# Patient Record
Sex: FEMALE | Race: WHITE | Employment: OTHER | URBAN - METROPOLITAN AREA
[De-identification: names, ages, dates, MRNs, and addresses within clinical notes are randomized per-mention and may not be internally consistent; named-entity substitution may affect disease eponyms.]

---

## 2017-05-15 ENCOUNTER — TELEPHONE (OUTPATIENT)
Dept: FAMILY MEDICINE CLINIC | Age: 57
End: 2017-05-15

## 2017-06-12 ENCOUNTER — OFFICE VISIT (OUTPATIENT)
Dept: FAMILY MEDICINE CLINIC | Age: 57
End: 2017-06-12

## 2017-06-12 VITALS
BODY MASS INDEX: 41.03 KG/M2 | HEIGHT: 69 IN | TEMPERATURE: 98 F | HEART RATE: 63 BPM | WEIGHT: 277 LBS | DIASTOLIC BLOOD PRESSURE: 78 MMHG | SYSTOLIC BLOOD PRESSURE: 126 MMHG | RESPIRATION RATE: 20 BRPM

## 2017-06-12 DIAGNOSIS — R13.12 OROPHARYNGEAL DYSPHAGIA: ICD-10-CM

## 2017-06-12 DIAGNOSIS — F17.200 SMOKER: ICD-10-CM

## 2017-06-12 DIAGNOSIS — R79.89 LOW VITAMIN D LEVEL: ICD-10-CM

## 2017-06-12 DIAGNOSIS — E78.2 MIXED HYPERLIPIDEMIA: ICD-10-CM

## 2017-06-12 DIAGNOSIS — E07.89 PALPABLE THYROID: ICD-10-CM

## 2017-06-12 DIAGNOSIS — E11.8 TYPE 2 DIABETES MELLITUS WITH COMPLICATION, UNSPECIFIED LONG TERM INSULIN USE STATUS: Primary | ICD-10-CM

## 2017-06-12 DIAGNOSIS — G62.9 NEUROPATHY: ICD-10-CM

## 2017-06-12 DIAGNOSIS — R06.02 SHORTNESS OF BREATH: ICD-10-CM

## 2017-06-12 LAB
ALBUMIN SERPL-MCNC: 4.2 G/DL (ref 3.5–5.1)
ALP BLD-CCNC: 122 U/L (ref 38–126)
ALT SERPL-CCNC: 15 U/L (ref 11–66)
ANION GAP SERPL CALCULATED.3IONS-SCNC: 15 MEQ/L (ref 8–16)
AST SERPL-CCNC: 13 U/L (ref 5–40)
BASOPHILS # BLD: 0.6 %
BASOPHILS ABSOLUTE: 0.1 THOU/MM3 (ref 0–0.1)
BILIRUB SERPL-MCNC: 0.6 MG/DL (ref 0.3–1.2)
BILIRUBIN DIRECT: < 0.2 MG/DL (ref 0–0.3)
BUN BLDV-MCNC: 18 MG/DL (ref 7–22)
CALCIUM SERPL-MCNC: 10.3 MG/DL (ref 8.5–10.5)
CHLORIDE BLD-SCNC: 101 MEQ/L (ref 98–111)
CHOLESTEROL, TOTAL: 260 MG/DL (ref 100–199)
CO2: 23 MEQ/L (ref 23–33)
CREAT SERPL-MCNC: 0.7 MG/DL (ref 0.4–1.2)
EOSINOPHIL # BLD: 2 %
EOSINOPHILS ABSOLUTE: 0.3 THOU/MM3 (ref 0–0.4)
GFR SERPL CREATININE-BSD FRML MDRD: 86 ML/MIN/1.73M2
GLUCOSE BLD-MCNC: 116 MG/DL (ref 70–108)
HCT VFR BLD CALC: 45.9 % (ref 37–47)
HDLC SERPL-MCNC: 33 MG/DL
HEMOGLOBIN: 15.3 GM/DL (ref 12–16)
LDL CHOLESTEROL CALCULATED: 166 MG/DL
LYMPHOCYTES # BLD: 33.7 %
LYMPHOCYTES ABSOLUTE: 4.3 THOU/MM3 (ref 1–4.8)
MAGNESIUM: 2 MG/DL (ref 1.6–2.4)
MCH RBC QN AUTO: 30.1 PG (ref 27–31)
MCHC RBC AUTO-ENTMCNC: 33.2 GM/DL (ref 33–37)
MCV RBC AUTO: 90.6 FL (ref 81–99)
MONOCYTES # BLD: 8.4 %
MONOCYTES ABSOLUTE: 1.1 THOU/MM3 (ref 0.4–1.3)
NUCLEATED RED BLOOD CELLS: 0 /100 WBC
PDW BLD-RTO: 14.5 % (ref 11.5–14.5)
PLATELET # BLD: 213 THOU/MM3 (ref 130–400)
PMV BLD AUTO: 10.8 MCM (ref 7.4–10.4)
POTASSIUM SERPL-SCNC: 4.3 MEQ/L (ref 3.5–5.2)
RBC # BLD: 5.07 MILL/MM3 (ref 4.2–5.4)
RBC # BLD: NORMAL 10*6/UL
SEDIMENTATION RATE, ERYTHROCYTE: 28 MM/HR (ref 0–20)
SEG NEUTROPHILS: 55.3 %
SEGMENTED NEUTROPHILS ABSOLUTE COUNT: 7 THOU/MM3 (ref 1.8–7.7)
SODIUM BLD-SCNC: 139 MEQ/L (ref 135–145)
TOTAL PROTEIN: 8 G/DL (ref 6.1–8)
TRIGL SERPL-MCNC: 305 MG/DL (ref 0–199)
TSH SERPL DL<=0.05 MIU/L-ACNC: 1.68 UIU/ML (ref 0.4–4.2)
VITAMIN D 25-HYDROXY: 12 NG/ML (ref 30–100)
WBC # BLD: 12.7 THOU/MM3 (ref 4.8–10.8)

## 2017-06-12 PROCEDURE — 99204 OFFICE O/P NEW MOD 45 MIN: CPT | Performed by: FAMILY MEDICINE

## 2017-06-12 PROCEDURE — 36415 COLL VENOUS BLD VENIPUNCTURE: CPT | Performed by: FAMILY MEDICINE

## 2017-06-12 RX ORDER — PREGABALIN 300 MG/1
300 CAPSULE ORAL 2 TIMES DAILY
COMMUNITY
End: 2017-08-14 | Stop reason: SDUPTHER

## 2017-06-12 RX ORDER — BUPROPION HYDROCHLORIDE 150 MG/1
150 TABLET, EXTENDED RELEASE ORAL 2 TIMES DAILY
Qty: 60 TABLET | Refills: 3 | Status: SHIPPED | OUTPATIENT
Start: 2017-06-12 | End: 2017-06-12 | Stop reason: SDUPTHER

## 2017-06-12 ASSESSMENT — ENCOUNTER SYMPTOMS
TROUBLE SWALLOWING: 0
NAUSEA: 0
EYE PAIN: 0
COUGH: 0
RHINORRHEA: 1
BLOOD IN STOOL: 0
CONSTIPATION: 0
ABDOMINAL PAIN: 0
SHORTNESS OF BREATH: 1
VOMITING: 0
DIARRHEA: 1

## 2017-06-13 ENCOUNTER — TELEPHONE (OUTPATIENT)
Dept: FAMILY MEDICINE CLINIC | Age: 57
End: 2017-06-13

## 2017-06-13 LAB — THYROXINE (T4): 6.9 UG/DL (ref 4.5–12)

## 2017-06-13 RX ORDER — BUPROPION HYDROCHLORIDE 150 MG/1
TABLET, EXTENDED RELEASE ORAL
Qty: 180 TABLET | Refills: 3 | Status: SHIPPED | OUTPATIENT
Start: 2017-06-13 | End: 2017-07-06 | Stop reason: SDUPTHER

## 2017-06-14 LAB
C-REACTIVE PROTEIN, HIGH SENSITIVITY: 14.7 MG/L
HOMOCYSTEINE, TOTAL: 16 UMOL/L
THYROGLOBULIN: NORMAL
THYROID PEROXIDASE ANTIBODY: 1.5 IU/ML (ref 0–9)

## 2017-06-15 ENCOUNTER — TELEPHONE (OUTPATIENT)
Dept: FAMILY MEDICINE CLINIC | Age: 57
End: 2017-06-15

## 2017-06-15 LAB — ANA SCREEN: NORMAL

## 2017-07-03 ENCOUNTER — TELEPHONE (OUTPATIENT)
Dept: FAMILY MEDICINE CLINIC | Age: 57
End: 2017-07-03

## 2017-07-06 DIAGNOSIS — F17.200 SMOKER: ICD-10-CM

## 2017-07-06 RX ORDER — BUPROPION HYDROCHLORIDE 150 MG/1
TABLET, EXTENDED RELEASE ORAL
Qty: 90 TABLET | Refills: 1 | Status: SHIPPED | OUTPATIENT
Start: 2017-07-06 | End: 2017-07-12 | Stop reason: SDUPTHER

## 2017-07-06 RX ORDER — PANTOPRAZOLE SODIUM 40 MG/1
40 TABLET, DELAYED RELEASE ORAL DAILY
Qty: 90 TABLET | Refills: 1 | Status: SHIPPED | OUTPATIENT
Start: 2017-07-06 | End: 2017-07-12 | Stop reason: SDUPTHER

## 2017-07-06 RX ORDER — CITALOPRAM 40 MG/1
40 TABLET ORAL DAILY
Qty: 90 TABLET | Refills: 1 | Status: SHIPPED | OUTPATIENT
Start: 2017-07-06 | End: 2017-07-12 | Stop reason: SDUPTHER

## 2017-07-06 RX ORDER — TRAZODONE HYDROCHLORIDE 100 MG/1
100 TABLET ORAL NIGHTLY
Qty: 90 TABLET | Refills: 1 | Status: SHIPPED | OUTPATIENT
Start: 2017-07-06 | End: 2017-07-12 | Stop reason: SDUPTHER

## 2017-07-06 RX ORDER — INSULIN ASPART 100 [IU]/ML
8 INJECTION, SUSPENSION SUBCUTANEOUS 3 TIMES DAILY
Qty: 1 VIAL | Refills: 3 | Status: SHIPPED | OUTPATIENT
Start: 2017-07-06 | End: 2017-07-11 | Stop reason: CLARIF

## 2017-07-11 ENCOUNTER — TELEPHONE (OUTPATIENT)
Dept: FAMILY MEDICINE CLINIC | Age: 57
End: 2017-07-11

## 2017-07-12 ENCOUNTER — TELEPHONE (OUTPATIENT)
Dept: FAMILY MEDICINE CLINIC | Age: 57
End: 2017-07-12

## 2017-07-12 ENCOUNTER — OFFICE VISIT (OUTPATIENT)
Dept: FAMILY MEDICINE CLINIC | Age: 57
End: 2017-07-12

## 2017-07-12 VITALS
WEIGHT: 273.4 LBS | SYSTOLIC BLOOD PRESSURE: 123 MMHG | DIASTOLIC BLOOD PRESSURE: 57 MMHG | RESPIRATION RATE: 12 BRPM | TEMPERATURE: 98.1 F | HEART RATE: 75 BPM | BODY MASS INDEX: 42.91 KG/M2 | HEIGHT: 67 IN

## 2017-07-12 DIAGNOSIS — E66.01 MORBID OBESITY WITH BMI OF 40.0-44.9, ADULT (HCC): ICD-10-CM

## 2017-07-12 DIAGNOSIS — J43.9 PULMONARY EMPHYSEMA, UNSPECIFIED EMPHYSEMA TYPE (HCC): Primary | ICD-10-CM

## 2017-07-12 DIAGNOSIS — Z72.0 TOBACCO ABUSE: ICD-10-CM

## 2017-07-12 DIAGNOSIS — F33.42 RECURRENT MAJOR DEPRESSIVE DISORDER, IN FULL REMISSION (HCC): ICD-10-CM

## 2017-07-12 DIAGNOSIS — Z11.4 SCREENING FOR HIV WITHOUT PRESENCE OF RISK FACTORS: ICD-10-CM

## 2017-07-12 DIAGNOSIS — R94.2 ABNORMAL PFT: Primary | ICD-10-CM

## 2017-07-12 DIAGNOSIS — E55.9 VITAMIN D DEFICIENCY: ICD-10-CM

## 2017-07-12 DIAGNOSIS — K21.9 GASTROESOPHAGEAL REFLUX DISEASE WITHOUT ESOPHAGITIS: ICD-10-CM

## 2017-07-12 DIAGNOSIS — E11.8 TYPE 2 DIABETES MELLITUS WITH COMPLICATION, UNSPECIFIED LONG TERM INSULIN USE STATUS: ICD-10-CM

## 2017-07-12 DIAGNOSIS — Z11.59 NEED FOR HEPATITIS C SCREENING TEST: ICD-10-CM

## 2017-07-12 DIAGNOSIS — E78.2 HYPERLIPIDEMIA, MIXED: ICD-10-CM

## 2017-07-12 DIAGNOSIS — F17.200 SMOKER: ICD-10-CM

## 2017-07-12 PROCEDURE — 99214 OFFICE O/P EST MOD 30 MIN: CPT | Performed by: NURSE PRACTITIONER

## 2017-07-12 RX ORDER — PANTOPRAZOLE SODIUM 40 MG/1
40 TABLET, DELAYED RELEASE ORAL DAILY
Qty: 90 TABLET | Refills: 1 | Status: SHIPPED | OUTPATIENT
Start: 2017-07-12 | End: 2017-11-09 | Stop reason: SDUPTHER

## 2017-07-12 RX ORDER — CITALOPRAM 40 MG/1
40 TABLET ORAL DAILY
Qty: 90 TABLET | Refills: 1 | Status: SHIPPED | OUTPATIENT
Start: 2017-07-12 | End: 2017-11-09 | Stop reason: SDUPTHER

## 2017-07-12 RX ORDER — BUPROPION HYDROCHLORIDE 150 MG/1
TABLET, EXTENDED RELEASE ORAL
Qty: 90 TABLET | Refills: 1 | Status: SHIPPED | OUTPATIENT
Start: 2017-07-12 | End: 2017-11-09 | Stop reason: SDUPTHER

## 2017-07-12 RX ORDER — AMPICILLIN TRIHYDRATE 250 MG
1 CAPSULE ORAL DAILY
COMMUNITY
End: 2019-01-14

## 2017-07-12 RX ORDER — ATORVASTATIN CALCIUM 20 MG/1
20 TABLET, FILM COATED ORAL DAILY
Qty: 90 TABLET | Refills: 0 | Status: SHIPPED | OUTPATIENT
Start: 2017-07-12 | End: 2017-09-20 | Stop reason: SDUPTHER

## 2017-07-12 RX ORDER — TRAZODONE HYDROCHLORIDE 100 MG/1
100 TABLET ORAL NIGHTLY
Qty: 90 TABLET | Refills: 1 | Status: SHIPPED | OUTPATIENT
Start: 2017-07-12 | End: 2017-11-09 | Stop reason: SDUPTHER

## 2017-07-12 ASSESSMENT — ENCOUNTER SYMPTOMS
SORE THROAT: 0
NAUSEA: 0
RHINORRHEA: 0
EYE REDNESS: 0
BLOOD IN STOOL: 0
COLOR CHANGE: 0
ABDOMINAL PAIN: 0
ABDOMINAL DISTENTION: 0
EYE DISCHARGE: 0
CONSTIPATION: 0
ANAL BLEEDING: 0
DIARRHEA: 0
SHORTNESS OF BREATH: 0
COUGH: 0

## 2017-07-12 ASSESSMENT — PATIENT HEALTH QUESTIONNAIRE - PHQ9
SUM OF ALL RESPONSES TO PHQ9 QUESTIONS 1 & 2: 3
SUM OF ALL RESPONSES TO PHQ QUESTIONS 1-9: 19
8. MOVING OR SPEAKING SO SLOWLY THAT OTHER PEOPLE COULD HAVE NOTICED. OR THE OPPOSITE, BEING SO FIGETY OR RESTLESS THAT YOU HAVE BEEN MOVING AROUND A LOT MORE THAN USUAL: 3
2. FEELING DOWN, DEPRESSED OR HOPELESS: 3
1. LITTLE INTEREST OR PLEASURE IN DOING THINGS: 0
4. FEELING TIRED OR HAVING LITTLE ENERGY: 2
3. TROUBLE FALLING OR STAYING ASLEEP: 2
10. IF YOU CHECKED OFF ANY PROBLEMS, HOW DIFFICULT HAVE THESE PROBLEMS MADE IT FOR YOU TO DO YOUR WORK, TAKE CARE OF THINGS AT HOME, OR GET ALONG WITH OTHER PEOPLE: 1
5. POOR APPETITE OR OVEREATING: 3
9. THOUGHTS THAT YOU WOULD BE BETTER OFF DEAD, OR OF HURTING YOURSELF: 0
7. TROUBLE CONCENTRATING ON THINGS, SUCH AS READING THE NEWSPAPER OR WATCHING TELEVISION: 3
6. FEELING BAD ABOUT YOURSELF - OR THAT YOU ARE A FAILURE OR HAVE LET YOURSELF OR YOUR FAMILY DOWN: 3

## 2017-07-13 ENCOUNTER — TELEPHONE (OUTPATIENT)
Dept: FAMILY MEDICINE CLINIC | Age: 57
End: 2017-07-13

## 2017-07-17 ENCOUNTER — TELEPHONE (OUTPATIENT)
Dept: FAMILY MEDICINE CLINIC | Age: 57
End: 2017-07-17

## 2017-07-17 DIAGNOSIS — E11.8 TYPE 2 DIABETES MELLITUS WITH COMPLICATION, UNSPECIFIED LONG TERM INSULIN USE STATUS: Primary | ICD-10-CM

## 2017-07-31 ENCOUNTER — HOSPITAL ENCOUNTER (OUTPATIENT)
Age: 57
Discharge: HOME | End: 2017-07-31
Payer: MEDICARE

## 2017-07-31 ENCOUNTER — HOSPITAL ENCOUNTER (OUTPATIENT)
Dept: GENERAL RADIOLOGY | Age: 57
Discharge: HOME | End: 2017-07-31
Payer: MEDICARE

## 2017-07-31 DIAGNOSIS — R94.2 ABNORMAL PFT: ICD-10-CM

## 2017-07-31 DIAGNOSIS — E11.8 TYPE 2 DIABETES MELLITUS WITH COMPLICATION, UNSPECIFIED LONG TERM INSULIN USE STATUS: ICD-10-CM

## 2017-07-31 DIAGNOSIS — Z11.59 NEED FOR HEPATITIS C SCREENING TEST: ICD-10-CM

## 2017-07-31 DIAGNOSIS — Z72.0 TOBACCO ABUSE: ICD-10-CM

## 2017-07-31 DIAGNOSIS — Z11.4 SCREENING FOR HIV WITHOUT PRESENCE OF RISK FACTORS: ICD-10-CM

## 2017-07-31 LAB
AVERAGE GLUCOSE: 222 MG/DL (ref 70–126)
CREATININE, URINE: 139.6 MG/DL
HBA1C MFR BLD: 9.4 % (ref 4.4–6.4)
HEPATITIS C ANTIBODY: NEGATIVE
MICROALBUMIN UR-MCNC: < 1.2 MG/DL
MICROALBUMIN/CREAT UR-RTO: 9 MG/G (ref 0–30)

## 2017-07-31 PROCEDURE — 82043 UR ALBUMIN QUANTITATIVE: CPT

## 2017-07-31 PROCEDURE — 36415 COLL VENOUS BLD VENIPUNCTURE: CPT

## 2017-07-31 PROCEDURE — 83036 HEMOGLOBIN GLYCOSYLATED A1C: CPT

## 2017-07-31 PROCEDURE — 86803 HEPATITIS C AB TEST: CPT

## 2017-07-31 PROCEDURE — 71020 XR CHEST STANDARD TWO VW: CPT

## 2017-07-31 PROCEDURE — 87389 HIV-1 AG W/HIV-1&-2 AB AG IA: CPT

## 2017-08-01 ENCOUNTER — TELEPHONE (OUTPATIENT)
Dept: FAMILY MEDICINE CLINIC | Age: 57
End: 2017-08-01

## 2017-08-01 LAB — HIV-2 AB: NEGATIVE

## 2017-08-02 ENCOUNTER — TELEPHONE (OUTPATIENT)
Dept: FAMILY MEDICINE CLINIC | Age: 57
End: 2017-08-02

## 2017-08-09 RX ORDER — PREGABALIN 300 MG/1
300 CAPSULE ORAL 2 TIMES DAILY
Qty: 60 CAPSULE | Refills: 1 | OUTPATIENT
Start: 2017-08-09

## 2017-08-14 DIAGNOSIS — G62.9 NEUROPATHY: Primary | ICD-10-CM

## 2017-08-14 RX ORDER — PREGABALIN 300 MG/1
300 CAPSULE ORAL 2 TIMES DAILY
Qty: 60 CAPSULE | Refills: 1 | Status: SHIPPED | OUTPATIENT
Start: 2017-08-14 | End: 2017-08-25 | Stop reason: SDUPTHER

## 2017-08-14 RX ORDER — PREGABALIN 300 MG/1
300 CAPSULE ORAL 2 TIMES DAILY
Qty: 60 CAPSULE | Refills: 1 | Status: SHIPPED | OUTPATIENT
Start: 2017-08-14 | End: 2017-08-14 | Stop reason: SDUPTHER

## 2017-08-23 ENCOUNTER — OFFICE VISIT (OUTPATIENT)
Dept: FAMILY MEDICINE CLINIC | Age: 57
End: 2017-08-23
Payer: MEDICARE

## 2017-08-23 VITALS
TEMPERATURE: 98 F | HEART RATE: 67 BPM | DIASTOLIC BLOOD PRESSURE: 72 MMHG | SYSTOLIC BLOOD PRESSURE: 110 MMHG | HEIGHT: 67 IN | WEIGHT: 273.8 LBS | BODY MASS INDEX: 42.97 KG/M2

## 2017-08-23 DIAGNOSIS — E11.8 TYPE 2 DIABETES MELLITUS WITH COMPLICATION, UNSPECIFIED LONG TERM INSULIN USE STATUS: Primary | ICD-10-CM

## 2017-08-23 DIAGNOSIS — G62.9 NEUROPATHY: ICD-10-CM

## 2017-08-23 LAB — HBA1C MFR BLD: 9.8 %

## 2017-08-23 PROCEDURE — 99214 OFFICE O/P EST MOD 30 MIN: CPT | Performed by: NURSE PRACTITIONER

## 2017-08-23 PROCEDURE — 83036 HEMOGLOBIN GLYCOSYLATED A1C: CPT | Performed by: NURSE PRACTITIONER

## 2017-08-23 RX ORDER — CHLORAL HYDRATE 500 MG
1000 CAPSULE ORAL DAILY
COMMUNITY
End: 2021-06-08 | Stop reason: ALTCHOICE

## 2017-08-23 RX ORDER — PREGABALIN 300 MG/1
300 CAPSULE ORAL 2 TIMES DAILY
Qty: 180 CAPSULE | Refills: 1 | Status: CANCELLED | OUTPATIENT
Start: 2017-08-23

## 2017-08-23 RX ORDER — GLIPIZIDE 5 MG/1
5 TABLET, FILM COATED, EXTENDED RELEASE ORAL DAILY
Qty: 90 TABLET | Refills: 3 | Status: SHIPPED | OUTPATIENT
Start: 2017-08-23 | End: 2017-11-09

## 2017-08-23 ASSESSMENT — ENCOUNTER SYMPTOMS
ANAL BLEEDING: 0
BLOOD IN STOOL: 0
SORE THROAT: 0
DIARRHEA: 0
COLOR CHANGE: 0
EYE REDNESS: 0
COUGH: 0
ABDOMINAL DISTENTION: 0
RHINORRHEA: 0
NAUSEA: 0
CONSTIPATION: 0
SHORTNESS OF BREATH: 0
ABDOMINAL PAIN: 0
EYE DISCHARGE: 0

## 2017-08-25 DIAGNOSIS — G62.9 NEUROPATHY: ICD-10-CM

## 2017-08-28 RX ORDER — PREGABALIN 300 MG/1
300 CAPSULE ORAL 2 TIMES DAILY
Qty: 180 CAPSULE | Refills: 1 | Status: SHIPPED | OUTPATIENT
Start: 2017-08-28 | End: 2018-03-01 | Stop reason: SDUPTHER

## 2017-09-11 ENCOUNTER — TELEPHONE (OUTPATIENT)
Dept: FAMILY MEDICINE CLINIC | Age: 57
End: 2017-09-11

## 2017-09-11 DIAGNOSIS — E11.8 TYPE 2 DIABETES MELLITUS WITH COMPLICATION, UNSPECIFIED LONG TERM INSULIN USE STATUS: Primary | ICD-10-CM

## 2017-09-20 DIAGNOSIS — E78.2 HYPERLIPIDEMIA, MIXED: ICD-10-CM

## 2017-09-20 RX ORDER — ATORVASTATIN CALCIUM 20 MG/1
20 TABLET, FILM COATED ORAL DAILY
Qty: 90 TABLET | Refills: 0 | Status: SHIPPED | OUTPATIENT
Start: 2017-09-20 | End: 2018-01-17 | Stop reason: SDUPTHER

## 2017-09-27 ENCOUNTER — CARE COORDINATION (OUTPATIENT)
Dept: CARE COORDINATION | Age: 57
End: 2017-09-27

## 2017-09-27 ENCOUNTER — TELEPHONE (OUTPATIENT)
Dept: FAMILY MEDICINE CLINIC | Age: 57
End: 2017-09-27

## 2017-10-09 ENCOUNTER — OFFICE VISIT (OUTPATIENT)
Dept: FAMILY MEDICINE CLINIC | Age: 57
End: 2017-10-09
Payer: MEDICARE

## 2017-10-09 VITALS
DIASTOLIC BLOOD PRESSURE: 62 MMHG | HEIGHT: 67 IN | TEMPERATURE: 97.5 F | RESPIRATION RATE: 12 BRPM | BODY MASS INDEX: 42.31 KG/M2 | SYSTOLIC BLOOD PRESSURE: 118 MMHG | WEIGHT: 269.6 LBS | HEART RATE: 66 BPM

## 2017-10-09 DIAGNOSIS — E78.2 HYPERLIPIDEMIA, MIXED: ICD-10-CM

## 2017-10-09 DIAGNOSIS — Z23 NEED FOR PROPHYLACTIC VACCINATION AGAINST DIPHTHERIA-TETANUS-PERTUSSIS (DTP): ICD-10-CM

## 2017-10-09 DIAGNOSIS — M50.20 HERNIATED CERVICAL DISC: ICD-10-CM

## 2017-10-09 DIAGNOSIS — E11.8 TYPE 2 DIABETES MELLITUS WITH COMPLICATION, UNSPECIFIED LONG TERM INSULIN USE STATUS: Primary | ICD-10-CM

## 2017-10-09 DIAGNOSIS — J44.9 MODERATE COPD (CHRONIC OBSTRUCTIVE PULMONARY DISEASE) (HCC): ICD-10-CM

## 2017-10-09 DIAGNOSIS — E55.9 VITAMIN D DEFICIENCY: ICD-10-CM

## 2017-10-09 DIAGNOSIS — Z12.11 SCREENING FOR COLON CANCER: ICD-10-CM

## 2017-10-09 DIAGNOSIS — E11.42 DIABETIC POLYNEUROPATHY ASSOCIATED WITH TYPE 2 DIABETES MELLITUS (HCC): ICD-10-CM

## 2017-10-09 DIAGNOSIS — Z12.31 ENCOUNTER FOR SCREENING MAMMOGRAM FOR BREAST CANCER: ICD-10-CM

## 2017-10-09 DIAGNOSIS — G62.9 NEUROPATHY: ICD-10-CM

## 2017-10-09 DIAGNOSIS — R29.898 WEAKNESS OF BOTH ARMS: ICD-10-CM

## 2017-10-09 PROCEDURE — 99214 OFFICE O/P EST MOD 30 MIN: CPT | Performed by: FAMILY MEDICINE

## 2017-10-09 RX ORDER — ALBUTEROL SULFATE 90 UG/1
2 AEROSOL, METERED RESPIRATORY (INHALATION) EVERY 6 HOURS PRN
Qty: 1 INHALER | Refills: 3 | Status: SHIPPED | OUTPATIENT
Start: 2017-10-09 | End: 2018-04-27 | Stop reason: SDUPTHER

## 2017-10-09 RX ORDER — MULTIVIT,TX WITH IRON,MINERALS
TABLET, EXTENDED RELEASE ORAL
Qty: 60 TABLET | Refills: 2 | Status: SHIPPED | OUTPATIENT
Start: 2017-10-09 | End: 2019-01-14

## 2017-10-09 NOTE — PROGRESS NOTES
[address]  [facility]  [address] [address]  Dept: [phone]  Dept Fax: [phone]: [phone]    Visit Date: 10/9/2017      Jing Brooks is a 61 y.o. female who presents today for:  Chief Complaint   Patient presents with    Diabetes     colonoscopy referral pended    Other     orders pended         Goals      Conditions and Symptoms            I will schedule office visits, as directed by my provider. I will keep my appointment or reschedule if I have to cancel. I will notify my provider of any barriers to my plan of care. I will follow my Zone Management tool to seek urgent or emergent care. I will notify my provider of any symptoms that indicate a worsening of my condition. Barriers: none  Plan for overcoming my barriers: N/A  Confidence: 7/10  Anticipated Goal Completion Date: 3/27/18         Nutrition Plan            I will follow a nutritional plan as directed   Calorie Controlled:   1500 Calories    Barriers: none  Plan for overcoming my barriers: N/A  Confidence: 7/10  Anticipated Goal Completion Date: 3/27/18       Stop Cigarette/Tobacco use           HPI:     HPI  She is taking the levemir at bed time 30 units    She is still on the sliding scale with the humalog - she takes between 16 and 24 units    Her hands are shaking a lot lately and she is dropping things - the disc in her neck has been stable - she will be starting physical therapy for the lower back.   Her hands seem to even fall off the steering wheel sometimes        Past Medical History:   Diagnosis Date    Arthritis     Depression     Diabetes mellitus ([address]     GERD (gastroesophageal reflux disease)     Glaucoma     Hiatal hernia     esophagus closes    Hx of blood clots     foot post op    Hyperlipidemia, mixed 7/12/2017    IBS (irritable bowel syndrome)     Leg pain     nerve damage right leg    MDRO (multiple drug resistant organisms) resistance Vitro route daily As needed. 100 each 3    atorvastatin (LIPITOR) 20 MG tablet Take 1 tablet by mouth daily 90 tablet 0    insulin lispro (HUMALOG KWIKPEN) 100 UNIT/ML pen Inject 8 Units into the skin 3 times daily (before meals) 5 Pen 3    insulin detemir (LEVEMIR FLEXTOUCH) 100 UNIT/ML injection pen Inject 30 Units into the skin nightly 5 Pen 3    pregabalin (LYRICA) 300 MG capsule Take 1 capsule by mouth 2 times daily 180 capsule 1    Omega-3 Fatty Acids (FISH OIL) 1000 MG CAPS Take 1,000 mg by mouth daily      glipiZIDE (GLUCOTROL XL) 5 MG extended release tablet Take 1 tablet by mouth daily 90 tablet 3    Cinnamon 500 MG CAPS Take 1 capsule by mouth daily      traZODone (DESYREL) 100 MG tablet Take 1 tablet by mouth nightly 90 tablet 1    SITagliptin (JANUVIA) 100 MG tablet Take 1 tablet by mouth daily 90 tablet 1    pantoprazole (PROTONIX) 40 MG tablet Take 1 tablet by mouth daily 90 tablet 1    citalopram (CELEXA) 40 MG tablet Take 1 tablet by mouth daily 90 tablet 1    buPROPion (WELLBUTRIN SR) 150 MG extended release tablet TAKE 1 TABLET BY MOUTH TWICE DAILY 90 tablet 1    Cholecalciferol (VITAMIN D3) 2000 units CAPS Take by mouth daily      vitamin B-12 (CYANOCOBALAMIN) 500 MCG tablet Take 500 mcg by mouth daily      rOPINIRole (REQUIP) 1 MG tablet Take 1 mg by mouth 3 times daily as needed       traMADol (ULTRAM) 50 MG tablet Take 50 mg by mouth every 6 hours as needed for Pain      HYDROcodone-acetaminophen (NORCO) 5-325 MG per tablet Take 1 tablet by mouth every 4 hours as needed for Pain (For moderate pain level 4-6). 60 tablet 0    acetaminophen 650 MG TABS Take 650 mg by mouth every 4 hours as needed for Pain (For mild pain level 1-3 or for fever > 100.5). 120 tablet      No current facility-administered medications for this visit.       Allergies   Allergen Reactions    Latex Itching and Rash       Health Maintenance   Topic Date Due    DTaP/Tdap/Td vaccine (1 - Tdap) 10/29/1978 also    Keep moving like you are! Great job losing weight! See y ou back in one month with Nikhil Andrews    Return in about 4 weeks (around 11/6/2017) for follow up chronic conditions, will review bloodwork. Orders Placed:  Orders Placed This Encounter   Procedures    HERRERA Digital Screen Bilateral [[id number]]    Basic Metabolic Panel, Without Calcium    Calcium    CBC Auto Differential    Magnesium    Vitamin D 25 Hydroxy    Lipid Panel    Hemoglobin A1C    [facility] ([facility]    EMG     Medications Prescribed:  Orders Placed This Encounter   Medications    Tdap (ADACEL) 5-2-15.5 LF-MCG/0.5 injection     Sig: Inject 0.5 mLs into the muscle once for 1 dose     Dispense:  0.5 mL     Refill:  0    Vitamins-Lipotropics (BALANCED B-100 COMPLEX CR) TBCR     Sig: Take 1 tablet by mouth 4 times daily (after meals and at bedtime)     Dispense:  120 tablet     Refill:  5    Magnesium Gluconate 250 MG TABS     Sig: One pill twice a day     Dispense:  60 tablet     Refill:  2    albuterol sulfate HFA (PROAIR HFA) 108 (90 Base) MCG/ACT inhaler     Sig: Inhale 2 puffs into the lungs every 6 hours as needed for Wheezing     Dispense:  1 Inhaler     Refill:  3        Patient given educational materials - see patient instructions. Discussed use, benefit, and side effects of prescribed medications. All patient questions answered. Pt voiced understanding. Reviewed health maintenance. Instructed to continue current medications, diet and exercise. Patient agreed with treatment plan. Follow up as directed.      Electronically signed by Evangelina Ibrahim DO on 11/7/2017 at 2:02 PM

## 2017-10-09 NOTE — PATIENT INSTRUCTIONS
Will go up on the levemir to 40 units    Will try to keep an eye on the humalog - may need less    Will do an EMG of the upper extremities to check on the damage form the nerves and the dropping things    Will try off the glipizide for 2 weeks to see if you feel any better    Will change form the b12 to the B100 time released twice a day - do NOT get the SUPER complex it does not work as well    Will also start the magnesium oxide twice a day also    Keep moving like you are! Great job losing weight! See you back in one month with Roxy Pedersen - will get some bloodwork right before    You may receive a survey about your visit with us today. The feedback from our patients helps us identify what is working well and where the service to all patients can be enhanced. Thank you! Patient Education          tetanus, diphtheria, acellular pertussis vaccine (Tdap)  Pronunciation:  ROSANNA gonzalez, dif THEER ee a, and lulu mccormack per TUS iss  Brand:  Adacel (Tdap), Boostrix (Tdap)  What is the most important information I should know about this vaccine? You should not receive this vaccine if you have ever had had a life-threatening allergic reaction to a tetanus, diphtheria, or pertussis vaccine. You also should not receive this vaccine if you had a neurologic disorder affecting your brain within 7 days after having a previous pertussis vaccine. What is tetanus, diphtheria, acellular pertussis vaccine (Tdap)? Tetanus, diphtheria, and pertussis are serious diseases caused by bacteria. Tetanus (lockjaw) causes painful tightening of the muscles, usually all over the body. It can lead to \"locking\" of the jaw so the victim cannot open the mouth or swallow. Tetanus leads to death in about 1 out of 10 cases. Diphtheria causes a thick coating in the nose, throat, and airways. It can lead to breathing problems, paralysis, heart failure, or death.   Pertussis (whooping cough) causes coughing so severe that it interferes with eating, drinking, or breathing. These spells can last for weeks and can lead to pneumonia, seizures (convulsions), brain damage, and death. Diphtheria and pertussis are spread from person to person. Tetanus enters the body through a cut or wound. The diphtheria, tetanus acellular, and pertussis adult vaccine (also called Tdap) is used to help prevent these diseases in people who are at least 8years old. Most people in this age group require only one Tdap shot for protection against these diseases. Tdap vaccine is especially important for healthcare workers or people who have close contact with a baby younger than 13 months old. This vaccine works by exposing you to a small dose of the bacteria or a protein from the bacteria, which causes the body to develop immunity to the disease. This vaccine will not treat an active infection that has already developed in the body. Like any vaccine, the Tdap vaccine may not provide protection from disease in every person. What should I discuss with my healthcare provider before receiving this vaccine? You should not receive this vaccine if:  · you had a life-threatening allergic reaction to any vaccine that contains tetanus, diphtheria, or pertussis; or  · you had a neurologic disorder affecting your brain (such as loss of consciousness or a prolonged seizure) within 7 days after having a previous pertussis vaccine. You may not be able to receive a Tdap vaccine if you have ever received a similar vaccine that caused any of the following:  · a very high fever (over 104 degrees Fahrenheit);  · a neurologic disorder or disease affecting the brain;  · fainting or going into shock;  · severe pain, redness, tenderness, swelling, or a lump where the shot was given;  · an allergy to latex rubber;  · severe or uncontrolled epilepsy or other seizure disorder; or  · Guillain-Barré syndrome (within 6 weeks after receiving a vaccine containing tetanus).   If you have any of these other doctor if you have recently received drugs or treatments that can weaken the immune system, including:  · an oral, nasal, inhaled, or injectable steroid medicine;  · medications to treat psoriasis, rheumatoid arthritis, or other autoimmune disorders; or  · medicines to treat or prevent organ transplant rejection. If you are using any of these medications, you may not be able to receive the vaccine, or may need to wait until the other treatments are finished. This list is not complete. Other drugs may interact with this vaccine, including prescription and over-the-counter medicines, vitamins, and herbal products. Not all possible interactions are listed in this medication guide. Where can I get more information? Your doctor or pharmacist can provide more information about this vaccine. Additional information is available from your local health department or the Centers for Disease Control and Prevention. Remember, keep this and all other medicines out of the reach of children, never share your medicines with others, and use this medication only for the indication prescribed. Every effort has been made to ensure that the information provided by [address] is accurate, up-to-date, and complete, but no guarantee is made to that effect. Drug information contained herein may be time sensitive. [facility] information has been compiled for use by healthcare practitioners and consumers in the [place] and therefore [facility] does not warrant that uses outside of the [place] are appropriate, unless specifically indicated otherwise. [facility] drug information does not endorse drugs, diagnose patients or recommend therapy.  [facility] drug information is an informational resource designed to assist licensed healthcare practitioners in caring for their patients and/or to serve consumers viewing this service as a supplement to, and not a substitute for, the expertise, skill, knowledge and judgment of thousands of people in the [facility] die from flu, and many more are hospitalized. Flu vaccine can:  · Keep you from getting flu. · Make flu less severe if you do get it. · Keep you from spreading flu to your family and other people. Inactivated and recombinant flu vaccines  A dose of flu vaccine is recommended every flu season. Children 6 months through 6years of age may need two doses during the same flu season. Everyone else needs only one dose each flu season. Some inactivated flu vaccines contain a very small amount of a mercury-based preservative called thimerosal. Studies have not shown thimerosal in vaccines to be harmful, but flu vaccines that do not contain thimerosal are available. There is no live flu virus in flu shots. They cannot cause the flu. There are many flu viruses, and they are always changing. Each year a new flu vaccine is made to protect against three or four viruses that are likely to cause disease in the upcoming flu season. But even when the vaccine doesn't exactly match these viruses, it may still provide some protection. Flu vaccine cannot prevent:  · Flu that is caused by a virus not covered by the vaccine. · Illnesses that look like flu but are not. Some people should not get this vaccine  Tell the person who is giving you the vaccine:  · If you have any severe (life-threatening) allergies. If you ever had a life-threatening allergic reaction after a dose of flu vaccine, or have a severe allergy to any part of this vaccine, you may be advised not to get vaccinated. Most, but not all, types of flu vaccine contain a small amount of egg protein. · If you ever had Guillain-Barré syndrome (also called GBS) Some people with a history of GBS should not get this vaccine. This should be discussed with your doctor. · If you are not feeling well. It is usually okay to get flu vaccine when you have a mild illness, but you might be asked to come back when you feel better.   Risks of a vaccine reaction  With any medicine, including vaccines, there is a chance of reactions. These are usually mild and go away on their own, but serious reactions are also possible. Most people who get a flu shot do not have any problems with it. Minor problems following a flu shot include:  · Soreness, redness, or swelling where the shot was given  · Hoarseness  · Sore, red or itchy eyes  · Cough  · Fever  · Aches  · Headache  · Itching  · Fatigue  If these problems occur, they usually begin soon after the shot and last 1 or 2 days. More serious problems following a flu shot can include the following:  · There may be a small increased risk of Guillain-Barré Syndrome (GBS) after inactivated flu vaccine. This risk has been estimated at 1 or 2 additional cases per million people vaccinated. This is much lower than the risk of severe complications from flu, which can be prevented by flu vaccine. · Teresa Fernandez children who get the flu shot along with pneumococcal vaccine (PCV13) and/or DTaP vaccine at the same time might be slightly more likely to have a seizure caused by fever. Ask your doctor for more information. Tell your doctor if a child who is getting flu vaccine has ever had a seizure  Problems that could happen after any injected vaccine:  · People sometimes faint after a medical procedure, including vaccination. Sitting or lying down for about 15 minutes can help prevent fainting, and injuries caused by a fall. Tell your doctor if you feel dizzy, or have vision changes or ringing in the ears. · Some people get severe pain in the shoulder and have difficulty moving the arm where a shot was given. This happens very rarely. · Any medication can cause a severe allergic reaction. Such reactions from a vaccine are very rare, estimated at about 1 in a million doses, and would happen within a few minutes to a few hours after the vaccination.   As with any medicine, there is a very remote chance of a vaccine causing a serious injury or death. The safety of vaccines is always being monitored. For more information, visit: www.cdc.gov/vaccinesafety/. What if there is a serious reaction? What should I look for? · Look for anything that concerns you, such as signs of a severe allergic reaction, very high fever, or unusual behavior. Signs of a severe allergic reaction can include hives, swelling of the face and throat, difficulty breathing, a fast heartbeat, dizziness, and weakness - usually within a few minutes to a few hours after the vaccination. What should I do? · If you think it is a severe allergic reaction or other emergency that can't wait, call 9-1-1 and get the person to the nearest hospital. Otherwise, call your doctor. · Reactions should be reported to the \"Vaccine Adverse Event Reporting System\" (VAERS). Your doctor should file this report, or you can do it yourself through the VAERS website at www.vaers. [facility].gov, or by calling 6-779.183.8837. [facility] does not give medical advice. The National Vaccine Injury Compensation Program  The National Vaccine Injury Compensation Program (VICP) is a federal program that was created to compensate people who may have been injured by certain vaccines. Persons who believe they may have been injured by a vaccine can learn about the program and about filing a claim by calling 7-619.544.1981 or visiting the [address] website at www.[facility].gov/vaccinecompensation. There is a time limit to file a claim for compensation. How can I learn more? · Ask your healthcare provider. He or she can give you the vaccine package insert or suggest other sources of information. · Call your local or state health department. · Contact the Centers for Disease Control and Prevention (CDC):  ¨ Call 5-661.100.8756 (1-800-CDC-INFO) or  ¨ Visit CDC's website at www.cdc.gov/flu  Vaccine Information Statement  Inactivated Influenza Vaccine  8/7/2015)  42 STEVE Taylor [id number]  Department of Health and Human vaccine if you have a cold or fever. In the case of a more severe illness with a fever or any type of infection, wait until you get better before receiving this vaccine. Vaccines may be harmful to an unborn baby and generally should not be given to a pregnant woman. However, not vaccinating the mother could be more harmful to the baby if the mother becomes infected with a disease that this vaccine could prevent. Your doctor will decide whether you should receive this vaccine, especially if you have a high risk of infection with pneumococcal disease. It is not known whether PPSV passes into breast milk or if it could harm a nursing baby. Do not use this medication without telling your doctor if you are breast-feeding a baby. How is this vaccine given? PPSV is given as an injection (shot) under the skin or into a muscle of your arm or thigh. You will receive this injection in a doctor's office or other clinic setting. PPSV is usually given as a routine vaccination in adults who are 72 years and older. PPSV may also be given to people between the ages 3and 59years old who have:  · heart disease, lung disease, or diabetes;  · a cerebrospinal fluid leak, or a cochlear implant (an electronic hearing device);  · alcoholism or liver disease (including cirrhosis);  · sickle cell disease or a disorder of the spleen;  · a weak immune system caused by HIV, AIDS, cancer, kidney failure, organ transplantation, or a damaged spleen; or  · a weak immune system caused by taking steroids or receiving chemotherapy or radiation treatment. PPSV may also be given to people between the ages 23and 59years old who smoke or have asthma. PPSV should be given at least 2 weeks before the start of any treatment that can weaken your immune system. PPSV is also given at least 2 weeks before you undergo a splenectomy (surgical removal of the spleen). The timing of this vaccination is very important for it to be effective.  Follow your However, like any medicine, this vaccine can cause side effects but the risk of serious side effects is extremely low. Get emergency medical help if you have any of these signs of an allergic reaction: hives; difficulty breathing; swelling of your face, lips, tongue, or throat. Call your doctor at once if you have a serious side effect such as:  · high fever (103 degrees or higher);  · easy bruising or bleeding;  · swollen glands with skin rash or itching, joint pain, and general ill feeling;  · pale or yellowed skin, dark colored urine, confusion or weakness;  · numbness or tingly feeling in your feet and spreading upward, severe lower back pain;  · changes in behavior, problems with vision, speech, swallowing, or bladder and bowel functions; or  · slow heart rate, trouble breathing, feeling like you might pass out. Less serious side effects are more likely to occur, such as:  · low fever (102 degrees or less), chills, tired feeling;  · swelling, pain, tenderness, or redness anywhere on your body;  · headache, nausea, vomiting;  · joint or muscle pain;  · swelling or stiffness in the arm or leg the vaccine was injected into;  · mild skin rash; or  · mild soreness, warmth, redness, swelling, or a hard lump where the shot was given. This is not a complete list of side effects and others may occur. Call your doctor for medical advice about side effects. You may report vaccine side effects to the [address] [address] and Human Services at [phone]. What other drugs will affect pneumococcal polysaccharides vaccine (PPSV)? Before receiving this vaccine, tell the doctor about all other vaccines you have recently received.   Also tell the doctor if you have recently received drugs or treatments that can weaken the immune system, including:  · an oral, nasal, inhaled, or injectable steroid medicine;  · medications to treat psoriasis, rheumatoid arthritis, or other autoimmune disorders, such as azathioprine (Imuran), etanercept (Enbrel), leflunomide (Vincent Pillion), and others; or  · medicines to treat or prevent organ transplant rejection, such as basiliximab (Simulect), cyclosporine (Sandimmune, Neoral, Gengraf), muromonab-CD3 (Orthoclone), mycophenolate mofetil (CellCept), sirolimus (Rapamune), or tacrolimus (Prograf). If you are using any of these medications, you may not be able to receive the vaccine, or may need to wait until the other treatments are finished. This list is not complete and other drugs may interact with PPSV. Tell your doctor about all medications you use. This includes prescription, over-the-counter, vitamin, and herbal products. Do not start a new medication without telling your doctor. Where can I get more information? Your doctor or pharmacist may have additional information about pneumococcal polysaccharides vaccine. You may also find additional information from your local health department or the Centers for Disease Control and Prevention. Remember, keep this and all other medicines out of the reach of children, never share your medicines with others, and use this medication only for the indication prescribed. Every effort has been made to ensure that the information provided by [address] is accurate, up-to-date, and complete, but no guarantee is made to that effect. Drug information contained herein may be time sensitive. [employer] information has been compiled for use by healthcare practitioners and consumers in the [place] and therefore [employer] does not warrant that uses outside of the [place] are appropriate, unless specifically indicated otherwise. [facility] information does not endorse drugs, diagnose patients or recommend therapy.  [facility] drug information is an informational resource designed to assist licensed healthcare practitioners in caring for their patients and/or to serve consumers viewing this service as a supplement to, and not a substitute an activity you enjoy, such as reading a book, taking a hot bath, or gardening. · Talk to your doctor or pharmacist about nicotine replacement therapy, which replaces the nicotine in your body. You still get nicotine but you do not use tobacco. Nicotine replacement products help you slowly reduce the amount of nicotine you need. These products come in several forms, many of them available over-the-counter:  ¨ Nicotine patches  ¨ Nicotine gum and lozenges  ¨ Nicotine inhaler  · Ask your doctor about bupropion (Wellbutrin) or varenicline (Chantix), which are prescription medicines. They do not contain nicotine. They help you by reducing withdrawal symptoms, such as stress and anxiety. · Some people find hypnosis, acupuncture, and massage helpful for ending the smoking habit. · Eat a healthy diet and get regular exercise. Having healthy habits will help your body move past its craving for nicotine. · Be prepared to keep trying. Most people are not successful the first few times they try to quit. Do not get mad at yourself if you smoke again. Make a list of things you learned and think about when you want to try again, such as next week, next month, or next year. Where can you learn more? Go to [email or web address].[email or web address]. org and sign in to your [employer] account. Enter F235 in the Fjuul box to learn more about \"Stopping Smoking: Care Instructions. \"     If you do not have an account, please click on the \"Sign Up Now\" link. Current as of: March 20, 2017  Content Version: 11.3  © 9142-4369 [employer]. Care instructions adapted under license by [facility] ([facility]). If you have questions about a medical condition or this instruction, always ask your healthcare professional. [place] [address] any warranty or liability for your use of this information.        Patient Education        Learning About Benefits From Quitting Smoking  How does quitting smoking make you

## 2017-10-10 ENCOUNTER — TELEPHONE (OUTPATIENT)
Dept: FAMILY MEDICINE CLINIC | Age: 57
End: 2017-10-10

## 2017-10-16 ENCOUNTER — HOSPITAL ENCOUNTER (OUTPATIENT)
Dept: PHARMACY | Age: 57
Setting detail: THERAPIES SERIES
Discharge: HOME | End: 2017-10-16
Payer: MEDICARE

## 2017-10-16 VITALS — WEIGHT: 270 LBS | HEIGHT: 67 IN | BODY MASS INDEX: 42.38 KG/M2

## 2017-10-16 PROCEDURE — 97802 MEDICAL NUTRITION INDIV IN: CPT | Performed by: DIETITIAN, REGISTERED

## 2017-10-17 ENCOUNTER — CARE COORDINATION (OUTPATIENT)
Dept: CARE COORDINATION | Age: 57
End: 2017-10-17

## 2017-10-17 ASSESSMENT — ENCOUNTER SYMPTOMS: DYSPNEA ASSOCIATED WITH: EXERTION

## 2017-10-17 NOTE — CARE COORDINATION
Ambulatory Care Coordination Note  10/17/2017  CM Risk Score: 6  Cameron Mortality Risk Score:      ACC: Elzbieta Sanot, RN    Summary Note: Spoke with Lani Lai. States she went to dietician yesterday. Reinforced healthy eating. States BS fluctuate. 239, 142, 323. States she ate stir reaves for dinner with noodles, broccoli, carrots and teriyaki sauce. Breakfast was cereal and toast.  Discussed she is eating too many carbs. Discussed plate method. Discussed eating vegetables, fruit, lean protein, and limiting portion of starches. Reinforced no regular pop. Starting PT at [facility] today. Going to chair yoga when she can. Encouraged walking daily. Diabetes Assessment    Meal Planning:  Avoidance of concentrated sweets   How often do you test your blood sugar?:  Daily, Meals, Bedtime   Do you have barriers with adherence to non-pharmacologic self-management interventions?  (Nutrition/Exercise/Self-Monitoring):  No   Have you ever had to go to the ED for symptoms of low blood sugar?:  No       Increase or Decrease trend in Blood Sugars   Do you have hyperglycemia symptoms?:  No   Do you have hypoglycemia symptoms?:  No   Last Blood Sugar Value:  239   Blood Sugar Monitoring Regimen:  Before Meals   Blood Sugar Trends:  Fluctuating       and   COPD Assessment    Is the patient able to verbalize Rescue vs. Long Acting medications?:  No  Does the patient have a nebulizer?:  No  Does the patient use a space with inhaled medications?:  No     No patient-reported symptoms         Symptoms:   None:  Yes      Symptom course:  stable  Breathlessness:  exertion  Increase use of rapid acting/rescue inhaled medications?:  No  Change in chronic cough?:  No/At Baseline  Change in sputum?:  No/At Baseline  Have you had a recent diagnosis of pneumonia either by PCP or at a hospital?:  No         Care Coordination Interventions    Program Enrollment:  Complex Care  Referral from Primary Care Provider:  No  Suggested ANGIE [facility] [place]  [place]   11/14/2017 9:40 AM Ivett Toro MD Pulm Med [facility] [place]  [place]   11/20/2017 2:00 PM Melissa Velasquez RD, LD STR MED [facility] - [place]

## 2017-10-27 ENCOUNTER — HOSPITAL ENCOUNTER (OUTPATIENT)
Dept: WOMENS IMAGING | Age: 57
Discharge: HOME | End: 2017-10-27
Payer: MEDICARE

## 2017-10-27 DIAGNOSIS — Z12.31 ENCOUNTER FOR SCREENING MAMMOGRAM FOR BREAST CANCER: ICD-10-CM

## 2017-10-27 PROCEDURE — 77063 BREAST TOMOSYNTHESIS BI: CPT

## 2017-11-07 ENCOUNTER — TELEPHONE (OUTPATIENT)
Dept: FAMILY MEDICINE CLINIC | Age: 57
End: 2017-11-07

## 2017-11-07 ENCOUNTER — PROCEDURE VISIT (OUTPATIENT)
Dept: NEUROLOGY | Age: 57
End: 2017-11-07
Payer: MEDICARE

## 2017-11-07 DIAGNOSIS — M54.12 LEFT CERVICAL RADICULOPATHY: Primary | ICD-10-CM

## 2017-11-07 DIAGNOSIS — E11.42 DIABETIC POLYNEUROPATHY ASSOCIATED WITH TYPE 2 DIABETES MELLITUS (HCC): ICD-10-CM

## 2017-11-07 DIAGNOSIS — R29.898 WEAKNESS OF BOTH ARMS: ICD-10-CM

## 2017-11-07 DIAGNOSIS — G56.01 RIGHT CARPAL TUNNEL SYNDROME: ICD-10-CM

## 2017-11-07 DIAGNOSIS — R20.0 BILATERAL HAND NUMBNESS: ICD-10-CM

## 2017-11-07 DIAGNOSIS — M50.20 HERNIATED CERVICAL DISC: ICD-10-CM

## 2017-11-07 PROCEDURE — 95911 NRV CNDJ TEST 9-10 STUDIES: CPT | Performed by: PSYCHIATRY & NEUROLOGY

## 2017-11-07 PROCEDURE — 95886 MUSC TEST DONE W/N TEST COMP: CPT | Performed by: PSYCHIATRY & NEUROLOGY

## 2017-11-07 ASSESSMENT — ENCOUNTER SYMPTOMS
EYES NEGATIVE: 1
RESPIRATORY NEGATIVE: 1
GASTROINTESTINAL NEGATIVE: 1

## 2017-11-07 NOTE — TELEPHONE ENCOUNTER
Can you let her know the EMG showed carpal tunell on the right and some mild degeneration of the disk in the lower neck    This may be why you are dropping things - would you like to be referred to ortho for the carpal tunnel?   It looks like the disc in the neck may be mild at this time    If so can you put in the referral to ortho Dx carpal tunnel syndrome - I am notr sure who the OIO doctor is - can cooper on the sheet to see who does the wrist?

## 2017-11-08 ENCOUNTER — HOSPITAL ENCOUNTER (OUTPATIENT)
Age: 57
Discharge: HOME | End: 2017-11-08
Payer: MEDICARE

## 2017-11-08 DIAGNOSIS — E55.9 VITAMIN D DEFICIENCY: ICD-10-CM

## 2017-11-08 DIAGNOSIS — E11.8 TYPE 2 DIABETES MELLITUS WITH COMPLICATION, UNSPECIFIED LONG TERM INSULIN USE STATUS: ICD-10-CM

## 2017-11-08 LAB
ANION GAP SERPL CALCULATED.3IONS-SCNC: 18 MEQ/L (ref 8–16)
AVERAGE GLUCOSE: 225 MG/DL (ref 70–126)
BASOPHILS # BLD: 1 %
BASOPHILS ABSOLUTE: 0.1 THOU/MM3 (ref 0–0.1)
BUN BLDV-MCNC: 14 MG/DL (ref 7–22)
CALCIUM SERPL-MCNC: 9.4 MG/DL (ref 8.5–10.5)
CHLORIDE BLD-SCNC: 98 MEQ/L (ref 98–111)
CHOLESTEROL, TOTAL: 176 MG/DL (ref 100–199)
CO2: 23 MEQ/L (ref 23–33)
CREAT SERPL-MCNC: 0.8 MG/DL (ref 0.4–1.2)
EOSINOPHIL # BLD: 1.6 %
EOSINOPHILS ABSOLUTE: 0.2 THOU/MM3 (ref 0–0.4)
GFR SERPL CREATININE-BSD FRML MDRD: 74 ML/MIN/1.73M2
GLUCOSE BLD-MCNC: 314 MG/DL (ref 70–108)
HBA1C MFR BLD: 9.5 % (ref 4.4–6.4)
HCT VFR BLD CALC: 41.1 % (ref 37–47)
HDLC SERPL-MCNC: 33 MG/DL
HEMOGLOBIN: 14.2 GM/DL (ref 12–16)
LDL CHOLESTEROL CALCULATED: 89 MG/DL
LYMPHOCYTES # BLD: 34.6 %
LYMPHOCYTES ABSOLUTE: 4 THOU/MM3 (ref 1–4.8)
MAGNESIUM: 2 MG/DL (ref 1.6–2.4)
MCH RBC QN AUTO: 31.6 PG (ref 27–31)
MCHC RBC AUTO-ENTMCNC: 34.5 GM/DL (ref 33–37)
MCV RBC AUTO: 91.5 FL (ref 81–99)
MONOCYTES # BLD: 8 %
MONOCYTES ABSOLUTE: 0.9 THOU/MM3 (ref 0.4–1.3)
NUCLEATED RED BLOOD CELLS: 0 /100 WBC
PDW BLD-RTO: 14 % (ref 11.5–14.5)
PLATELET # BLD: 180 THOU/MM3 (ref 130–400)
PMV BLD AUTO: 10.5 MCM (ref 7.4–10.4)
POTASSIUM SERPL-SCNC: 4.5 MEQ/L (ref 3.5–5.2)
RBC # BLD: 4.49 MILL/MM3 (ref 4.2–5.4)
SEG NEUTROPHILS: 54.8 %
SEGMENTED NEUTROPHILS ABSOLUTE COUNT: 6.4 THOU/MM3 (ref 1.8–7.7)
SODIUM BLD-SCNC: 139 MEQ/L (ref 135–145)
TRIGL SERPL-MCNC: 272 MG/DL (ref 0–199)
VITAMIN D 25-HYDROXY: 19 NG/ML (ref 30–100)
WBC # BLD: 11.7 THOU/MM3 (ref 4.8–10.8)

## 2017-11-08 PROCEDURE — 80061 LIPID PANEL: CPT

## 2017-11-08 PROCEDURE — 85025 COMPLETE CBC W/AUTO DIFF WBC: CPT

## 2017-11-08 PROCEDURE — 80048 BASIC METABOLIC PNL TOTAL CA: CPT

## 2017-11-08 PROCEDURE — 82306 VITAMIN D 25 HYDROXY: CPT

## 2017-11-08 PROCEDURE — 83735 ASSAY OF MAGNESIUM: CPT

## 2017-11-08 PROCEDURE — 36415 COLL VENOUS BLD VENIPUNCTURE: CPT

## 2017-11-08 PROCEDURE — 83036 HEMOGLOBIN GLYCOSYLATED A1C: CPT

## 2017-11-09 ENCOUNTER — OFFICE VISIT (OUTPATIENT)
Dept: FAMILY MEDICINE CLINIC | Age: 57
End: 2017-11-09
Payer: MEDICARE

## 2017-11-09 VITALS
OXYGEN SATURATION: 96 % | SYSTOLIC BLOOD PRESSURE: 130 MMHG | BODY MASS INDEX: 43.19 KG/M2 | WEIGHT: 275.2 LBS | HEIGHT: 67 IN | DIASTOLIC BLOOD PRESSURE: 74 MMHG | RESPIRATION RATE: 12 BRPM | HEART RATE: 67 BPM | TEMPERATURE: 98 F

## 2017-11-09 DIAGNOSIS — K21.9 GASTROESOPHAGEAL REFLUX DISEASE WITHOUT ESOPHAGITIS: ICD-10-CM

## 2017-11-09 DIAGNOSIS — F33.42 RECURRENT MAJOR DEPRESSIVE DISORDER, IN FULL REMISSION (HCC): ICD-10-CM

## 2017-11-09 DIAGNOSIS — J43.9 PULMONARY EMPHYSEMA, UNSPECIFIED EMPHYSEMA TYPE (HCC): Primary | ICD-10-CM

## 2017-11-09 DIAGNOSIS — E11.8 TYPE 2 DIABETES MELLITUS WITH COMPLICATION, UNSPECIFIED LONG TERM INSULIN USE STATUS: ICD-10-CM

## 2017-11-09 PROCEDURE — 99214 OFFICE O/P EST MOD 30 MIN: CPT | Performed by: FAMILY MEDICINE

## 2017-11-09 RX ORDER — CITALOPRAM 40 MG/1
40 TABLET ORAL DAILY
Qty: 90 TABLET | Refills: 1 | Status: SHIPPED | OUTPATIENT
Start: 2017-11-09 | End: 2018-03-01 | Stop reason: SDUPTHER

## 2017-11-09 RX ORDER — BUPROPION HYDROCHLORIDE 150 MG/1
TABLET, EXTENDED RELEASE ORAL
Qty: 90 TABLET | Refills: 1 | Status: SHIPPED | OUTPATIENT
Start: 2017-11-09 | End: 2018-04-27 | Stop reason: SDUPTHER

## 2017-11-09 RX ORDER — TRAZODONE HYDROCHLORIDE 100 MG/1
100 TABLET ORAL NIGHTLY
Qty: 90 TABLET | Refills: 1 | Status: SHIPPED | OUTPATIENT
Start: 2017-11-09 | End: 2018-04-27 | Stop reason: SDUPTHER

## 2017-11-09 RX ORDER — PANTOPRAZOLE SODIUM 40 MG/1
40 TABLET, DELAYED RELEASE ORAL DAILY
Qty: 90 TABLET | Refills: 1 | Status: SHIPPED | OUTPATIENT
Start: 2017-11-09 | End: 2018-03-01 | Stop reason: SDUPTHER

## 2017-11-09 ASSESSMENT — ENCOUNTER SYMPTOMS
ABDOMINAL PAIN: 0
COUGH: 0
NAUSEA: 0
EYE PAIN: 0
CONSTIPATION: 0
DIARRHEA: 0
TROUBLE SWALLOWING: 0
BLOOD IN STOOL: 0
VOMITING: 0
SHORTNESS OF BREATH: 1

## 2017-11-09 NOTE — PROGRESS NOTES
[address]  [facility]  [facility]  [address] [address]  Dept: [phone]  Dept Fax: [phone]: [phone]    Visit Date: 11/9/2017      Angeles Christiansen is a 61 y.o. female who presents today for:  Chief Complaint   Patient presents with    Diabetes    Forms     application for job       ANTICIPATORY GUIDANCE : ADULT     WELL QUESTIONS  1. How many cups of caffeine do you drink per day? I do not consume any caffeine products daily   2. Do you exercise a minimum of 30 minutes per day, above normal activity? Yes    3. Do you eat a minimum of 8-10 servings of fruits and vegetables per day? No, on average the patient consumes 1 servings of fruits and vegetables per day  4. Do you drink alcohol? No  5. How many oz of water do you drink per day? 160 oz  EDUCATION PROVIDED  [x] Discussed and/or handout given on the below topic(s):  Caffeine Use: Limit to 4 of the 8 oz cups per day (400mg of caffeine a day), Exercise: Ideal 30 minutes per day, above normal activity, Eating Fruits and Vegetables: Studies show 8-10 servings per day decrease BP, Alcohol: Ideal maximum of one cup per day for females and two cups per day for a male and 64 ounces a day is recommended             Goals      Conditions and Symptoms            I will schedule office visits, as directed by my provider. I will keep my appointment or reschedule if I have to cancel. I will notify my provider of any barriers to my plan of care. I will follow my Zone Management tool to seek urgent or emergent care. I will notify my provider of any symptoms that indicate a worsening of my condition.     Barriers: none  Plan for overcoming my barriers: N/A  Confidence: 7/10  Anticipated Goal Completion Date: 3/27/18         Nutrition Plan            I will follow a nutritional plan as directed   Calorie Controlled:   1500 Calories    Barriers: none  Plan for overcoming my barriers: N/A  Confidence: test  02/08/2018    Diabetic foot exam  07/12/2018    Diabetic microalbuminuria test  07/31/2018    Diabetic retinal exam  08/10/2018    Lipid screen  11/08/2018    Breast cancer screen  10/27/2019    Hepatitis C screen  Completed    HIV screen  Completed       Subjective:      Review of Systems   Constitutional: Negative for chills, fatigue and fever. HENT: Negative for ear pain, postnasal drip and trouble swallowing. Eyes: Negative for pain and visual disturbance. Respiratory: Positive for shortness of breath. Negative for cough. Cardiovascular: Negative for chest pain and palpitations. Gastrointestinal: Negative for abdominal pain, blood in stool, constipation, diarrhea, nausea and vomiting. Skin: Negative for rash. Neurological: Negative for headaches. Objective:     Vitals:    11/09/17 0738   BP: 130/74   Site: Left Arm   Position: Sitting   Cuff Size: Medium Adult   Pulse: 67   Resp: 12   Temp: 98 °F (36.7 °C)   TempSrc: Oral   SpO2: 96%   Weight: 275 lb 3.2 oz (124.8 kg)   Height: 5' 7.01\" (1.702 m)       Body mass index is 43.09 kg/m². Wt Readings from Last 3 Encounters:   11/09/17 275 lb 3.2 oz (124.8 kg)   10/16/17 270 lb (122.5 kg)   10/09/17 269 lb 9.6 oz (122.3 kg)     BP Readings from Last 3 Encounters:   11/09/17 130/74   10/09/17 118/62   08/23/17 110/72       Physical Exam   Constitutional: She is oriented to person, place, and time. She appears well-developed and well-nourished. No distress. HENT:   Head: Normocephalic and atraumatic. Right Ear: External ear normal.   Left Ear: External ear normal.   Eyes: Conjunctivae and EOM are normal. Right eye exhibits no discharge. Left eye exhibits no discharge. No scleral icterus. Neck: Normal range of motion. Cardiovascular: Normal rate, regular rhythm and normal heart sounds. No murmur heard. Pulmonary/Chest: Effort normal and breath sounds normal.   Musculoskeletal: She exhibits no edema.    Neurological: She is the skin that is harder for the insulin injection    Will sign the work forms today    Will see you back in 1 month to recheck the A1c and possibly increase the glucophage again    No Follow-up on file. Orders Placed:  No orders of the defined types were placed in this encounter. Medications Prescribed:  Orders Placed This Encounter   Medications    pantoprazole (PROTONIX) 40 MG tablet     Sig: Take 1 tablet by mouth daily     Dispense:  90 tablet     Refill:  1    traZODone (DESYREL) 100 MG tablet     Sig: Take 1 tablet by mouth nightly     Dispense:  90 tablet     Refill:  1    citalopram (CELEXA) 40 MG tablet     Sig: Take 1 tablet by mouth daily     Dispense:  90 tablet     Refill:  1    buPROPion (WELLBUTRIN SR) 150 MG extended release tablet     Sig: TAKE 1 TABLET BY MOUTH TWICE DAILY     Dispense:  90 tablet     Refill:  1     **Patient requests 90 days supply**    SITagliptin (JANUVIA) 100 MG tablet     Sig: Take 1 tablet by mouth daily     Dispense:  90 tablet     Refill:  1    metFORMIN (GLUCOPHAGE) 500 MG tablet     Sig: Take 1 tablet by mouth 2 times daily (with meals) Will slowly work up to 2 pills a day - can start with every other day     Dispense:  60 tablet     Refill:  3        Patient given educational materials - see patient instructions. Discussed use, benefit, and side effects of prescribed medications. All patient questions answered. Pt voiced understanding. Reviewed health maintenance. Instructed to continue current medications, diet and exercise. Patient agreed with treatment plan. Follow up as directed.      Electronically signed by Timothy Schwartz DO on 11/9/2017 at 8:15 AM

## 2017-11-09 NOTE — PATIENT INSTRUCTIONS
Will try to restart some glucophage - but at a really low dose - so the diarrhea won't be worse with it    Will eat some yogurt daily    Will hold off on the ortho consult for now - if the wrist gets worse will possibly refer at that time    Will keep working with the dietician    Will stay on the same doses of the insulins    Will see you back next month to check the A1c    If you can  the magnesium gluconate - that will help to decrease the diarrhea    Will stay on the inhaler - will see the lung doctor next week    Will be sure to not use the skin that is harder for the insulin injection    Will sign the work forms today    Will see you back in 1 month to recheck the A1c and possibly increase the glucophage again

## 2017-11-10 ENCOUNTER — CARE COORDINATION (OUTPATIENT)
Dept: CARE COORDINATION | Age: 57
End: 2017-11-10

## 2017-11-10 NOTE — CARE COORDINATION
Ambulatory Care Coordination Note  11/10/2017  CM Risk Score: 6  Cameron Mortality Risk Score:      ACC: Ramin Garrett, RN    Summary Note: Spoke with José Luis Loyola. Saw PCP yesterday. Discussed diet. Doesn't cook as its only her. Likes frozen meals. Discussed avoiding pizzas, and biscuits. Lean cuisine or health choice are better options. Like Banquet pot pies. Total carbs are about 35. Good about of carbs for a meal but a lot of sodium. Continues to eat one meal a day. Discussed eating 3 meals a day even if its a small meal or snack. Discussed starting metformin gradually. Verbalizes understanding. Confirmed upcoming appointments. Applied for a job and excited about the possible opportunity. Encouraged to exercise or walk daily as tolerated. Diabetes Assessment    Meal Planning:  Avoidance of concentrated sweets   How often do you test your blood sugar?:  Daily, Meals, Bedtime   Do you have barriers with adherence to non-pharmacologic self-management interventions?  (Nutrition/Exercise/Self-Monitoring):  No   Have you ever had to go to the ED for symptoms of low blood sugar?:  No       No patient-reported symptoms   Do you have hyperglycemia symptoms?:  No   Do you have hypoglycemia symptoms?:  No   Blood Sugar Monitoring Regimen:  Before Meals   Blood Sugar Trends:  No Change       and   COPD Assessment    Is the patient able to verbalize Rescue vs. Long Acting medications?:  No  Does the patient have a nebulizer?:  No  Does the patient use a space with inhaled medications?:  No     No patient-reported symptoms         Symptoms:   None:  Yes      Symptom course:  stable  Breathlessness:  none  Increase use of rapid acting/rescue inhaled medications?:  No  Change in chronic cough?:  No/At Baseline  Change in sputum?:  No/At Baseline  Have you had a recent diagnosis of pneumonia either by PCP or at a hospital?:  No               Care Coordination Interventions    Program Enrollment:  Complex at bedtime)  Patient taking differently: Take 1 tablet by mouth 2 times daily  10/9/17   Temi Ratliff DO   Magnesium Gluconate 250 MG TABS One pill twice a day 10/9/17   Temi Ratliff DO   albuterol sulfate HFA (PROAIR HFA) 108 (90 Base) MCG/ACT inhaler Inhale 2 puffs into the lungs every 6 hours as needed for Wheezing 10/9/17   Temi Ratliff, DO   glucose blood VI test strips (ACCU-CHEK DAVIDA) strip 1 each by In Vitro route daily As needed. 9/28/17   Temi Ratliff DO   atorvastatin (LIPITOR) 20 MG tablet Take 1 tablet by mouth daily 9/20/17   Temi Ratliff DO   insulin lispro (HUMALOG KWIKPEN) 100 UNIT/ML pen Inject 8 Units into the skin 3 times daily (before meals) 9/12/17   Temi Ratliff DO   insulin detemir (LEVEMIR FLEXTOUCH) 100 UNIT/ML injection pen Inject 30 Units into the skin nightly  Patient taking differently: Inject 40 Units into the skin nightly  9/12/17   Temi Ratliff DO   pregabalin (LYRICA) 300 MG capsule Take 1 capsule by mouth 2 times daily 8/28/17   Temi Ratliff, DO   Omega-3 Fatty Acids (FISH OIL) 1000 MG CAPS Take 1,000 mg by mouth daily    Historical Provider, MD   Cinnamon 500 MG CAPS Take 1 capsule by mouth daily    Historical Provider, MD   Cholecalciferol (VITAMIN D3) 2000 units CAPS Take by mouth daily    Historical Provider, MD   vitamin B-12 (CYANOCOBALAMIN) 500 MCG tablet Take 500 mcg by mouth daily    Historical Provider, MD   rOPINIRole (REQUIP) 1 MG tablet Take 1 mg by mouth 3 times daily as needed     Historical Provider, MD   traMADol (ULTRAM) 50 MG tablet Take 50 mg by mouth every 6 hours as needed for Pain    Historical Provider, MD   HYDROcodone-acetaminophen (NORCO) 5-325 MG per tablet Take 1 tablet by mouth every 4 hours as needed for Pain (For moderate pain level 4-6). 8/16/13   Christian Diaz MD   acetaminophen 650 MG TABS Take 650 mg by mouth every 4 hours as needed for Pain (For mild pain level 1-3 or for fever > 100.5).  8/16/13   Christian Diaz MD       Future Appointments  Date Time Provider Scarlett Stacey   11/14/2017 9:40 AM Linda Holbrook   11/20/2017 2:00 PM Melissa Velasquez, RD, LD [facility]   12/14/2017 1:00 PM [facility]aez CNP [facility]

## 2017-11-14 ENCOUNTER — OFFICE VISIT (OUTPATIENT)
Dept: PULMONOLOGY | Age: 57
End: 2017-11-14
Payer: MEDICARE

## 2017-11-14 VITALS
DIASTOLIC BLOOD PRESSURE: 68 MMHG | SYSTOLIC BLOOD PRESSURE: 118 MMHG | BODY MASS INDEX: 44.26 KG/M2 | OXYGEN SATURATION: 94 % | HEIGHT: 67 IN | WEIGHT: 282 LBS | TEMPERATURE: 97.7 F | HEART RATE: 68 BPM

## 2017-11-14 DIAGNOSIS — J98.4 RESTRICTIVE LUNG DISEASE SECONDARY TO OBESITY: ICD-10-CM

## 2017-11-14 DIAGNOSIS — E66.9 RESTRICTIVE LUNG DISEASE SECONDARY TO OBESITY: ICD-10-CM

## 2017-11-14 DIAGNOSIS — J96.11 CHRONIC RESPIRATORY FAILURE WITH HYPOXIA (HCC): ICD-10-CM

## 2017-11-14 DIAGNOSIS — G47.33 OSA (OBSTRUCTIVE SLEEP APNEA): ICD-10-CM

## 2017-11-14 DIAGNOSIS — E66.01 MORBID OBESITY WITH BMI OF 40.0-44.9, ADULT (HCC): ICD-10-CM

## 2017-11-14 DIAGNOSIS — Z72.0 TOBACCO ABUSE: ICD-10-CM

## 2017-11-14 DIAGNOSIS — R06.09 EXERTIONAL DYSPNEA: Primary | ICD-10-CM

## 2017-11-14 PROCEDURE — 94620 6 MIN WALK TEST: CPT | Performed by: INTERNAL MEDICINE

## 2017-11-14 PROCEDURE — 99205 OFFICE O/P NEW HI 60 MIN: CPT | Performed by: INTERNAL MEDICINE

## 2017-11-14 ASSESSMENT — ENCOUNTER SYMPTOMS
CONSTIPATION: 0
PHOTOPHOBIA: 0
NAUSEA: 0
ABDOMINAL DISTENTION: 0
COUGH: 0
WHEEZING: 0
SHORTNESS OF BREATH: 1
ABDOMINAL PAIN: 0
STRIDOR: 0
SINUS PRESSURE: 0
BACK PAIN: 1
CHOKING: 1
APNEA: 1
VOMITING: 0
BLOOD IN STOOL: 0
CHEST TIGHTNESS: 0
RHINORRHEA: 0
FACIAL SWELLING: 0
VOICE CHANGE: 0

## 2017-11-14 NOTE — PATIENT INSTRUCTIONS
mad at yourself if you smoke again. Make a list of things you learned and think about when you want to try again, such as next week, next month, or next year. Where can you learn more? Go to [email or web address] and sign in to your [employer] account. Enter B975 in the [facility] box to learn more about \"Stopping Smoking: Care Instructions. \"     If you do not have an account, please click on the \"Sign Up Now\" link. Current as of: March 20, 2017  Content Version: 11.3  © 6492-1427 [employer]. Care instructions adapted under license by [facility] ([facility]). If you have questions about a medical condition or this instruction, always ask your healthcare professional. [address] any warranty or liability for your use of this information. Patient Education        Stopping Smoking: Care Instructions  Your Care Instructions  Cigarette smokers crave the nicotine in cigarettes. Giving it up is much harder than simply changing a habit. Your body has to stop craving the nicotine. It is hard to quit, but you can do it. There are many tools that people use to quit smoking. You may find that combining tools works best for you. There are several steps to quitting. First you get ready to quit. Then you get support to help you. After that, you learn new skills and behaviors to become a nonsmoker. For many people, a necessary step is getting and using medicine. Your doctor will help you set up the plan that best meets your needs. You may want to attend a smoking cessation program to help you quit smoking. When you choose a program, look for one that has proven success. Ask your doctor for ideas. You will greatly increase your chances of success if you take medicine as well as get counseling or join a cessation program.  Some of the changes you feel when you first quit tobacco are uncomfortable.  Your body will miss the nicotine at first, and you may feel short-tempered and grumpy. You may have trouble sleeping or concentrating. Medicine can help you deal with these symptoms. You may struggle with changing your smoking habits and rituals. The last step is the tricky one: Be prepared for the smoking urge to continue for a time. This is a lot to deal with, but keep at it. You will feel better. Follow-up care is a key part of your treatment and safety. Be sure to make and go to all appointments, and call your doctor if you are having problems. Its also a good idea to know your test results and keep a list of the medicines you take. How can you care for yourself at home? · Ask your family, friends, and coworkers for support. You have a better chance of quitting if you have help and support. · Join a support group, such as Nicotine Anonymous, for people who are trying to quit smoking. · Consider signing up for a smoking cessation program, such as the American Lung Association's Freedom from Smoking program.  · Set a quit date. Pick your date carefully so that it is not right in the middle of a big deadline or stressful time. Once you quit, do not even take a puff. Get rid of all ashtrays and lighters after your last cigarette. Clean your house and your clothes so that they do not smell of smoke. · Learn how to be a nonsmoker. Think about ways you can avoid those things that make you reach for a cigarette. ¨ Avoid situations that put you at greatest risk for smoking. For some people, it is hard to have a drink with friends without smoking. For others, they might skip a coffee break with coworkers who smoke. ¨ Change your daily routine. Take a different route to work or eat a meal in a different place. · Cut down on stress. Calm yourself or release tension by doing an activity you enjoy, such as reading a book, taking a hot bath, or gardening. · Talk to your doctor or pharmacist about nicotine replacement therapy, which replaces the nicotine in your body.  You

## 2017-11-14 NOTE — PROGRESS NOTES
Sister      diastolic dysfunction    Other Sister      thyroid cysts    Cancer Brother     Kidney Disease Brother     Diabetes Brother     Kidney Disease Brother      CURRENT MEDICATIONS:  Current Outpatient Prescriptions   Medication Sig Dispense Refill    pantoprazole (PROTONIX) 40 MG tablet Take 1 tablet by mouth daily 90 tablet 1    traZODone (DESYREL) 100 MG tablet Take 1 tablet by mouth nightly 90 tablet 1    citalopram (CELEXA) 40 MG tablet Take 1 tablet by mouth daily 90 tablet 1    buPROPion (WELLBUTRIN SR) 150 MG extended release tablet TAKE 1 TABLET BY MOUTH TWICE DAILY 90 tablet 1    SITagliptin (JANUVIA) 100 MG tablet Take 1 tablet by mouth daily 90 tablet 1    metFORMIN (GLUCOPHAGE) 500 MG tablet Take 1 tablet by mouth 2 times daily (with meals) Will slowly work up to 2 pills a day - can start with every other day 60 tablet 3    Vitamins-Lipotropics (BALANCED B-100 COMPLEX CR) TBCR Take 1 tablet by mouth 4 times daily (after meals and at bedtime) (Patient taking differently: Take 1 tablet by mouth 2 times daily ) 120 tablet 5    Magnesium Gluconate 250 MG TABS One pill twice a day 60 tablet 2    albuterol sulfate HFA (PROAIR HFA) 108 (90 Base) MCG/ACT inhaler Inhale 2 puffs into the lungs every 6 hours as needed for Wheezing 1 Inhaler 3    glucose blood VI test strips (ACCU-CHEK DAVIDA) strip 1 each by In Vitro route daily As needed.  100 each 3    atorvastatin (LIPITOR) 20 MG tablet Take 1 tablet by mouth daily 90 tablet 0    insulin lispro (HUMALOG KWIKPEN) 100 UNIT/ML pen Inject 8 Units into the skin 3 times daily (before meals) 5 Pen 3    insulin detemir (LEVEMIR FLEXTOUCH) 100 UNIT/ML injection pen Inject 30 Units into the skin nightly (Patient taking differently: Inject 40 Units into the skin nightly ) 5 Pen 3    pregabalin (LYRICA) 300 MG capsule Take 1 capsule by mouth 2 times daily 180 capsule 1    Omega-3 Fatty Acids (FISH OIL) 1000 MG CAPS Take 1,000 mg by mouth daily     

## 2017-11-14 NOTE — PROGRESS NOTES
Subjective:      Patient ID: Ousmane Long is a 61 y.o. female. CC: COPD    HPI     Lambert Nunez c/o HARTMAN, also dyspnea when speaks, daily smoker, also diagnosed with CARLOTTA about 12 years ago, never able to use CPAP, believes she has gaine 60 lbs since then  Snores, stops breathing  Lambert Nunez started smoking at age 14 an average of 1ppd, continues smoking daily about 5 cig a day, On Welbutrim for smoking cessation. Morbidly obese BMI 44 , some mobility problems due to degenerative disk disease with multiple operations . Uses Albuterol MDI up to twice a day--happy with that  Refuses Flu/Vaccine    Review of Systems   Constitutional: Positive for fatigue and unexpected weight change. Negative for appetite change, chills, diaphoresis and fever. HENT: Negative for congestion, facial swelling, nosebleeds, rhinorrhea, sinus pressure, sneezing and voice change. Eyes: Negative for photophobia and visual disturbance. Respiratory: Positive for apnea, choking and shortness of breath. Negative for cough, chest tightness, wheezing and stridor. No hemoptysis   Cardiovascular: Positive for leg swelling. Negative for chest pain and palpitations. Gastrointestinal: Negative for abdominal distention, abdominal pain, blood in stool, constipation, nausea and vomiting. Genitourinary: Negative for difficulty urinating, dysuria, enuresis, frequency and hematuria. Musculoskeletal: Positive for arthralgias, back pain, joint swelling and neck stiffness. Skin: Negative for pallor and rash. Neurological: Positive for seizures and speech difficulty. Negative for dizziness, syncope, facial asymmetry, weakness, numbness and headaches. Hematological: Negative for adenopathy. Does not bruise/bleed easily. Psychiatric/Behavioral: Positive for sleep disturbance. Negative for agitation, confusion, decreased concentration and suicidal ideas.          All other systems reviewed and are negative    Lung Nodule Screening     [x] Qualifies    [] Does not qualify   [x] Declined   [] Completed   Past Medical History:   Diagnosis Date    Arthritis     Carpal tunnel syndrome     Depression     Diabetes mellitus ([address]     GERD (gastroesophageal reflux disease)     Glaucoma     Hiatal hernia     esophagus closes    Hx of blood clots     foot post op    Hyperlipidemia, mixed 2017    IBS (irritable bowel syndrome)     Leg pain     nerve damage right leg    MDRO (multiple drug resistant organisms) resistance     wound and nasal    Guzman's neuroma     left foot    Nausea & vomiting     Neuropathic pain     PONV (postoperative nausea and vomiting)     Pulmonary emphysema ([address] 2017    Sleep apnea     DOES NOT USE CPAP     Past Surgical History:   Procedure Laterality Date    ABDOMEN SURGERY      APPENDECTOMY      BACK SURGERY      lower x 3    CERVICAL FUSION       SECTION      x3    CHOLECYSTECTOMY      COLONOSCOPY      ECTOPIC PREGNANCY SURGERY      EYE SURGERY Bilateral     LASER FOR GLAUCOMA    HYSTERECTOMY      JOINT REPLACEMENT      OTHER SURGICAL HISTORY  Aug 5, 2013    Left knee total arthroplasty ([facility] Joellen [facility]cedo, [facility])    TOE SURGERY Left     little toe bone removed    TONSILLECTOMY      TOTAL KNEE ARTHROPLASTY Right 2016    UPPER GASTROINTESTINAL ENDOSCOPY       Social History   Substance Use Topics    Smoking status: Current Every Day Smoker     Packs/day: 0.10     Years: 35.00     Types: Cigarettes     Start date: 1982    Smokeless tobacco: Never Used      Comment: 1 pack per day, has cut down to 5 cigs a day    Alcohol use No      Allergies   Allergen Reactions    Latex Itching and Rash      Family History   Problem Relation Age of Onset    Arthritis Mother     Heart Disease Mother      CHF    High Blood Pressure Mother     Kidney Disease Mother     Cancer Father     Heart Disease Sister      diastolic dysfunction    Other Sister      thyroid cysts    Cancer Brother     Kidney Disease Brother     Diabetes Brother     Kidney Disease Brother      Current Outpatient Prescriptions   Medication Sig Dispense Refill    pantoprazole (PROTONIX) 40 MG tablet Take 1 tablet by mouth daily 90 tablet 1    traZODone (DESYREL) 100 MG tablet Take 1 tablet by mouth nightly 90 tablet 1    citalopram (CELEXA) 40 MG tablet Take 1 tablet by mouth daily 90 tablet 1    buPROPion (WELLBUTRIN SR) 150 MG extended release tablet TAKE 1 TABLET BY MOUTH TWICE DAILY 90 tablet 1    SITagliptin (JANUVIA) 100 MG tablet Take 1 tablet by mouth daily 90 tablet 1    metFORMIN (GLUCOPHAGE) 500 MG tablet Take 1 tablet by mouth 2 times daily (with meals) Will slowly work up to 2 pills a day - can start with every other day 60 tablet 3    Vitamins-Lipotropics (BALANCED B-100 COMPLEX CR) TBCR Take 1 tablet by mouth 4 times daily (after meals and at bedtime) (Patient taking differently: Take 1 tablet by mouth 2 times daily ) 120 tablet 5    Magnesium Gluconate 250 MG TABS One pill twice a day 60 tablet 2    albuterol sulfate HFA (PROAIR HFA) 108 (90 Base) MCG/ACT inhaler Inhale 2 puffs into the lungs every 6 hours as needed for Wheezing 1 Inhaler 3    glucose blood VI test strips (ACCU-CHEK DAVIDA) strip 1 each by In Vitro route daily As needed.  100 each 3    atorvastatin (LIPITOR) 20 MG tablet Take 1 tablet by mouth daily 90 tablet 0    insulin lispro (HUMALOG KWIKPEN) 100 UNIT/ML pen Inject 8 Units into the skin 3 times daily (before meals) 5 Pen 3    insulin detemir (LEVEMIR FLEXTOUCH) 100 UNIT/ML injection pen Inject 30 Units into the skin nightly (Patient taking differently: Inject 40 Units into the skin nightly ) 5 Pen 3    pregabalin (LYRICA) 300 MG capsule Take 1 capsule by mouth 2 times daily 180 capsule 1    Omega-3 Fatty Acids (FISH OIL) 1000 MG CAPS Take 1,000 mg by mouth daily      Cinnamon 500 MG CAPS Take 1 capsule by mouth daily      Cholecalciferol (VITAMIN D3) 2000 units CAPS Take by mouth daily      vitamin B-12 (CYANOCOBALAMIN) 500 MCG tablet Take 500 mcg by mouth daily      rOPINIRole (REQUIP) 1 MG tablet Take 1 mg by mouth 3 times daily as needed       traMADol (ULTRAM) 50 MG tablet Take 50 mg by mouth every 6 hours as needed for Pain      HYDROcodone-acetaminophen (NORCO) 5-325 MG per tablet Take 1 tablet by mouth every 4 hours as needed for Pain (For moderate pain level 4-6). 60 tablet 0    acetaminophen 650 MG TABS Take 650 mg by mouth every 4 hours as needed for Pain (For mild pain level 1-3 or for fever > 100.5). 120 tablet      No current facility-administered medications for this visit. LABS - none   /68   Pulse 68   Temp 97.7 °F (36.5 °C)   Ht 5' 7\" (1.702 m)   Wt 282 lb (127.9 kg)   SpO2 94% Comment: room air at rest  BMI 44.17 kg/m²    Wt Readings from Last 3 Encounters:   11/14/17 282 lb (127.9 kg)   11/09/17 275 lb 3.2 oz (124.8 kg)   10/16/17 270 lb (122.5 kg)     Neck Circumference - 16 in; Mallampati Score - 4              Objective:   Physical Exam   Constitutional: She is oriented to person, place, and time. She appears well-developed. No distress. Morbid obesity BMI 44   HENT:   Head: Normocephalic and atraumatic. Mouth/Throat: No oropharyngeal exudate. Large tongue, crowded pharynx, mallampati class 4  Upper and lower dentures . Eyes: Pupils are equal, round, and reactive to light. Right eye exhibits no discharge. Left eye exhibits no discharge. No scleral icterus. Neck: Normal range of motion. Neck supple. No JVD present. No tracheal deviation present. Cardiovascular: Normal rate, regular rhythm and intact distal pulses. Exam reveals no gallop and no friction rub. No murmur heard. Pulmonary/Chest: Effort normal and breath sounds normal. No stridor. No respiratory distress. She has no wheezes. She has no rales. She exhibits no tenderness. Abdominal: Soft.  Bowel sounds are normal. She exhibits no distension and no mass. There is no tenderness. There is no rebound and no guarding. Musculoskeletal: Normal range of motion. She exhibits edema and tenderness. Lymphadenopathy:     She has no cervical adenopathy. Neurological: She is alert and oriented to person, place, and time. No cranial nerve deficit. Skin: Skin is warm. No rash noted. She is not diaphoretic. No erythema. No pallor. Psychiatric: She has a normal mood and affect. Her behavior is normal. Judgment and thought content normal.               CXR:       Impression   1. Normal heart size. Old healed granulomatous disease. 2. Prior anterior cervical fusion. 3. No acute findings. No infiltrates or effusions are seen.               **This report has been created using voice recognition software.  It may contain minor errors which are inherent in voice recognition technology. **       Final report electronically signed by Dr. Jurgen Garrison on 7/31/2017 1:04 PM              Order History         Assessment:      1. Exertional dyspnea  6 MIN WALK TEST   2. Restrictive lung disease secondary to obesity  6 MIN WALK TEST    [facility] Weight Management David Randle MD   3. CARLOTTA (obstructive sleep apnea)  [address] [place] [address] Weight Management David Randle MD   4. Morbid obesity with BMI of 40.0-44.9, adult ([facility]  [facility] David Randle MD   5. Tobacco abuse     6.  Chronic respiratory failure with hypoxia ([facility]             Plan:      Orders Placed This Encounter   Procedures   [address]     Referral Priority:   Routine     Referral Type:   Consult for Advice and Opinion     Referral Reason:   Specialty Services Required     Requested Specialty:   Sleep Center     Number of Visits Requested:   [facility] Weight Management David Randle MD     Referral Priority:   Routine     Referral Type:   Consult for

## 2017-11-20 ENCOUNTER — INITIAL CONSULT (OUTPATIENT)
Dept: PULMONOLOGY | Age: 57
End: 2017-11-20
Payer: MEDICARE

## 2017-11-20 VITALS
HEIGHT: 67 IN | HEART RATE: 63 BPM | SYSTOLIC BLOOD PRESSURE: 128 MMHG | OXYGEN SATURATION: 96 % | BODY MASS INDEX: 43.22 KG/M2 | WEIGHT: 275.4 LBS | DIASTOLIC BLOOD PRESSURE: 68 MMHG | TEMPERATURE: 98.4 F

## 2017-11-20 DIAGNOSIS — G47.33 OSA (OBSTRUCTIVE SLEEP APNEA): Primary | ICD-10-CM

## 2017-11-20 PROCEDURE — 99213 OFFICE O/P EST LOW 20 MIN: CPT | Performed by: INTERNAL MEDICINE

## 2017-11-20 NOTE — PROGRESS NOTES
New Sleep Patient H/P  CC: CARLOTTA  Presentation:  Astrid Mukherjee has CARLOTTA diagnosed 13 years ago, at [facility], she was offered either a trach or CPAP her sleep apnea was severe, she quit going to the doctor. Now her symptoms are worse, sleepy bilateral lower extremity edema, snores, falls sleep during the day, witnessed apneic events, needs daily naps    Symptoms began:  several years ago. Snoring and Apneas:  Do you snore or been told you a snore? Yes  How long have known about your snoring? years  Any witnessed apneas? Yes  Any awakenings with choking or gasping?  No        Past Medical History:   Diagnosis Date    Arthritis     Carpal tunnel syndrome     Depression     Diabetes mellitus (HCC)     GERD (gastroesophageal reflux disease)     Glaucoma     Hiatal hernia     esophagus closes    Hx of blood clots     foot post op    Hyperlipidemia, mixed 2017    IBS (irritable bowel syndrome)     Leg pain     nerve damage right leg    MDRO (multiple drug resistant organisms) resistance     wound and nasal    Guzman's neuroma     left foot    Nausea & vomiting     Neuropathic pain     PONV (postoperative nausea and vomiting)     Pulmonary emphysema ([address] 2017    Sleep apnea     DOES NOT USE CPAP       Past Surgical History:   Procedure Laterality Date    ABDOMEN SURGERY      APPENDECTOMY      BACK SURGERY      lower x 3    CERVICAL FUSION       SECTION      x3    CHOLECYSTECTOMY      COLONOSCOPY      ECTOPIC PREGNANCY SURGERY      EYE SURGERY Bilateral     LASER FOR GLAUCOMA    HYSTERECTOMY      JOINT REPLACEMENT      OTHER SURGICAL HISTORY  Aug 5, 2013    Left knee total arthroplasty (Dr. Bird Luis, [facility])    TOE SURGERY Left     little toe bone removed    TONSILLECTOMY      TOTAL KNEE ARTHROPLASTY Right 2016    UPPER GASTROINTESTINAL ENDOSCOPY         Social History   Substance Use Topics    Smoking status: Current Every Day Smoker normal, no murmur, click, rub or gallop  Extremities: edema 2 (+)  Neurologic: Mental status: Alert, oriented, thought content appropriate      Assessment   Obstructive sleep apnea on no treatment  Morbid obesity BMI 43  Leg edema  Plan   Split Night PSG    Mask Desensitization and Pre study teaching? No  Weight Loss Information Given? Yes  Sleep Hygiene Discussed?  Yes

## 2017-11-29 ENCOUNTER — TELEPHONE (OUTPATIENT)
Dept: SLEEP CENTER | Age: 57
End: 2017-11-29

## 2017-11-30 ENCOUNTER — TELEPHONE (OUTPATIENT)
Dept: SLEEP CENTER | Age: 57
End: 2017-11-30

## 2017-11-30 ENCOUNTER — TELEPHONE (OUTPATIENT)
Dept: FAMILY MEDICINE CLINIC | Age: 57
End: 2017-11-30

## 2017-11-30 DIAGNOSIS — J43.9 PULMONARY EMPHYSEMA, UNSPECIFIED EMPHYSEMA TYPE (HCC): ICD-10-CM

## 2017-11-30 DIAGNOSIS — G47.33 OSA (OBSTRUCTIVE SLEEP APNEA): Primary | ICD-10-CM

## 2017-11-30 DIAGNOSIS — M17.11 PRIMARY OSTEOARTHRITIS OF RIGHT KNEE: Primary | ICD-10-CM

## 2017-11-30 DIAGNOSIS — E11.8 TYPE 2 DIABETES MELLITUS WITH COMPLICATION, UNSPECIFIED LONG TERM INSULIN USE STATUS: ICD-10-CM

## 2017-11-30 NOTE — TELEPHONE ENCOUNTER
Medhat Ellis was denied a Spit PSG study by insurance. You may call for a p2p , 1 [id number]. Please advise.    Omega Mckinney, STEPHEN

## 2017-11-30 NOTE — TELEPHONE ENCOUNTER
Patient is requesting a prescription for a new smaller shower chair be sent to [facility]. Please advise.     DOLV: 11/9/17  [facility]

## 2017-12-05 ENCOUNTER — CARE COORDINATION (OUTPATIENT)
Dept: CARE COORDINATION | Age: 57
End: 2017-12-05

## 2017-12-05 ASSESSMENT — ENCOUNTER SYMPTOMS: DYSPNEA ASSOCIATED WITH: EXERTION

## 2017-12-05 NOTE — TELEPHONE ENCOUNTER
Called pt to see if she had a fax number for [facility]. Fax number [phone]. Faxed.

## 2017-12-05 NOTE — CARE COORDINATION
Ambulatory Care Coordination Note  12/5/2017  CM Risk Score: 3  Cameron Mortality Risk Score:      ACC: Rena Keating RN    Summary Note: Spoke with Dima Vital. Has home oxygen with [employer]. Wears with activity at 1 liter. Feels better since having it. Waiting on sleep study approval by insurance. Using inhaler 1-2 times per day. Discussed early symptom recognition and reporting to avoid ED and hospital including changes in breathing, unusual SOB, swelling, increased inhaler usage. Conversation short since she was driving. Diabetes Assessment    Meal Planning:  Avoidance of concentrated sweets   How often do you test your blood sugar?:  Daily, Meals, Bedtime   Do you have barriers with adherence to non-pharmacologic self-management interventions?  (Nutrition/Exercise/Self-Monitoring):  No   Have you ever had to go to the ED for symptoms of low blood sugar?:  No       No patient-reported symptoms   Do you have hyperglycemia symptoms?:  No   Do you have hypoglycemia symptoms?:  No   Blood Sugar Monitoring Regimen:  Before Meals   Blood Sugar Trends:  Fluctuating       and   COPD Assessment    Is the patient able to verbalize Rescue vs. Long Acting medications?:  No  Does the patient have a nebulizer?:  No  Does the patient use a space with inhaled medications?:  No     No patient-reported symptoms         Symptoms:   None:  Yes      Symptom course:  stable  Breathlessness:  exertion  Increase use of rapid acting/rescue inhaled medications?:  No  Change in chronic cough?:  No/At Baseline  Change in sputum?:  No/At Baseline  Have you had a recent diagnosis of pneumonia either by PCP or at a hospital?:  No               Care Coordination Interventions    Program Enrollment:  Complex Care  Referral from Primary Care Provider:  No  Suggested Interventions and Community Resources  Diabetes Education:  Completed  Physical Therapy:  Completed (Comment: [id number])  Registered Dietician:  Completed  Other Services or Interventions:  Home oxygen supplies with [employer]         Goals Addressed             Most Recent     Conditions and Symptoms   No change (12/5/2017)             I will schedule office visits, as directed by my provider. I will keep my appointment or reschedule if I have to cancel. I will notify my provider of any barriers to my plan of care. I will follow my Zone Management tool to seek urgent or emergent care. I will notify my provider of any symptoms that indicate a worsening of my condition. Barriers: none  Plan for overcoming my barriers: N/A  Confidence: 7/10  Anticipated Goal Completion Date: 3/27/18         Nutrition Plan   No change (12/5/2017)             I will follow a nutritional plan as directed   Calorie Controlled:   1500 Calories    Barriers: none  Plan for overcoming my barriers: N/A  Confidence: 7/10  Anticipated Goal Completion Date: 3/27/18            Prior to Admission medications    Medication Sig Start Date End Date Taking?  Authorizing Provider   pantoprazole (PROTONIX) 40 MG tablet Take 1 tablet by mouth daily 11/9/17   Carola Chatman DO   traZODone (DESYREL) 100 MG tablet Take 1 tablet by mouth nightly 11/9/17   Carola Chatman DO   citalopram (CELEXA) 40 MG tablet Take 1 tablet by mouth daily 11/9/17   Carola Chatman DO   buPROPion [facility]) 150 MG extended release tablet TAKE 1 TABLET BY MOUTH TWICE DAILY 11/9/17   Carola Chatman DO   SITagliptin (JANUVIA) 100 MG tablet Take 1 tablet by mouth daily 11/9/17   Carola Chatman DO   metFORMIN (GLUCOPHAGE) 500 MG tablet Take 1 tablet by mouth 2 times daily (with meals) Will slowly work up to 2 pills a day - can start with every other day 11/9/17   Carola Chatman DO   Vitamins-Lipotropics (BALANCED B-100 COMPLEX CR) TBCR Take 1 tablet by mouth 4 times daily (after meals and at bedtime)  Patient taking differently: Take 1 tablet by mouth 2 times daily  10/9/17   Carola Chatman DO   Magnesium Gluconate 250 MG TABS One pill twice a day [facility]   5/17/2018 1:20 PM Linda Powell

## 2017-12-15 ENCOUNTER — TELEPHONE (OUTPATIENT)
Dept: FAMILY MEDICINE CLINIC | Age: 57
End: 2017-12-15

## 2017-12-15 NOTE — TELEPHONE ENCOUNTER
12/15/17  Requesting office notes from last visit for shower chair order. Please fax to [phone].    Thanks/blm

## 2017-12-20 ENCOUNTER — OFFICE VISIT (OUTPATIENT)
Dept: FAMILY MEDICINE CLINIC | Age: 57
End: 2017-12-20
Payer: MEDICARE

## 2017-12-20 VITALS
WEIGHT: 280.2 LBS | BODY MASS INDEX: 42.47 KG/M2 | SYSTOLIC BLOOD PRESSURE: 121 MMHG | DIASTOLIC BLOOD PRESSURE: 59 MMHG | HEIGHT: 68 IN | OXYGEN SATURATION: 93 % | HEART RATE: 70 BPM | TEMPERATURE: 97.6 F

## 2017-12-20 DIAGNOSIS — E11.8 TYPE 2 DIABETES MELLITUS WITH COMPLICATION, UNSPECIFIED LONG TERM INSULIN USE STATUS: Primary | ICD-10-CM

## 2017-12-20 DIAGNOSIS — E78.2 HYPERLIPIDEMIA, MIXED: ICD-10-CM

## 2017-12-20 DIAGNOSIS — G56.01 CARPAL TUNNEL SYNDROME ON RIGHT: ICD-10-CM

## 2017-12-20 LAB — HBA1C MFR BLD: 9.5 %

## 2017-12-20 PROCEDURE — 83036 HEMOGLOBIN GLYCOSYLATED A1C: CPT | Performed by: NURSE PRACTITIONER

## 2017-12-20 PROCEDURE — 99214 OFFICE O/P EST MOD 30 MIN: CPT | Performed by: NURSE PRACTITIONER

## 2017-12-20 ASSESSMENT — ENCOUNTER SYMPTOMS
ABDOMINAL DISTENTION: 0
CONSTIPATION: 0
RHINORRHEA: 0
EYE REDNESS: 0
BLOOD IN STOOL: 0
SORE THROAT: 0
COUGH: 0
NAUSEA: 0
SHORTNESS OF BREATH: 0
EYE DISCHARGE: 0
COLOR CHANGE: 0
ANAL BLEEDING: 0
ABDOMINAL PAIN: 0
DIARRHEA: 0

## 2017-12-20 NOTE — PATIENT INSTRUCTIONS
· Restart the Metformin and ensure you take it with food - you will have diarrhea if you do not eat  · Take 500 mg once daily for 1 week - take with breakfast  · Then take twice daily for 1 week - take with breakfast and dinner  · Then take 2 tablets daily - take with breakfast and dinner  · Continue Levemir 40 units nightly  · Increase Humalog to 10 units three times daily with meals plus group 2 sliding scale  · Blood sugars:  - NO insulin, 151-200 - 3 units, 201-250 - 6 units, 251-300 - 8 units, 301-350 - 12 units, 351-400 - 15 units  · Continue Januvia 100 mg once daily  · Restart the Cinnamon 500 mg 4 times daily with meals and at bedtime  · Will add Bydureon - use once weekly  · Samples given x 4  · Referred to the diabetic center - please go to the appointment  · Check you blood sugars at least 2 times daily. Check first in the morning before you eat or drink anything and again 2 hours after your largest meal of the da. · Write the numbers down for us  · Avoid sugars and carbs - follow a diabetic diet (info given  · Drink plenty of water - half of  Your body weight in ounces daily  · Exercise - Aim for 30 minutes daily of aerobic exercise. Can run, jog, walk, swim, weight lift - anything to get the heart rate elevated. · Will order the brace for the right wrist  · If you decide you would like to see the ortho doctor please let us know  · Continue Lipitor for the cholesterol  · Will see you back in 1 month, sooner as needed      Patient Education        Carpal Tunnel Syndrome: Care Instructions  Your Care Instructions    Carpal tunnel syndrome is a nerve problem. It can cause tingling, numbness, weakness, or pain in the fingers, thumb, and hand. The median nerve and several tough tissues called tendons run through a space in the wrist called the carpal tunnel. The repeated hand motions used in work and some hobbies and sports can put pressure on the nerve.  Pregnancy and several conditions, including or occupational therapy. ? · You have side effects of your corticosteroid medicine, such as:  ¨ Weight gain. ¨ Mood changes. ¨ Trouble sleeping. ¨ Bruising easily. ? · You have any other problems with your medicine. Where can you learn more? Go to [email or web address]://[email or web address].[email or web address] org and sign in to your YouMail account. Enter R432 in the TourPal box to learn more about \"Carpal Tunnel Syndrome: Care Instructions. \"     If you do not have an account, please click on the \"Sign Up Now\" link. Current as of: March 21, 2017  Content Version: 11.4  © 5513-3740 [employer]. Care instructions adapted under license by [facility] ([facility]). If you have questions about a medical condition or this instruction, always ask your healthcare professional. [address] any warranty or liability for your use of this information. Patient Education        Carpal Tunnel Syndrome: Exercises  Your Care Instructions  Here are some examples of typical rehabilitation exercises for your condition. Start each exercise slowly. Ease off the exercise if you start to have pain. Your doctor or your physical or occupational therapist will tell you when you can start these exercises and which ones will work best for you. Warm-up stretches  When you no longer have pain or numbness, you can do exercises to help prevent carpal tunnel syndrome from coming back. Do not do any stretch or movement that is uncomfortable or painful. 12. Rotate your wrist up, down, and from side to side. Repeat 4 times. 13. Stretch your fingers far apart. Relax them, and then stretch them again. Repeat 4 times. 14. Stretch your thumb by pulling it back gently, holding it, and then releasing it. Repeat 4 times. How to do the exercises  Prayer stretch    5. Start with your palms together in front of your chest just below your chin.   6. Slowly lower your hands toward your waistline, keeping your hands close to your stomach and your palms together until you feel a mild to moderate stretch under your forearms. 7. Hold for at least 15 to 30 seconds. Repeat 2 to 4 times. Wrist flexor stretch    1. Extend your arm in front of you with your palm up. 2. Bend your wrist, pointing your hand toward the floor. 3. With your other hand, gently bend your wrist farther until you feel a mild to moderate stretch in your forearm. 4. Hold for at least 15 to 30 seconds. Repeat 2 to 4 times. Wrist extensor stretch    1. Repeat steps 1 through 4 of the stretch above, but begin with your extended hand palm down. Follow-up care is a key part of your treatment and safety. Be sure to make and go to all appointments, and call your doctor if you are having problems. It's also a good idea to know your test results and keep a list of the medicines you take. Where can you learn more? Go to [email or web address] org and sign in to your JustSpotted account. Enter G429 in the Altrec.com box to learn more about \"Carpal Tunnel Syndrome: Exercises. \"     If you do not have an account, please click on the \"Sign Up Now\" link. Current as of: March 21, 2017  Content Version: 11.4  © 0901-6698 Healthwise, [employer]. Care instructions adapted under license by [facility] ([facility]). If you have questions about a medical condition or this instruction, always ask your healthcare professional. [place] [address] any warranty or liability for your use of this information. Patient Education        Learning About Diabetes Food Guidelines  Your Care Instructions    Meal planning is important to manage diabetes. It helps keep your blood sugar at a target level (which you set with your doctor). You don't have to eat special foods. You can eat what your family eats, including sweets once in a while. But you do have to pay attention to how often you eat and how much you eat of certain foods.   You may want to work with a meal.  · Try to eat about the same amount of carbs at each meal. Do not \"save up\" your daily allowance of carbs to eat at one meal.  · Proteins have very little or no carbs per serving. Examples of proteins are beef, chicken, turkey, fish, eggs, tofu, cheese, cottage cheese, and peanut butter. A serving size of meat is 3 ounces, which is about the size of a deck of cards. Examples of meat substitute serving sizes (equal to 1 ounce of meat) are 1/4 cup of cottage cheese, 1 egg, 1 tablespoon of peanut butter, and ½ cup of tofu. How can you eat out and still eat healthy? · Learn to estimate the serving sizes of foods that have carbohydrate. If you measure food at home, it will be easier to estimate the amount in a serving of restaurant food. · If the meal you order has too much carbohydrate (such as potatoes, corn, or baked beans), ask to have a low-carbohydrate food instead. Ask for a salad or green vegetables. · If you use insulin, check your blood sugar before and after eating out to help you plan how much to eat in the future. · If you eat more carbohydrate at a meal than you had planned, take a walk or do other exercise. This will help lower your blood sugar. What else should you know? · Limit saturated fat, such as the fat from meat and dairy products. This is a healthy choice because people who have diabetes are at higher risk of heart disease. So choose lean cuts of meat and nonfat or low-fat dairy products. Use olive or canola oil instead of butter or shortening when cooking. · Don't skip meals. Your blood sugar may drop too low if you skip meals and take insulin or certain medicines for diabetes. · Check with your doctor before you drink alcohol. Alcohol can cause your blood sugar to drop too low. Alcohol can also cause a bad reaction if you take certain diabetes medicines. Follow-up care is a key part of your treatment and safety.  Be sure to make and go to all appointments, and call your doctor if you are having problems. It's also a good idea to know your test results and keep a list of the medicines you take. Where can you learn more? Go to [email or web address] and sign in to your QuickPlay Media account. Enter V606 in the [facility] box to learn more about \"Learning About Diabetes Food Guidelines. \"     If you do not have an account, please click on the \"Sign Up Now\" link. Current as of: March 13, 2017  Content Version: 11.4  © 6893-7530 Healthwise, [employer]. Care instructions adapted under license by [facility] ([facility]). If you have questions about a medical condition or this instruction, always ask your healthcare professional. [address] any warranty or liability for your use of this information. Patient Education        Learning About Meal Planning for Diabetes  Why plan your meals? Meal planning can be a key part of managing diabetes. Planning meals and snacks with the right balance of carbohydrate, protein, and fat can help you keep your blood sugar at the target level you set with your doctor. You don't have to eat special foods. You can eat what your family eats, including sweets once in a while. But you do have to pay attention to how often you eat and how much you eat of certain foods. You may want to work with a dietitian or a certified diabetes educator. He or she can give you tips and meal ideas and can answer your questions about meal planning. This health professional can also help you reach a healthy weight if that is one of your goals. What plan is right for you? Your dietitian or diabetes educator may suggest that you start with the plate format or carbohydrate counting. The plate format  The plate format is a simple way to help you manage how you eat. You plan meals by learning how much space each food should take on a plate. Using the plate format helps you spread carbohydrate throughout the day.  It can make it easier to keep serving of the food. First, look at the serving size on the food label. Is that the amount you eat in a serving? All of the nutrition information on a food label is based on that serving size. So if you eat more or less than that, you'll need to adjust the other numbers. Total carbohydrate is the next thing you need to look for on the label. If you count carbohydrate servings, one serving of carbohydrate is 15 grams. For foods that don't come with labels, such as fresh fruits and vegetables, you'll need a guide that lists carbohydrate in these foods. Ask your doctor, dietitian, or diabetes educator about books or other nutrition guides you can use. If you take insulin, you need to know how many grams of carbohydrate are in a meal. This lets you know how much rapid-acting insulin to take before you eat. If you use an insulin pump, you get a constant rate of insulin during the day. So the pump must be programmed at meals to give you extra insulin to cover the rise in blood sugar after meals. When you know how much carbohydrate you will eat, you can take the right amount of insulin. Or, if you always use the same amount of insulin, you need to make sure that you eat the same amount of carbohydrate at meals. If you need more help to understand carbohydrate counting and food labels, ask your doctor, dietitian, or diabetes educator. How do you get started with meal planning? Here are some tips to get started:  · Plan your meals a week at a time. Don't forget to include snacks too. · Use cookbooks or online recipes to plan several main meals. Plan some quick meals for busy nights. You also can double some recipes that freeze well. Then you can save half for other busy nights when you don't have time to cook. · Make sure you have the ingredients you need for your recipes. If you're running low on basic items, put these items on your shopping list too.   · List foods that you use to make breakfasts, lunches, and snacks. List plenty of fruits and vegetables. · Post this list on the refrigerator. Add to it as you think of more things you need. · Take the list to the store to do your weekly shopping. Follow-up care is a key part of your treatment and safety. Be sure to make and go to all appointments, and call your doctor if you are having problems. It's also a good idea to know your test results and keep a list of the medicines you take. Where can you learn more? Go to [email or web address]://[email or web address].[email or web address] org and sign in to your SyncSum account. Enter S384 in the [employer] box to learn more about \"Learning About Meal Planning for Diabetes. \"     If you do not have an account, please click on the \"Sign Up Now\" link. Current as of: March 13, 2017  Content Version: 11.4  © 6431-5628 [employer]. Care instructions adapted under license by [facility] ([facility]). If you have questions about a medical condition or this instruction, always ask your healthcare professional. [place] [address] any warranty or liability for your use of this information. Patient Education        Type 2 Diabetes: Care Instructions  Your Care Instructions    Type 2 diabetes is a disease that develops when the body's tissues cannot use insulin properly. Over time, the pancreas cannot make enough insulin. Insulin is a hormone that helps the body's cells use sugar (glucose) for energy. It also helps the body store extra sugar in muscle, fat, and liver cells. Without insulin, the sugar cannot get into the cells to do its work. It stays in the blood instead. This can cause high blood sugar levels. A person has diabetes when the blood sugar stays too high too much of the time. Over time, diabetes can lead to diseases of the heart, blood vessels, nerves, kidneys, and eyes. You may be able to control your blood sugar by losing weight, eating a healthy diet, and getting daily exercise.  You may also have to take exercise to keep your blood sugar in your target range. If those things do not help, you may take a medicine called metformin. It helps your body use insulin. This can help you control your blood sugar. You might take it on its own or with other medicines. When taken on its own, metformin should not cause low blood sugar or weight gain. Example  · Metformin (Glucophage)  Possible side effects  Common side effects include:  · Short-term nausea. · Not feeling hungry. · Diarrhea. · Increased gas in your belly. · A metallic taste. You may have side effects or reactions not listed here. Check the information that comes with your medicine. What to know about taking this medicine  · Metformin does not usually cause low blood sugar. But you may get a low blood sugar when you take metformin and you exercise hard, drink alcohol, or you do not eat enough food. · Sometimes metformin is combined with other diabetes medicine. Some of these can cause low blood sugar. · If you need a test that uses a dye or you need to have surgery, be sure to tell all of your doctors that you take metformin. You may have to stop taking it before and after the test or surgery. · Over time, blood levels of vitamin B12 can decrease in some people who take metformin. Your body needs this B vitamin to make blood cells. It also keeps your nervous system healthy. If you have been taking metformin for more than a few years, ask your doctor if you need a B12 blood test to measure the amount of vitamin B12 in your blood. · Be safe with medicines. Take your medicines exactly as prescribed. Call your doctor if you think you are having a problem with your medicine. · Check with your doctor or pharmacist before you use any other medicines. This includes over-the-counter medicines. Make sure your doctor knows all of the medicines, vitamins, herbal products, and supplements you take. Taking some medicines together can cause problems.   Where can you

## 2017-12-20 NOTE — PROGRESS NOTES
[address]  [facility]  [facility]  [address] [place] [address] [address] [address]  Dept: [phone]  Dept Fax: [phone]: [phone]    Visit Date: 12/20/2017    Servando Marie is a 61 y.o. female who presents today for:  Chief Complaint   Patient presents with    1 Month Follow-Up    Diabetes    Discuss Medications     Patient no longer taking metformin, states it causes stomach problems for her.  Weight Gain     Patient is frustrated with gaining weight all the time. States she cannot lose weight. HPI:     Diabetes Mellitus:  Laurie presents for follow up of diabetes, She was scheduled to see a nutritionist on 08/28/2017 but she no showed. she was then scheduled 11/20 but no showed again.       She is taking Levemir 40 units nightly, Humalog Flexpen 8 units TID plus a SS before meals but she reports that she just gives herself 16 units without checking her sugars- she will take it 3-4 times daily, sometimes without eating, Januvia 100 mg nightly, and Cinnamon 500 mg PO twice daily - she ran out about 10 days ago and cannot get a refill until the first of the year because she does not have the money. She was started back on the Metformin but she stopped taking it about a week ago. She was taking it on an empty stomach and it was giving her diarrhea.      A1C from 11/08/17 was 9.5, today it is 9.5. Blood sugars have been running in the high 200's-mid 300's in the mornings, high 100's-mid 200's in the afternoon, and high 100's-mid 200's in the evening. .      She is not always following a diabetic diet - Reports that she has a chocolate addiction - she eats ice cream or something chocolate at least once per week. She does eat pasta and rice. The holidays are tough for her as there is so much stuff to eat, most of it is not healthy.      She tries to exercise by walking and stretching. Reports she does this 10 minutes at a time. She quit smoking 3 weeks ago.  She moved into an apartment that does not allow smoking. She states that she has a lot of scar tissue in her stomach - we discussed rotating sites and not injecting in hard skin tissue.      HPI  Health Maintenance   Topic Date Due    DTaP/Tdap/Td vaccine (1 - Tdap) 10/29/1978    Pneumococcal med risk (1 of 1 - PPSV23) 10/29/1978    Cervical cancer screen  10/29/1980    Colon cancer screen colonoscopy  10/29/2009    Flu vaccine (1) 2017    Diabetic hemoglobin A1C test  2018    Diabetic microalbuminuria test  2018    Diabetic retinal exam  08/10/2018    Diabetic foot exam  2018    Lipid screen  2018    Breast cancer screen  10/27/2019    Hepatitis C screen  Completed    HIV screen  Completed       Past Medical History:   Diagnosis Date    Arthritis     Carpal tunnel syndrome     Depression     Diabetes mellitus ([address]     GERD (gastroesophageal reflux disease)     Glaucoma     Hiatal hernia     esophagus closes    Hx of blood clots     foot post op    Hyperlipidemia, mixed 2017    IBS (irritable bowel syndrome)     Leg pain     nerve damage right leg    MDRO (multiple drug resistant organisms) resistance 2008    wound and nasal    Guzman's neuroma     left foot    Nausea & vomiting     Neuropathic pain     PONV (postoperative nausea and vomiting)     Pulmonary emphysema ([address] 2017    Sleep apnea     DOES NOT USE CPAP      Past Surgical History:   Procedure Laterality Date    ABDOMEN SURGERY      APPENDECTOMY      BACK SURGERY      lower x 3    CERVICAL FUSION       SECTION      x3    CHOLECYSTECTOMY      COLONOSCOPY      ECTOPIC PREGNANCY SURGERY      EYE SURGERY Bilateral     LASER FOR GLAUCOMA    HYSTERECTOMY      JOINT REPLACEMENT      OTHER SURGICAL HISTORY  Aug 5, 2013    Left knee total arthroplasty (Dr. Brandon Yeboah, [facility])    TOE SURGERY Left     little toe bone removed    TONSILLECTOMY      TOTAL KNEE ARTHROPLASTY Right 1/26/2016    UPPER GASTROINTESTINAL ENDOSCOPY       Family History   Problem Relation Age of Onset    Arthritis Mother     Heart Disease Mother      CHF    High Blood Pressure Mother     Kidney Disease Mother     Cancer Father     Heart Disease Sister      diastolic dysfunction    Other Sister      thyroid cysts    Cancer Brother     Kidney Disease Brother     Diabetes Brother     Kidney Disease Brother      Social History   Substance Use Topics    Smoking status: Current Every Day Smoker     Packs/day: 0.10     Years: 35.00     Types: Cigarettes     Start date: 11/14/1982    Smokeless tobacco: Never Used      Comment: 1 pack per day, has cut down to 5 cigs a day    Alcohol use No      Current Outpatient Prescriptions   Medication Sig Dispense Refill    OXYGEN Inhale 1 L into the lungs      insulin lispro (HUMALOG KWIKPEN) 100 UNIT/ML pen 10 units TID with meals plus SS.  Blood sugar:  - NO insulin, 151-200: 3 u, 201-250 : 6 u, 251-300 : 8 u, 301-350: 12 u, 351-400: 15 u 5 pen 3    Exenatide (BYDUREON) 2 MG PEN Inject once weekly 4 pen 3    Elastic Bandages & Supports (WRIST BRACE/RIGHT MEDIUM) MISC Wear brace on right wrist nightly 1 each 0    Exenatide (BYDUREON) 2 MG PEN Inject once weekly 4 pen 3    pantoprazole (PROTONIX) 40 MG tablet Take 1 tablet by mouth daily 90 tablet 1    traZODone (DESYREL) 100 MG tablet Take 1 tablet by mouth nightly 90 tablet 1    citalopram (CELEXA) 40 MG tablet Take 1 tablet by mouth daily 90 tablet 1    buPROPion (WELLBUTRIN SR) 150 MG extended release tablet TAKE 1 TABLET BY MOUTH TWICE DAILY 90 tablet 1    SITagliptin (JANUVIA) 100 MG tablet Take 1 tablet by mouth daily 90 tablet 1    Vitamins-Lipotropics (BALANCED B-100 COMPLEX CR) TBCR Take 1 tablet by mouth 4 times daily (after meals and at bedtime) (Patient taking differently: Take 1 tablet by mouth 2 times daily ) 120 tablet 5    Magnesium Gluconate 250 MG TABS One pill twice a day 60 tablet 2  albuterol sulfate HFA (PROAIR HFA) 108 (90 Base) MCG/ACT inhaler Inhale 2 puffs into the lungs every 6 hours as needed for Wheezing 1 Inhaler 3    glucose blood VI test strips (ACCU-CHEK DAVIDA) strip 1 each by In Vitro route daily As needed. 100 each 3    atorvastatin (LIPITOR) 20 MG tablet Take 1 tablet by mouth daily 90 tablet 0    insulin detemir (LEVEMIR FLEXTOUCH) 100 UNIT/ML injection pen Inject 30 Units into the skin nightly (Patient taking differently: Inject 40 Units into the skin nightly ) 5 Pen 3    pregabalin (LYRICA) 300 MG capsule Take 1 capsule by mouth 2 times daily 180 capsule 1    Omega-3 Fatty Acids (FISH OIL) 1000 MG CAPS Take 1,000 mg by mouth daily      Cinnamon 500 MG CAPS Take 1 capsule by mouth daily      Cholecalciferol (VITAMIN D3) 2000 units CAPS Take by mouth daily      vitamin B-12 (CYANOCOBALAMIN) 500 MCG tablet Take 500 mcg by mouth daily      rOPINIRole (REQUIP) 1 MG tablet Take 1 mg by mouth 3 times daily as needed       traMADol (ULTRAM) 50 MG tablet Take 50 mg by mouth every 6 hours as needed for Pain      HYDROcodone-acetaminophen (NORCO) 5-325 MG per tablet Take 1 tablet by mouth every 4 hours as needed for Pain (For moderate pain level 4-6). 60 tablet 0    acetaminophen 650 MG TABS Take 650 mg by mouth every 4 hours as needed for Pain (For mild pain level 1-3 or for fever > 100.5). 120 tablet     metFORMIN (GLUCOPHAGE) 500 MG tablet Take 1 tablet by mouth 2 times daily (with meals) Will slowly work up to 2 pills a day - can start with every other day 60 tablet 3     No current facility-administered medications for this visit. Allergies   Allergen Reactions    Latex Itching and Rash       Subjective:    Review of Systems   Constitutional: Negative for chills, fatigue and fever. HENT: Negative for congestion, ear pain, postnasal drip, rhinorrhea and sore throat. Eyes: Negative for discharge and redness.    Respiratory: Negative for cough and shortness exercise. Can run, jog, walk, swim, weight lift - anything to get the heart rate elevated. · Will order the brace for the right wrist  · If you decide you would like to see the ortho doctor please let us know  · Continue Lipitor for the cholesterol  · Will see you back in 1 month, sooner as needed    Return in about 1 month (around 1/20/2018). Orders Placed:  Orders Placed This Encounter   Procedures    POCT glycosylated hemoglobin (Hb A1C)     DIABETES EDUCATION     Medications Prescribed:  Orders Placed This Encounter   Medications    insulin lispro (HUMALOG KWIKPEN) 100 UNIT/ML pen     Sig: 10 units TID with meals plus SS. Blood sugar:  - NO insulin, 151-200: 3 u, 201-250 : 6 u, 251-300 : 8 u, 301-350: 12 u, 351-400: 15 u     Dispense:  5 pen     Refill:  3    Exenatide (BYDUREON) 2 MG PEN     Sig: Inject once weekly     Dispense:  4 pen     Refill:  3    Elastic Bandages & Supports (WRIST BRACE/RIGHT MEDIUM) MISC     Sig: Wear brace on right wrist nightly     Dispense:  1 each     Refill:  0    Exenatide (BYDUREON) 2 MG PEN     Sig: Inject once weekly     Dispense:  4 pen     Refill:  3     Lot # [id number]  EXP: 01/2020        Patient given educational materials - see patient instructions. Discussed use, benefit, and side effects of prescribed medications. All patient questions answered. Pt voiced understanding. Reviewed health maintenance. Instructed to continue current medications, diet and exercise. Patient agreed with treatment plan. Follow up as directed.      Electronically signed by Max Neri CNP on 12/20/2017 at 5:52 PM

## 2018-01-02 ENCOUNTER — TELEPHONE (OUTPATIENT)
Dept: SLEEP CENTER | Age: 58
End: 2018-01-02

## 2018-01-04 ENCOUNTER — CARE COORDINATION (OUTPATIENT)
Dept: CARE COORDINATION | Age: 58
End: 2018-01-04

## 2018-01-04 ASSESSMENT — ENCOUNTER SYMPTOMS: DYSPNEA ASSOCIATED WITH: EXERTION

## 2018-01-04 NOTE — CARE COORDINATION
Ambulatory Care Coordination Note  2018  CM Risk Score: 3  Cameron Mortality Risk Score:      ACC: Nikolai Monteiro, RN    Summary Note:  Spoke with Bobbi Clay. Reports not feeling well during cold weather. Body hurts. Leaves the house with daughters. Daughters are big support system. Usually goes to [place] for winter to niece's. Not going this winter. Discussed a1c no improvement at 9.5.  BS are: 229, 200, 192, 303, 129. Some over 300. Doesn't check BS before every meal.  Checks once a day at different times before a meal.  Discussed possibly not giving correct dose of Humalog since 10 units plus sliding scale. Reviewed sliding scale, has it hanging on refrigerator. Discussed importance of checking BS before meals three times a day. Not taking metformin as not tolerated. Has diarrhea and GI upset. States she has tried to gradually take it starting at half a pill. Bydureon needle is \"big\". Having a hard time injecting herself. Understands how to mix medication. Daughter is going to help her inject next. Discussed limiting portions of starches such as rice, pasta, potatoes, bread. Doesn't eat much fruit. Trying to eat more vegetables. Doesn't have much extra money for food. Gets food stamps. Doesn't like food smith as food in past was  or not good. Goes to [employer] or [employer]. Discussed role and importance of diet and exercise in managing diabetes. Discussed importance of good BS control. States breathing is baseline at this time. Discussed reporting changes in breathing such as increase SOB, cough, sputum. Reminded of same day appointments. Diabetes Assessment    Meal Planning:  Avoidance of concentrated sweets   How often do you test your blood sugar?:  Daily, Meals, Bedtime   Do you have barriers with adherence to non-pharmacologic self-management interventions?  (Nutrition/Exercise/Self-Monitoring):  No   Have you ever had to go to the ED for symptoms of low blood

## 2018-01-17 DIAGNOSIS — E78.2 HYPERLIPIDEMIA, MIXED: ICD-10-CM

## 2018-01-17 RX ORDER — ATORVASTATIN CALCIUM 20 MG/1
TABLET, FILM COATED ORAL
Qty: 90 TABLET | Refills: 0 | Status: SHIPPED | OUTPATIENT
Start: 2018-01-17 | End: 2018-03-01 | Stop reason: SDUPTHER

## 2018-01-24 ENCOUNTER — TELEPHONE (OUTPATIENT)
Dept: FAMILY MEDICINE CLINIC | Age: 58
End: 2018-01-24

## 2018-01-26 NOTE — TELEPHONE ENCOUNTER
Sorin Hua calls again asking if her incontinence supplies order had been faxed yet? She said she spoke to them after she hung up from the office on Wednesday and they were going to fax it again. I don't see anything in her chart yet but could someone please check on this for the patient. Thank you!

## 2018-01-31 ENCOUNTER — TELEPHONE (OUTPATIENT)
Dept: FAMILY MEDICINE CLINIC | Age: 58
End: 2018-01-31

## 2018-02-06 DIAGNOSIS — E11.8 TYPE 2 DIABETES MELLITUS WITH COMPLICATION, UNSPECIFIED LONG TERM INSULIN USE STATUS: ICD-10-CM

## 2018-02-06 NOTE — TELEPHONE ENCOUNTER
Yolette Bell called requesting a refill on the following medications:  Requested Prescriptions     Pending Prescriptions Disp Refills    insulin detemir (LEVEMIR FLEXTOUCH) 100 UNIT/ML injection pen 5 pen 3     Sig: Inject 30 Units into the skin nightly     Pharmacy verified: [facility]  . pv      Date of last visit: 12/20/17  Date of next visit (if applicable): 0/52/8999

## 2018-02-08 ENCOUNTER — TELEPHONE (OUTPATIENT)
Dept: FAMILY MEDICINE CLINIC | Age: 58
End: 2018-02-08

## 2018-02-08 NOTE — TELEPHONE ENCOUNTER
Patient returned call again checking on her incontinence supplies form. (We never received a form for her, and she was told by our office that if would be faxed last Monday). Attempted to go look in Dr. Saundra Jacob folder again to advise patient on status, no form found. Went to get patient off a hold to advise her and she hung up.

## 2018-02-09 NOTE — TELEPHONE ENCOUNTER
Called and LM for patient to return call. Wanted to let her know that we did receive form and it was faxed today for her incontinence supplies.

## 2018-02-12 ENCOUNTER — CARE COORDINATION (OUTPATIENT)
Dept: CARE COORDINATION | Age: 58
End: 2018-02-12

## 2018-02-12 ASSESSMENT — ENCOUNTER SYMPTOMS: DYSPNEA ASSOCIATED WITH: EXERTION

## 2018-02-12 NOTE — CARE COORDINATION
Ambulatory Care Coordination Note  2/12/2018  CM Risk Score: 3  Cameron Mortality Risk Score:      ACC: Dirk Swan, RN    Summary Note: Spoke with [employer]. States she is upset as not received incontinence supplies. Reassured [employer] that papers were re-faxed with corrected information by PCP office, today. States she cannot take metformin as side effects of diarrhea. Levemir was recently increased to 40 units nightly. Denies changes in breathing. Couldn't discuss blood sugars as conversation cut short by [employer] Hua. Possible discharge from care coordination next month as states she is changing PCP's. Has upcoming appointment with Arsenio Vyas. Diabetes Assessment    Meal Planning:  Avoidance of concentrated sweets   How often do you test your blood sugar?:  Daily, Meals, Bedtime   Do you have barriers with adherence to non-pharmacologic self-management interventions?  (Nutrition/Exercise/Self-Monitoring):  No   Have you ever had to go to the ED for symptoms of low blood sugar?:  No       No patient-reported symptoms   Do you have hyperglycemia symptoms?:  No   Do you have hypoglycemia symptoms?:  No   Blood Sugar Monitoring Regimen:  Before Meals   Blood Sugar Trends:  No Change       and   COPD Assessment    Does the patient understand envrionmental exposure?:  Yes  Is the patient able to verbalize Rescue vs. Long Acting medications?:  No  Does the patient have a nebulizer?:  No  Does the patient use a space with inhaled medications?:  No     No patient-reported symptoms         Symptoms:   None:  Yes      Symptom course:  stable  Breathlessness:  exertion  Increase use of rapid acting/rescue inhaled medications?:  No  Change in chronic cough?:  No/At Baseline  Change in sputum?:  No/At Baseline  Have you had a recent diagnosis of pneumonia either by PCP or at a hospital?:  No           Care Coordination Interventions    Program Enrollment:  Rising Risk  Referral from Primary Care Provider: daily (after meals and at bedtime)  Patient taking differently: Take 1 tablet by mouth 2 times daily  10/9/17   Donley Boas, DO   Magnesium Gluconate 250 MG TABS One pill twice a day 10/9/17   Donley Boas, DO   albuterol sulfate HFA (PROAIR HFA) 108 (90 Base) MCG/ACT inhaler Inhale 2 puffs into the lungs every 6 hours as needed for Wheezing 10/9/17   Donley Boas, DO   glucose blood VI test strips (ACCU-CHEK DAVIDA) strip 1 each by In Vitro route daily As needed. 9/28/17   Donley Boas, DO   pregabalin (LYRICA) 300 MG capsule Take 1 capsule by mouth 2 times daily 8/28/17   Donley Boas, DO   Omega-3 Fatty Acids (FISH OIL) 1000 MG CAPS Take 1,000 mg by mouth daily    Historical Provider, MD   Cinnamon 500 MG CAPS Take 1 capsule by mouth daily    Historical Provider, MD   Cholecalciferol (VITAMIN D3) 2000 units CAPS Take by mouth daily    Historical Provider, MD   vitamin B-12 (CYANOCOBALAMIN) 500 MCG tablet Take 500 mcg by mouth daily    Historical Provider, MD   rOPINIRole (REQUIP) 1 MG tablet Take 1 mg by mouth 3 times daily as needed     Historical Provider, MD   traMADol (ULTRAM) 50 MG tablet Take 50 mg by mouth every 6 hours as needed for Pain    Historical Provider, MD   HYDROcodone-acetaminophen (NORCO) 5-325 MG per tablet Take 1 tablet by mouth every 4 hours as needed for Pain (For moderate pain level 4-6). 8/16/13   Geraldine Kim MD   acetaminophen 650 MG TABS Take 650 mg by mouth every 4 hours as needed for Pain (For mild pain level 1-3 or for fever > 100.5).  8/16/13   Geraldine Kim MD       Future Appointments  Date Time Provider Scarlett Ibarra   2/14/2018 2:00 PM Yolie Balbuena CNP [email or web address]   4/2/2018 1:00 PM Shahla Park PA-C Pulm Med [address]   5/17/2018 1:20 PM Linda Acosta

## 2018-03-01 ENCOUNTER — OFFICE VISIT (OUTPATIENT)
Dept: FAMILY MEDICINE CLINIC | Age: 58
End: 2018-03-01
Payer: MEDICARE

## 2018-03-01 VITALS
DIASTOLIC BLOOD PRESSURE: 64 MMHG | SYSTOLIC BLOOD PRESSURE: 122 MMHG | HEIGHT: 68 IN | WEIGHT: 276 LBS | HEART RATE: 71 BPM | RESPIRATION RATE: 12 BRPM | BODY MASS INDEX: 41.83 KG/M2 | TEMPERATURE: 98.1 F | OXYGEN SATURATION: 96 %

## 2018-03-01 DIAGNOSIS — G62.9 NEUROPATHY: ICD-10-CM

## 2018-03-01 DIAGNOSIS — K21.9 GASTROESOPHAGEAL REFLUX DISEASE WITHOUT ESOPHAGITIS: ICD-10-CM

## 2018-03-01 DIAGNOSIS — E78.2 HYPERLIPIDEMIA, MIXED: ICD-10-CM

## 2018-03-01 DIAGNOSIS — F33.42 RECURRENT MAJOR DEPRESSIVE DISORDER, IN FULL REMISSION (HCC): ICD-10-CM

## 2018-03-01 DIAGNOSIS — E11.8 TYPE 2 DIABETES MELLITUS WITH COMPLICATION, UNSPECIFIED LONG TERM INSULIN USE STATUS: Primary | ICD-10-CM

## 2018-03-01 LAB — HBA1C MFR BLD: 9.2 %

## 2018-03-01 PROCEDURE — 83036 HEMOGLOBIN GLYCOSYLATED A1C: CPT | Performed by: NURSE PRACTITIONER

## 2018-03-01 PROCEDURE — 99214 OFFICE O/P EST MOD 30 MIN: CPT | Performed by: NURSE PRACTITIONER

## 2018-03-01 RX ORDER — PREGABALIN 300 MG/1
300 CAPSULE ORAL 2 TIMES DAILY
Qty: 180 CAPSULE | Refills: 1 | Status: SHIPPED | OUTPATIENT
Start: 2018-03-01 | End: 2018-03-22 | Stop reason: SDUPTHER

## 2018-03-01 RX ORDER — PANTOPRAZOLE SODIUM 40 MG/1
40 TABLET, DELAYED RELEASE ORAL DAILY
Qty: 90 TABLET | Refills: 1 | Status: SHIPPED | OUTPATIENT
Start: 2018-03-01 | End: 2018-07-18 | Stop reason: SDUPTHER

## 2018-03-01 RX ORDER — CITALOPRAM 40 MG/1
40 TABLET ORAL DAILY
Qty: 90 TABLET | Refills: 1 | Status: SHIPPED | OUTPATIENT
Start: 2018-03-01 | End: 2018-07-18 | Stop reason: SDUPTHER

## 2018-03-01 RX ORDER — TRAZODONE HYDROCHLORIDE 100 MG/1
100 TABLET ORAL NIGHTLY
Qty: 90 TABLET | Refills: 1 | Status: CANCELLED | OUTPATIENT
Start: 2018-03-01

## 2018-03-01 RX ORDER — ATORVASTATIN CALCIUM 20 MG/1
TABLET, FILM COATED ORAL
Qty: 90 TABLET | Refills: 0 | Status: SHIPPED | OUTPATIENT
Start: 2018-03-01 | End: 2018-06-20 | Stop reason: SDUPTHER

## 2018-03-01 NOTE — PROGRESS NOTES
Itching and Rash       Subjective:    Review of Systems   Constitutional: Negative for chills, fatigue and fever. HENT: Negative for congestion, ear pain, postnasal drip, rhinorrhea and sore throat. Eyes: Negative for discharge and redness. Respiratory: Negative for cough and shortness of breath. Cardiovascular: Negative for chest pain and leg swelling. Gastrointestinal: Negative for abdominal distention, abdominal pain, anal bleeding, blood in stool, constipation, diarrhea and nausea. Skin: Negative for color change and rash. Neurological: Positive for numbness (In bilateral feet). Negative for facial asymmetry, speech difficulty and weakness. Hematological: Does not bruise/bleed easily. Psychiatric/Behavioral: Negative for agitation and confusion. Objective:     Vitals:    03/01/18 1301   BP: 122/64   Site: Left Arm   Position: Sitting   Cuff Size: Large Adult   Pulse: 71   Resp: 12   Temp: 98.1 °F (36.7 °C)   TempSrc: Oral   SpO2: 96%   Weight: 276 lb (125.2 kg)   Height: 5' 8.31\" (1.735 m)       Body mass index is 41.59 kg/m². Wt Readings from Last 3 Encounters:   03/01/18 276 lb (125.2 kg)   12/20/17 280 lb 3.2 oz (127.1 kg)   11/20/17 275 lb 6.4 oz (124.9 kg)     BP Readings from Last 3 Encounters:   03/01/18 122/64   12/20/17 (!) 121/59   11/20/17 128/68       Physical Exam   Constitutional: She is oriented to person, place, and time. She appears well-developed and well-nourished. No distress. HENT:   Head: Normocephalic and atraumatic. Right Ear: External ear normal.   Left Ear: External ear normal.   Eyes: Conjunctivae are normal. Right eye exhibits no discharge. Left eye exhibits no discharge. Neck: Normal range of motion. Cardiovascular: Normal rate and regular rhythm. Pulmonary/Chest: Effort normal. No respiratory distress. Musculoskeletal: She exhibits no tenderness or deformity. Neurological: She is alert and oriented to person, place, and time.    Skin: Skin is warm and dry. No rash noted. She is not diaphoretic. Physical Exam :          Right foot: Normal. She exhibits normal range of motion, no tenderness, no bony tenderness, no swelling, normal capillary refill of < 3 seconds, no crepitus, no deformity, no lacerations, and no ulcerations. Monofilament - Decreased sensation in foot    Left foot:  Normal. She exhibits normal range of motion, no tenderness, no bony tenderness, no swelling, normal capillary refill of < 3 seconds, no crepitus, no deformity, no lacerations, and no ulcerations. Monofilament - Decreased sensation in left foot     Psychiatric: She has a normal mood and affect. Her speech is normal and behavior is normal. Judgment and thought content normal. Cognition and memory are normal.       Lab Results   Component Value Date    WBC 11.7 (H) 11/08/2017    HGB 14.2 11/08/2017    HCT 41.1 11/08/2017     11/08/2017    CHOL 176 11/08/2017    TRIG 272 (H) 11/08/2017    HDL 33 11/08/2017    LDLCALC 89 11/08/2017    AST 13 06/12/2017     11/08/2017    K 4.5 11/08/2017    CL 98 11/08/2017    CREATININE 0.8 11/08/2017    BUN 14 11/08/2017    CO2 23 11/08/2017    TSH 1.680 06/12/2017    LABA1C 9.2 03/01/2018    LABMICR < 1.20 07/31/2017    LABGLOM 74 (A) 11/08/2017    MG 2.0 11/08/2017    CALCIUM 9.4 11/08/2017    VITD25 19 (L) 11/08/2017     Assessment:      1. Type 2 diabetes mellitus with complication, unspecified long term insulin use status (HCC)  Exenatide ER (BYDUREON BCISE) 2 MG/0.85ML AUIJ    insulin detemir (LEVEMIR FLEXTOUCH) 100 UNIT/ML injection pen     DIABETES EDUCATION    POCT glycosylated hemoglobin (Hb A1C)     DIABETES FOOT EXAM   2. Neuropathy (HCC)  pregabalin (LYRICA) 300 MG capsule   3. Hyperlipidemia, mixed  atorvastatin (LIPITOR) 20 MG tablet   4. Recurrent major depressive disorder, in full remission ([facility] [address]  citalopram (CELEXA) 40 MG tablet   5.  Gastroesophageal reflux disease without esophagitis  pantoprazole (PROTONIX)

## 2018-03-01 NOTE — PATIENT INSTRUCTIONS
away.  When should you call for help? Call 911 anytime you think you may need emergency care. For example, call if:  ? · You feel like hurting yourself or someone else. ? · Someone you know has depression and is about to attempt or is attempting suicide. ?Call your doctor now or seek immediate medical care if:  ? · You hear voices. ? · Someone you know has depression and:  ¨ Starts to give away his or her possessions. ¨ Uses illegal drugs or drinks alcohol heavily. ¨ Talks or writes about death, including writing suicide notes or talking about guns, knives, or pills. ¨ Starts to spend a lot of time alone. ¨ Acts very aggressively or suddenly appears calm. ? Watch closely for changes in your health, and be sure to contact your doctor if:  ? · You do not get better as expected. Where can you learn more? Go to https://CondoDomainpeifeanyiewflaco.SunnyBump. org and sign in to your Sleepy's account. Enter G102 in the Nu-B-2B box to learn more about \"Recovering From Depression: Care Instructions. \"     If you do not have an account, please click on the \"Sign Up Now\" link. Current as of: May 12, 2017  Content Version: 11.5  © 0104-7390 Glyde. Care instructions adapted under license by [email or web address] ([facility]). If you have questions about a medical condition or this instruction, always ask your healthcare professional. [place] [address] any warranty or liability for your use of this information. Patient Education        Learning About Diabetes Food Guidelines  Your Care Instructions    Meal planning is important to manage diabetes. It helps keep your blood sugar at a target level (which you set with your doctor). You don't have to eat special foods. You can eat what your family eats, including sweets once in a while. But you do have to pay attention to how often you eat and how much you eat of certain foods.   You may want to work with a dietitian or a certified diabetes a good idea to know your test results and keep a list of the medicines you take. Where can you learn more? Go to https://chpepiceweb.[email or web address]. org and sign in to your MDC Telecom account. Enter E768 in the [facility] box to learn more about \"Learning About Diabetes Food Guidelines. \"     If you do not have an account, please click on the \"Sign Up Now\" link. Current as of: March 13, 2017  Content Version: 11.5  © 5946-1196 Clinipace WorldWide. Care instructions adapted under license by [facility] ([facility] If you have questions about a medical condition or this instruction, always ask your healthcare professional. [address] any warranty or liability for your use of this information. Patient Education        Learning About Meal Planning for Diabetes  Why plan your meals? Meal planning can be a key part of managing diabetes. Planning meals and snacks with the right balance of carbohydrate, protein, and fat can help you keep your blood sugar at the target level you set with your doctor. You don't have to eat special foods. You can eat what your family eats, including sweets once in a while. But you do have to pay attention to how often you eat and how much you eat of certain foods. You may want to work with a dietitian or a certified diabetes educator. He or she can give you tips and meal ideas and can answer your questions about meal planning. This health professional can also help you reach a healthy weight if that is one of your goals. What plan is right for you? Your dietitian or diabetes educator may suggest that you start with the plate format or carbohydrate counting. The plate format  The plate format is a simple way to help you manage how you eat. You plan meals by learning how much space each food should take on a plate. Using the plate format helps you spread carbohydrate throughout the day.  It can make it easier to keep your blood sugar level within your target range. It also helps you see if you're eating healthy portion sizes. To use the plate format, you put non-starchy vegetables on half your plate. Add meat or meat substitutes on one-quarter of the plate. Put a grain or starchy vegetable (such as brown rice or a potato) on the final quarter of the plate. You can add a small piece of fruit and some low-fat or fat-free milk or yogurt, depending on your carbohydrate goal for each meal.  Here are some tips for using the plate format:  · Make sure that you are not using an oversized plate. A 9-inch plate is best. Many restaurants use larger plates. · Get used to using the plate format at home. Then you can use it when you eat out. · Write down your questions about using the plate format. Talk to your doctor, a dietitian, or a diabetes educator about your concerns. Carbohydrate counting  With carbohydrate counting, you plan meals based on the amount of carbohydrate in each food. Carbohydrate raises blood sugar higher and more quickly than any other nutrient. It is found in desserts, breads and cereals, and fruit. It's also found in starchy vegetables such as potatoes and corn, grains such as rice and pasta, and milk and yogurt. Spreading carbohydrate throughout the day helps keep your blood sugar levels within your target range. Your daily amount depends on several things, including your weight, how active you are, which diabetes medicines you take, and what your goals are for your blood sugar levels. A registered dietitian or diabetes educator can help you plan how much carbohydrate to include in each meal and snack. A guideline for your daily amount of carbohydrate is:  · 45 to 60 grams at each meal. That's about the same as 3 to 4 carbohydrate servings. · 15 to 20 grams at each snack. That's about the same as 1 carbohydrate serving. The Nutrition Facts label on packaged foods tells you how much carbohydrate is in a serving of the food.  First, look at the serving size on the food label. Is that the amount you eat in a serving? All of the nutrition information on a food label is based on that serving size. So if you eat more or less than that, you'll need to adjust the other numbers. Total carbohydrate is the next thing you need to look for on the label. If you count carbohydrate servings, one serving of carbohydrate is 15 grams. For foods that don't come with labels, such as fresh fruits and vegetables, you'll need a guide that lists carbohydrate in these foods. Ask your doctor, dietitian, or diabetes educator about books or other nutrition guides you can use. If you take insulin, you need to know how many grams of carbohydrate are in a meal. This lets you know how much rapid-acting insulin to take before you eat. If you use an insulin pump, you get a constant rate of insulin during the day. So the pump must be programmed at meals to give you extra insulin to cover the rise in blood sugar after meals. When you know how much carbohydrate you will eat, you can take the right amount of insulin. Or, if you always use the same amount of insulin, you need to make sure that you eat the same amount of carbohydrate at meals. If you need more help to understand carbohydrate counting and food labels, ask your doctor, dietitian, or diabetes educator. How do you get started with meal planning? Here are some tips to get started:  · Plan your meals a week at a time. Don't forget to include snacks too. · Use cookbooks or online recipes to plan several main meals. Plan some quick meals for busy nights. You also can double some recipes that freeze well. Then you can save half for other busy nights when you don't have time to cook. · Make sure you have the ingredients you need for your recipes. If you're running low on basic items, put these items on your shopping list too. · List foods that you use to make breakfasts, lunches, and snacks.  List plenty of fruits and vegetables. · Post this list on the refrigerator. Add to it as you think of more things you need. · Take the list to the store to do your weekly shopping. Follow-up care is a key part of your treatment and safety. Be sure to make and go to all appointments, and call your doctor if you are having problems. It's also a good idea to know your test results and keep a list of the medicines you take. Where can you learn more? Go to https://Twenty Recruitment GrouppeDeemelo.[email or web address] [email or web address] org and sign in to your The Runthrough account. Enter G207 in the Closely box to learn more about \"Learning About Meal Planning for Diabetes. \"     If you do not have an account, please click on the \"Sign Up Now\" link. Current as of: March 13, 2017  Content Version: 11.5  © 5276-4811 Crowdnetic. Care instructions adapted under license by [facility] ([facility]). If you have questions about a medical condition or this instruction, always ask your healthcare professional. [place] [address] any warranty or liability for your use of this information. Patient Education        Diabetes Foot Health: Care Instructions  Your Care Instructions    When you have diabetes, your feet need extra care and attention. Diabetes can damage the nerve endings and blood vessels in your feet, making you less likely to notice when your feet are injured. Diabetes also limits your body's ability to fight infection and get blood to areas that need it. If you get a minor foot injury, it could become an ulcer or a serious infection. With good foot care, you can prevent most of these problems. Caring for your feet can be quick and easy. Most of the care can be done when you are bathing or getting ready for bed. Follow-up care is a key part of your treatment and safety. Be sure to make and go to all appointments, and call your doctor if you are having problems.  It's also a good idea to know your test results and keep a list of the medicines you helps prevent bunions and blisters. ¨ Try on shoes while wearing the kind of socks you will usually wear with the shoes. ¨ Avoid plastic shoes. They may rub your feet and cause blisters. Good shoes should be made of materials that are flexible and breathable, such as leather or cloth. ¨ Break in new shoes slowly by wearing them for no more than an hour a day for several days. Take extra time to check your feet for red areas, blisters, or other problems after you wear new shoes. · Do not go barefoot. Do not wear sandals, and do not wear shoes with very thin soles. Thin soles are easy to puncture. They also do not protect your feet from hot pavement or cold weather. · Have your doctor check your feet during each visit. If you have a foot problem, see your doctor. Do not try to treat an early foot problem at home. Home remedies or treatments that you can buy without a prescription (such as corn removers) can be harmful. · Always get early treatment for foot problems. A minor irritation can lead to a major problem if not properly cared for early. When should you call for help? Call your doctor now or seek immediate medical care if:  ? · You have a foot sore, an ulcer or break in the skin that is not healing after 4 days, bleeding corns or calluses, or an ingrown toenail. ? · You have blue or black areas, which can mean bruising or blood flow problems. ? · You have peeling skin or tiny blisters between your toes or cracking or oozing of the skin. ? · You have a fever for more than 24 hours and a foot sore. ? · You have new numbness or tingling in your feet that does not go away after you move your feet or change positions. ? · You have unexplained or unusual swelling of the foot or ankle. ? Watch closely for changes in your health, and be sure to contact your doctor if:  ? · You cannot do proper foot care. Where can you learn more? Go to https://yanna.health-partners. org and sign in to your Healthwise, Incorporated. Care instructions adapted under license by [facility] ([facility]). If you have questions about a medical condition or this instruction, always ask your healthcare professional. [address] any warranty or liability for your use of this information. Patient Education        Learning About High Cholesterol  What is high cholesterol? Cholesterol is a type of fat in your blood. It is needed for many body functions, such as making new cells. Cholesterol is made by your body. It also comes from food you eat. If you have too much cholesterol, it starts to build up in your arteries. This is called hardening of the arteries, or atherosclerosis. High cholesterol raises your risk of a heart attack and stroke. There are different types of cholesterol. LDL is the \"bad\" cholesterol. High LDL can raise your risk for heart disease, heart attack, and stroke. HDL is the \"good\" cholesterol. High HDL is linked with a lower risk for heart disease, heart attack, and stroke. Your cholesterol levels help your doctor find out your risk for having a heart attack or stroke. How can you prevent high cholesterol? A heart-healthy lifestyle can help you prevent high cholesterol. This lifestyle helps lower your risk for a heart attack and stroke. · Eat heart-healthy foods. ¨ Eat fruits, vegetables, whole grains (like oatmeal), dried beans and peas, nuts and seeds, soy products (like tofu), and fat-free or low-fat dairy products. ¨ Replace butter, margarine, and hydrogenated or partially hydrogenated oils with olive and canola oils. (Canola oil margarine without trans fat is fine.)  ¨ Replace red meat with fish, poultry, and soy protein (like tofu). ¨ Limit processed and packaged foods like chips, crackers, and cookies. · Be active. Exercise can improve your cholesterol level. Get at least 30 minutes of exercise on most days of the week. Walking is a good choice.  You also may want to do

## 2018-03-02 ASSESSMENT — ENCOUNTER SYMPTOMS
ANAL BLEEDING: 0
ABDOMINAL PAIN: 0
CONSTIPATION: 0
NAUSEA: 0
EYE DISCHARGE: 0
RHINORRHEA: 0
SHORTNESS OF BREATH: 0
COLOR CHANGE: 0
SORE THROAT: 0
EYE REDNESS: 0
COUGH: 0
ABDOMINAL DISTENTION: 0
DIARRHEA: 0
BLOOD IN STOOL: 0

## 2018-03-14 ENCOUNTER — CARE COORDINATION (OUTPATIENT)
Dept: CARE COORDINATION | Age: 58
End: 2018-03-14

## 2018-03-14 ASSESSMENT — ENCOUNTER SYMPTOMS: DYSPNEA ASSOCIATED WITH: EXERTION

## 2018-03-14 NOTE — CARE COORDINATION
sputum?:  No/At Baseline  Have you had a recent diagnosis of pneumonia either by PCP or at a hospital?:  No               Care Coordination Interventions    Program Enrollment:  Rising Risk  Referral from Primary Care Provider:  No  Suggested Interventions and Community Resources  Diabetes Education:  Completed  Physical Therapy:  Completed (Comment: [phone])  Registered Dietician:  Completed  Other Services or Interventions:  Home oxygen supplies with [employer]         Goals Addressed             Most Recent     Conditions and Symptoms   On track (3/14/2018)             I will schedule office visits, as directed by my provider. I will keep my appointment or reschedule if I have to cancel. I will notify my provider of any barriers to my plan of care. I will follow my Zone Management tool to seek urgent or emergent care. I will notify my provider of any symptoms that indicate a worsening of my condition. Barriers: none  Plan for overcoming my barriers: N/A  Confidence: 7/10  Anticipated Goal Completion Date: 3/27/18              Prior to Admission medications    Medication Sig Start Date End Date Taking? Authorizing Provider   pregabalin (LYRICA) 300 MG capsule Take 1 capsule by mouth 2 times daily for 30 days. 3/1/18 3/31/18  Rashawn Braun CNP   atorvastatin (LIPITOR) 20 MG tablet TAKE 1 TABLET DAILY 3/1/18   Rashawn Braun CNP   citalopram (CELEXA) 40 MG tablet Take 1 tablet by mouth daily 3/1/18   Rashawn Braun CNP   pantoprazole (PROTONIX) 40 MG tablet Take 1 tablet by mouth daily 3/1/18   Rashawn Braun CNP   Exenatide ER (BYDUREON BCISE) 2 MG/0.85ML AUIJ Inject 2 mg once weekly 3/1/18   Rashawn Braun CNP   insulin detemir (LEVEMIR FLEXTOUCH) 100 UNIT/ML injection pen Inject 45 Units into the skin nightly 3/1/18   Rashawn Braun CNP   OXYGEN Inhale 1 L into the lungs    Historical Provider, MD   insulin lispro (HUMALOG KWIKPEN) 100 UNIT/ML pen 10 units TID with meals plus SS.  Blood sugar:  - NO insulin, 151-200: 3 u, 201-250 : 6 u, 251-300 : 8 u, 301-350: 12 u, 351-400: 15 u 12/20/17   Otto Bolanos CNP   Elastic Bandages & Supports (WRIST BRACE/RIGHT MEDIUM) MISC Wear brace on right wrist nightly 12/20/17   Otto Bolanos CNP   traZODone (DESYREL) 100 MG tablet Take 1 tablet by mouth nightly 11/9/17 Fredda Outlaw, DO   buPROPion [facility]) 150 MG extended release tablet TAKE 1 TABLET BY MOUTH TWICE DAILY 11/9/17 Fredda Outlaw, DO   SITagliptin (JANUVIA) 100 MG tablet Take 1 tablet by mouth daily 11/9/17 Fredda Outlaw, DO   metFORMIN (GLUCOPHAGE) 500 MG tablet Take 1 tablet by mouth 2 times daily (with meals) Will slowly work up to 2 pills a day - can start with every other day 11/9/17 Fredda Outlaw, DO   Vitamins-Lipotropics (BALANCED B-100 COMPLEX CR) TBCR Take 1 tablet by mouth 4 times daily (after meals and at bedtime)  Patient taking differently: Take 1 tablet by mouth 2 times daily  10/9/17   Fredda Outlaw, DO   Magnesium Gluconate 250 MG TABS One pill twice a day 10/9/17   Fredda Outlaw, DO   albuterol sulfate HFA (PROAIR HFA) 108 (90 Base) MCG/ACT inhaler Inhale 2 puffs into the lungs every 6 hours as needed for Wheezing 10/9/17   Fredda Outlaw, DO   glucose blood VI test strips (ACCU-CHEK DAVIDA) strip 1 each by In Vitro route daily As needed.  9/28/17 Fredda Outlaw, DO   Omega-3 Fatty Acids (FISH OIL) 1000 MG CAPS Take 1,000 mg by mouth daily    Historical Provider, MD   Cinnamon 500 MG CAPS Take 1 capsule by mouth daily    Historical Provider, MD   Cholecalciferol (VITAMIN D3) 2000 units CAPS Take by mouth daily    Historical Provider, MD   vitamin B-12 (CYANOCOBALAMIN) 500 MCG tablet Take 500 mcg by mouth daily    Historical Provider, MD   rOPINIRole (REQUIP) 1 MG tablet Take 1 mg by mouth 3 times daily as needed     Historical Provider, MD   traMADol (ULTRAM) 50 MG tablet Take 50 mg by mouth every 6 hours as needed for Pain    Historical Provider, MD

## 2018-03-22 ENCOUNTER — TELEPHONE (OUTPATIENT)
Dept: FAMILY MEDICINE CLINIC | Age: 58
End: 2018-03-22

## 2018-03-22 DIAGNOSIS — G62.9 NEUROPATHY: ICD-10-CM

## 2018-03-22 DIAGNOSIS — E11.8 TYPE 2 DIABETES MELLITUS WITH COMPLICATION, UNSPECIFIED LONG TERM INSULIN USE STATUS: ICD-10-CM

## 2018-03-22 RX ORDER — PREGABALIN 300 MG/1
300 CAPSULE ORAL 2 TIMES DAILY
Qty: 180 CAPSULE | Refills: 1 | Status: SHIPPED | OUTPATIENT
Start: 2018-03-22 | End: 2018-06-20 | Stop reason: SDUPTHER

## 2018-03-22 RX ORDER — PREGABALIN 300 MG/1
300 CAPSULE ORAL 2 TIMES DAILY
Qty: 180 CAPSULE | Refills: 1 | Status: CANCELLED | OUTPATIENT
Start: 2018-03-22 | End: 2018-09-18

## 2018-03-22 NOTE — TELEPHONE ENCOUNTER
3/22/18    [facility] called requesting a refill on the following medications:  Requested Prescriptions     Pending Prescriptions Disp Refills    SITagliptin (JANUVIA) 100 MG tablet 90 tablet 1     Sig: Take 1 tablet by mouth daily     Pharmacy verified:  [email or web address]      Date of last visit:  3/1/18  Date of next visit (if applicable): 1/23/43  blm

## 2018-04-13 ENCOUNTER — TELEPHONE (OUTPATIENT)
Dept: FAMILY MEDICINE CLINIC | Age: 58
End: 2018-04-13

## 2018-04-26 ENCOUNTER — CARE COORDINATION (OUTPATIENT)
Dept: CARE COORDINATION | Age: 58
End: 2018-04-26

## 2018-04-27 ENCOUNTER — OFFICE VISIT (OUTPATIENT)
Dept: FAMILY MEDICINE CLINIC | Age: 58
End: 2018-04-27
Payer: MEDICARE

## 2018-04-27 VITALS
OXYGEN SATURATION: 92 % | RESPIRATION RATE: 12 BRPM | BODY MASS INDEX: 42.31 KG/M2 | HEART RATE: 66 BPM | HEIGHT: 68 IN | DIASTOLIC BLOOD PRESSURE: 78 MMHG | SYSTOLIC BLOOD PRESSURE: 132 MMHG | WEIGHT: 279.2 LBS | TEMPERATURE: 97 F

## 2018-04-27 DIAGNOSIS — M25.571 ARTHRALGIA OF RIGHT FOOT: Primary | ICD-10-CM

## 2018-04-27 DIAGNOSIS — J44.9 MODERATE COPD (CHRONIC OBSTRUCTIVE PULMONARY DISEASE) (HCC): ICD-10-CM

## 2018-04-27 DIAGNOSIS — M72.2 PLANTAR FASCIITIS, RIGHT: ICD-10-CM

## 2018-04-27 DIAGNOSIS — M77.9 BONE SPUR: ICD-10-CM

## 2018-04-27 DIAGNOSIS — F33.42 RECURRENT MAJOR DEPRESSIVE DISORDER, IN FULL REMISSION (HCC): ICD-10-CM

## 2018-04-27 DIAGNOSIS — E11.8 TYPE 2 DIABETES MELLITUS WITH COMPLICATION, UNSPECIFIED LONG TERM INSULIN USE STATUS: ICD-10-CM

## 2018-04-27 PROCEDURE — 99214 OFFICE O/P EST MOD 30 MIN: CPT | Performed by: NURSE PRACTITIONER

## 2018-04-27 RX ORDER — ALBUTEROL SULFATE 90 UG/1
2 AEROSOL, METERED RESPIRATORY (INHALATION) EVERY 6 HOURS PRN
Qty: 1 INHALER | Refills: 3 | Status: SHIPPED | OUTPATIENT
Start: 2018-04-27 | End: 2019-05-02 | Stop reason: SDUPTHER

## 2018-04-27 RX ORDER — TRAZODONE HYDROCHLORIDE 100 MG/1
100 TABLET ORAL NIGHTLY
Qty: 90 TABLET | Refills: 1 | Status: SHIPPED | OUTPATIENT
Start: 2018-04-27 | End: 2018-07-18 | Stop reason: SDUPTHER

## 2018-04-27 RX ORDER — BUPROPION HYDROCHLORIDE 150 MG/1
TABLET, EXTENDED RELEASE ORAL
Qty: 90 TABLET | Refills: 1 | Status: SHIPPED | OUTPATIENT
Start: 2018-04-27 | End: 2018-12-27 | Stop reason: SDUPTHER

## 2018-04-27 ASSESSMENT — ENCOUNTER SYMPTOMS
NAUSEA: 0
CONSTIPATION: 0
EYE REDNESS: 0
RHINORRHEA: 0
ABDOMINAL PAIN: 0
ABDOMINAL DISTENTION: 0
ANAL BLEEDING: 0
BLOOD IN STOOL: 0
COUGH: 0
SORE THROAT: 0
COLOR CHANGE: 0
DIARRHEA: 0
EYE DISCHARGE: 0
SHORTNESS OF BREATH: 0

## 2018-05-17 ENCOUNTER — TELEPHONE (OUTPATIENT)
Dept: FAMILY MEDICINE CLINIC | Age: 58
End: 2018-05-17

## 2018-05-24 ENCOUNTER — APPOINTMENT (OUTPATIENT)
Dept: GENERAL RADIOLOGY | Age: 58
DRG: 854 | End: 2018-05-24
Payer: MEDICARE

## 2018-05-24 ENCOUNTER — HOSPITAL ENCOUNTER (INPATIENT)
Age: 58
LOS: 7 days | Discharge: NURSING, REHAB OR LONG-TERM CARE | DRG: 854 | End: 2018-05-31
Attending: FAMILY MEDICINE | Admitting: HOSPITALIST
Payer: MEDICARE

## 2018-05-24 ENCOUNTER — OFFICE VISIT (OUTPATIENT)
Dept: FAMILY MEDICINE CLINIC | Age: 58
End: 2018-05-24
Payer: MEDICARE

## 2018-05-24 ENCOUNTER — APPOINTMENT (OUTPATIENT)
Dept: CT IMAGING | Age: 58
DRG: 854 | End: 2018-05-24
Payer: MEDICARE

## 2018-05-24 VITALS
WEIGHT: 279.1 LBS | RESPIRATION RATE: 12 BRPM | TEMPERATURE: 100.2 F | SYSTOLIC BLOOD PRESSURE: 129 MMHG | HEART RATE: 90 BPM | HEIGHT: 68 IN | BODY MASS INDEX: 42.3 KG/M2 | DIASTOLIC BLOOD PRESSURE: 65 MMHG

## 2018-05-24 DIAGNOSIS — T63.301A SPIDER BITE WOUND, ACCIDENTAL OR UNINTENTIONAL, INITIAL ENCOUNTER: Primary | ICD-10-CM

## 2018-05-24 DIAGNOSIS — L03.317 CELLULITIS OF BUTTOCK: ICD-10-CM

## 2018-05-24 DIAGNOSIS — L03.317 CELLULITIS OF BUTTOCK: Primary | ICD-10-CM

## 2018-05-24 PROBLEM — A41.9 SEPSIS (HCC): Status: ACTIVE | Noted: 2018-05-24

## 2018-05-24 LAB
ANION GAP SERPL CALCULATED.3IONS-SCNC: 15 MEQ/L (ref 8–16)
AVERAGE GLUCOSE: 198 MG/DL (ref 70–126)
BUN BLDV-MCNC: 16 MG/DL (ref 7–22)
CALCIUM SERPL-MCNC: 9.3 MG/DL (ref 8.5–10.5)
CHLORIDE BLD-SCNC: 95 MEQ/L (ref 98–111)
CO2: 21 MEQ/L (ref 23–33)
CREAT SERPL-MCNC: 0.9 MG/DL (ref 0.4–1.2)
GFR SERPL CREATININE-BSD FRML MDRD: 64 ML/MIN/1.73M2
GLUCOSE BLD-MCNC: 269 MG/DL (ref 70–108)
GLUCOSE BLD-MCNC: 298 MG/DL (ref 70–108)
HBA1C MFR BLD: 8.6 % (ref 4.4–6.4)
LACTIC ACID: 2.1 MMOL/L (ref 0.5–2.2)
OSMOLALITY CALCULATION: 273.3 MOSMOL/KG (ref 275–300)
POTASSIUM SERPL-SCNC: 3.9 MEQ/L (ref 3.5–5.2)
PROCALCITONIN: 0.4 NG/ML (ref 0.01–0.09)
SCAN OF BLOOD SMEAR: NORMAL
SCAN OF BLOOD SMEAR: NORMAL
SODIUM BLD-SCNC: 131 MEQ/L (ref 135–145)

## 2018-05-24 PROCEDURE — 71045 X-RAY EXAM CHEST 1 VIEW: CPT

## 2018-05-24 PROCEDURE — 99222 1ST HOSP IP/OBS MODERATE 55: CPT | Performed by: HOSPITALIST

## 2018-05-24 PROCEDURE — 36415 COLL VENOUS BLD VENIPUNCTURE: CPT

## 2018-05-24 PROCEDURE — 36415 COLL VENOUS BLD VENIPUNCTURE: CPT | Performed by: FAMILY MEDICINE

## 2018-05-24 PROCEDURE — 82948 REAGENT STRIP/BLOOD GLUCOSE: CPT

## 2018-05-24 PROCEDURE — 80048 BASIC METABOLIC PNL TOTAL CA: CPT

## 2018-05-24 PROCEDURE — 1200000000 HC SEMI PRIVATE

## 2018-05-24 PROCEDURE — 84145 PROCALCITONIN (PCT): CPT

## 2018-05-24 PROCEDURE — 87040 BLOOD CULTURE FOR BACTERIA: CPT

## 2018-05-24 PROCEDURE — 99213 OFFICE O/P EST LOW 20 MIN: CPT | Performed by: FAMILY MEDICINE

## 2018-05-24 PROCEDURE — 83605 ASSAY OF LACTIC ACID: CPT

## 2018-05-24 PROCEDURE — 6370000000 HC RX 637 (ALT 250 FOR IP): Performed by: FAMILY MEDICINE

## 2018-05-24 PROCEDURE — 99285 EMERGENCY DEPT VISIT HI MDM: CPT

## 2018-05-24 PROCEDURE — 74176 CT ABD & PELVIS W/O CONTRAST: CPT

## 2018-05-24 PROCEDURE — 85025 COMPLETE CBC W/AUTO DIFF WBC: CPT

## 2018-05-24 PROCEDURE — 83036 HEMOGLOBIN GLYCOSYLATED A1C: CPT

## 2018-05-24 RX ORDER — SULFAMETHOXAZOLE AND TRIMETHOPRIM 800; 160 MG/1; MG/1
1 TABLET ORAL 2 TIMES DAILY
Qty: 20 TABLET | Refills: 0 | Status: ON HOLD | OUTPATIENT
Start: 2018-05-24 | End: 2018-06-15 | Stop reason: HOSPADM

## 2018-05-24 RX ORDER — POTASSIUM CHLORIDE 7.45 MG/ML
10 INJECTION INTRAVENOUS PRN
Status: DISCONTINUED | OUTPATIENT
Start: 2018-05-24 | End: 2018-05-31 | Stop reason: HOSPADM

## 2018-05-24 RX ORDER — SODIUM CHLORIDE 0.9 % (FLUSH) 0.9 %
10 SYRINGE (ML) INJECTION PRN
Status: DISCONTINUED | OUTPATIENT
Start: 2018-05-24 | End: 2018-05-31 | Stop reason: HOSPADM

## 2018-05-24 RX ORDER — ROPINIROLE 1 MG/1
1 TABLET, FILM COATED ORAL 3 TIMES DAILY
Status: DISCONTINUED | OUTPATIENT
Start: 2018-05-25 | End: 2018-05-31 | Stop reason: HOSPADM

## 2018-05-24 RX ORDER — INSULIN GLARGINE 100 [IU]/ML
45 INJECTION, SOLUTION SUBCUTANEOUS NIGHTLY
Status: DISCONTINUED | OUTPATIENT
Start: 2018-05-25 | End: 2018-05-31 | Stop reason: HOSPADM

## 2018-05-24 RX ORDER — ACETAMINOPHEN 325 MG/1
TABLET ORAL
Status: DISPENSED
Start: 2018-05-24 | End: 2018-05-25

## 2018-05-24 RX ORDER — PANTOPRAZOLE SODIUM 40 MG/1
40 TABLET, DELAYED RELEASE ORAL DAILY
Status: DISCONTINUED | OUTPATIENT
Start: 2018-05-25 | End: 2018-05-31 | Stop reason: HOSPADM

## 2018-05-24 RX ORDER — ATORVASTATIN CALCIUM 20 MG/1
20 TABLET, FILM COATED ORAL DAILY
Status: DISCONTINUED | OUTPATIENT
Start: 2018-05-25 | End: 2018-05-31 | Stop reason: HOSPADM

## 2018-05-24 RX ORDER — OMEGA-3-ACID ETHYL ESTERS 1 G/1
1000 CAPSULE, LIQUID FILLED ORAL DAILY
Status: DISCONTINUED | OUTPATIENT
Start: 2018-05-25 | End: 2018-05-31 | Stop reason: HOSPADM

## 2018-05-24 RX ORDER — POTASSIUM CHLORIDE 20 MEQ/1
40 TABLET, EXTENDED RELEASE ORAL PRN
Status: DISCONTINUED | OUTPATIENT
Start: 2018-05-24 | End: 2018-05-31 | Stop reason: HOSPADM

## 2018-05-24 RX ORDER — ACETAMINOPHEN 325 MG/1
650 TABLET ORAL ONCE
Status: COMPLETED | OUTPATIENT
Start: 2018-05-24 | End: 2018-05-24

## 2018-05-24 RX ORDER — ACETAMINOPHEN 325 MG/1
650 TABLET ORAL EVERY 4 HOURS PRN
Status: DISCONTINUED | OUTPATIENT
Start: 2018-05-24 | End: 2018-05-31 | Stop reason: HOSPADM

## 2018-05-24 RX ORDER — PREGABALIN 75 MG/1
300 CAPSULE ORAL 2 TIMES DAILY
Status: DISCONTINUED | OUTPATIENT
Start: 2018-05-25 | End: 2018-05-31 | Stop reason: HOSPADM

## 2018-05-24 RX ORDER — ONDANSETRON 2 MG/ML
4 INJECTION INTRAMUSCULAR; INTRAVENOUS EVERY 6 HOURS PRN
Status: DISCONTINUED | OUTPATIENT
Start: 2018-05-24 | End: 2018-05-26

## 2018-05-24 RX ORDER — BUPROPION HYDROCHLORIDE 150 MG/1
150 TABLET, EXTENDED RELEASE ORAL 2 TIMES DAILY
Status: DISCONTINUED | OUTPATIENT
Start: 2018-05-25 | End: 2018-05-31 | Stop reason: HOSPADM

## 2018-05-24 RX ORDER — HYDROCODONE BITARTRATE AND ACETAMINOPHEN 5; 325 MG/1; MG/1
1 TABLET ORAL EVERY 4 HOURS PRN
Status: DISCONTINUED | OUTPATIENT
Start: 2018-05-24 | End: 2018-05-30

## 2018-05-24 RX ORDER — TRAZODONE HYDROCHLORIDE 100 MG/1
100 TABLET ORAL NIGHTLY
Status: DISCONTINUED | OUTPATIENT
Start: 2018-05-25 | End: 2018-05-31 | Stop reason: HOSPADM

## 2018-05-24 RX ORDER — TRAMADOL HYDROCHLORIDE 50 MG/1
50 TABLET ORAL EVERY 6 HOURS PRN
Status: DISCONTINUED | OUTPATIENT
Start: 2018-05-24 | End: 2018-05-30

## 2018-05-24 RX ORDER — SULFAMETHOXAZOLE AND TRIMETHOPRIM 800; 160 MG/1; MG/1
1 TABLET ORAL 2 TIMES DAILY
Status: DISCONTINUED | OUTPATIENT
Start: 2018-05-25 | End: 2018-05-25

## 2018-05-24 RX ORDER — ALBUTEROL SULFATE 90 UG/1
2 AEROSOL, METERED RESPIRATORY (INHALATION) EVERY 6 HOURS PRN
Status: DISCONTINUED | OUTPATIENT
Start: 2018-05-24 | End: 2018-05-31 | Stop reason: HOSPADM

## 2018-05-24 RX ORDER — SODIUM CHLORIDE 0.9 % (FLUSH) 0.9 %
10 SYRINGE (ML) INJECTION EVERY 12 HOURS SCHEDULED
Status: DISCONTINUED | OUTPATIENT
Start: 2018-05-25 | End: 2018-05-31 | Stop reason: HOSPADM

## 2018-05-24 RX ORDER — POTASSIUM CHLORIDE 20MEQ/15ML
40 LIQUID (ML) ORAL PRN
Status: DISCONTINUED | OUTPATIENT
Start: 2018-05-24 | End: 2018-05-31 | Stop reason: HOSPADM

## 2018-05-24 RX ORDER — CITALOPRAM 40 MG/1
40 TABLET ORAL DAILY
Status: DISCONTINUED | OUTPATIENT
Start: 2018-05-25 | End: 2018-05-31 | Stop reason: HOSPADM

## 2018-05-24 RX ORDER — ACETAMINOPHEN 500 MG
TABLET ORAL
Status: DISCONTINUED
Start: 2018-05-24 | End: 2018-05-24 | Stop reason: WASHOUT

## 2018-05-24 RX ADMIN — ACETAMINOPHEN 650 MG: 325 TABLET ORAL at 21:14

## 2018-05-24 ASSESSMENT — ENCOUNTER SYMPTOMS
EYE DISCHARGE: 0
BACK PAIN: 0
RHINORRHEA: 0
WHEEZING: 0
NAUSEA: 0
SHORTNESS OF BREATH: 0
COUGH: 0
EYE PAIN: 0
ROS SKIN COMMENTS: BLISTER
ABDOMINAL PAIN: 0
GASTROINTESTINAL NEGATIVE: 1
DIARRHEA: 0
RESPIRATORY NEGATIVE: 1
VOMITING: 0
SORE THROAT: 0

## 2018-05-24 ASSESSMENT — PAIN DESCRIPTION - LOCATION
LOCATION: BUTTOCKS
LOCATION: BUTTOCKS;HEAD

## 2018-05-24 ASSESSMENT — PAIN DESCRIPTION - PAIN TYPE
TYPE: ACUTE PAIN
TYPE: ACUTE PAIN

## 2018-05-24 ASSESSMENT — PAIN SCALES - GENERAL
PAINLEVEL_OUTOF10: 9
PAINLEVEL_OUTOF10: 9

## 2018-05-24 ASSESSMENT — PAIN DESCRIPTION - DESCRIPTORS: DESCRIPTORS: SORE

## 2018-05-24 ASSESSMENT — PAIN DESCRIPTION - FREQUENCY: FREQUENCY: CONTINUOUS

## 2018-05-24 ASSESSMENT — PAIN DESCRIPTION - ORIENTATION: ORIENTATION: RIGHT

## 2018-05-25 ENCOUNTER — TELEPHONE (OUTPATIENT)
Dept: FAMILY MEDICINE CLINIC | Age: 58
End: 2018-05-25

## 2018-05-25 ENCOUNTER — ANESTHESIA EVENT (OUTPATIENT)
Dept: OPERATING ROOM | Age: 58
DRG: 854 | End: 2018-05-25
Payer: MEDICARE

## 2018-05-25 ENCOUNTER — ANESTHESIA (OUTPATIENT)
Dept: OPERATING ROOM | Age: 58
DRG: 854 | End: 2018-05-25
Payer: MEDICARE

## 2018-05-25 VITALS
TEMPERATURE: 98.6 F | RESPIRATION RATE: 6 BRPM | OXYGEN SATURATION: 98 % | SYSTOLIC BLOOD PRESSURE: 162 MMHG | DIASTOLIC BLOOD PRESSURE: 77 MMHG

## 2018-05-25 LAB
ANION GAP SERPL CALCULATED.3IONS-SCNC: 13 MEQ/L (ref 8–16)
BASOPHILIA: SLIGHT
BASOPHILIA: SLIGHT
BASOPHILS # BLD: 0.1 %
BASOPHILS # BLD: 0.3 %
BASOPHILS # BLD: 0.6 %
BASOPHILS ABSOLUTE: 0 THOU/MM3 (ref 0–0.1)
BASOPHILS ABSOLUTE: 0.1 THOU/MM3 (ref 0–0.1)
BASOPHILS ABSOLUTE: 0.1 THOU/MM3 (ref 0–0.1)
BUN BLDV-MCNC: 16 MG/DL (ref 7–22)
CALCIUM SERPL-MCNC: 9 MG/DL (ref 8.5–10.5)
CHLORIDE BLD-SCNC: 96 MEQ/L (ref 98–111)
CO2: 21 MEQ/L (ref 23–33)
CREAT SERPL-MCNC: 0.8 MG/DL (ref 0.4–1.2)
DIFFERENTIAL TYPE: ABNORMAL
DIFFERENTIAL TYPE: ABNORMAL
EOSINOPHIL # BLD: 0 %
EOSINOPHILS ABSOLUTE: 0 THOU/MM3 (ref 0–0.4)
GFR SERPL CREATININE-BSD FRML MDRD: 74 ML/MIN/1.73M2
GLUCOSE BLD-MCNC: 218 MG/DL (ref 70–108)
GLUCOSE BLD-MCNC: 261 MG/DL (ref 70–108)
GLUCOSE BLD-MCNC: 266 MG/DL (ref 70–108)
GLUCOSE BLD-MCNC: 341 MG/DL (ref 70–108)
GLUCOSE BLD-MCNC: 380 MG/DL (ref 70–108)
HCT VFR BLD CALC: 40.5 % (ref 37–47)
HCT VFR BLD CALC: 41.8 % (ref 37–47)
HCT VFR BLD CALC: 43.1 % (ref 37–47)
HEMOGLOBIN: 13.7 GM/DL (ref 12–16)
HEMOGLOBIN: 14.3 GM/DL (ref 12–16)
HEMOGLOBIN: 14.3 GM/DL (ref 12–16)
LACTIC ACID: 1.1 MMOL/L (ref 0.5–2.2)
LYMPHOCYTES # BLD: 10.7 %
LYMPHOCYTES # BLD: 6.3 %
LYMPHOCYTES # BLD: 9.3 %
LYMPHOCYTES ABSOLUTE: 1.5 THOU/MM3 (ref 1–4.8)
LYMPHOCYTES ABSOLUTE: 2 THOU/MM3 (ref 1–4.8)
LYMPHOCYTES ABSOLUTE: 2.7 THOU/MM3 (ref 1–4.8)
MCH RBC QN AUTO: 30.8 PG (ref 27–31)
MCH RBC QN AUTO: 30.9 PG (ref 27–31)
MCH RBC QN AUTO: 31.2 PG (ref 27–31)
MCHC RBC AUTO-ENTMCNC: 33.2 GM/DL (ref 33–37)
MCHC RBC AUTO-ENTMCNC: 33.7 GM/DL (ref 33–37)
MCHC RBC AUTO-ENTMCNC: 34.2 GM/DL (ref 33–37)
MCV RBC AUTO: 91.2 FL (ref 81–99)
MCV RBC AUTO: 91.5 FL (ref 81–99)
MCV RBC AUTO: 92.6 FL (ref 81–99)
MONOCYTES # BLD: 10.7 %
MONOCYTES # BLD: 10.9 %
MONOCYTES # BLD: 9.7 %
MONOCYTES ABSOLUTE: 2.3 THOU/MM3 (ref 0.4–1.3)
MONOCYTES ABSOLUTE: 2.3 THOU/MM3 (ref 0.4–1.3)
MONOCYTES ABSOLUTE: 2.7 THOU/MM3 (ref 0.4–1.3)
NUCLEATED RED BLOOD CELLS: 0 /100 WBC
PATHOLOGIST REVIEW: ABNORMAL
PDW BLD-RTO: 13.8 % (ref 11.5–14.5)
PDW BLD-RTO: 13.9 % (ref 11.5–14.5)
PDW BLD-RTO: 14 % (ref 11.5–14.5)
PLATELET # BLD: 183 THOU/MM3 (ref 130–400)
PLATELET # BLD: 193 THOU/MM3 (ref 130–400)
PLATELET # BLD: 223 THOU/MM3 (ref 130–400)
PLATELET ESTIMATE: ADEQUATE
PLATELET ESTIMATE: ADEQUATE
PMV BLD AUTO: 10.2 FL (ref 7.4–10.4)
PMV BLD AUTO: 10.8 FL (ref 7.4–10.4)
PMV BLD AUTO: 11.2 FL (ref 7.4–10.4)
POTASSIUM REFLEX MAGNESIUM: 4 MEQ/L (ref 3.5–5.2)
RBC # BLD: 4.43 MILL/MM3 (ref 4.2–5.4)
RBC # BLD: 4.59 MILL/MM3 (ref 4.2–5.4)
RBC # BLD: 4.65 MILL/MM3 (ref 4.2–5.4)
SCAN OF BLOOD SMEAR: NORMAL
SEG NEUTROPHILS: 78.3 %
SEG NEUTROPHILS: 79.7 %
SEG NEUTROPHILS: 83.4 %
SEGMENTED NEUTROPHILS ABSOLUTE COUNT: 17.1 THOU/MM3 (ref 1.8–7.7)
SEGMENTED NEUTROPHILS ABSOLUTE COUNT: 19.4 THOU/MM3 (ref 1.8–7.7)
SEGMENTED NEUTROPHILS ABSOLUTE COUNT: 19.9 THOU/MM3 (ref 1.8–7.7)
SODIUM BLD-SCNC: 130 MEQ/L (ref 135–145)
WBC # BLD: 21.4 THOU/MM3 (ref 4.8–10.8)
WBC # BLD: 23.3 THOU/MM3 (ref 4.8–10.8)
WBC # BLD: 25.4 THOU/MM3 (ref 4.8–10.8)

## 2018-05-25 PROCEDURE — 2580000003 HC RX 258: Performed by: HOSPITALIST

## 2018-05-25 PROCEDURE — 2580000003 HC RX 258: Performed by: SURGERY

## 2018-05-25 PROCEDURE — 6360000002 HC RX W HCPCS: Performed by: HOSPITALIST

## 2018-05-25 PROCEDURE — 87075 CULTR BACTERIA EXCEPT BLOOD: CPT

## 2018-05-25 PROCEDURE — 80048 BASIC METABOLIC PNL TOTAL CA: CPT

## 2018-05-25 PROCEDURE — 99222 1ST HOSP IP/OBS MODERATE 55: CPT | Performed by: SURGERY

## 2018-05-25 PROCEDURE — 1200000000 HC SEMI PRIVATE

## 2018-05-25 PROCEDURE — 0JB90ZZ EXCISION OF BUTTOCK SUBCUTANEOUS TISSUE AND FASCIA, OPEN APPROACH: ICD-10-PCS | Performed by: SURGERY

## 2018-05-25 PROCEDURE — 82948 REAGENT STRIP/BLOOD GLUCOSE: CPT

## 2018-05-25 PROCEDURE — 36415 COLL VENOUS BLD VENIPUNCTURE: CPT

## 2018-05-25 PROCEDURE — 6370000000 HC RX 637 (ALT 250 FOR IP): Performed by: HOSPITALIST

## 2018-05-25 PROCEDURE — 99233 SBSQ HOSP IP/OBS HIGH 50: CPT | Performed by: HOSPITALIST

## 2018-05-25 PROCEDURE — 6360000002 HC RX W HCPCS: Performed by: NURSE ANESTHETIST, CERTIFIED REGISTERED

## 2018-05-25 PROCEDURE — 6360000002 HC RX W HCPCS: Performed by: FAMILY MEDICINE

## 2018-05-25 PROCEDURE — 87147 CULTURE TYPE IMMUNOLOGIC: CPT

## 2018-05-25 PROCEDURE — 2500000003 HC RX 250 WO HCPCS: Performed by: NURSE ANESTHETIST, CERTIFIED REGISTERED

## 2018-05-25 PROCEDURE — 2500000003 HC RX 250 WO HCPCS: Performed by: INTERNAL MEDICINE

## 2018-05-25 PROCEDURE — 3600000002 HC SURGERY LEVEL 2 BASE: Performed by: SURGERY

## 2018-05-25 PROCEDURE — 6360000002 HC RX W HCPCS: Performed by: INTERNAL MEDICINE

## 2018-05-25 PROCEDURE — 3600000012 HC SURGERY LEVEL 2 ADDTL 15MIN: Performed by: SURGERY

## 2018-05-25 PROCEDURE — 3700000001 HC ADD 15 MINUTES (ANESTHESIA): Performed by: SURGERY

## 2018-05-25 PROCEDURE — 85025 COMPLETE CBC W/AUTO DIFF WBC: CPT

## 2018-05-25 PROCEDURE — 3700000000 HC ANESTHESIA ATTENDED CARE: Performed by: SURGERY

## 2018-05-25 PROCEDURE — 83605 ASSAY OF LACTIC ACID: CPT

## 2018-05-25 PROCEDURE — 2580000003 HC RX 258: Performed by: FAMILY MEDICINE

## 2018-05-25 PROCEDURE — 7100000001 HC PACU RECOVERY - ADDTL 15 MIN: Performed by: SURGERY

## 2018-05-25 PROCEDURE — 87205 SMEAR GRAM STAIN: CPT

## 2018-05-25 PROCEDURE — 46040 I&D ISCHIORCT&/PERIRCT ABSC: CPT | Performed by: SURGERY

## 2018-05-25 PROCEDURE — 7100000000 HC PACU RECOVERY - FIRST 15 MIN: Performed by: SURGERY

## 2018-05-25 PROCEDURE — 87070 CULTURE OTHR SPECIMN AEROBIC: CPT

## 2018-05-25 PROCEDURE — 6370000000 HC RX 637 (ALT 250 FOR IP): Performed by: ANESTHESIOLOGY

## 2018-05-25 RX ORDER — SUCCINYLCHOLINE CHLORIDE 20 MG/ML
INJECTION INTRAMUSCULAR; INTRAVENOUS PRN
Status: DISCONTINUED | OUTPATIENT
Start: 2018-05-25 | End: 2018-05-25 | Stop reason: SDUPTHER

## 2018-05-25 RX ORDER — PROPOFOL 10 MG/ML
INJECTION, EMULSION INTRAVENOUS PRN
Status: DISCONTINUED | OUTPATIENT
Start: 2018-05-25 | End: 2018-05-25 | Stop reason: SDUPTHER

## 2018-05-25 RX ORDER — LIDOCAINE HYDROCHLORIDE 10 MG/ML
INJECTION, SOLUTION INFILTRATION; PERINEURAL PRN
Status: DISCONTINUED | OUTPATIENT
Start: 2018-05-25 | End: 2018-05-25 | Stop reason: SDUPTHER

## 2018-05-25 RX ORDER — FENTANYL CITRATE 50 UG/ML
INJECTION, SOLUTION INTRAMUSCULAR; INTRAVENOUS PRN
Status: DISCONTINUED | OUTPATIENT
Start: 2018-05-25 | End: 2018-05-25 | Stop reason: SDUPTHER

## 2018-05-25 RX ORDER — ROCURONIUM BROMIDE 10 MG/ML
INJECTION, SOLUTION INTRAVENOUS PRN
Status: DISCONTINUED | OUTPATIENT
Start: 2018-05-25 | End: 2018-05-25 | Stop reason: SDUPTHER

## 2018-05-25 RX ORDER — NICOTINE POLACRILEX 4 MG
15 LOZENGE BUCCAL PRN
Status: DISCONTINUED | OUTPATIENT
Start: 2018-05-25 | End: 2018-05-31 | Stop reason: HOSPADM

## 2018-05-25 RX ORDER — DEXTROSE MONOHYDRATE 50 MG/ML
100 INJECTION, SOLUTION INTRAVENOUS PRN
Status: DISCONTINUED | OUTPATIENT
Start: 2018-05-25 | End: 2018-05-31 | Stop reason: HOSPADM

## 2018-05-25 RX ORDER — LABETALOL HYDROCHLORIDE 5 MG/ML
5 INJECTION, SOLUTION INTRAVENOUS EVERY 10 MIN PRN
Status: DISCONTINUED | OUTPATIENT
Start: 2018-05-25 | End: 2018-05-25 | Stop reason: HOSPADM

## 2018-05-25 RX ORDER — PROMETHAZINE HYDROCHLORIDE 25 MG/ML
12.5 INJECTION, SOLUTION INTRAMUSCULAR; INTRAVENOUS
Status: DISCONTINUED | OUTPATIENT
Start: 2018-05-25 | End: 2018-05-25 | Stop reason: HOSPADM

## 2018-05-25 RX ORDER — MEPERIDINE HYDROCHLORIDE 25 MG/ML
25 INJECTION INTRAMUSCULAR; INTRAVENOUS; SUBCUTANEOUS
Status: DISCONTINUED | OUTPATIENT
Start: 2018-05-25 | End: 2018-05-25 | Stop reason: HOSPADM

## 2018-05-25 RX ORDER — DIPHENHYDRAMINE HYDROCHLORIDE 50 MG/ML
12.5 INJECTION INTRAMUSCULAR; INTRAVENOUS
Status: DISCONTINUED | OUTPATIENT
Start: 2018-05-25 | End: 2018-05-25 | Stop reason: HOSPADM

## 2018-05-25 RX ORDER — FENTANYL CITRATE 50 UG/ML
25 INJECTION, SOLUTION INTRAMUSCULAR; INTRAVENOUS EVERY 5 MIN PRN
Status: DISCONTINUED | OUTPATIENT
Start: 2018-05-25 | End: 2018-05-25 | Stop reason: HOSPADM

## 2018-05-25 RX ORDER — SODIUM CHLORIDE 9 MG/ML
INJECTION, SOLUTION INTRAVENOUS CONTINUOUS
Status: DISCONTINUED | OUTPATIENT
Start: 2018-05-25 | End: 2018-05-31 | Stop reason: HOSPADM

## 2018-05-25 RX ORDER — DEXTROSE MONOHYDRATE 25 G/50ML
12.5 INJECTION, SOLUTION INTRAVENOUS PRN
Status: DISCONTINUED | OUTPATIENT
Start: 2018-05-25 | End: 2018-05-31 | Stop reason: HOSPADM

## 2018-05-25 RX ORDER — ONDANSETRON 2 MG/ML
INJECTION INTRAMUSCULAR; INTRAVENOUS PRN
Status: DISCONTINUED | OUTPATIENT
Start: 2018-05-25 | End: 2018-05-25 | Stop reason: SDUPTHER

## 2018-05-25 RX ORDER — MIDAZOLAM HYDROCHLORIDE 1 MG/ML
INJECTION INTRAMUSCULAR; INTRAVENOUS PRN
Status: DISCONTINUED | OUTPATIENT
Start: 2018-05-25 | End: 2018-05-25 | Stop reason: SDUPTHER

## 2018-05-25 RX ORDER — MORPHINE SULFATE 2 MG/ML
2 INJECTION, SOLUTION INTRAMUSCULAR; INTRAVENOUS EVERY 4 HOURS PRN
Status: DISPENSED | OUTPATIENT
Start: 2018-05-25 | End: 2018-05-26

## 2018-05-25 RX ORDER — FENTANYL CITRATE 50 UG/ML
50 INJECTION, SOLUTION INTRAMUSCULAR; INTRAVENOUS EVERY 5 MIN PRN
Status: DISCONTINUED | OUTPATIENT
Start: 2018-05-25 | End: 2018-05-25 | Stop reason: HOSPADM

## 2018-05-25 RX ORDER — CLINDAMYCIN PHOSPHATE 600 MG/50ML
600 INJECTION INTRAVENOUS EVERY 8 HOURS
Status: COMPLETED | OUTPATIENT
Start: 2018-05-25 | End: 2018-05-27

## 2018-05-25 RX ADMIN — INSULIN GLARGINE 45 UNITS: 100 INJECTION, SOLUTION SUBCUTANEOUS at 20:52

## 2018-05-25 RX ADMIN — PROPOFOL 200 MG: 10 INJECTION, EMULSION INTRAVENOUS at 14:59

## 2018-05-25 RX ADMIN — ONDANSETRON 4 MG: 2 INJECTION INTRAMUSCULAR; INTRAVENOUS at 04:07

## 2018-05-25 RX ADMIN — BUPROPION HYDROCHLORIDE 150 MG: 150 TABLET, EXTENDED RELEASE ORAL at 08:36

## 2018-05-25 RX ADMIN — PREGABALIN 300 MG: 75 CAPSULE ORAL at 08:35

## 2018-05-25 RX ADMIN — INSULIN LISPRO 10 UNITS: 100 INJECTION, SOLUTION INTRAVENOUS; SUBCUTANEOUS at 08:36

## 2018-05-25 RX ADMIN — LINAGLIPTIN 5 MG: 5 TABLET, FILM COATED ORAL at 08:36

## 2018-05-25 RX ADMIN — PREGABALIN 300 MG: 75 CAPSULE ORAL at 20:44

## 2018-05-25 RX ADMIN — Medication 4 UNITS: at 08:41

## 2018-05-25 RX ADMIN — VANCOMYCIN HYDROCHLORIDE 1750 MG: 1 INJECTION, POWDER, LYOPHILIZED, FOR SOLUTION INTRAVENOUS at 03:56

## 2018-05-25 RX ADMIN — Medication 10 ML: at 11:14

## 2018-05-25 RX ADMIN — PROPOFOL 50 MG: 10 INJECTION, EMULSION INTRAVENOUS at 15:12

## 2018-05-25 RX ADMIN — ONDANSETRON 4 MG: 2 INJECTION INTRAMUSCULAR; INTRAVENOUS at 21:21

## 2018-05-25 RX ADMIN — ROPINIROLE HYDROCHLORIDE 1 MG: 1 TABLET, FILM COATED ORAL at 20:44

## 2018-05-25 RX ADMIN — MIDAZOLAM HYDROCHLORIDE 2 MG: 1 INJECTION, SOLUTION INTRAMUSCULAR; INTRAVENOUS at 14:52

## 2018-05-25 RX ADMIN — ROCURONIUM BROMIDE 10 MG: 10 INJECTION, SOLUTION INTRAVENOUS at 14:59

## 2018-05-25 RX ADMIN — MEROPENEM 1 G: 1 INJECTION, POWDER, FOR SOLUTION INTRAVENOUS at 10:28

## 2018-05-25 RX ADMIN — ATORVASTATIN CALCIUM 20 MG: 20 TABLET, FILM COATED ORAL at 08:36

## 2018-05-25 RX ADMIN — TRAZODONE HYDROCHLORIDE 100 MG: 100 TABLET ORAL at 20:44

## 2018-05-25 RX ADMIN — BUPROPION HYDROCHLORIDE 150 MG: 150 TABLET, EXTENDED RELEASE ORAL at 20:44

## 2018-05-25 RX ADMIN — HYDROCODONE BITARTRATE AND ACETAMINOPHEN 1 TABLET: 5; 325 TABLET ORAL at 08:35

## 2018-05-25 RX ADMIN — MEROPENEM 1 G: 1 INJECTION, POWDER, FOR SOLUTION INTRAVENOUS at 06:08

## 2018-05-25 RX ADMIN — Medication 120 MG: at 14:59

## 2018-05-25 RX ADMIN — INSULIN LISPRO 2 UNITS: 100 INJECTION, SOLUTION INTRAVENOUS; SUBCUTANEOUS at 20:47

## 2018-05-25 RX ADMIN — LIDOCAINE HYDROCHLORIDE 50 MG: 10 INJECTION, SOLUTION INFILTRATION; PERINEURAL at 14:59

## 2018-05-25 RX ADMIN — FENTANYL CITRATE 50 MCG: 50 INJECTION INTRAMUSCULAR; INTRAVENOUS at 15:13

## 2018-05-25 RX ADMIN — INSULIN HUMAN 4 UNITS: 100 INJECTION, SOLUTION PARENTERAL at 15:56

## 2018-05-25 RX ADMIN — ONDANSETRON HYDROCHLORIDE 4 MG: 4 INJECTION, SOLUTION INTRAMUSCULAR; INTRAVENOUS at 15:12

## 2018-05-25 RX ADMIN — HYDROCODONE BITARTRATE AND ACETAMINOPHEN 1 TABLET: 5; 325 TABLET ORAL at 04:29

## 2018-05-25 RX ADMIN — SODIUM CHLORIDE: 9 INJECTION, SOLUTION INTRAVENOUS at 14:33

## 2018-05-25 RX ADMIN — FENTANYL CITRATE 50 MCG: 50 INJECTION INTRAMUSCULAR; INTRAVENOUS at 14:59

## 2018-05-25 RX ADMIN — CITALOPRAM 40 MG: 40 TABLET, FILM COATED ORAL at 08:36

## 2018-05-25 RX ADMIN — MORPHINE SULFATE 2 MG: 2 INJECTION, SOLUTION INTRAMUSCULAR; INTRAVENOUS at 13:13

## 2018-05-25 RX ADMIN — MEROPENEM 1 G: 1 INJECTION, POWDER, FOR SOLUTION INTRAVENOUS at 20:39

## 2018-05-25 RX ADMIN — ONDANSETRON HYDROCHLORIDE 4 MG: 4 INJECTION, SOLUTION INTRAMUSCULAR; INTRAVENOUS at 14:55

## 2018-05-25 RX ADMIN — VANCOMYCIN HYDROCHLORIDE 1750 MG: 1 INJECTION, POWDER, LYOPHILIZED, FOR SOLUTION INTRAVENOUS at 17:36

## 2018-05-25 RX ADMIN — Medication 3 UNITS: at 13:13

## 2018-05-25 RX ADMIN — SULFAMETHOXAZOLE AND TRIMETHOPRIM 1 TABLET: 800; 160 TABLET ORAL at 08:36

## 2018-05-25 RX ADMIN — CLINDAMYCIN PHOSPHATE 600 MG: 12 INJECTION, SOLUTION INTRAMUSCULAR; INTRAVENOUS at 21:15

## 2018-05-25 RX ADMIN — CLINDAMYCIN PHOSPHATE 600 MG: 12 INJECTION, SOLUTION INTRAMUSCULAR; INTRAVENOUS at 11:13

## 2018-05-25 RX ADMIN — ROPINIROLE HYDROCHLORIDE 1 MG: 1 TABLET, FILM COATED ORAL at 08:36

## 2018-05-25 RX ADMIN — ENOXAPARIN SODIUM 40 MG: 40 INJECTION, SOLUTION INTRAVENOUS; SUBCUTANEOUS at 08:35

## 2018-05-25 RX ADMIN — PANTOPRAZOLE SODIUM 40 MG: 40 TABLET, DELAYED RELEASE ORAL at 08:35

## 2018-05-25 ASSESSMENT — PULMONARY FUNCTION TESTS
PIF_VALUE: 5
PIF_VALUE: 25
PIF_VALUE: 25
PIF_VALUE: 2
PIF_VALUE: 0
PIF_VALUE: 23
PIF_VALUE: 26
PIF_VALUE: 0
PIF_VALUE: 43
PIF_VALUE: 23
PIF_VALUE: 18
PIF_VALUE: 28
PIF_VALUE: 28
PIF_VALUE: 2
PIF_VALUE: 2
PIF_VALUE: 29
PIF_VALUE: 0
PIF_VALUE: 2
PIF_VALUE: 26
PIF_VALUE: 25
PIF_VALUE: 26
PIF_VALUE: 24
PIF_VALUE: 29
PIF_VALUE: 37
PIF_VALUE: 3
PIF_VALUE: 25
PIF_VALUE: 25
PIF_VALUE: 1
PIF_VALUE: 0
PIF_VALUE: 29
PIF_VALUE: 25
PIF_VALUE: 33
PIF_VALUE: 28
PIF_VALUE: 25
PIF_VALUE: 16
PIF_VALUE: 27
PIF_VALUE: 25
PIF_VALUE: 3
PIF_VALUE: 25
PIF_VALUE: 2
PIF_VALUE: 1
PIF_VALUE: 27
PIF_VALUE: 23
PIF_VALUE: 25
PIF_VALUE: 25
PIF_VALUE: 26
PIF_VALUE: 8

## 2018-05-25 ASSESSMENT — PAIN DESCRIPTION - FREQUENCY: FREQUENCY: CONTINUOUS

## 2018-05-25 ASSESSMENT — PAIN DESCRIPTION - LOCATION
LOCATION_2: HEAD
LOCATION: BUTTOCKS

## 2018-05-25 ASSESSMENT — PAIN SCALES - GENERAL
PAINLEVEL_OUTOF10: 8
PAINLEVEL_OUTOF10: 0
PAINLEVEL_OUTOF10: 8
PAINLEVEL_OUTOF10: 0
PAINLEVEL_OUTOF10: 8
PAINLEVEL_OUTOF10: 3
PAINLEVEL_OUTOF10: 7
PAINLEVEL_OUTOF10: 8
PAINLEVEL_OUTOF10: 8

## 2018-05-25 ASSESSMENT — COPD QUESTIONNAIRES: CAT_SEVERITY: SEVERE

## 2018-05-25 ASSESSMENT — PAIN DESCRIPTION - DESCRIPTORS
DESCRIPTORS: SORE;BURNING
DESCRIPTORS: BURNING

## 2018-05-25 ASSESSMENT — PAIN DESCRIPTION - PAIN TYPE
TYPE_2: ACUTE PAIN
TYPE: ACUTE PAIN

## 2018-05-25 ASSESSMENT — PAIN DESCRIPTION - ORIENTATION
ORIENTATION: RIGHT

## 2018-05-25 ASSESSMENT — LIFESTYLE VARIABLES: SMOKING_STATUS: 1

## 2018-05-25 ASSESSMENT — PAIN DESCRIPTION - INTENSITY: RATING_2: 8

## 2018-05-26 PROBLEM — L02.31 ABSCESS OF BUTTOCK, RIGHT: Status: ACTIVE | Noted: 2018-05-26

## 2018-05-26 PROBLEM — L02.31 ABSCESS, GLUTEAL, RIGHT: Status: ACTIVE | Noted: 2018-05-26

## 2018-05-26 LAB
ALBUMIN SERPL-MCNC: 3.4 G/DL (ref 3.5–5.1)
AMPHETAMINE+METHAMPHETAMINE URINE SCREEN: NEGATIVE
ANION GAP SERPL CALCULATED.3IONS-SCNC: 8 MEQ/L (ref 8–16)
BACTERIA: ABNORMAL /HPF
BARBITURATE QUANTITATIVE URINE: NEGATIVE
BASOPHILS # BLD: 0.5 %
BASOPHILS ABSOLUTE: 0.1 THOU/MM3 (ref 0–0.1)
BENZODIAZEPINE QUANTITATIVE URINE: POSITIVE
BILIRUBIN URINE: ABNORMAL
BLOOD, URINE: NEGATIVE
BUN BLDV-MCNC: 12 MG/DL (ref 7–22)
C-REACTIVE PROTEIN: 13.5 MG/DL (ref 0–1)
CALCIUM SERPL-MCNC: 8.7 MG/DL (ref 8.5–10.5)
CANNABINOID QUANTITATIVE URINE: POSITIVE
CASTS 2: ABNORMAL /LPF
CASTS UA: ABNORMAL /LPF
CHARACTER, URINE: CLEAR
CHLORIDE BLD-SCNC: 101 MEQ/L (ref 98–111)
CO2: 26 MEQ/L (ref 23–33)
COCAINE METABOLITE QUANTITATIVE URINE: NEGATIVE
COLOR: ABNORMAL
CREAT SERPL-MCNC: 0.6 MG/DL (ref 0.4–1.2)
CRYSTALS, UA: ABNORMAL
EOSINOPHIL # BLD: 0.4 %
EOSINOPHILS ABSOLUTE: 0.1 THOU/MM3 (ref 0–0.4)
EPITHELIAL CELLS, UA: ABNORMAL /HPF
GFR SERPL CREATININE-BSD FRML MDRD: > 90 ML/MIN/1.73M2
GLUCOSE BLD-MCNC: 135 MG/DL (ref 70–108)
GLUCOSE BLD-MCNC: 236 MG/DL (ref 70–108)
GLUCOSE BLD-MCNC: 238 MG/DL (ref 70–108)
GLUCOSE BLD-MCNC: 269 MG/DL (ref 70–108)
GLUCOSE BLD-MCNC: 73 MG/DL (ref 70–108)
GLUCOSE URINE: >= 1000 MG/DL
HCT VFR BLD CALC: 37.2 % (ref 37–47)
HEMOGLOBIN: 12.5 GM/DL (ref 12–16)
ICTOTEST: NEGATIVE
KETONES, URINE: ABNORMAL
LACTIC ACID: 2 MMOL/L (ref 0.5–2.2)
LEUKOCYTE ESTERASE, URINE: NEGATIVE
LYMPHOCYTES # BLD: 20.1 %
LYMPHOCYTES ABSOLUTE: 2.8 THOU/MM3 (ref 1–4.8)
MAGNESIUM: 2.5 MG/DL (ref 1.6–2.4)
MCH RBC QN AUTO: 30.7 PG (ref 27–31)
MCHC RBC AUTO-ENTMCNC: 33.5 GM/DL (ref 33–37)
MCV RBC AUTO: 91.7 FL (ref 81–99)
MISCELLANEOUS 2: ABNORMAL
MONOCYTES # BLD: 9.6 %
MONOCYTES ABSOLUTE: 1.3 THOU/MM3 (ref 0.4–1.3)
NITRITE, URINE: NEGATIVE
NUCLEATED RED BLOOD CELLS: 0 /100 WBC
OPIATES, URINE: POSITIVE
OXYCODONE: NEGATIVE
PDW BLD-RTO: 13.6 % (ref 11.5–14.5)
PH UA: 6
PHENCYCLIDINE QUANTITATIVE URINE: NEGATIVE
PHOSPHORUS: 1.8 MG/DL (ref 2.4–4.7)
PLATELET # BLD: 209 THOU/MM3 (ref 130–400)
PMV BLD AUTO: 10.9 FL (ref 7.4–10.4)
POTASSIUM SERPL-SCNC: 3.8 MEQ/L (ref 3.5–5.2)
PROTEIN UA: 30
RBC # BLD: 4.06 MILL/MM3 (ref 4.2–5.4)
RBC URINE: ABNORMAL /HPF
RENAL EPITHELIAL, UA: ABNORMAL
SEDIMENTATION RATE, ERYTHROCYTE: 95 MM/HR (ref 0–20)
SEG NEUTROPHILS: 69.4 %
SEGMENTED NEUTROPHILS ABSOLUTE COUNT: 9.6 THOU/MM3 (ref 1.8–7.7)
SODIUM BLD-SCNC: 135 MEQ/L (ref 135–145)
SPECIFIC GRAVITY, URINE: > 1.03 (ref 1–1.03)
UROBILINOGEN, URINE: 1 EU/DL
VANCOMYCIN TROUGH: 15.2 UG/ML (ref 5–15)
WBC # BLD: 13.9 THOU/MM3 (ref 4.8–10.8)
WBC UA: ABNORMAL /HPF
YEAST: ABNORMAL

## 2018-05-26 PROCEDURE — 6370000000 HC RX 637 (ALT 250 FOR IP): Performed by: HOSPITALIST

## 2018-05-26 PROCEDURE — 1200000000 HC SEMI PRIVATE

## 2018-05-26 PROCEDURE — 6360000002 HC RX W HCPCS: Performed by: HOSPITALIST

## 2018-05-26 PROCEDURE — 85025 COMPLETE CBC W/AUTO DIFF WBC: CPT

## 2018-05-26 PROCEDURE — 2580000003 HC RX 258: Performed by: INTERNAL MEDICINE

## 2018-05-26 PROCEDURE — 6370000000 HC RX 637 (ALT 250 FOR IP): Performed by: INTERNAL MEDICINE

## 2018-05-26 PROCEDURE — 80307 DRUG TEST PRSMV CHEM ANLYZR: CPT

## 2018-05-26 PROCEDURE — 83605 ASSAY OF LACTIC ACID: CPT

## 2018-05-26 PROCEDURE — 2580000003 HC RX 258: Performed by: HOSPITALIST

## 2018-05-26 PROCEDURE — 2500000003 HC RX 250 WO HCPCS: Performed by: INTERNAL MEDICINE

## 2018-05-26 PROCEDURE — 99024 POSTOP FOLLOW-UP VISIT: CPT | Performed by: SURGERY

## 2018-05-26 PROCEDURE — 36415 COLL VENOUS BLD VENIPUNCTURE: CPT

## 2018-05-26 PROCEDURE — 80202 ASSAY OF VANCOMYCIN: CPT

## 2018-05-26 PROCEDURE — 80069 RENAL FUNCTION PANEL: CPT

## 2018-05-26 PROCEDURE — 2580000003 HC RX 258: Performed by: SURGERY

## 2018-05-26 PROCEDURE — 86140 C-REACTIVE PROTEIN: CPT

## 2018-05-26 PROCEDURE — 83735 ASSAY OF MAGNESIUM: CPT

## 2018-05-26 PROCEDURE — 85651 RBC SED RATE NONAUTOMATED: CPT

## 2018-05-26 PROCEDURE — 82948 REAGENT STRIP/BLOOD GLUCOSE: CPT

## 2018-05-26 PROCEDURE — 99233 SBSQ HOSP IP/OBS HIGH 50: CPT | Performed by: INTERNAL MEDICINE

## 2018-05-26 PROCEDURE — 81001 URINALYSIS AUTO W/SCOPE: CPT

## 2018-05-26 RX ORDER — PROMETHAZINE HYDROCHLORIDE 25 MG/ML
25 INJECTION, SOLUTION INTRAMUSCULAR; INTRAVENOUS EVERY 6 HOURS PRN
Status: DISCONTINUED | OUTPATIENT
Start: 2018-05-26 | End: 2018-05-31 | Stop reason: HOSPADM

## 2018-05-26 RX ADMIN — TRAMADOL HYDROCHLORIDE 50 MG: 50 TABLET, FILM COATED ORAL at 11:26

## 2018-05-26 RX ADMIN — VANCOMYCIN HYDROCHLORIDE 1750 MG: 1 INJECTION, POWDER, LYOPHILIZED, FOR SOLUTION INTRAVENOUS at 16:37

## 2018-05-26 RX ADMIN — HYDROCODONE BITARTRATE AND ACETAMINOPHEN 1 TABLET: 5; 325 TABLET ORAL at 03:45

## 2018-05-26 RX ADMIN — OMEGA-3-ACID ETHYL ESTERS 1000 MG: 1 CAPSULE, LIQUID FILLED ORAL at 11:22

## 2018-05-26 RX ADMIN — HYDROCODONE BITARTRATE AND ACETAMINOPHEN 1 TABLET: 5; 325 TABLET ORAL at 20:03

## 2018-05-26 RX ADMIN — CLINDAMYCIN PHOSPHATE 600 MG: 12 INJECTION, SOLUTION INTRAMUSCULAR; INTRAVENOUS at 20:59

## 2018-05-26 RX ADMIN — ROPINIROLE HYDROCHLORIDE 1 MG: 1 TABLET, FILM COATED ORAL at 15:36

## 2018-05-26 RX ADMIN — INSULIN LISPRO 10 UNITS: 100 INJECTION, SOLUTION INTRAVENOUS; SUBCUTANEOUS at 12:18

## 2018-05-26 RX ADMIN — SODIUM CHLORIDE: 9 INJECTION, SOLUTION INTRAVENOUS at 15:37

## 2018-05-26 RX ADMIN — TRAZODONE HYDROCHLORIDE 100 MG: 100 TABLET ORAL at 20:53

## 2018-05-26 RX ADMIN — BUPROPION HYDROCHLORIDE 150 MG: 150 TABLET, EXTENDED RELEASE ORAL at 09:47

## 2018-05-26 RX ADMIN — CLINDAMYCIN PHOSPHATE 600 MG: 12 INJECTION, SOLUTION INTRAMUSCULAR; INTRAVENOUS at 11:22

## 2018-05-26 RX ADMIN — CITALOPRAM 40 MG: 40 TABLET, FILM COATED ORAL at 09:47

## 2018-05-26 RX ADMIN — PANTOPRAZOLE SODIUM 40 MG: 40 TABLET, DELAYED RELEASE ORAL at 09:52

## 2018-05-26 RX ADMIN — MEROPENEM 1 G: 1 INJECTION, POWDER, FOR SOLUTION INTRAVENOUS at 01:10

## 2018-05-26 RX ADMIN — MEROPENEM 1 G: 1 INJECTION, POWDER, FOR SOLUTION INTRAVENOUS at 09:48

## 2018-05-26 RX ADMIN — LINAGLIPTIN 5 MG: 5 TABLET, FILM COATED ORAL at 09:46

## 2018-05-26 RX ADMIN — ATORVASTATIN CALCIUM 20 MG: 20 TABLET, FILM COATED ORAL at 11:22

## 2018-05-26 RX ADMIN — ENOXAPARIN SODIUM 40 MG: 40 INJECTION, SOLUTION INTRAVENOUS; SUBCUTANEOUS at 09:53

## 2018-05-26 RX ADMIN — HYDROCODONE BITARTRATE AND ACETAMINOPHEN 1 TABLET: 5; 325 TABLET ORAL at 09:52

## 2018-05-26 RX ADMIN — INSULIN GLARGINE 45 UNITS: 100 INJECTION, SOLUTION SUBCUTANEOUS at 20:40

## 2018-05-26 RX ADMIN — Medication 2 UNITS: at 12:17

## 2018-05-26 RX ADMIN — SODIUM CHLORIDE: 9 INJECTION, SOLUTION INTRAVENOUS at 00:01

## 2018-05-26 RX ADMIN — ROPINIROLE HYDROCHLORIDE 1 MG: 1 TABLET, FILM COATED ORAL at 09:47

## 2018-05-26 RX ADMIN — MEROPENEM 1 G: 1 INJECTION, POWDER, FOR SOLUTION INTRAVENOUS at 20:03

## 2018-05-26 RX ADMIN — ONDANSETRON 4 MG: 2 INJECTION INTRAMUSCULAR; INTRAVENOUS at 08:59

## 2018-05-26 RX ADMIN — PREGABALIN 300 MG: 75 CAPSULE ORAL at 09:52

## 2018-05-26 RX ADMIN — PREGABALIN 300 MG: 75 CAPSULE ORAL at 20:53

## 2018-05-26 RX ADMIN — CLINDAMYCIN PHOSPHATE 600 MG: 12 INJECTION, SOLUTION INTRAMUSCULAR; INTRAVENOUS at 01:46

## 2018-05-26 RX ADMIN — INSULIN LISPRO 10 UNITS: 100 INJECTION, SOLUTION INTRAVENOUS; SUBCUTANEOUS at 20:49

## 2018-05-26 RX ADMIN — BUPROPION HYDROCHLORIDE 150 MG: 150 TABLET, EXTENDED RELEASE ORAL at 20:52

## 2018-05-26 RX ADMIN — ROPINIROLE HYDROCHLORIDE 1 MG: 1 TABLET, FILM COATED ORAL at 20:52

## 2018-05-26 RX ADMIN — INSULIN LISPRO 1 UNITS: 100 INJECTION, SOLUTION INTRAVENOUS; SUBCUTANEOUS at 20:40

## 2018-05-26 RX ADMIN — VANCOMYCIN HYDROCHLORIDE 1750 MG: 1 INJECTION, POWDER, LYOPHILIZED, FOR SOLUTION INTRAVENOUS at 03:41

## 2018-05-26 RX ADMIN — Medication 3 UNITS: at 09:47

## 2018-05-26 ASSESSMENT — PAIN DESCRIPTION - LOCATION
LOCATION_2: BUTTOCKS
LOCATION: BUTTOCKS
LOCATION: RECTUM

## 2018-05-26 ASSESSMENT — PAIN DESCRIPTION - ORIENTATION: ORIENTATION_2: RIGHT

## 2018-05-26 ASSESSMENT — PAIN SCALES - GENERAL
PAINLEVEL_OUTOF10: 6
PAINLEVEL_OUTOF10: 7
PAINLEVEL_OUTOF10: 8
PAINLEVEL_OUTOF10: 6

## 2018-05-26 ASSESSMENT — PAIN DESCRIPTION - PAIN TYPE
TYPE: SURGICAL PAIN
TYPE: CHRONIC PAIN
TYPE_2: SURGICAL;ACUTE PAIN
TYPE: SURGICAL PAIN

## 2018-05-26 ASSESSMENT — PAIN DESCRIPTION - INTENSITY: RATING_2: 8

## 2018-05-26 ASSESSMENT — PAIN DESCRIPTION - DESCRIPTORS: DESCRIPTORS: HEADACHE

## 2018-05-27 LAB
ADENOVIRUS F 40 41 PCR: NOT DETECTED
ALBUMIN SERPL-MCNC: 2.7 G/DL (ref 3.5–5.1)
ANION GAP SERPL CALCULATED.3IONS-SCNC: 13 MEQ/L (ref 8–16)
ASTROVIRUS PCR: NOT DETECTED
BUN BLDV-MCNC: 13 MG/DL (ref 7–22)
CALCIUM SERPL-MCNC: 8.4 MG/DL (ref 8.5–10.5)
CAMPYLOBACTER PCR: NOT DETECTED
CHLORIDE BLD-SCNC: 104 MEQ/L (ref 98–111)
CLOSTRIDIUM DIFFICILE, PCR: NOT DETECTED
CO2: 20 MEQ/L (ref 23–33)
CREAT SERPL-MCNC: 0.7 MG/DL (ref 0.4–1.2)
CRYPTOSPORIDIUM PCR: NOT DETECTED
CYCLOSPORA CAYETANENSIS PCR: NOT DETECTED
E COLI 0157 PCR: NORMAL
E COLI ENTEROAGGREGATIVE PCR: NOT DETECTED
E COLI ENTEROPATHOGENIC PCR: NOT DETECTED
E COLI ENTEROTOXIGENIC PCR: NOT DETECTED
E COLI SHIGA LIKE TOXIN PCR: NOT DETECTED
E COLI SHIGELLA/ENTEROINVASIVE PCR: NOT DETECTED
E HISTOLYTICA GI FILM ARRAY: NOT DETECTED
GFR SERPL CREATININE-BSD FRML MDRD: 86 ML/MIN/1.73M2
GIARDIA LAMBLIA PCR: NOT DETECTED
GLUCOSE BLD-MCNC: 126 MG/DL (ref 70–108)
GLUCOSE BLD-MCNC: 162 MG/DL (ref 70–108)
GLUCOSE BLD-MCNC: 179 MG/DL (ref 70–108)
GLUCOSE BLD-MCNC: 192 MG/DL (ref 70–108)
GLUCOSE BLD-MCNC: 79 MG/DL (ref 70–108)
HCT VFR BLD CALC: 37 % (ref 37–47)
HEMOGLOBIN: 12.8 GM/DL (ref 12–16)
MAGNESIUM: 2.2 MG/DL (ref 1.6–2.4)
MCH RBC QN AUTO: 31.7 PG (ref 27–31)
MCHC RBC AUTO-ENTMCNC: 34.6 GM/DL (ref 33–37)
MCV RBC AUTO: 91.7 FL (ref 81–99)
NOROVIRUS GI GII PCR: NOT DETECTED
PDW BLD-RTO: 14.8 % (ref 11.5–14.5)
PHOSPHORUS: 2.4 MG/DL (ref 2.4–4.7)
PLATELET # BLD: 218 THOU/MM3 (ref 130–400)
PLESIOMONAS SHIGELLOIDES PCR: NOT DETECTED
PMV BLD AUTO: 10.4 FL (ref 7.4–10.4)
POTASSIUM SERPL-SCNC: 4.2 MEQ/L (ref 3.5–5.2)
RBC # BLD: 4.03 MILL/MM3 (ref 4.2–5.4)
ROTAVIRUS A PCR: NOT DETECTED
SALMONELLA PCR: NOT DETECTED
SAPOVIRUS PCR: NOT DETECTED
SODIUM BLD-SCNC: 137 MEQ/L (ref 135–145)
VIBRIO CHOLERAE PCR: NOT DETECTED
VIBRIO PCR: NOT DETECTED
WBC # BLD: 13.5 THOU/MM3 (ref 4.8–10.8)
YERSINIA ENTEROCOLITICA PCR: NOT DETECTED

## 2018-05-27 PROCEDURE — 83735 ASSAY OF MAGNESIUM: CPT

## 2018-05-27 PROCEDURE — 1200000000 HC SEMI PRIVATE

## 2018-05-27 PROCEDURE — 2500000003 HC RX 250 WO HCPCS: Performed by: INTERNAL MEDICINE

## 2018-05-27 PROCEDURE — 82948 REAGENT STRIP/BLOOD GLUCOSE: CPT

## 2018-05-27 PROCEDURE — 80069 RENAL FUNCTION PANEL: CPT

## 2018-05-27 PROCEDURE — 99024 POSTOP FOLLOW-UP VISIT: CPT | Performed by: SURGERY

## 2018-05-27 PROCEDURE — 87507 IADNA-DNA/RNA PROBE TQ 12-25: CPT

## 2018-05-27 PROCEDURE — 85027 COMPLETE CBC AUTOMATED: CPT

## 2018-05-27 PROCEDURE — 6360000002 HC RX W HCPCS: Performed by: HOSPITALIST

## 2018-05-27 PROCEDURE — 99232 SBSQ HOSP IP/OBS MODERATE 35: CPT | Performed by: INTERNAL MEDICINE

## 2018-05-27 PROCEDURE — 2580000003 HC RX 258: Performed by: HOSPITALIST

## 2018-05-27 PROCEDURE — 2580000003 HC RX 258: Performed by: INTERNAL MEDICINE

## 2018-05-27 PROCEDURE — 6370000000 HC RX 637 (ALT 250 FOR IP): Performed by: HOSPITALIST

## 2018-05-27 PROCEDURE — 36415 COLL VENOUS BLD VENIPUNCTURE: CPT

## 2018-05-27 RX ADMIN — PREGABALIN 300 MG: 75 CAPSULE ORAL at 08:31

## 2018-05-27 RX ADMIN — BUPROPION HYDROCHLORIDE 150 MG: 150 TABLET, EXTENDED RELEASE ORAL at 20:09

## 2018-05-27 RX ADMIN — TRAMADOL HYDROCHLORIDE 50 MG: 50 TABLET, FILM COATED ORAL at 04:12

## 2018-05-27 RX ADMIN — TRAMADOL HYDROCHLORIDE 50 MG: 50 TABLET, FILM COATED ORAL at 20:09

## 2018-05-27 RX ADMIN — BUPROPION HYDROCHLORIDE 150 MG: 150 TABLET, EXTENDED RELEASE ORAL at 08:31

## 2018-05-27 RX ADMIN — ROPINIROLE HYDROCHLORIDE 1 MG: 1 TABLET, FILM COATED ORAL at 15:08

## 2018-05-27 RX ADMIN — INSULIN GLARGINE 45 UNITS: 100 INJECTION, SOLUTION SUBCUTANEOUS at 21:21

## 2018-05-27 RX ADMIN — ENOXAPARIN SODIUM 40 MG: 40 INJECTION, SOLUTION INTRAVENOUS; SUBCUTANEOUS at 08:32

## 2018-05-27 RX ADMIN — ROPINIROLE HYDROCHLORIDE 1 MG: 1 TABLET, FILM COATED ORAL at 20:09

## 2018-05-27 RX ADMIN — ATORVASTATIN CALCIUM 20 MG: 20 TABLET, FILM COATED ORAL at 08:31

## 2018-05-27 RX ADMIN — SODIUM CHLORIDE: 9 INJECTION, SOLUTION INTRAVENOUS at 13:01

## 2018-05-27 RX ADMIN — SODIUM CHLORIDE: 9 INJECTION, SOLUTION INTRAVENOUS at 01:53

## 2018-05-27 RX ADMIN — CLINDAMYCIN PHOSPHATE 600 MG: 12 INJECTION, SOLUTION INTRAMUSCULAR; INTRAVENOUS at 02:44

## 2018-05-27 RX ADMIN — HYDROCODONE BITARTRATE AND ACETAMINOPHEN 1 TABLET: 5; 325 TABLET ORAL at 08:36

## 2018-05-27 RX ADMIN — ROPINIROLE HYDROCHLORIDE 1 MG: 1 TABLET, FILM COATED ORAL at 08:31

## 2018-05-27 RX ADMIN — HYDROCODONE BITARTRATE AND ACETAMINOPHEN 1 TABLET: 5; 325 TABLET ORAL at 18:26

## 2018-05-27 RX ADMIN — OMEGA-3-ACID ETHYL ESTERS 1000 MG: 1 CAPSULE, LIQUID FILLED ORAL at 08:32

## 2018-05-27 RX ADMIN — CITALOPRAM 40 MG: 40 TABLET, FILM COATED ORAL at 08:31

## 2018-05-27 RX ADMIN — PANTOPRAZOLE SODIUM 40 MG: 40 TABLET, DELAYED RELEASE ORAL at 08:32

## 2018-05-27 RX ADMIN — LINAGLIPTIN 5 MG: 5 TABLET, FILM COATED ORAL at 08:31

## 2018-05-27 RX ADMIN — SODIUM CHLORIDE: 9 INJECTION, SOLUTION INTRAVENOUS at 23:23

## 2018-05-27 RX ADMIN — HYDROCODONE BITARTRATE AND ACETAMINOPHEN 1 TABLET: 5; 325 TABLET ORAL at 13:00

## 2018-05-27 RX ADMIN — MEROPENEM 1 G: 1 INJECTION, POWDER, FOR SOLUTION INTRAVENOUS at 01:50

## 2018-05-27 RX ADMIN — Medication 1 UNITS: at 16:57

## 2018-05-27 RX ADMIN — VANCOMYCIN HYDROCHLORIDE 1750 MG: 1 INJECTION, POWDER, LYOPHILIZED, FOR SOLUTION INTRAVENOUS at 16:09

## 2018-05-27 RX ADMIN — Medication 1 UNITS: at 08:36

## 2018-05-27 RX ADMIN — INSULIN LISPRO 10 UNITS: 100 INJECTION, SOLUTION INTRAVENOUS; SUBCUTANEOUS at 08:39

## 2018-05-27 RX ADMIN — MEROPENEM 1 G: 1 INJECTION, POWDER, FOR SOLUTION INTRAVENOUS at 18:58

## 2018-05-27 RX ADMIN — TRAZODONE HYDROCHLORIDE 100 MG: 100 TABLET ORAL at 20:09

## 2018-05-27 RX ADMIN — PREGABALIN 300 MG: 75 CAPSULE ORAL at 20:09

## 2018-05-27 RX ADMIN — VANCOMYCIN HYDROCHLORIDE 1750 MG: 1 INJECTION, POWDER, LYOPHILIZED, FOR SOLUTION INTRAVENOUS at 04:11

## 2018-05-27 RX ADMIN — MEROPENEM 1 G: 1 INJECTION, POWDER, FOR SOLUTION INTRAVENOUS at 08:41

## 2018-05-27 ASSESSMENT — PAIN SCALES - GENERAL
PAINLEVEL_OUTOF10: 8
PAINLEVEL_OUTOF10: 0
PAINLEVEL_OUTOF10: 9
PAINLEVEL_OUTOF10: 9
PAINLEVEL_OUTOF10: 6
PAINLEVEL_OUTOF10: 6
PAINLEVEL_OUTOF10: 7
PAINLEVEL_OUTOF10: 7
PAINLEVEL_OUTOF10: 8
PAINLEVEL_OUTOF10: 9
PAINLEVEL_OUTOF10: 8

## 2018-05-27 ASSESSMENT — PAIN DESCRIPTION - DESCRIPTORS
DESCRIPTORS: SORE
DESCRIPTORS: SORE

## 2018-05-27 ASSESSMENT — PAIN DESCRIPTION - ONSET
ONSET: ON-GOING
ONSET: ON-GOING

## 2018-05-27 ASSESSMENT — PAIN DESCRIPTION - LOCATION
LOCATION: BUTTOCKS

## 2018-05-27 ASSESSMENT — PAIN DESCRIPTION - PAIN TYPE
TYPE: SURGICAL PAIN

## 2018-05-27 ASSESSMENT — PAIN DESCRIPTION - FREQUENCY
FREQUENCY: CONTINUOUS
FREQUENCY: CONTINUOUS

## 2018-05-27 ASSESSMENT — PAIN DESCRIPTION - ORIENTATION
ORIENTATION: RIGHT
ORIENTATION: RIGHT

## 2018-05-28 LAB
AEROBIC CULTURE: NORMAL
ALBUMIN SERPL-MCNC: 3 G/DL (ref 3.5–5.1)
ANAEROBIC CULTURE: NORMAL
ANION GAP SERPL CALCULATED.3IONS-SCNC: 12 MEQ/L (ref 8–16)
ANISOCYTOSIS: ABNORMAL
BASOPHILS # BLD: 0.5 %
BASOPHILS ABSOLUTE: 0.1 THOU/MM3 (ref 0–0.1)
BUN BLDV-MCNC: 9 MG/DL (ref 7–22)
C-REACTIVE PROTEIN: 6.64 MG/DL (ref 0–1)
CALCIUM SERPL-MCNC: 8.6 MG/DL (ref 8.5–10.5)
CHLORIDE BLD-SCNC: 105 MEQ/L (ref 98–111)
CO2: 20 MEQ/L (ref 23–33)
CREAT SERPL-MCNC: 0.6 MG/DL (ref 0.4–1.2)
EOSINOPHIL # BLD: 0.7 %
EOSINOPHILS ABSOLUTE: 0.1 THOU/MM3 (ref 0–0.4)
GFR SERPL CREATININE-BSD FRML MDRD: > 90 ML/MIN/1.73M2
GLUCOSE BLD-MCNC: 125 MG/DL (ref 70–108)
GLUCOSE BLD-MCNC: 127 MG/DL (ref 70–108)
GLUCOSE BLD-MCNC: 169 MG/DL (ref 70–108)
GLUCOSE BLD-MCNC: 183 MG/DL (ref 70–108)
GLUCOSE BLD-MCNC: 94 MG/DL (ref 70–108)
GRAM STAIN RESULT: NORMAL
HCT VFR BLD CALC: 35.5 % (ref 37–47)
HEMOGLOBIN: 12.1 GM/DL (ref 12–16)
LYMPHOCYTES # BLD: 25 %
LYMPHOCYTES ABSOLUTE: 3.3 THOU/MM3 (ref 1–4.8)
MAGNESIUM: 1.9 MG/DL (ref 1.6–2.4)
MCH RBC QN AUTO: 30.9 PG (ref 27–31)
MCHC RBC AUTO-ENTMCNC: 34.1 GM/DL (ref 33–37)
MCV RBC AUTO: 90.6 FL (ref 81–99)
MONOCYTES # BLD: 9.1 %
MONOCYTES ABSOLUTE: 1.2 THOU/MM3 (ref 0.4–1.3)
NUCLEATED RED BLOOD CELLS: 0 /100 WBC
PDW BLD-RTO: 14.7 % (ref 11.5–14.5)
PHOSPHORUS: 2.3 MG/DL (ref 2.4–4.7)
PLATELET # BLD: 231 THOU/MM3 (ref 130–400)
PMV BLD AUTO: 9.9 FL (ref 7.4–10.4)
POTASSIUM SERPL-SCNC: 3.6 MEQ/L (ref 3.5–5.2)
RBC # BLD: 3.92 MILL/MM3 (ref 4.2–5.4)
SEDIMENTATION RATE, ERYTHROCYTE: 70 MM/HR (ref 0–20)
SEG NEUTROPHILS: 64.7 %
SEGMENTED NEUTROPHILS ABSOLUTE COUNT: 8.4 THOU/MM3 (ref 1.8–7.7)
SODIUM BLD-SCNC: 137 MEQ/L (ref 135–145)
WBC # BLD: 13 THOU/MM3 (ref 4.8–10.8)

## 2018-05-28 PROCEDURE — 2580000003 HC RX 258: Performed by: INTERNAL MEDICINE

## 2018-05-28 PROCEDURE — 6370000000 HC RX 637 (ALT 250 FOR IP): Performed by: HOSPITALIST

## 2018-05-28 PROCEDURE — 85651 RBC SED RATE NONAUTOMATED: CPT

## 2018-05-28 PROCEDURE — 2580000003 HC RX 258: Performed by: HOSPITALIST

## 2018-05-28 PROCEDURE — 80069 RENAL FUNCTION PANEL: CPT

## 2018-05-28 PROCEDURE — 83735 ASSAY OF MAGNESIUM: CPT

## 2018-05-28 PROCEDURE — 6360000002 HC RX W HCPCS: Performed by: HOSPITALIST

## 2018-05-28 PROCEDURE — 1200000000 HC SEMI PRIVATE

## 2018-05-28 PROCEDURE — 85025 COMPLETE CBC W/AUTO DIFF WBC: CPT

## 2018-05-28 PROCEDURE — 6370000000 HC RX 637 (ALT 250 FOR IP): Performed by: INTERNAL MEDICINE

## 2018-05-28 PROCEDURE — 99232 SBSQ HOSP IP/OBS MODERATE 35: CPT | Performed by: INTERNAL MEDICINE

## 2018-05-28 PROCEDURE — 36415 COLL VENOUS BLD VENIPUNCTURE: CPT

## 2018-05-28 PROCEDURE — 99024 POSTOP FOLLOW-UP VISIT: CPT | Performed by: SURGERY

## 2018-05-28 PROCEDURE — 82948 REAGENT STRIP/BLOOD GLUCOSE: CPT

## 2018-05-28 PROCEDURE — 86140 C-REACTIVE PROTEIN: CPT

## 2018-05-28 RX ORDER — LOPERAMIDE HYDROCHLORIDE 2 MG/1
2 CAPSULE ORAL 4 TIMES DAILY PRN
Status: DISCONTINUED | OUTPATIENT
Start: 2018-05-28 | End: 2018-05-31 | Stop reason: HOSPADM

## 2018-05-28 RX ADMIN — HYDROCODONE BITARTRATE AND ACETAMINOPHEN 1 TABLET: 5; 325 TABLET ORAL at 05:33

## 2018-05-28 RX ADMIN — ATORVASTATIN CALCIUM 20 MG: 20 TABLET, FILM COATED ORAL at 08:22

## 2018-05-28 RX ADMIN — BUPROPION HYDROCHLORIDE 150 MG: 150 TABLET, EXTENDED RELEASE ORAL at 08:22

## 2018-05-28 RX ADMIN — PREGABALIN 300 MG: 75 CAPSULE ORAL at 20:59

## 2018-05-28 RX ADMIN — MEROPENEM 1 G: 1 INJECTION, POWDER, FOR SOLUTION INTRAVENOUS at 01:41

## 2018-05-28 RX ADMIN — Medication 10 ML: at 12:10

## 2018-05-28 RX ADMIN — SODIUM CHLORIDE: 9 INJECTION, SOLUTION INTRAVENOUS at 07:16

## 2018-05-28 RX ADMIN — HYDROCODONE BITARTRATE AND ACETAMINOPHEN 1 TABLET: 5; 325 TABLET ORAL at 09:34

## 2018-05-28 RX ADMIN — SODIUM CHLORIDE: 9 INJECTION, SOLUTION INTRAVENOUS at 21:19

## 2018-05-28 RX ADMIN — SODIUM CHLORIDE: 9 INJECTION, SOLUTION INTRAVENOUS at 13:59

## 2018-05-28 RX ADMIN — OMEGA-3-ACID ETHYL ESTERS 1000 MG: 1 CAPSULE, LIQUID FILLED ORAL at 08:23

## 2018-05-28 RX ADMIN — TRAMADOL HYDROCHLORIDE 50 MG: 50 TABLET, FILM COATED ORAL at 18:16

## 2018-05-28 RX ADMIN — PREGABALIN 300 MG: 75 CAPSULE ORAL at 08:22

## 2018-05-28 RX ADMIN — Medication 10 ML: at 21:09

## 2018-05-28 RX ADMIN — INSULIN GLARGINE 45 UNITS: 100 INJECTION, SOLUTION SUBCUTANEOUS at 21:06

## 2018-05-28 RX ADMIN — INSULIN LISPRO 0 UNITS: 100 INJECTION, SOLUTION INTRAVENOUS; SUBCUTANEOUS at 21:02

## 2018-05-28 RX ADMIN — LINAGLIPTIN 5 MG: 5 TABLET, FILM COATED ORAL at 08:22

## 2018-05-28 RX ADMIN — ENOXAPARIN SODIUM 40 MG: 40 INJECTION, SOLUTION INTRAVENOUS; SUBCUTANEOUS at 08:23

## 2018-05-28 RX ADMIN — MEROPENEM 1 G: 1 INJECTION, POWDER, FOR SOLUTION INTRAVENOUS at 09:34

## 2018-05-28 RX ADMIN — CITALOPRAM 40 MG: 40 TABLET, FILM COATED ORAL at 08:22

## 2018-05-28 RX ADMIN — ROPINIROLE HYDROCHLORIDE 1 MG: 1 TABLET, FILM COATED ORAL at 20:56

## 2018-05-28 RX ADMIN — HYDROCODONE BITARTRATE AND ACETAMINOPHEN 1 TABLET: 5; 325 TABLET ORAL at 20:55

## 2018-05-28 RX ADMIN — PANTOPRAZOLE SODIUM 40 MG: 40 TABLET, DELAYED RELEASE ORAL at 08:22

## 2018-05-28 RX ADMIN — ROPINIROLE HYDROCHLORIDE 1 MG: 1 TABLET, FILM COATED ORAL at 08:22

## 2018-05-28 RX ADMIN — LOPERAMIDE HYDROCHLORIDE 2 MG: 2 CAPSULE ORAL at 09:34

## 2018-05-28 RX ADMIN — VANCOMYCIN HYDROCHLORIDE 1750 MG: 1 INJECTION, POWDER, LYOPHILIZED, FOR SOLUTION INTRAVENOUS at 05:34

## 2018-05-28 RX ADMIN — MEROPENEM 1 G: 1 INJECTION, POWDER, FOR SOLUTION INTRAVENOUS at 17:01

## 2018-05-28 RX ADMIN — Medication 1 UNITS: at 08:27

## 2018-05-28 RX ADMIN — BUPROPION HYDROCHLORIDE 150 MG: 150 TABLET, EXTENDED RELEASE ORAL at 20:56

## 2018-05-28 RX ADMIN — ROPINIROLE HYDROCHLORIDE 1 MG: 1 TABLET, FILM COATED ORAL at 13:52

## 2018-05-28 RX ADMIN — TRAZODONE HYDROCHLORIDE 100 MG: 100 TABLET ORAL at 20:55

## 2018-05-28 ASSESSMENT — PAIN DESCRIPTION - PAIN TYPE
TYPE: SURGICAL PAIN

## 2018-05-28 ASSESSMENT — PAIN DESCRIPTION - FREQUENCY
FREQUENCY: CONTINUOUS
FREQUENCY: CONTINUOUS

## 2018-05-28 ASSESSMENT — PAIN DESCRIPTION - LOCATION
LOCATION: BUTTOCKS

## 2018-05-28 ASSESSMENT — PAIN DESCRIPTION - ONSET: ONSET: ON-GOING

## 2018-05-28 ASSESSMENT — PAIN DESCRIPTION - ORIENTATION
ORIENTATION: RIGHT

## 2018-05-28 ASSESSMENT — PAIN DESCRIPTION - DESCRIPTORS
DESCRIPTORS: SORE
DESCRIPTORS: BURNING
DESCRIPTORS: BURNING

## 2018-05-28 ASSESSMENT — PAIN SCALES - GENERAL
PAINLEVEL_OUTOF10: 9
PAINLEVEL_OUTOF10: 8
PAINLEVEL_OUTOF10: 9
PAINLEVEL_OUTOF10: 7
PAINLEVEL_OUTOF10: 7
PAINLEVEL_OUTOF10: 6
PAINLEVEL_OUTOF10: 8
PAINLEVEL_OUTOF10: 0

## 2018-05-29 ENCOUNTER — ANESTHESIA (OUTPATIENT)
Dept: OPERATING ROOM | Age: 58
DRG: 854 | End: 2018-05-29
Payer: MEDICARE

## 2018-05-29 ENCOUNTER — ANESTHESIA EVENT (OUTPATIENT)
Dept: OPERATING ROOM | Age: 58
DRG: 854 | End: 2018-05-29
Payer: MEDICARE

## 2018-05-29 VITALS
OXYGEN SATURATION: 96 % | DIASTOLIC BLOOD PRESSURE: 47 MMHG | SYSTOLIC BLOOD PRESSURE: 81 MMHG | RESPIRATION RATE: 19 BRPM

## 2018-05-29 LAB
ANION GAP SERPL CALCULATED.3IONS-SCNC: 11 MEQ/L (ref 8–16)
BUN BLDV-MCNC: 7 MG/DL (ref 7–22)
CALCIUM SERPL-MCNC: 8.7 MG/DL (ref 8.5–10.5)
CHLORIDE BLD-SCNC: 107 MEQ/L (ref 98–111)
CO2: 24 MEQ/L (ref 23–33)
CREAT SERPL-MCNC: 0.6 MG/DL (ref 0.4–1.2)
GFR SERPL CREATININE-BSD FRML MDRD: > 90 ML/MIN/1.73M2
GLUCOSE BLD-MCNC: 103 MG/DL (ref 70–108)
GLUCOSE BLD-MCNC: 86 MG/DL (ref 70–108)
GLUCOSE BLD-MCNC: 94 MG/DL (ref 70–108)
GLUCOSE BLD-MCNC: 98 MG/DL (ref 70–108)
GLUCOSE BLD-MCNC: 99 MG/DL (ref 70–108)
POTASSIUM SERPL-SCNC: 3.5 MEQ/L (ref 3.5–5.2)
SODIUM BLD-SCNC: 142 MEQ/L (ref 135–145)

## 2018-05-29 PROCEDURE — 6370000000 HC RX 637 (ALT 250 FOR IP): Performed by: HOSPITALIST

## 2018-05-29 PROCEDURE — 99232 SBSQ HOSP IP/OBS MODERATE 35: CPT | Performed by: SURGERY

## 2018-05-29 PROCEDURE — 36415 COLL VENOUS BLD VENIPUNCTURE: CPT

## 2018-05-29 PROCEDURE — 7100000000 HC PACU RECOVERY - FIRST 15 MIN: Performed by: SURGERY

## 2018-05-29 PROCEDURE — 6360000002 HC RX W HCPCS: Performed by: ANESTHESIOLOGY

## 2018-05-29 PROCEDURE — 3700000001 HC ADD 15 MINUTES (ANESTHESIA): Performed by: SURGERY

## 2018-05-29 PROCEDURE — 97166 OT EVAL MOD COMPLEX 45 MIN: CPT

## 2018-05-29 PROCEDURE — 7100000001 HC PACU RECOVERY - ADDTL 15 MIN: Performed by: SURGERY

## 2018-05-29 PROCEDURE — 99233 SBSQ HOSP IP/OBS HIGH 50: CPT | Performed by: INTERNAL MEDICINE

## 2018-05-29 PROCEDURE — 6370000000 HC RX 637 (ALT 250 FOR IP): Performed by: INTERNAL MEDICINE

## 2018-05-29 PROCEDURE — 80048 BASIC METABOLIC PNL TOTAL CA: CPT

## 2018-05-29 PROCEDURE — 6360000002 HC RX W HCPCS: Performed by: HOSPITALIST

## 2018-05-29 PROCEDURE — 0JB90ZZ EXCISION OF BUTTOCK SUBCUTANEOUS TISSUE AND FASCIA, OPEN APPROACH: ICD-10-PCS | Performed by: SURGERY

## 2018-05-29 PROCEDURE — 2500000003 HC RX 250 WO HCPCS: Performed by: ANESTHESIOLOGY

## 2018-05-29 PROCEDURE — 2580000003 HC RX 258: Performed by: INTERNAL MEDICINE

## 2018-05-29 PROCEDURE — G8987 SELF CARE CURRENT STATUS: HCPCS

## 2018-05-29 PROCEDURE — 3700000000 HC ANESTHESIA ATTENDED CARE: Performed by: SURGERY

## 2018-05-29 PROCEDURE — APPSS30 APP SPLIT SHARED TIME 16-30 MINUTES: Performed by: NURSE PRACTITIONER

## 2018-05-29 PROCEDURE — 97530 THERAPEUTIC ACTIVITIES: CPT

## 2018-05-29 PROCEDURE — G8988 SELF CARE GOAL STATUS: HCPCS

## 2018-05-29 PROCEDURE — 3600000013 HC SURGERY LEVEL 3 ADDTL 15MIN: Performed by: SURGERY

## 2018-05-29 PROCEDURE — 2580000003 HC RX 258: Performed by: HOSPITALIST

## 2018-05-29 PROCEDURE — 3600000003 HC SURGERY LEVEL 3 BASE: Performed by: SURGERY

## 2018-05-29 PROCEDURE — 1200000000 HC SEMI PRIVATE

## 2018-05-29 PROCEDURE — 82948 REAGENT STRIP/BLOOD GLUCOSE: CPT

## 2018-05-29 PROCEDURE — 99024 POSTOP FOLLOW-UP VISIT: CPT | Performed by: NURSE PRACTITIONER

## 2018-05-29 RX ORDER — METOCLOPRAMIDE HYDROCHLORIDE 5 MG/ML
10 INJECTION INTRAMUSCULAR; INTRAVENOUS
Status: ACTIVE | OUTPATIENT
Start: 2018-05-29 | End: 2018-05-29

## 2018-05-29 RX ORDER — FENTANYL CITRATE 50 UG/ML
INJECTION, SOLUTION INTRAMUSCULAR; INTRAVENOUS PRN
Status: DISCONTINUED | OUTPATIENT
Start: 2018-05-29 | End: 2018-05-31 | Stop reason: SDUPTHER

## 2018-05-29 RX ORDER — FENTANYL CITRATE 50 UG/ML
50 INJECTION, SOLUTION INTRAMUSCULAR; INTRAVENOUS EVERY 5 MIN PRN
Status: DISCONTINUED | OUTPATIENT
Start: 2018-05-29 | End: 2018-05-30 | Stop reason: HOSPADM

## 2018-05-29 RX ORDER — PROMETHAZINE HYDROCHLORIDE 25 MG/ML
12.5 INJECTION, SOLUTION INTRAMUSCULAR; INTRAVENOUS
Status: ACTIVE | OUTPATIENT
Start: 2018-05-29 | End: 2018-05-29

## 2018-05-29 RX ORDER — SUCCINYLCHOLINE CHLORIDE 20 MG/ML
INJECTION INTRAMUSCULAR; INTRAVENOUS PRN
Status: DISCONTINUED | OUTPATIENT
Start: 2018-05-29 | End: 2018-05-31 | Stop reason: SDUPTHER

## 2018-05-29 RX ORDER — LABETALOL HYDROCHLORIDE 5 MG/ML
5 INJECTION, SOLUTION INTRAVENOUS EVERY 10 MIN PRN
Status: DISCONTINUED | OUTPATIENT
Start: 2018-05-29 | End: 2018-05-30 | Stop reason: HOSPADM

## 2018-05-29 RX ORDER — DIPHENHYDRAMINE HYDROCHLORIDE 50 MG/ML
12.5 INJECTION INTRAMUSCULAR; INTRAVENOUS
Status: ACTIVE | OUTPATIENT
Start: 2018-05-29 | End: 2018-05-29

## 2018-05-29 RX ORDER — MEPERIDINE HYDROCHLORIDE 25 MG/ML
12.5 INJECTION INTRAMUSCULAR; INTRAVENOUS; SUBCUTANEOUS EVERY 5 MIN PRN
Status: DISCONTINUED | OUTPATIENT
Start: 2018-05-29 | End: 2018-05-30 | Stop reason: HOSPADM

## 2018-05-29 RX ORDER — LIDOCAINE HYDROCHLORIDE 20 MG/ML
INJECTION, SOLUTION INFILTRATION; PERINEURAL PRN
Status: DISCONTINUED | OUTPATIENT
Start: 2018-05-29 | End: 2018-05-31 | Stop reason: SDUPTHER

## 2018-05-29 RX ORDER — MIDAZOLAM HYDROCHLORIDE 1 MG/ML
INJECTION INTRAMUSCULAR; INTRAVENOUS PRN
Status: DISCONTINUED | OUTPATIENT
Start: 2018-05-29 | End: 2018-05-31 | Stop reason: SDUPTHER

## 2018-05-29 RX ORDER — ONDANSETRON 2 MG/ML
INJECTION INTRAMUSCULAR; INTRAVENOUS PRN
Status: DISCONTINUED | OUTPATIENT
Start: 2018-05-29 | End: 2018-05-30 | Stop reason: SDUPTHER

## 2018-05-29 RX ORDER — FENTANYL CITRATE 50 UG/ML
25 INJECTION, SOLUTION INTRAMUSCULAR; INTRAVENOUS EVERY 5 MIN PRN
Status: DISCONTINUED | OUTPATIENT
Start: 2018-05-29 | End: 2018-05-30 | Stop reason: HOSPADM

## 2018-05-29 RX ORDER — PROPOFOL 10 MG/ML
INJECTION, EMULSION INTRAVENOUS PRN
Status: DISCONTINUED | OUTPATIENT
Start: 2018-05-29 | End: 2018-05-31 | Stop reason: SDUPTHER

## 2018-05-29 RX ADMIN — FENTANYL CITRATE 50 MCG: 50 INJECTION, SOLUTION INTRAMUSCULAR; INTRAVENOUS at 23:29

## 2018-05-29 RX ADMIN — LOPERAMIDE HYDROCHLORIDE 2 MG: 2 CAPSULE ORAL at 11:15

## 2018-05-29 RX ADMIN — HYDROCODONE BITARTRATE AND ACETAMINOPHEN 1 TABLET: 5; 325 TABLET ORAL at 19:36

## 2018-05-29 RX ADMIN — CITALOPRAM 40 MG: 40 TABLET, FILM COATED ORAL at 08:48

## 2018-05-29 RX ADMIN — HYDROCODONE BITARTRATE AND ACETAMINOPHEN 1 TABLET: 5; 325 TABLET ORAL at 23:36

## 2018-05-29 RX ADMIN — PANTOPRAZOLE SODIUM 40 MG: 40 TABLET, DELAYED RELEASE ORAL at 08:48

## 2018-05-29 RX ADMIN — PREGABALIN 300 MG: 75 CAPSULE ORAL at 08:48

## 2018-05-29 RX ADMIN — BUPROPION HYDROCHLORIDE 150 MG: 150 TABLET, EXTENDED RELEASE ORAL at 08:48

## 2018-05-29 RX ADMIN — ROPINIROLE HYDROCHLORIDE 1 MG: 1 TABLET, FILM COATED ORAL at 08:49

## 2018-05-29 RX ADMIN — LIDOCAINE HYDROCHLORIDE 100 MG: 20 INJECTION, SOLUTION INFILTRATION; PERINEURAL at 22:15

## 2018-05-29 RX ADMIN — TRAMADOL HYDROCHLORIDE 50 MG: 50 TABLET, FILM COATED ORAL at 08:48

## 2018-05-29 RX ADMIN — SODIUM CHLORIDE: 9 INJECTION, SOLUTION INTRAVENOUS at 04:32

## 2018-05-29 RX ADMIN — Medication 120 MG: at 22:15

## 2018-05-29 RX ADMIN — FENTANYL CITRATE 100 MCG: 50 INJECTION INTRAMUSCULAR; INTRAVENOUS at 22:20

## 2018-05-29 RX ADMIN — HYDROCODONE BITARTRATE AND ACETAMINOPHEN 1 TABLET: 5; 325 TABLET ORAL at 13:07

## 2018-05-29 RX ADMIN — ENOXAPARIN SODIUM 40 MG: 40 INJECTION, SOLUTION INTRAVENOUS; SUBCUTANEOUS at 08:46

## 2018-05-29 RX ADMIN — ROPINIROLE HYDROCHLORIDE 1 MG: 1 TABLET, FILM COATED ORAL at 13:08

## 2018-05-29 RX ADMIN — SODIUM CHLORIDE: 9 INJECTION, SOLUTION INTRAVENOUS at 15:58

## 2018-05-29 RX ADMIN — MEROPENEM 1 G: 1 INJECTION, POWDER, FOR SOLUTION INTRAVENOUS at 01:33

## 2018-05-29 RX ADMIN — MIDAZOLAM HYDROCHLORIDE 2 MG: 1 INJECTION, SOLUTION INTRAMUSCULAR; INTRAVENOUS at 22:15

## 2018-05-29 RX ADMIN — LINAGLIPTIN 5 MG: 5 TABLET, FILM COATED ORAL at 08:49

## 2018-05-29 RX ADMIN — FENTANYL CITRATE 100 MCG: 50 INJECTION INTRAMUSCULAR; INTRAVENOUS at 22:15

## 2018-05-29 RX ADMIN — OMEGA-3-ACID ETHYL ESTERS 1000 MG: 1 CAPSULE, LIQUID FILLED ORAL at 08:48

## 2018-05-29 RX ADMIN — MEROPENEM 1 G: 1 INJECTION, POWDER, FOR SOLUTION INTRAVENOUS at 08:52

## 2018-05-29 RX ADMIN — ONDANSETRON HYDROCHLORIDE 4 MG: 4 INJECTION, SOLUTION INTRAMUSCULAR; INTRAVENOUS at 22:18

## 2018-05-29 RX ADMIN — PROPOFOL 150 MG: 10 INJECTION, EMULSION INTRAVENOUS at 22:15

## 2018-05-29 RX ADMIN — ATORVASTATIN CALCIUM 20 MG: 20 TABLET, FILM COATED ORAL at 08:49

## 2018-05-29 RX ADMIN — MEROPENEM 1 G: 1 INJECTION, POWDER, FOR SOLUTION INTRAVENOUS at 17:38

## 2018-05-29 RX ADMIN — FENTANYL CITRATE 50 MCG: 50 INJECTION, SOLUTION INTRAMUSCULAR; INTRAVENOUS at 23:24

## 2018-05-29 ASSESSMENT — PAIN DESCRIPTION - ORIENTATION
ORIENTATION: RIGHT
ORIENTATION: RIGHT

## 2018-05-29 ASSESSMENT — PAIN DESCRIPTION - FREQUENCY
FREQUENCY: INTERMITTENT
FREQUENCY: CONTINUOUS
FREQUENCY: CONTINUOUS

## 2018-05-29 ASSESSMENT — PULMONARY FUNCTION TESTS
PIF_VALUE: 1
PIF_VALUE: 3
PIF_VALUE: 25
PIF_VALUE: 25
PIF_VALUE: 6
PIF_VALUE: 2
PIF_VALUE: 26
PIF_VALUE: 25
PIF_VALUE: 26
PIF_VALUE: 3
PIF_VALUE: 24
PIF_VALUE: 3
PIF_VALUE: 2
PIF_VALUE: 0
PIF_VALUE: 18
PIF_VALUE: 25
PIF_VALUE: 2
PIF_VALUE: 3
PIF_VALUE: 24
PIF_VALUE: 24
PIF_VALUE: 25
PIF_VALUE: 0
PIF_VALUE: 39
PIF_VALUE: 23
PIF_VALUE: 3
PIF_VALUE: 3
PIF_VALUE: 2
PIF_VALUE: 28
PIF_VALUE: 9
PIF_VALUE: 23
PIF_VALUE: 25
PIF_VALUE: 26
PIF_VALUE: 1
PIF_VALUE: 28
PIF_VALUE: 3
PIF_VALUE: 1
PIF_VALUE: 2
PIF_VALUE: 4
PIF_VALUE: 2
PIF_VALUE: 25
PIF_VALUE: 3
PIF_VALUE: 30
PIF_VALUE: 25
PIF_VALUE: 43
PIF_VALUE: 3
PIF_VALUE: 1
PIF_VALUE: 31
PIF_VALUE: 2
PIF_VALUE: 1
PIF_VALUE: 2
PIF_VALUE: 34
PIF_VALUE: 26
PIF_VALUE: 12

## 2018-05-29 ASSESSMENT — PAIN SCALES - GENERAL
PAINLEVEL_OUTOF10: 7
PAINLEVEL_OUTOF10: 7
PAINLEVEL_OUTOF10: 8
PAINLEVEL_OUTOF10: 6
PAINLEVEL_OUTOF10: 9
PAINLEVEL_OUTOF10: 3
PAINLEVEL_OUTOF10: 7
PAINLEVEL_OUTOF10: 6
PAINLEVEL_OUTOF10: 8
PAINLEVEL_OUTOF10: 8
PAINLEVEL_OUTOF10: 9
PAINLEVEL_OUTOF10: 8
PAINLEVEL_OUTOF10: 5

## 2018-05-29 ASSESSMENT — PAIN DESCRIPTION - LOCATION
LOCATION: BUTTOCKS

## 2018-05-29 ASSESSMENT — PAIN DESCRIPTION - DESCRIPTORS
DESCRIPTORS: ACHING
DESCRIPTORS: BURNING

## 2018-05-29 ASSESSMENT — PAIN DESCRIPTION - PROGRESSION
CLINICAL_PROGRESSION: GRADUALLY IMPROVING
CLINICAL_PROGRESSION: NOT CHANGED

## 2018-05-29 ASSESSMENT — PAIN DESCRIPTION - PAIN TYPE
TYPE: SURGICAL PAIN

## 2018-05-29 ASSESSMENT — LIFESTYLE VARIABLES: SMOKING_STATUS: 1

## 2018-05-29 ASSESSMENT — PAIN DESCRIPTION - ONSET: ONSET: GRADUAL

## 2018-05-29 ASSESSMENT — COPD QUESTIONNAIRES: CAT_SEVERITY: SEVERE

## 2018-05-30 LAB
ANION GAP SERPL CALCULATED.3IONS-SCNC: 8 MEQ/L (ref 8–16)
BLOOD CULTURE, ROUTINE: NORMAL
BLOOD CULTURE, ROUTINE: NORMAL
BUN BLDV-MCNC: 9 MG/DL (ref 7–22)
CALCIUM SERPL-MCNC: 8.3 MG/DL (ref 8.5–10.5)
CHLORIDE BLD-SCNC: 111 MEQ/L (ref 98–111)
CO2: 26 MEQ/L (ref 23–33)
CREAT SERPL-MCNC: 0.7 MG/DL (ref 0.4–1.2)
GFR SERPL CREATININE-BSD FRML MDRD: 86 ML/MIN/1.73M2
GLUCOSE BLD-MCNC: 104 MG/DL (ref 70–108)
GLUCOSE BLD-MCNC: 82 MG/DL (ref 70–108)
GLUCOSE BLD-MCNC: 89 MG/DL (ref 70–108)
GLUCOSE BLD-MCNC: 90 MG/DL (ref 70–108)
GLUCOSE BLD-MCNC: 91 MG/DL (ref 70–108)
POTASSIUM SERPL-SCNC: 3.8 MEQ/L (ref 3.5–5.2)
SODIUM BLD-SCNC: 145 MEQ/L (ref 135–145)

## 2018-05-30 PROCEDURE — 97161 PT EVAL LOW COMPLEX 20 MIN: CPT

## 2018-05-30 PROCEDURE — 2580000003 HC RX 258: Performed by: INTERNAL MEDICINE

## 2018-05-30 PROCEDURE — 6360000002 HC RX W HCPCS: Performed by: SURGERY

## 2018-05-30 PROCEDURE — 97530 THERAPEUTIC ACTIVITIES: CPT

## 2018-05-30 PROCEDURE — 2580000003 HC RX 258: Performed by: HOSPITALIST

## 2018-05-30 PROCEDURE — G8978 MOBILITY CURRENT STATUS: HCPCS

## 2018-05-30 PROCEDURE — 99232 SBSQ HOSP IP/OBS MODERATE 35: CPT | Performed by: INTERNAL MEDICINE

## 2018-05-30 PROCEDURE — 97535 SELF CARE MNGMENT TRAINING: CPT

## 2018-05-30 PROCEDURE — 80048 BASIC METABOLIC PNL TOTAL CA: CPT

## 2018-05-30 PROCEDURE — 36415 COLL VENOUS BLD VENIPUNCTURE: CPT

## 2018-05-30 PROCEDURE — 99024 POSTOP FOLLOW-UP VISIT: CPT | Performed by: SURGERY

## 2018-05-30 PROCEDURE — APPSS30 APP SPLIT SHARED TIME 16-30 MINUTES: Performed by: NURSE PRACTITIONER

## 2018-05-30 PROCEDURE — 82948 REAGENT STRIP/BLOOD GLUCOSE: CPT

## 2018-05-30 PROCEDURE — G8979 MOBILITY GOAL STATUS: HCPCS

## 2018-05-30 PROCEDURE — 1200000000 HC SEMI PRIVATE

## 2018-05-30 PROCEDURE — 6370000000 HC RX 637 (ALT 250 FOR IP): Performed by: HOSPITALIST

## 2018-05-30 PROCEDURE — 6370000000 HC RX 637 (ALT 250 FOR IP): Performed by: NURSE PRACTITIONER

## 2018-05-30 PROCEDURE — 99024 POSTOP FOLLOW-UP VISIT: CPT | Performed by: NURSE PRACTITIONER

## 2018-05-30 PROCEDURE — 6360000002 HC RX W HCPCS: Performed by: HOSPITALIST

## 2018-05-30 RX ORDER — ONDANSETRON 2 MG/ML
4 INJECTION INTRAMUSCULAR; INTRAVENOUS EVERY 6 HOURS PRN
Status: DISCONTINUED | OUTPATIENT
Start: 2018-05-30 | End: 2018-05-31 | Stop reason: HOSPADM

## 2018-05-30 RX ORDER — TRAMADOL HYDROCHLORIDE 50 MG/1
100 TABLET ORAL 3 TIMES DAILY
Status: DISCONTINUED | OUTPATIENT
Start: 2018-05-30 | End: 2018-05-31 | Stop reason: HOSPADM

## 2018-05-30 RX ORDER — HYDROCODONE BITARTRATE AND ACETAMINOPHEN 5; 325 MG/1; MG/1
2 TABLET ORAL EVERY 4 HOURS PRN
Status: DISCONTINUED | OUTPATIENT
Start: 2018-05-30 | End: 2018-05-31 | Stop reason: HOSPADM

## 2018-05-30 RX ORDER — HYDROCODONE BITARTRATE AND ACETAMINOPHEN 5; 325 MG/1; MG/1
1 TABLET ORAL EVERY 4 HOURS PRN
Status: DISCONTINUED | OUTPATIENT
Start: 2018-05-30 | End: 2018-05-31 | Stop reason: HOSPADM

## 2018-05-30 RX ORDER — TRAMADOL HYDROCHLORIDE 50 MG/1
100 TABLET ORAL 3 TIMES DAILY
COMMUNITY

## 2018-05-30 RX ORDER — HYDROCODONE BITARTRATE AND ACETAMINOPHEN 5; 325 MG/1; MG/1
1 TABLET ORAL EVERY 8 HOURS PRN
COMMUNITY

## 2018-05-30 RX ADMIN — SODIUM CHLORIDE: 9 INJECTION, SOLUTION INTRAVENOUS at 10:13

## 2018-05-30 RX ADMIN — ROPINIROLE HYDROCHLORIDE 1 MG: 1 TABLET, FILM COATED ORAL at 14:10

## 2018-05-30 RX ADMIN — INSULIN GLARGINE 45 UNITS: 100 INJECTION, SOLUTION SUBCUTANEOUS at 21:08

## 2018-05-30 RX ADMIN — LINAGLIPTIN 5 MG: 5 TABLET, FILM COATED ORAL at 10:20

## 2018-05-30 RX ADMIN — ATORVASTATIN CALCIUM 20 MG: 20 TABLET, FILM COATED ORAL at 10:20

## 2018-05-30 RX ADMIN — ENOXAPARIN SODIUM 40 MG: 40 INJECTION, SOLUTION INTRAVENOUS; SUBCUTANEOUS at 09:46

## 2018-05-30 RX ADMIN — ONDANSETRON 4 MG: 2 INJECTION INTRAMUSCULAR; INTRAVENOUS at 10:05

## 2018-05-30 RX ADMIN — TRAMADOL HYDROCHLORIDE 100 MG: 50 TABLET, FILM COATED ORAL at 21:07

## 2018-05-30 RX ADMIN — ROPINIROLE HYDROCHLORIDE 1 MG: 1 TABLET, FILM COATED ORAL at 21:06

## 2018-05-30 RX ADMIN — Medication 10 ML: at 10:21

## 2018-05-30 RX ADMIN — PREGABALIN 300 MG: 75 CAPSULE ORAL at 09:46

## 2018-05-30 RX ADMIN — PANTOPRAZOLE SODIUM 40 MG: 40 TABLET, DELAYED RELEASE ORAL at 10:20

## 2018-05-30 RX ADMIN — MEROPENEM 1 G: 1 INJECTION, POWDER, FOR SOLUTION INTRAVENOUS at 10:13

## 2018-05-30 RX ADMIN — SODIUM CHLORIDE: 9 INJECTION, SOLUTION INTRAVENOUS at 17:10

## 2018-05-30 RX ADMIN — ACETAMINOPHEN 325 MG: 325 TABLET ORAL at 09:45

## 2018-05-30 RX ADMIN — ROPINIROLE HYDROCHLORIDE 1 MG: 1 TABLET, FILM COATED ORAL at 10:20

## 2018-05-30 RX ADMIN — HYDROCODONE BITARTRATE AND ACETAMINOPHEN 1 TABLET: 5; 325 TABLET ORAL at 09:45

## 2018-05-30 RX ADMIN — OMEGA-3-ACID ETHYL ESTERS 1000 MG: 1 CAPSULE, LIQUID FILLED ORAL at 09:47

## 2018-05-30 RX ADMIN — CITALOPRAM 40 MG: 40 TABLET, FILM COATED ORAL at 10:20

## 2018-05-30 RX ADMIN — MEROPENEM 1 G: 1 INJECTION, POWDER, FOR SOLUTION INTRAVENOUS at 01:39

## 2018-05-30 RX ADMIN — HYDROCODONE BITARTRATE AND ACETAMINOPHEN 2 TABLET: 5; 325 TABLET ORAL at 22:38

## 2018-05-30 RX ADMIN — INSULIN GLARGINE 45 UNITS: 100 INJECTION, SOLUTION SUBCUTANEOUS at 00:09

## 2018-05-30 RX ADMIN — ACETAMINOPHEN 325 MG: 325 TABLET ORAL at 18:14

## 2018-05-30 RX ADMIN — BUPROPION HYDROCHLORIDE 150 MG: 150 TABLET, EXTENDED RELEASE ORAL at 21:06

## 2018-05-30 RX ADMIN — MEROPENEM 1 G: 1 INJECTION, POWDER, FOR SOLUTION INTRAVENOUS at 17:14

## 2018-05-30 RX ADMIN — HYDROCODONE BITARTRATE AND ACETAMINOPHEN 1 TABLET: 5; 325 TABLET ORAL at 18:14

## 2018-05-30 RX ADMIN — PREGABALIN 300 MG: 75 CAPSULE ORAL at 21:07

## 2018-05-30 RX ADMIN — BUPROPION HYDROCHLORIDE 150 MG: 150 TABLET, EXTENDED RELEASE ORAL at 10:20

## 2018-05-30 RX ADMIN — TRAZODONE HYDROCHLORIDE 100 MG: 100 TABLET ORAL at 21:06

## 2018-05-30 RX ADMIN — TRAMADOL HYDROCHLORIDE 100 MG: 50 TABLET, FILM COATED ORAL at 14:09

## 2018-05-30 ASSESSMENT — PAIN SCALES - GENERAL
PAINLEVEL_OUTOF10: 3
PAINLEVEL_OUTOF10: 8
PAINLEVEL_OUTOF10: 8
PAINLEVEL_OUTOF10: 5
PAINLEVEL_OUTOF10: 5
PAINLEVEL_OUTOF10: 4
PAINLEVEL_OUTOF10: 7
PAINLEVEL_OUTOF10: 3
PAINLEVEL_OUTOF10: 4
PAINLEVEL_OUTOF10: 7
PAINLEVEL_OUTOF10: 5
PAINLEVEL_OUTOF10: 7
PAINLEVEL_OUTOF10: 2

## 2018-05-30 ASSESSMENT — PAIN DESCRIPTION - PROGRESSION
CLINICAL_PROGRESSION: GRADUALLY IMPROVING
CLINICAL_PROGRESSION: GRADUALLY IMPROVING
CLINICAL_PROGRESSION: NOT CHANGED

## 2018-05-30 ASSESSMENT — PAIN DESCRIPTION - DESCRIPTORS
DESCRIPTORS: ACHING;DISCOMFORT;THROBBING
DESCRIPTORS: ACHING
DESCRIPTORS: BURNING
DESCRIPTORS: TENDER;DISCOMFORT

## 2018-05-30 ASSESSMENT — PAIN DESCRIPTION - LOCATION
LOCATION: BUTTOCKS
LOCATION: HEAD;BUTTOCKS
LOCATION: BUTTOCKS
LOCATION: BUTTOCKS
LOCATION: ABDOMEN
LOCATION: BUTTOCKS
LOCATION_2: HEAD
LOCATION: BUTTOCKS

## 2018-05-30 ASSESSMENT — PAIN DESCRIPTION - FREQUENCY
FREQUENCY: INTERMITTENT
FREQUENCY: INTERMITTENT

## 2018-05-30 ASSESSMENT — PAIN DESCRIPTION - ORIENTATION
ORIENTATION: LEFT
ORIENTATION: RIGHT
ORIENTATION: RIGHT

## 2018-05-30 ASSESSMENT — PAIN DESCRIPTION - ONSET
ONSET: GRADUAL
ONSET: ON-GOING

## 2018-05-30 ASSESSMENT — PAIN DESCRIPTION - PAIN TYPE
TYPE_2: ACUTE PAIN
TYPE: SURGICAL PAIN

## 2018-05-30 ASSESSMENT — PAIN DESCRIPTION - INTENSITY: RATING_2: 7

## 2018-05-31 ENCOUNTER — HOSPITAL ENCOUNTER (INPATIENT)
Age: 58
LOS: 15 days | Discharge: HOME | DRG: 603 | End: 2018-06-15
Attending: FAMILY MEDICINE | Admitting: FAMILY MEDICINE
Payer: MEDICARE

## 2018-05-31 VITALS
SYSTOLIC BLOOD PRESSURE: 128 MMHG | HEART RATE: 61 BPM | BODY MASS INDEX: 42.52 KG/M2 | HEIGHT: 69 IN | DIASTOLIC BLOOD PRESSURE: 68 MMHG | OXYGEN SATURATION: 95 % | WEIGHT: 287.1 LBS | TEMPERATURE: 98.3 F | RESPIRATION RATE: 16 BRPM

## 2018-05-31 DIAGNOSIS — L02.31 GLUTEAL ABSCESS: Primary | ICD-10-CM

## 2018-05-31 PROBLEM — K76.0 HEPATIC STEATOSIS: Status: ACTIVE | Noted: 2018-05-31

## 2018-05-31 PROBLEM — F32.A DEPRESSION: Status: ACTIVE | Noted: 2018-05-31

## 2018-05-31 LAB
ANION GAP SERPL CALCULATED.3IONS-SCNC: 9 MEQ/L (ref 8–16)
BUN BLDV-MCNC: 8 MG/DL (ref 7–22)
CALCIUM SERPL-MCNC: 8.1 MG/DL (ref 8.5–10.5)
CHLORIDE BLD-SCNC: 110 MEQ/L (ref 98–111)
CO2: 23 MEQ/L (ref 23–33)
CREAT SERPL-MCNC: 0.6 MG/DL (ref 0.4–1.2)
GFR SERPL CREATININE-BSD FRML MDRD: > 90 ML/MIN/1.73M2
GLUCOSE BLD-MCNC: 148 MG/DL (ref 70–108)
GLUCOSE BLD-MCNC: 79 MG/DL (ref 70–108)
GLUCOSE BLD-MCNC: 85 MG/DL (ref 70–108)
GLUCOSE BLD-MCNC: 91 MG/DL (ref 70–108)
GLUCOSE BLD-MCNC: 91 MG/DL (ref 70–108)
GLUCOSE BLD-MCNC: 96 MG/DL (ref 70–108)
HCT VFR BLD CALC: 31.5 % (ref 37–47)
HEMOGLOBIN: 10.6 GM/DL (ref 12–16)
MCH RBC QN AUTO: 31.3 PG (ref 27–31)
MCHC RBC AUTO-ENTMCNC: 33.6 GM/DL (ref 33–37)
MCV RBC AUTO: 93 FL (ref 81–99)
PDW BLD-RTO: 14.4 % (ref 11.5–14.5)
PLATELET # BLD: 287 THOU/MM3 (ref 130–400)
PMV BLD AUTO: 9.2 FL (ref 7.4–10.4)
POTASSIUM SERPL-SCNC: 3.4 MEQ/L (ref 3.5–5.2)
RBC # BLD: 3.38 MILL/MM3 (ref 4.2–5.4)
SODIUM BLD-SCNC: 142 MEQ/L (ref 135–145)
WBC # BLD: 10.8 THOU/MM3 (ref 4.8–10.8)

## 2018-05-31 PROCEDURE — 85027 COMPLETE CBC AUTOMATED: CPT

## 2018-05-31 PROCEDURE — 97110 THERAPEUTIC EXERCISES: CPT

## 2018-05-31 PROCEDURE — 80048 BASIC METABOLIC PNL TOTAL CA: CPT

## 2018-05-31 PROCEDURE — 6370000000 HC RX 637 (ALT 250 FOR IP): Performed by: HOSPITALIST

## 2018-05-31 PROCEDURE — 6360000002 HC RX W HCPCS: Performed by: HOSPITALIST

## 2018-05-31 PROCEDURE — 36415 COLL VENOUS BLD VENIPUNCTURE: CPT

## 2018-05-31 PROCEDURE — 2580000003 HC RX 258: Performed by: INTERNAL MEDICINE

## 2018-05-31 PROCEDURE — 2580000003 HC RX 258: Performed by: HOSPITALIST

## 2018-05-31 PROCEDURE — 6370000000 HC RX 637 (ALT 250 FOR IP): Performed by: INTERNAL MEDICINE

## 2018-05-31 PROCEDURE — APPSS30 APP SPLIT SHARED TIME 16-30 MINUTES: Performed by: NURSE PRACTITIONER

## 2018-05-31 PROCEDURE — 1290000000 HC SEMI PRIVATE OTHER R&B

## 2018-05-31 PROCEDURE — 99024 POSTOP FOLLOW-UP VISIT: CPT | Performed by: NURSE PRACTITIONER

## 2018-05-31 PROCEDURE — 82948 REAGENT STRIP/BLOOD GLUCOSE: CPT

## 2018-05-31 PROCEDURE — 6370000000 HC RX 637 (ALT 250 FOR IP): Performed by: NURSE PRACTITIONER

## 2018-05-31 PROCEDURE — 99239 HOSP IP/OBS DSCHRG MGMT >30: CPT | Performed by: INTERNAL MEDICINE

## 2018-05-31 PROCEDURE — 6360000002 HC RX W HCPCS: Performed by: INTERNAL MEDICINE

## 2018-05-31 PROCEDURE — A6402 STERILE GAUZE <= 16 SQ IN: HCPCS

## 2018-05-31 PROCEDURE — 0220000000 HC SKILLED NURSING FACILITY

## 2018-05-31 PROCEDURE — 6370000000 HC RX 637 (ALT 250 FOR IP): Performed by: FAMILY MEDICINE

## 2018-05-31 PROCEDURE — 99024 POSTOP FOLLOW-UP VISIT: CPT | Performed by: SURGERY

## 2018-05-31 PROCEDURE — 97116 GAIT TRAINING THERAPY: CPT

## 2018-05-31 RX ORDER — OMEGA-3-ACID ETHYL ESTERS 1 G/1
1000 CAPSULE, LIQUID FILLED ORAL DAILY
Status: DISCONTINUED | OUTPATIENT
Start: 2018-06-01 | End: 2018-06-15 | Stop reason: HOSPADM

## 2018-05-31 RX ORDER — PANTOPRAZOLE SODIUM 40 MG/1
40 TABLET, DELAYED RELEASE ORAL DAILY
Status: CANCELLED | OUTPATIENT
Start: 2018-06-01

## 2018-05-31 RX ORDER — TROSPIUM CHLORIDE 20 MG/1
20 TABLET, FILM COATED ORAL NIGHTLY
Status: DISCONTINUED | OUTPATIENT
Start: 2018-05-31 | End: 2018-06-15 | Stop reason: HOSPADM

## 2018-05-31 RX ORDER — TRAMADOL HYDROCHLORIDE 50 MG/1
100 TABLET ORAL 3 TIMES DAILY
Status: DISCONTINUED | OUTPATIENT
Start: 2018-05-31 | End: 2018-06-15 | Stop reason: HOSPADM

## 2018-05-31 RX ORDER — BUPROPION HYDROCHLORIDE 150 MG/1
150 TABLET, EXTENDED RELEASE ORAL 2 TIMES DAILY
Status: DISCONTINUED | OUTPATIENT
Start: 2018-05-31 | End: 2018-06-15 | Stop reason: HOSPADM

## 2018-05-31 RX ORDER — ACETAMINOPHEN 325 MG/1
650 TABLET ORAL EVERY 4 HOURS PRN
Status: CANCELLED | OUTPATIENT
Start: 2018-05-31

## 2018-05-31 RX ORDER — INSULIN GLARGINE 100 [IU]/ML
45 INJECTION, SOLUTION SUBCUTANEOUS NIGHTLY
Status: CANCELLED | OUTPATIENT
Start: 2018-05-31

## 2018-05-31 RX ORDER — ATORVASTATIN CALCIUM 20 MG/1
20 TABLET, FILM COATED ORAL DAILY
Status: CANCELLED | OUTPATIENT
Start: 2018-06-01

## 2018-05-31 RX ORDER — LACTOBACILLUS RHAMNOSUS GG 10B CELL
1 CAPSULE ORAL
Status: DISCONTINUED | OUTPATIENT
Start: 2018-06-01 | End: 2018-06-15 | Stop reason: HOSPADM

## 2018-05-31 RX ORDER — HYDROCODONE BITARTRATE AND ACETAMINOPHEN 5; 325 MG/1; MG/1
2 TABLET ORAL EVERY 4 HOURS PRN
Status: CANCELLED | OUTPATIENT
Start: 2018-05-31

## 2018-05-31 RX ORDER — HYDROCODONE BITARTRATE AND ACETAMINOPHEN 5; 325 MG/1; MG/1
1 TABLET ORAL EVERY 4 HOURS PRN
Status: CANCELLED | OUTPATIENT
Start: 2018-05-31

## 2018-05-31 RX ORDER — CITALOPRAM 40 MG/1
40 TABLET ORAL DAILY
Status: CANCELLED | OUTPATIENT
Start: 2018-06-01

## 2018-05-31 RX ORDER — TRAZODONE HYDROCHLORIDE 100 MG/1
100 TABLET ORAL NIGHTLY
Status: CANCELLED | OUTPATIENT
Start: 2018-05-31

## 2018-05-31 RX ORDER — PREGABALIN 75 MG/1
300 CAPSULE ORAL 2 TIMES DAILY
Status: DISCONTINUED | OUTPATIENT
Start: 2018-05-31 | End: 2018-06-15 | Stop reason: HOSPADM

## 2018-05-31 RX ORDER — POTASSIUM CHLORIDE 20 MEQ/1
40 TABLET, EXTENDED RELEASE ORAL ONCE
Status: COMPLETED | OUTPATIENT
Start: 2018-05-31 | End: 2018-05-31

## 2018-05-31 RX ORDER — DEXTROSE MONOHYDRATE 50 MG/ML
100 INJECTION, SOLUTION INTRAVENOUS PRN
Status: CANCELLED | OUTPATIENT
Start: 2018-05-31

## 2018-05-31 RX ORDER — BUPROPION HYDROCHLORIDE 150 MG/1
150 TABLET, EXTENDED RELEASE ORAL 2 TIMES DAILY
Status: CANCELLED | OUTPATIENT
Start: 2018-05-31

## 2018-05-31 RX ORDER — CITALOPRAM 40 MG/1
40 TABLET ORAL DAILY
Status: DISCONTINUED | OUTPATIENT
Start: 2018-06-01 | End: 2018-06-15 | Stop reason: HOSPADM

## 2018-05-31 RX ORDER — ALBUTEROL SULFATE 90 UG/1
2 AEROSOL, METERED RESPIRATORY (INHALATION) EVERY 6 HOURS PRN
Status: DISCONTINUED | OUTPATIENT
Start: 2018-05-31 | End: 2018-06-15 | Stop reason: HOSPADM

## 2018-05-31 RX ORDER — TRAMADOL HYDROCHLORIDE 50 MG/1
100 TABLET ORAL 3 TIMES DAILY
Status: CANCELLED | OUTPATIENT
Start: 2018-05-31

## 2018-05-31 RX ORDER — PANTOPRAZOLE SODIUM 40 MG/1
40 TABLET, DELAYED RELEASE ORAL DAILY
Status: DISCONTINUED | OUTPATIENT
Start: 2018-06-01 | End: 2018-06-15 | Stop reason: HOSPADM

## 2018-05-31 RX ORDER — NICOTINE POLACRILEX 4 MG
15 LOZENGE BUCCAL PRN
Status: CANCELLED | OUTPATIENT
Start: 2018-05-31

## 2018-05-31 RX ORDER — ACETAMINOPHEN 325 MG/1
650 TABLET ORAL EVERY 4 HOURS PRN
Status: DISCONTINUED | OUTPATIENT
Start: 2018-05-31 | End: 2018-06-15 | Stop reason: HOSPADM

## 2018-05-31 RX ORDER — HYDROCODONE BITARTRATE AND ACETAMINOPHEN 5; 325 MG/1; MG/1
1 TABLET ORAL EVERY 4 HOURS PRN
Status: DISCONTINUED | OUTPATIENT
Start: 2018-05-31 | End: 2018-06-15 | Stop reason: HOSPADM

## 2018-05-31 RX ORDER — NICOTINE POLACRILEX 4 MG
15 LOZENGE BUCCAL PRN
Status: DISCONTINUED | OUTPATIENT
Start: 2018-05-31 | End: 2018-06-15 | Stop reason: HOSPADM

## 2018-05-31 RX ORDER — DEXTROSE MONOHYDRATE 50 MG/ML
100 INJECTION, SOLUTION INTRAVENOUS PRN
Status: DISCONTINUED | OUTPATIENT
Start: 2018-05-31 | End: 2018-06-15 | Stop reason: HOSPADM

## 2018-05-31 RX ORDER — PREGABALIN 75 MG/1
300 CAPSULE ORAL 2 TIMES DAILY
Status: CANCELLED | OUTPATIENT
Start: 2018-05-31

## 2018-05-31 RX ORDER — TRAZODONE HYDROCHLORIDE 100 MG/1
100 TABLET ORAL NIGHTLY
Status: DISCONTINUED | OUTPATIENT
Start: 2018-05-31 | End: 2018-06-15 | Stop reason: HOSPADM

## 2018-05-31 RX ORDER — HYDROCODONE BITARTRATE AND ACETAMINOPHEN 5; 325 MG/1; MG/1
2 TABLET ORAL EVERY 4 HOURS PRN
Status: DISCONTINUED | OUTPATIENT
Start: 2018-05-31 | End: 2018-06-15 | Stop reason: HOSPADM

## 2018-05-31 RX ORDER — LOPERAMIDE HYDROCHLORIDE 2 MG/1
2 CAPSULE ORAL 4 TIMES DAILY PRN
Status: DISCONTINUED | OUTPATIENT
Start: 2018-05-31 | End: 2018-06-15 | Stop reason: HOSPADM

## 2018-05-31 RX ORDER — ATORVASTATIN CALCIUM 20 MG/1
20 TABLET, FILM COATED ORAL DAILY
Status: DISCONTINUED | OUTPATIENT
Start: 2018-06-01 | End: 2018-06-15 | Stop reason: HOSPADM

## 2018-05-31 RX ORDER — ONDANSETRON 4 MG/1
4 TABLET, FILM COATED ORAL EVERY 8 HOURS PRN
Status: DISCONTINUED | OUTPATIENT
Start: 2018-05-31 | End: 2018-06-15 | Stop reason: HOSPADM

## 2018-05-31 RX ORDER — DEXTROSE MONOHYDRATE 25 G/50ML
12.5 INJECTION, SOLUTION INTRAVENOUS PRN
Status: CANCELLED | OUTPATIENT
Start: 2018-05-31

## 2018-05-31 RX ORDER — ONDANSETRON 2 MG/ML
4 INJECTION INTRAMUSCULAR; INTRAVENOUS EVERY 6 HOURS PRN
Status: CANCELLED | OUTPATIENT
Start: 2018-05-31

## 2018-05-31 RX ORDER — DEXTROSE MONOHYDRATE 25 G/50ML
12.5 INJECTION, SOLUTION INTRAVENOUS PRN
Status: DISCONTINUED | OUTPATIENT
Start: 2018-05-31 | End: 2018-06-15 | Stop reason: HOSPADM

## 2018-05-31 RX ORDER — ONDANSETRON 2 MG/ML
4 INJECTION INTRAMUSCULAR; INTRAVENOUS EVERY 6 HOURS PRN
Status: DISCONTINUED | OUTPATIENT
Start: 2018-05-31 | End: 2018-05-31

## 2018-05-31 RX ORDER — ROPINIROLE 1 MG/1
1 TABLET, FILM COATED ORAL 3 TIMES DAILY
Status: DISCONTINUED | OUTPATIENT
Start: 2018-05-31 | End: 2018-06-15 | Stop reason: HOSPADM

## 2018-05-31 RX ORDER — LOPERAMIDE HYDROCHLORIDE 2 MG/1
2 CAPSULE ORAL 4 TIMES DAILY PRN
Status: CANCELLED | OUTPATIENT
Start: 2018-05-31

## 2018-05-31 RX ORDER — INSULIN GLARGINE 100 [IU]/ML
45 INJECTION, SOLUTION SUBCUTANEOUS NIGHTLY
Status: DISCONTINUED | OUTPATIENT
Start: 2018-05-31 | End: 2018-06-02

## 2018-05-31 RX ORDER — ALBUTEROL SULFATE 90 UG/1
2 AEROSOL, METERED RESPIRATORY (INHALATION) EVERY 6 HOURS PRN
Status: CANCELLED | OUTPATIENT
Start: 2018-05-31

## 2018-05-31 RX ORDER — ROPINIROLE 1 MG/1
1 TABLET, FILM COATED ORAL 3 TIMES DAILY
Status: CANCELLED | OUTPATIENT
Start: 2018-05-31

## 2018-05-31 RX ORDER — OMEGA-3-ACID ETHYL ESTERS 1 G/1
1000 CAPSULE, LIQUID FILLED ORAL DAILY
Status: CANCELLED | OUTPATIENT
Start: 2018-06-01

## 2018-05-31 RX ADMIN — TRAMADOL HYDROCHLORIDE 100 MG: 50 TABLET, FILM COATED ORAL at 08:37

## 2018-05-31 RX ADMIN — INSULIN GLARGINE 45 UNITS: 100 INJECTION, SOLUTION SUBCUTANEOUS at 21:20

## 2018-05-31 RX ADMIN — PANTOPRAZOLE SODIUM 40 MG: 40 TABLET, DELAYED RELEASE ORAL at 07:47

## 2018-05-31 RX ADMIN — CITALOPRAM 40 MG: 40 TABLET, FILM COATED ORAL at 07:46

## 2018-05-31 RX ADMIN — HYDROCODONE BITARTRATE AND ACETAMINOPHEN 2 TABLET: 5; 325 TABLET ORAL at 13:32

## 2018-05-31 RX ADMIN — ROPINIROLE HYDROCHLORIDE 1 MG: 1 TABLET, FILM COATED ORAL at 07:46

## 2018-05-31 RX ADMIN — ROPINIROLE HYDROCHLORIDE 1 MG: 1 TABLET, FILM COATED ORAL at 21:19

## 2018-05-31 RX ADMIN — ENOXAPARIN SODIUM 40 MG: 40 INJECTION, SOLUTION INTRAVENOUS; SUBCUTANEOUS at 08:38

## 2018-05-31 RX ADMIN — BUPROPION HYDROCHLORIDE 150 MG: 150 TABLET, EXTENDED RELEASE ORAL at 20:28

## 2018-05-31 RX ADMIN — TRAMADOL HYDROCHLORIDE 100 MG: 50 TABLET, FILM COATED ORAL at 16:25

## 2018-05-31 RX ADMIN — POTASSIUM CHLORIDE 40 MEQ: 20 TABLET, EXTENDED RELEASE ORAL at 09:37

## 2018-05-31 RX ADMIN — Medication 1 UNITS: at 21:20

## 2018-05-31 RX ADMIN — SODIUM CHLORIDE: 9 INJECTION, SOLUTION INTRAVENOUS at 00:25

## 2018-05-31 RX ADMIN — ATORVASTATIN CALCIUM 20 MG: 20 TABLET, FILM COATED ORAL at 07:46

## 2018-05-31 RX ADMIN — MEROPENEM 1 G: 1 INJECTION, POWDER, FOR SOLUTION INTRAVENOUS at 20:27

## 2018-05-31 RX ADMIN — LINAGLIPTIN 5 MG: 5 TABLET, FILM COATED ORAL at 08:37

## 2018-05-31 RX ADMIN — MEROPENEM 1 G: 1 INJECTION, POWDER, FOR SOLUTION INTRAVENOUS at 08:37

## 2018-05-31 RX ADMIN — TRAMADOL HYDROCHLORIDE 100 MG: 50 TABLET, FILM COATED ORAL at 20:28

## 2018-05-31 RX ADMIN — SODIUM CHLORIDE: 9 INJECTION, SOLUTION INTRAVENOUS at 07:19

## 2018-05-31 RX ADMIN — TROSPIUM CHLORIDE 20 MG: 20 TABLET ORAL at 21:19

## 2018-05-31 RX ADMIN — BUPROPION HYDROCHLORIDE 150 MG: 150 TABLET, EXTENDED RELEASE ORAL at 07:46

## 2018-05-31 RX ADMIN — TRAZODONE HYDROCHLORIDE 100 MG: 100 TABLET ORAL at 20:27

## 2018-05-31 RX ADMIN — PREGABALIN 300 MG: 75 CAPSULE ORAL at 07:47

## 2018-05-31 RX ADMIN — PREGABALIN 300 MG: 75 CAPSULE ORAL at 20:27

## 2018-05-31 RX ADMIN — OMEGA-3-ACID ETHYL ESTERS 1000 MG: 1 CAPSULE, LIQUID FILLED ORAL at 07:46

## 2018-05-31 RX ADMIN — HYDROCODONE BITARTRATE AND ACETAMINOPHEN 1 TABLET: 5; 325 TABLET ORAL at 20:27

## 2018-05-31 RX ADMIN — ROPINIROLE HYDROCHLORIDE 1 MG: 1 TABLET, FILM COATED ORAL at 13:59

## 2018-05-31 RX ADMIN — MEROPENEM 1 G: 1 INJECTION, POWDER, FOR SOLUTION INTRAVENOUS at 01:17

## 2018-05-31 ASSESSMENT — PAIN DESCRIPTION - LOCATION
LOCATION: BUTTOCKS
LOCATION: SHOULDER;NECK

## 2018-05-31 ASSESSMENT — PAIN SCALES - GENERAL
PAINLEVEL_OUTOF10: 4
PAINLEVEL_OUTOF10: 6
PAINLEVEL_OUTOF10: 5
PAINLEVEL_OUTOF10: 8
PAINLEVEL_OUTOF10: 6
PAINLEVEL_OUTOF10: 5
PAINLEVEL_OUTOF10: 6
PAINLEVEL_OUTOF10: 6
PAINLEVEL_OUTOF10: 8
PAINLEVEL_OUTOF10: 5
PAINLEVEL_OUTOF10: 8

## 2018-05-31 ASSESSMENT — PAIN DESCRIPTION - ORIENTATION
ORIENTATION: RIGHT
ORIENTATION: RIGHT

## 2018-05-31 ASSESSMENT — PAIN DESCRIPTION - DESCRIPTORS
DESCRIPTORS: BURNING;ACHING
DESCRIPTORS: SHARP;ACHING
DESCRIPTORS: SHARP

## 2018-05-31 ASSESSMENT — PAIN DESCRIPTION - PAIN TYPE
TYPE: SURGICAL PAIN
TYPE: SURGICAL PAIN
TYPE: CHRONIC PAIN

## 2018-05-31 ASSESSMENT — PAIN DESCRIPTION - ONSET: ONSET: ON-GOING

## 2018-05-31 ASSESSMENT — PAIN DESCRIPTION - FREQUENCY: FREQUENCY: CONTINUOUS

## 2018-06-01 LAB
GLUCOSE BLD-MCNC: 105 MG/DL (ref 70–108)
GLUCOSE BLD-MCNC: 190 MG/DL (ref 70–108)
GLUCOSE BLD-MCNC: 59 MG/DL (ref 70–108)
GLUCOSE BLD-MCNC: 77 MG/DL (ref 70–108)
GLUCOSE BLD-MCNC: 96 MG/DL (ref 70–108)

## 2018-06-01 PROCEDURE — 2580000003 HC RX 258: Performed by: INTERNAL MEDICINE

## 2018-06-01 PROCEDURE — 97535 SELF CARE MNGMENT TRAINING: CPT

## 2018-06-01 PROCEDURE — 6370000000 HC RX 637 (ALT 250 FOR IP): Performed by: INTERNAL MEDICINE

## 2018-06-01 PROCEDURE — 6360000002 HC RX W HCPCS: Performed by: INTERNAL MEDICINE

## 2018-06-01 PROCEDURE — 82948 REAGENT STRIP/BLOOD GLUCOSE: CPT

## 2018-06-01 PROCEDURE — G8978 MOBILITY CURRENT STATUS: HCPCS

## 2018-06-01 PROCEDURE — G8979 MOBILITY GOAL STATUS: HCPCS

## 2018-06-01 PROCEDURE — 1290000000 HC SEMI PRIVATE OTHER R&B

## 2018-06-01 PROCEDURE — 97110 THERAPEUTIC EXERCISES: CPT

## 2018-06-01 PROCEDURE — 97166 OT EVAL MOD COMPLEX 45 MIN: CPT

## 2018-06-01 PROCEDURE — 97161 PT EVAL LOW COMPLEX 20 MIN: CPT

## 2018-06-01 PROCEDURE — 6370000000 HC RX 637 (ALT 250 FOR IP): Performed by: FAMILY MEDICINE

## 2018-06-01 RX ADMIN — MEROPENEM 1 G: 1 INJECTION, POWDER, FOR SOLUTION INTRAVENOUS at 05:12

## 2018-06-01 RX ADMIN — OMEGA-3-ACID ETHYL ESTERS 1000 MG: 1 CAPSULE, LIQUID FILLED ORAL at 12:59

## 2018-06-01 RX ADMIN — HYDROCODONE BITARTRATE AND ACETAMINOPHEN 1 TABLET: 5; 325 TABLET ORAL at 05:16

## 2018-06-01 RX ADMIN — MEROPENEM 1 G: 1 INJECTION, POWDER, FOR SOLUTION INTRAVENOUS at 21:21

## 2018-06-01 RX ADMIN — ROPINIROLE HYDROCHLORIDE 1 MG: 1 TABLET, FILM COATED ORAL at 21:10

## 2018-06-01 RX ADMIN — BUPROPION HYDROCHLORIDE 150 MG: 150 TABLET, EXTENDED RELEASE ORAL at 21:10

## 2018-06-01 RX ADMIN — TRAMADOL HYDROCHLORIDE 100 MG: 50 TABLET, FILM COATED ORAL at 14:11

## 2018-06-01 RX ADMIN — ROPINIROLE HYDROCHLORIDE 1 MG: 1 TABLET, FILM COATED ORAL at 14:11

## 2018-06-01 RX ADMIN — Medication 1 UNITS: at 21:16

## 2018-06-01 RX ADMIN — PREGABALIN 300 MG: 75 CAPSULE ORAL at 21:10

## 2018-06-01 RX ADMIN — TRAZODONE HYDROCHLORIDE 100 MG: 100 TABLET ORAL at 21:22

## 2018-06-01 RX ADMIN — TROSPIUM CHLORIDE 20 MG: 20 TABLET ORAL at 21:10

## 2018-06-01 RX ADMIN — INSULIN GLARGINE 45 UNITS: 100 INJECTION, SOLUTION SUBCUTANEOUS at 21:16

## 2018-06-01 RX ADMIN — HYDROCODONE BITARTRATE AND ACETAMINOPHEN 2 TABLET: 5; 325 TABLET ORAL at 21:10

## 2018-06-01 RX ADMIN — INSULIN LISPRO 5 UNITS: 100 INJECTION, SOLUTION INTRAVENOUS; SUBCUTANEOUS at 13:00

## 2018-06-01 RX ADMIN — MEROPENEM 1 G: 1 INJECTION, POWDER, FOR SOLUTION INTRAVENOUS at 13:11

## 2018-06-01 ASSESSMENT — PAIN DESCRIPTION - DESCRIPTORS
DESCRIPTORS: ACHING;SHARP;BURNING
DESCRIPTORS: ACHING;SHARP;THROBBING
DESCRIPTORS: ACHING;BURNING;CONSTANT

## 2018-06-01 ASSESSMENT — PAIN DESCRIPTION - LOCATION
LOCATION: BUTTOCKS
LOCATION: BUTTOCKS
LOCATION: BUTTOCKS;BACK

## 2018-06-01 ASSESSMENT — PAIN SCALES - GENERAL
PAINLEVEL_OUTOF10: 8
PAINLEVEL_OUTOF10: 5
PAINLEVEL_OUTOF10: 5
PAINLEVEL_OUTOF10: 7
PAINLEVEL_OUTOF10: 8
PAINLEVEL_OUTOF10: 7
PAINLEVEL_OUTOF10: 8
PAINLEVEL_OUTOF10: 6
PAINLEVEL_OUTOF10: 0

## 2018-06-01 ASSESSMENT — PAIN DESCRIPTION - ORIENTATION
ORIENTATION: RIGHT

## 2018-06-01 ASSESSMENT — PAIN DESCRIPTION - PAIN TYPE
TYPE: ACUTE PAIN;SURGICAL PAIN;CHRONIC PAIN
TYPE: NEUROPATHIC PAIN;SURGICAL PAIN
TYPE: ACUTE PAIN;SURGICAL PAIN

## 2018-06-02 LAB
GLUCOSE BLD-MCNC: 171 MG/DL (ref 70–108)
GLUCOSE BLD-MCNC: 192 MG/DL (ref 70–108)
GLUCOSE BLD-MCNC: 75 MG/DL (ref 70–108)
GLUCOSE BLD-MCNC: 97 MG/DL (ref 70–108)

## 2018-06-02 PROCEDURE — 6360000002 HC RX W HCPCS: Performed by: INTERNAL MEDICINE

## 2018-06-02 PROCEDURE — 2580000003 HC RX 258: Performed by: FAMILY MEDICINE

## 2018-06-02 PROCEDURE — 6370000000 HC RX 637 (ALT 250 FOR IP): Performed by: FAMILY MEDICINE

## 2018-06-02 PROCEDURE — 76937 US GUIDE VASCULAR ACCESS: CPT

## 2018-06-02 PROCEDURE — C1751 CATH, INF, PER/CENT/MIDLINE: HCPCS

## 2018-06-02 PROCEDURE — 1290000000 HC SEMI PRIVATE OTHER R&B

## 2018-06-02 PROCEDURE — 2500000003 HC RX 250 WO HCPCS: Performed by: FAMILY MEDICINE

## 2018-06-02 PROCEDURE — 36569 INSJ PICC 5 YR+ W/O IMAGING: CPT

## 2018-06-02 PROCEDURE — 6370000000 HC RX 637 (ALT 250 FOR IP): Performed by: INTERNAL MEDICINE

## 2018-06-02 PROCEDURE — 82948 REAGENT STRIP/BLOOD GLUCOSE: CPT

## 2018-06-02 PROCEDURE — 2580000003 HC RX 258: Performed by: INTERNAL MEDICINE

## 2018-06-02 RX ORDER — LIDOCAINE HYDROCHLORIDE 10 MG/ML
5 INJECTION, SOLUTION EPIDURAL; INFILTRATION; INTRACAUDAL; PERINEURAL ONCE
Status: COMPLETED | OUTPATIENT
Start: 2018-06-02 | End: 2018-06-02

## 2018-06-02 RX ORDER — SODIUM CHLORIDE 0.9 % (FLUSH) 0.9 %
10 SYRINGE (ML) INJECTION PRN
Status: DISCONTINUED | OUTPATIENT
Start: 2018-06-02 | End: 2018-06-15 | Stop reason: HOSPADM

## 2018-06-02 RX ORDER — SODIUM CHLORIDE 0.9 % (FLUSH) 0.9 %
10 SYRINGE (ML) INJECTION EVERY 12 HOURS SCHEDULED
Status: DISCONTINUED | OUTPATIENT
Start: 2018-06-02 | End: 2018-06-15 | Stop reason: HOSPADM

## 2018-06-02 RX ORDER — INSULIN GLARGINE 100 [IU]/ML
48 INJECTION, SOLUTION SUBCUTANEOUS NIGHTLY
Status: DISCONTINUED | OUTPATIENT
Start: 2018-06-02 | End: 2018-06-10

## 2018-06-02 RX ADMIN — TRAMADOL HYDROCHLORIDE 100 MG: 50 TABLET, FILM COATED ORAL at 08:13

## 2018-06-02 RX ADMIN — Medication 1 UNITS: at 20:41

## 2018-06-02 RX ADMIN — INSULIN LISPRO 1 UNITS: 100 INJECTION, SOLUTION INTRAVENOUS; SUBCUTANEOUS at 12:35

## 2018-06-02 RX ADMIN — PREGABALIN 300 MG: 75 CAPSULE ORAL at 08:06

## 2018-06-02 RX ADMIN — LINAGLIPTIN 5 MG: 5 TABLET, FILM COATED ORAL at 08:05

## 2018-06-02 RX ADMIN — ENOXAPARIN SODIUM 40 MG: 40 INJECTION, SOLUTION INTRAVENOUS; SUBCUTANEOUS at 08:10

## 2018-06-02 RX ADMIN — TROSPIUM CHLORIDE 20 MG: 20 TABLET ORAL at 20:39

## 2018-06-02 RX ADMIN — Medication 10 ML: at 20:40

## 2018-06-02 RX ADMIN — MEROPENEM 1 G: 1 INJECTION, POWDER, FOR SOLUTION INTRAVENOUS at 22:49

## 2018-06-02 RX ADMIN — BUPROPION HYDROCHLORIDE 150 MG: 150 TABLET, EXTENDED RELEASE ORAL at 20:39

## 2018-06-02 RX ADMIN — ROPINIROLE HYDROCHLORIDE 1 MG: 1 TABLET, FILM COATED ORAL at 20:39

## 2018-06-02 RX ADMIN — ROPINIROLE HYDROCHLORIDE 1 MG: 1 TABLET, FILM COATED ORAL at 08:05

## 2018-06-02 RX ADMIN — ROPINIROLE HYDROCHLORIDE 1 MG: 1 TABLET, FILM COATED ORAL at 14:04

## 2018-06-02 RX ADMIN — CITALOPRAM 40 MG: 40 TABLET, FILM COATED ORAL at 08:06

## 2018-06-02 RX ADMIN — ATORVASTATIN CALCIUM 20 MG: 20 TABLET, FILM COATED ORAL at 08:05

## 2018-06-02 RX ADMIN — MEROPENEM 1 G: 1 INJECTION, POWDER, FOR SOLUTION INTRAVENOUS at 14:24

## 2018-06-02 RX ADMIN — HYDROCODONE BITARTRATE AND ACETAMINOPHEN 2 TABLET: 5; 325 TABLET ORAL at 14:00

## 2018-06-02 RX ADMIN — TRAMADOL HYDROCHLORIDE 100 MG: 50 TABLET, FILM COATED ORAL at 14:00

## 2018-06-02 RX ADMIN — TRAZODONE HYDROCHLORIDE 100 MG: 100 TABLET ORAL at 20:39

## 2018-06-02 RX ADMIN — INSULIN GLARGINE 48 UNITS: 100 INJECTION, SOLUTION SUBCUTANEOUS at 20:41

## 2018-06-02 RX ADMIN — Medication 1 CAPSULE: at 08:10

## 2018-06-02 RX ADMIN — LIDOCAINE HYDROCHLORIDE 5 ML: 10 INJECTION, SOLUTION EPIDURAL; INFILTRATION; INTRACAUDAL; PERINEURAL at 18:03

## 2018-06-02 RX ADMIN — PANTOPRAZOLE SODIUM 40 MG: 40 TABLET, DELAYED RELEASE ORAL at 08:08

## 2018-06-02 RX ADMIN — OMEGA-3-ACID ETHYL ESTERS 1000 MG: 1 CAPSULE, LIQUID FILLED ORAL at 08:08

## 2018-06-02 RX ADMIN — BUPROPION HYDROCHLORIDE 150 MG: 150 TABLET, EXTENDED RELEASE ORAL at 08:05

## 2018-06-02 RX ADMIN — PREGABALIN 300 MG: 75 CAPSULE ORAL at 20:39

## 2018-06-02 RX ADMIN — HYDROCODONE BITARTRATE AND ACETAMINOPHEN 2 TABLET: 5; 325 TABLET ORAL at 20:40

## 2018-06-02 ASSESSMENT — PAIN DESCRIPTION - ORIENTATION
ORIENTATION: RIGHT

## 2018-06-02 ASSESSMENT — PAIN SCALES - GENERAL
PAINLEVEL_OUTOF10: 7
PAINLEVEL_OUTOF10: 8
PAINLEVEL_OUTOF10: 8
PAINLEVEL_OUTOF10: 9
PAINLEVEL_OUTOF10: 6

## 2018-06-02 ASSESSMENT — PAIN DESCRIPTION - PAIN TYPE
TYPE: ACUTE PAIN

## 2018-06-02 ASSESSMENT — PAIN DESCRIPTION - LOCATION
LOCATION: BUTTOCKS

## 2018-06-03 LAB
ANION GAP SERPL CALCULATED.3IONS-SCNC: 12 MEQ/L (ref 8–16)
ANISOCYTOSIS: ABNORMAL
BASOPHILS # BLD: 0.4 %
BASOPHILS ABSOLUTE: 0 THOU/MM3 (ref 0–0.1)
BUN BLDV-MCNC: 11 MG/DL (ref 7–22)
CALCIUM SERPL-MCNC: 9.4 MG/DL (ref 8.5–10.5)
CHLORIDE BLD-SCNC: 103 MEQ/L (ref 98–111)
CO2: 31 MEQ/L (ref 23–33)
CREAT SERPL-MCNC: 0.7 MG/DL (ref 0.4–1.2)
EOSINOPHIL # BLD: 2 %
EOSINOPHILS ABSOLUTE: 0.2 THOU/MM3 (ref 0–0.4)
GFR SERPL CREATININE-BSD FRML MDRD: 86 ML/MIN/1.73M2
GLUCOSE BLD-MCNC: 143 MG/DL (ref 70–108)
GLUCOSE BLD-MCNC: 172 MG/DL (ref 70–108)
GLUCOSE BLD-MCNC: 179 MG/DL (ref 70–108)
GLUCOSE BLD-MCNC: 87 MG/DL (ref 70–108)
GLUCOSE BLD-MCNC: 94 MG/DL (ref 70–108)
HCT VFR BLD CALC: 35.5 % (ref 37–47)
HEMOGLOBIN: 11.9 GM/DL (ref 12–16)
LYMPHOCYTES # BLD: 33 %
LYMPHOCYTES ABSOLUTE: 3.8 THOU/MM3 (ref 1–4.8)
MCH RBC QN AUTO: 30.9 PG (ref 27–31)
MCHC RBC AUTO-ENTMCNC: 33.7 GM/DL (ref 33–37)
MCV RBC AUTO: 91.9 FL (ref 81–99)
MONOCYTES # BLD: 8 %
MONOCYTES ABSOLUTE: 0.9 THOU/MM3 (ref 0.4–1.3)
NUCLEATED RED BLOOD CELLS: 0 /100 WBC
PDW BLD-RTO: 14.5 % (ref 11.5–14.5)
PLATELET # BLD: 374 THOU/MM3 (ref 130–400)
PMV BLD AUTO: 8.7 FL (ref 7.4–10.4)
POTASSIUM SERPL-SCNC: 4.1 MEQ/L (ref 3.5–5.2)
RBC # BLD: 3.86 MILL/MM3 (ref 4.2–5.4)
SEG NEUTROPHILS: 56.6 %
SEGMENTED NEUTROPHILS ABSOLUTE COUNT: 6.5 THOU/MM3 (ref 1.8–7.7)
SODIUM BLD-SCNC: 146 MEQ/L (ref 135–145)
WBC # BLD: 11.4 THOU/MM3 (ref 4.8–10.8)

## 2018-06-03 PROCEDURE — 6360000002 HC RX W HCPCS: Performed by: INTERNAL MEDICINE

## 2018-06-03 PROCEDURE — 6370000000 HC RX 637 (ALT 250 FOR IP): Performed by: INTERNAL MEDICINE

## 2018-06-03 PROCEDURE — 6370000000 HC RX 637 (ALT 250 FOR IP): Performed by: FAMILY MEDICINE

## 2018-06-03 PROCEDURE — 97110 THERAPEUTIC EXERCISES: CPT

## 2018-06-03 PROCEDURE — 80048 BASIC METABOLIC PNL TOTAL CA: CPT

## 2018-06-03 PROCEDURE — 97535 SELF CARE MNGMENT TRAINING: CPT

## 2018-06-03 PROCEDURE — 85025 COMPLETE CBC W/AUTO DIFF WBC: CPT

## 2018-06-03 PROCEDURE — 2580000003 HC RX 258: Performed by: FAMILY MEDICINE

## 2018-06-03 PROCEDURE — 2580000003 HC RX 258: Performed by: INTERNAL MEDICINE

## 2018-06-03 PROCEDURE — 97116 GAIT TRAINING THERAPY: CPT

## 2018-06-03 PROCEDURE — 36415 COLL VENOUS BLD VENIPUNCTURE: CPT

## 2018-06-03 PROCEDURE — 1290000000 HC SEMI PRIVATE OTHER R&B

## 2018-06-03 PROCEDURE — 82948 REAGENT STRIP/BLOOD GLUCOSE: CPT

## 2018-06-03 RX ADMIN — ROPINIROLE HYDROCHLORIDE 1 MG: 1 TABLET, FILM COATED ORAL at 07:48

## 2018-06-03 RX ADMIN — Medication 1 CAPSULE: at 07:48

## 2018-06-03 RX ADMIN — PANTOPRAZOLE SODIUM 40 MG: 40 TABLET, DELAYED RELEASE ORAL at 07:48

## 2018-06-03 RX ADMIN — ENOXAPARIN SODIUM 40 MG: 40 INJECTION, SOLUTION INTRAVENOUS; SUBCUTANEOUS at 07:48

## 2018-06-03 RX ADMIN — ROPINIROLE HYDROCHLORIDE 1 MG: 1 TABLET, FILM COATED ORAL at 14:10

## 2018-06-03 RX ADMIN — INSULIN LISPRO 1 UNITS: 100 INJECTION, SOLUTION INTRAVENOUS; SUBCUTANEOUS at 12:58

## 2018-06-03 RX ADMIN — MEROPENEM 1 G: 1 INJECTION, POWDER, FOR SOLUTION INTRAVENOUS at 22:47

## 2018-06-03 RX ADMIN — ATORVASTATIN CALCIUM 20 MG: 20 TABLET, FILM COATED ORAL at 07:48

## 2018-06-03 RX ADMIN — TRAMADOL HYDROCHLORIDE 100 MG: 50 TABLET, FILM COATED ORAL at 09:21

## 2018-06-03 RX ADMIN — TRAMADOL HYDROCHLORIDE 100 MG: 50 TABLET, FILM COATED ORAL at 20:48

## 2018-06-03 RX ADMIN — TRAZODONE HYDROCHLORIDE 100 MG: 100 TABLET ORAL at 20:48

## 2018-06-03 RX ADMIN — BUPROPION HYDROCHLORIDE 150 MG: 150 TABLET, EXTENDED RELEASE ORAL at 07:47

## 2018-06-03 RX ADMIN — ROPINIROLE HYDROCHLORIDE 1 MG: 1 TABLET, FILM COATED ORAL at 20:47

## 2018-06-03 RX ADMIN — TROSPIUM CHLORIDE 20 MG: 20 TABLET ORAL at 20:47

## 2018-06-03 RX ADMIN — MEROPENEM 1 G: 1 INJECTION, POWDER, FOR SOLUTION INTRAVENOUS at 05:51

## 2018-06-03 RX ADMIN — PREGABALIN 300 MG: 75 CAPSULE ORAL at 20:48

## 2018-06-03 RX ADMIN — LINAGLIPTIN 5 MG: 5 TABLET, FILM COATED ORAL at 07:48

## 2018-06-03 RX ADMIN — INSULIN GLARGINE 48 UNITS: 100 INJECTION, SOLUTION SUBCUTANEOUS at 20:48

## 2018-06-03 RX ADMIN — TRAMADOL HYDROCHLORIDE 100 MG: 50 TABLET, FILM COATED ORAL at 14:10

## 2018-06-03 RX ADMIN — Medication 10 ML: at 07:54

## 2018-06-03 RX ADMIN — Medication 1 UNITS: at 20:49

## 2018-06-03 RX ADMIN — HYDROCODONE BITARTRATE AND ACETAMINOPHEN 1 TABLET: 5; 325 TABLET ORAL at 07:34

## 2018-06-03 RX ADMIN — CITALOPRAM 40 MG: 40 TABLET, FILM COATED ORAL at 07:47

## 2018-06-03 RX ADMIN — OMEGA-3-ACID ETHYL ESTERS 1000 MG: 1 CAPSULE, LIQUID FILLED ORAL at 07:50

## 2018-06-03 RX ADMIN — PREGABALIN 300 MG: 75 CAPSULE ORAL at 07:47

## 2018-06-03 RX ADMIN — BUPROPION HYDROCHLORIDE 150 MG: 150 TABLET, EXTENDED RELEASE ORAL at 20:48

## 2018-06-03 RX ADMIN — HYDROCODONE BITARTRATE AND ACETAMINOPHEN 2 TABLET: 5; 325 TABLET ORAL at 22:52

## 2018-06-03 RX ADMIN — HYDROCODONE BITARTRATE AND ACETAMINOPHEN 2 TABLET: 5; 325 TABLET ORAL at 18:05

## 2018-06-03 RX ADMIN — INSULIN LISPRO 1 UNITS: 100 INJECTION, SOLUTION INTRAVENOUS; SUBCUTANEOUS at 17:57

## 2018-06-03 RX ADMIN — MEROPENEM 1 G: 1 INJECTION, POWDER, FOR SOLUTION INTRAVENOUS at 14:10

## 2018-06-03 ASSESSMENT — PAIN SCALES - GENERAL
PAINLEVEL_OUTOF10: 6
PAINLEVEL_OUTOF10: 8
PAINLEVEL_OUTOF10: 9
PAINLEVEL_OUTOF10: 8
PAINLEVEL_OUTOF10: 8
PAINLEVEL_OUTOF10: 6
PAINLEVEL_OUTOF10: 8
PAINLEVEL_OUTOF10: 8

## 2018-06-03 ASSESSMENT — PAIN DESCRIPTION - PAIN TYPE
TYPE: ACUTE PAIN
TYPE: ACUTE PAIN

## 2018-06-03 ASSESSMENT — PAIN DESCRIPTION - LOCATION
LOCATION: BUTTOCKS
LOCATION: BUTTOCKS

## 2018-06-03 ASSESSMENT — PAIN DESCRIPTION - ORIENTATION
ORIENTATION: RIGHT
ORIENTATION: RIGHT

## 2018-06-04 LAB
GLUCOSE BLD-MCNC: 116 MG/DL (ref 70–108)
GLUCOSE BLD-MCNC: 120 MG/DL (ref 70–108)
GLUCOSE BLD-MCNC: 146 MG/DL (ref 70–108)
GLUCOSE BLD-MCNC: 89 MG/DL (ref 70–108)

## 2018-06-04 PROCEDURE — 97116 GAIT TRAINING THERAPY: CPT

## 2018-06-04 PROCEDURE — 97110 THERAPEUTIC EXERCISES: CPT

## 2018-06-04 PROCEDURE — 6360000002 HC RX W HCPCS: Performed by: INTERNAL MEDICINE

## 2018-06-04 PROCEDURE — 6360000002 HC RX W HCPCS: Performed by: FAMILY MEDICINE

## 2018-06-04 PROCEDURE — 6370000000 HC RX 637 (ALT 250 FOR IP): Performed by: INTERNAL MEDICINE

## 2018-06-04 PROCEDURE — 82948 REAGENT STRIP/BLOOD GLUCOSE: CPT

## 2018-06-04 PROCEDURE — 6370000000 HC RX 637 (ALT 250 FOR IP): Performed by: FAMILY MEDICINE

## 2018-06-04 PROCEDURE — 97530 THERAPEUTIC ACTIVITIES: CPT

## 2018-06-04 PROCEDURE — 97535 SELF CARE MNGMENT TRAINING: CPT

## 2018-06-04 PROCEDURE — 2580000003 HC RX 258: Performed by: INTERNAL MEDICINE

## 2018-06-04 PROCEDURE — 2580000003 HC RX 258: Performed by: FAMILY MEDICINE

## 2018-06-04 PROCEDURE — 1290000000 HC SEMI PRIVATE OTHER R&B

## 2018-06-04 RX ADMIN — OMEGA-3-ACID ETHYL ESTERS 1000 MG: 1 CAPSULE, LIQUID FILLED ORAL at 08:30

## 2018-06-04 RX ADMIN — BUPROPION HYDROCHLORIDE 150 MG: 150 TABLET, EXTENDED RELEASE ORAL at 22:21

## 2018-06-04 RX ADMIN — TROSPIUM CHLORIDE 20 MG: 20 TABLET ORAL at 22:20

## 2018-06-04 RX ADMIN — CITALOPRAM 40 MG: 40 TABLET, FILM COATED ORAL at 08:28

## 2018-06-04 RX ADMIN — Medication 1 UNITS: at 22:22

## 2018-06-04 RX ADMIN — ENOXAPARIN SODIUM 40 MG: 40 INJECTION, SOLUTION INTRAVENOUS; SUBCUTANEOUS at 08:30

## 2018-06-04 RX ADMIN — MEROPENEM 1 G: 1 INJECTION, POWDER, FOR SOLUTION INTRAVENOUS at 14:32

## 2018-06-04 RX ADMIN — LINAGLIPTIN 5 MG: 5 TABLET, FILM COATED ORAL at 08:28

## 2018-06-04 RX ADMIN — ROPINIROLE HYDROCHLORIDE 1 MG: 1 TABLET, FILM COATED ORAL at 22:21

## 2018-06-04 RX ADMIN — ONDANSETRON HYDROCHLORIDE 4 MG: 4 TABLET, FILM COATED ORAL at 09:58

## 2018-06-04 RX ADMIN — BUPROPION HYDROCHLORIDE 150 MG: 150 TABLET, EXTENDED RELEASE ORAL at 08:28

## 2018-06-04 RX ADMIN — Medication 10 ML: at 14:31

## 2018-06-04 RX ADMIN — Medication 10 ML: at 22:22

## 2018-06-04 RX ADMIN — Medication 1 CAPSULE: at 08:27

## 2018-06-04 RX ADMIN — MEROPENEM 1 G: 1 INJECTION, POWDER, FOR SOLUTION INTRAVENOUS at 23:03

## 2018-06-04 RX ADMIN — TRAMADOL HYDROCHLORIDE 100 MG: 50 TABLET, FILM COATED ORAL at 22:23

## 2018-06-04 RX ADMIN — MEROPENEM 1 G: 1 INJECTION, POWDER, FOR SOLUTION INTRAVENOUS at 06:01

## 2018-06-04 RX ADMIN — TRAMADOL HYDROCHLORIDE 100 MG: 50 TABLET, FILM COATED ORAL at 11:28

## 2018-06-04 RX ADMIN — PANTOPRAZOLE SODIUM 40 MG: 40 TABLET, DELAYED RELEASE ORAL at 08:32

## 2018-06-04 RX ADMIN — ROPINIROLE HYDROCHLORIDE 1 MG: 1 TABLET, FILM COATED ORAL at 14:23

## 2018-06-04 RX ADMIN — TRAMADOL HYDROCHLORIDE 100 MG: 50 TABLET, FILM COATED ORAL at 17:43

## 2018-06-04 RX ADMIN — ROPINIROLE HYDROCHLORIDE 1 MG: 1 TABLET, FILM COATED ORAL at 08:28

## 2018-06-04 RX ADMIN — INSULIN GLARGINE 48 UNITS: 100 INJECTION, SOLUTION SUBCUTANEOUS at 22:21

## 2018-06-04 RX ADMIN — HYDROCODONE BITARTRATE AND ACETAMINOPHEN 2 TABLET: 5; 325 TABLET ORAL at 04:39

## 2018-06-04 RX ADMIN — PREGABALIN 300 MG: 75 CAPSULE ORAL at 22:21

## 2018-06-04 RX ADMIN — ATORVASTATIN CALCIUM 20 MG: 20 TABLET, FILM COATED ORAL at 08:29

## 2018-06-04 RX ADMIN — TRAZODONE HYDROCHLORIDE 100 MG: 100 TABLET ORAL at 22:21

## 2018-06-04 RX ADMIN — PREGABALIN 300 MG: 75 CAPSULE ORAL at 08:37

## 2018-06-04 ASSESSMENT — PAIN SCALES - GENERAL
PAINLEVEL_OUTOF10: 8
PAINLEVEL_OUTOF10: 7
PAINLEVEL_OUTOF10: 8
PAINLEVEL_OUTOF10: 7
PAINLEVEL_OUTOF10: 7

## 2018-06-05 LAB
GLUCOSE BLD-MCNC: 111 MG/DL (ref 70–108)
GLUCOSE BLD-MCNC: 114 MG/DL (ref 70–108)
GLUCOSE BLD-MCNC: 133 MG/DL (ref 70–108)
GLUCOSE BLD-MCNC: 216 MG/DL (ref 70–108)

## 2018-06-05 PROCEDURE — 97116 GAIT TRAINING THERAPY: CPT

## 2018-06-05 PROCEDURE — 2580000003 HC RX 258: Performed by: INTERNAL MEDICINE

## 2018-06-05 PROCEDURE — 1290000000 HC SEMI PRIVATE OTHER R&B

## 2018-06-05 PROCEDURE — 97535 SELF CARE MNGMENT TRAINING: CPT

## 2018-06-05 PROCEDURE — 82948 REAGENT STRIP/BLOOD GLUCOSE: CPT

## 2018-06-05 PROCEDURE — 6370000000 HC RX 637 (ALT 250 FOR IP): Performed by: FAMILY MEDICINE

## 2018-06-05 PROCEDURE — 97530 THERAPEUTIC ACTIVITIES: CPT

## 2018-06-05 PROCEDURE — 6360000002 HC RX W HCPCS: Performed by: INTERNAL MEDICINE

## 2018-06-05 PROCEDURE — 6370000000 HC RX 637 (ALT 250 FOR IP): Performed by: INTERNAL MEDICINE

## 2018-06-05 PROCEDURE — 2580000003 HC RX 258: Performed by: FAMILY MEDICINE

## 2018-06-05 PROCEDURE — 97110 THERAPEUTIC EXERCISES: CPT

## 2018-06-05 RX ADMIN — TRAMADOL HYDROCHLORIDE 100 MG: 50 TABLET, FILM COATED ORAL at 14:12

## 2018-06-05 RX ADMIN — PREGABALIN 300 MG: 75 CAPSULE ORAL at 20:47

## 2018-06-05 RX ADMIN — HYDROCODONE BITARTRATE AND ACETAMINOPHEN 2 TABLET: 5; 325 TABLET ORAL at 09:25

## 2018-06-05 RX ADMIN — BUPROPION HYDROCHLORIDE 150 MG: 150 TABLET, EXTENDED RELEASE ORAL at 20:47

## 2018-06-05 RX ADMIN — INSULIN GLARGINE 48 UNITS: 100 INJECTION, SOLUTION SUBCUTANEOUS at 20:46

## 2018-06-05 RX ADMIN — MEROPENEM 1 G: 1 INJECTION, POWDER, FOR SOLUTION INTRAVENOUS at 22:04

## 2018-06-05 RX ADMIN — TROSPIUM CHLORIDE 20 MG: 20 TABLET ORAL at 20:47

## 2018-06-05 RX ADMIN — OMEGA-3-ACID ETHYL ESTERS 1000 MG: 1 CAPSULE, LIQUID FILLED ORAL at 09:26

## 2018-06-05 RX ADMIN — TRAZODONE HYDROCHLORIDE 100 MG: 100 TABLET ORAL at 20:51

## 2018-06-05 RX ADMIN — PREGABALIN 300 MG: 75 CAPSULE ORAL at 09:25

## 2018-06-05 RX ADMIN — Medication 1 CAPSULE: at 09:26

## 2018-06-05 RX ADMIN — CITALOPRAM 40 MG: 40 TABLET, FILM COATED ORAL at 09:26

## 2018-06-05 RX ADMIN — ATORVASTATIN CALCIUM 20 MG: 20 TABLET, FILM COATED ORAL at 09:26

## 2018-06-05 RX ADMIN — ROPINIROLE HYDROCHLORIDE 1 MG: 1 TABLET, FILM COATED ORAL at 20:47

## 2018-06-05 RX ADMIN — HYDROCODONE BITARTRATE AND ACETAMINOPHEN 2 TABLET: 5; 325 TABLET ORAL at 04:11

## 2018-06-05 RX ADMIN — MEROPENEM 1 G: 1 INJECTION, POWDER, FOR SOLUTION INTRAVENOUS at 14:13

## 2018-06-05 RX ADMIN — MEROPENEM 1 G: 1 INJECTION, POWDER, FOR SOLUTION INTRAVENOUS at 05:44

## 2018-06-05 RX ADMIN — Medication 1 UNITS: at 20:46

## 2018-06-05 RX ADMIN — BUPROPION HYDROCHLORIDE 150 MG: 150 TABLET, EXTENDED RELEASE ORAL at 09:26

## 2018-06-05 RX ADMIN — PANTOPRAZOLE SODIUM 40 MG: 40 TABLET, DELAYED RELEASE ORAL at 09:26

## 2018-06-05 RX ADMIN — ENOXAPARIN SODIUM 40 MG: 40 INJECTION, SOLUTION INTRAVENOUS; SUBCUTANEOUS at 09:27

## 2018-06-05 RX ADMIN — ROPINIROLE HYDROCHLORIDE 1 MG: 1 TABLET, FILM COATED ORAL at 09:25

## 2018-06-05 RX ADMIN — Medication 10 ML: at 20:52

## 2018-06-05 RX ADMIN — Medication 10 ML: at 14:24

## 2018-06-05 RX ADMIN — ROPINIROLE HYDROCHLORIDE 1 MG: 1 TABLET, FILM COATED ORAL at 14:13

## 2018-06-05 RX ADMIN — TRAMADOL HYDROCHLORIDE 100 MG: 50 TABLET, FILM COATED ORAL at 22:05

## 2018-06-05 RX ADMIN — LINAGLIPTIN 5 MG: 5 TABLET, FILM COATED ORAL at 09:26

## 2018-06-05 RX ADMIN — TRAMADOL HYDROCHLORIDE 100 MG: 50 TABLET, FILM COATED ORAL at 18:03

## 2018-06-05 ASSESSMENT — PAIN DESCRIPTION - PROGRESSION
CLINICAL_PROGRESSION: NOT CHANGED

## 2018-06-05 ASSESSMENT — PAIN DESCRIPTION - LOCATION
LOCATION: BACK;BUTTOCKS
LOCATION: BACK;BUTTOCKS

## 2018-06-05 ASSESSMENT — PAIN SCALES - GENERAL
PAINLEVEL_OUTOF10: 6
PAINLEVEL_OUTOF10: 7
PAINLEVEL_OUTOF10: 8
PAINLEVEL_OUTOF10: 7
PAINLEVEL_OUTOF10: 8
PAINLEVEL_OUTOF10: 7
PAINLEVEL_OUTOF10: 7
PAINLEVEL_OUTOF10: 8
PAINLEVEL_OUTOF10: 9

## 2018-06-05 ASSESSMENT — PAIN DESCRIPTION - FREQUENCY: FREQUENCY: INTERMITTENT

## 2018-06-05 ASSESSMENT — PAIN DESCRIPTION - ORIENTATION
ORIENTATION: RIGHT;LOWER
ORIENTATION: RIGHT;LOWER

## 2018-06-05 ASSESSMENT — PAIN DESCRIPTION - PAIN TYPE
TYPE: ACUTE PAIN
TYPE: ACUTE PAIN

## 2018-06-05 ASSESSMENT — PAIN DESCRIPTION - DESCRIPTORS: DESCRIPTORS: ACHING

## 2018-06-05 ASSESSMENT — PAIN DESCRIPTION - ONSET: ONSET: ON-GOING

## 2018-06-06 LAB
GLUCOSE BLD-MCNC: 126 MG/DL (ref 70–108)
GLUCOSE BLD-MCNC: 126 MG/DL (ref 70–108)
GLUCOSE BLD-MCNC: 149 MG/DL (ref 70–108)
GLUCOSE BLD-MCNC: 252 MG/DL (ref 70–108)

## 2018-06-06 PROCEDURE — 1290000000 HC SEMI PRIVATE OTHER R&B

## 2018-06-06 PROCEDURE — 6370000000 HC RX 637 (ALT 250 FOR IP): Performed by: INTERNAL MEDICINE

## 2018-06-06 PROCEDURE — 6360000002 HC RX W HCPCS: Performed by: INTERNAL MEDICINE

## 2018-06-06 PROCEDURE — 2580000003 HC RX 258: Performed by: FAMILY MEDICINE

## 2018-06-06 PROCEDURE — 97116 GAIT TRAINING THERAPY: CPT

## 2018-06-06 PROCEDURE — 2580000003 HC RX 258: Performed by: INTERNAL MEDICINE

## 2018-06-06 PROCEDURE — 97535 SELF CARE MNGMENT TRAINING: CPT

## 2018-06-06 PROCEDURE — 97530 THERAPEUTIC ACTIVITIES: CPT

## 2018-06-06 PROCEDURE — 97110 THERAPEUTIC EXERCISES: CPT

## 2018-06-06 PROCEDURE — 6370000000 HC RX 637 (ALT 250 FOR IP): Performed by: FAMILY MEDICINE

## 2018-06-06 PROCEDURE — 82948 REAGENT STRIP/BLOOD GLUCOSE: CPT

## 2018-06-06 RX ADMIN — HYDROCODONE BITARTRATE AND ACETAMINOPHEN 2 TABLET: 5; 325 TABLET ORAL at 04:36

## 2018-06-06 RX ADMIN — TRAMADOL HYDROCHLORIDE 100 MG: 50 TABLET, FILM COATED ORAL at 23:19

## 2018-06-06 RX ADMIN — HYDROCODONE BITARTRATE AND ACETAMINOPHEN 2 TABLET: 5; 325 TABLET ORAL at 08:55

## 2018-06-06 RX ADMIN — TRAMADOL HYDROCHLORIDE 100 MG: 50 TABLET, FILM COATED ORAL at 13:06

## 2018-06-06 RX ADMIN — ROPINIROLE HYDROCHLORIDE 1 MG: 1 TABLET, FILM COATED ORAL at 21:53

## 2018-06-06 RX ADMIN — Medication 1 CAPSULE: at 08:58

## 2018-06-06 RX ADMIN — CITALOPRAM 40 MG: 40 TABLET, FILM COATED ORAL at 08:58

## 2018-06-06 RX ADMIN — Medication 2 UNITS: at 21:55

## 2018-06-06 RX ADMIN — TRAZODONE HYDROCHLORIDE 100 MG: 100 TABLET ORAL at 21:54

## 2018-06-06 RX ADMIN — PREGABALIN 300 MG: 75 CAPSULE ORAL at 08:57

## 2018-06-06 RX ADMIN — LINAGLIPTIN 5 MG: 5 TABLET, FILM COATED ORAL at 08:57

## 2018-06-06 RX ADMIN — TROSPIUM CHLORIDE 20 MG: 20 TABLET ORAL at 21:53

## 2018-06-06 RX ADMIN — INSULIN LISPRO 1 UNITS: 100 INJECTION, SOLUTION INTRAVENOUS; SUBCUTANEOUS at 17:05

## 2018-06-06 RX ADMIN — TRAMADOL HYDROCHLORIDE 100 MG: 50 TABLET, FILM COATED ORAL at 17:04

## 2018-06-06 RX ADMIN — ATORVASTATIN CALCIUM 20 MG: 20 TABLET, FILM COATED ORAL at 08:59

## 2018-06-06 RX ADMIN — BUPROPION HYDROCHLORIDE 150 MG: 150 TABLET, EXTENDED RELEASE ORAL at 08:59

## 2018-06-06 RX ADMIN — Medication 10 ML: at 13:26

## 2018-06-06 RX ADMIN — MEROPENEM 1 G: 1 INJECTION, POWDER, FOR SOLUTION INTRAVENOUS at 13:30

## 2018-06-06 RX ADMIN — HYDROCODONE BITARTRATE AND ACETAMINOPHEN 2 TABLET: 5; 325 TABLET ORAL at 21:54

## 2018-06-06 RX ADMIN — BUPROPION HYDROCHLORIDE 150 MG: 150 TABLET, EXTENDED RELEASE ORAL at 21:53

## 2018-06-06 RX ADMIN — ENOXAPARIN SODIUM 40 MG: 40 INJECTION, SOLUTION INTRAVENOUS; SUBCUTANEOUS at 09:00

## 2018-06-06 RX ADMIN — ROPINIROLE HYDROCHLORIDE 1 MG: 1 TABLET, FILM COATED ORAL at 14:01

## 2018-06-06 RX ADMIN — MEROPENEM 1 G: 1 INJECTION, POWDER, FOR SOLUTION INTRAVENOUS at 06:07

## 2018-06-06 RX ADMIN — INSULIN GLARGINE 48 UNITS: 100 INJECTION, SOLUTION SUBCUTANEOUS at 21:55

## 2018-06-06 RX ADMIN — PANTOPRAZOLE SODIUM 40 MG: 40 TABLET, DELAYED RELEASE ORAL at 08:57

## 2018-06-06 RX ADMIN — ROPINIROLE HYDROCHLORIDE 1 MG: 1 TABLET, FILM COATED ORAL at 08:58

## 2018-06-06 RX ADMIN — OMEGA-3-ACID ETHYL ESTERS 1000 MG: 1 CAPSULE, LIQUID FILLED ORAL at 08:59

## 2018-06-06 RX ADMIN — PREGABALIN 300 MG: 75 CAPSULE ORAL at 21:54

## 2018-06-06 RX ADMIN — Medication 10 ML: at 21:56

## 2018-06-06 RX ADMIN — MEROPENEM 1 G: 1 INJECTION, POWDER, FOR SOLUTION INTRAVENOUS at 21:55

## 2018-06-06 ASSESSMENT — PAIN DESCRIPTION - PROGRESSION
CLINICAL_PROGRESSION: NOT CHANGED

## 2018-06-06 ASSESSMENT — PAIN SCALES - GENERAL
PAINLEVEL_OUTOF10: 7
PAINLEVEL_OUTOF10: 8
PAINLEVEL_OUTOF10: 9
PAINLEVEL_OUTOF10: 8
PAINLEVEL_OUTOF10: 7
PAINLEVEL_OUTOF10: 5

## 2018-06-07 LAB
GLUCOSE BLD-MCNC: 134 MG/DL (ref 70–108)
GLUCOSE BLD-MCNC: 143 MG/DL (ref 70–108)
GLUCOSE BLD-MCNC: 157 MG/DL (ref 70–108)
GLUCOSE BLD-MCNC: 194 MG/DL (ref 70–108)

## 2018-06-07 PROCEDURE — 97110 THERAPEUTIC EXERCISES: CPT

## 2018-06-07 PROCEDURE — 6360000002 HC RX W HCPCS: Performed by: INTERNAL MEDICINE

## 2018-06-07 PROCEDURE — 6360000002 HC RX W HCPCS: Performed by: FAMILY MEDICINE

## 2018-06-07 PROCEDURE — 6370000000 HC RX 637 (ALT 250 FOR IP): Performed by: FAMILY MEDICINE

## 2018-06-07 PROCEDURE — 0220000000 HC SKILLED NURSING FACILITY

## 2018-06-07 PROCEDURE — 2580000003 HC RX 258: Performed by: FAMILY MEDICINE

## 2018-06-07 PROCEDURE — 97116 GAIT TRAINING THERAPY: CPT

## 2018-06-07 PROCEDURE — 2580000003 HC RX 258: Performed by: INTERNAL MEDICINE

## 2018-06-07 PROCEDURE — A5120 SKIN BARRIER, WIPE OR SWAB: HCPCS

## 2018-06-07 PROCEDURE — 97530 THERAPEUTIC ACTIVITIES: CPT

## 2018-06-07 PROCEDURE — 6370000000 HC RX 637 (ALT 250 FOR IP): Performed by: INTERNAL MEDICINE

## 2018-06-07 PROCEDURE — 1290000000 HC SEMI PRIVATE OTHER R&B

## 2018-06-07 PROCEDURE — 82948 REAGENT STRIP/BLOOD GLUCOSE: CPT

## 2018-06-07 RX ADMIN — BUPROPION HYDROCHLORIDE 150 MG: 150 TABLET, EXTENDED RELEASE ORAL at 09:17

## 2018-06-07 RX ADMIN — INSULIN LISPRO 1 UNITS: 100 INJECTION, SOLUTION INTRAVENOUS; SUBCUTANEOUS at 16:55

## 2018-06-07 RX ADMIN — TRAZODONE HYDROCHLORIDE 100 MG: 100 TABLET ORAL at 21:11

## 2018-06-07 RX ADMIN — TRAMADOL HYDROCHLORIDE 100 MG: 50 TABLET, FILM COATED ORAL at 16:54

## 2018-06-07 RX ADMIN — Medication 10 ML: at 09:21

## 2018-06-07 RX ADMIN — MEROPENEM 1 G: 1 INJECTION, POWDER, FOR SOLUTION INTRAVENOUS at 13:30

## 2018-06-07 RX ADMIN — OMEGA-3-ACID ETHYL ESTERS 1000 MG: 1 CAPSULE, LIQUID FILLED ORAL at 09:20

## 2018-06-07 RX ADMIN — MEROPENEM 1 G: 1 INJECTION, POWDER, FOR SOLUTION INTRAVENOUS at 06:05

## 2018-06-07 RX ADMIN — INSULIN GLARGINE 48 UNITS: 100 INJECTION, SOLUTION SUBCUTANEOUS at 21:10

## 2018-06-07 RX ADMIN — PANTOPRAZOLE SODIUM 40 MG: 40 TABLET, DELAYED RELEASE ORAL at 09:20

## 2018-06-07 RX ADMIN — HYDROCODONE BITARTRATE AND ACETAMINOPHEN 2 TABLET: 5; 325 TABLET ORAL at 09:12

## 2018-06-07 RX ADMIN — ONDANSETRON HYDROCHLORIDE 4 MG: 4 TABLET, FILM COATED ORAL at 20:53

## 2018-06-07 RX ADMIN — PREGABALIN 300 MG: 75 CAPSULE ORAL at 21:11

## 2018-06-07 RX ADMIN — ENOXAPARIN SODIUM 40 MG: 40 INJECTION, SOLUTION INTRAVENOUS; SUBCUTANEOUS at 11:14

## 2018-06-07 RX ADMIN — ROPINIROLE HYDROCHLORIDE 1 MG: 1 TABLET, FILM COATED ORAL at 21:10

## 2018-06-07 RX ADMIN — TROSPIUM CHLORIDE 20 MG: 20 TABLET ORAL at 21:10

## 2018-06-07 RX ADMIN — TRAMADOL HYDROCHLORIDE 100 MG: 50 TABLET, FILM COATED ORAL at 21:23

## 2018-06-07 RX ADMIN — Medication 1 CAPSULE: at 09:19

## 2018-06-07 RX ADMIN — HYDROCODONE BITARTRATE AND ACETAMINOPHEN 2 TABLET: 5; 325 TABLET ORAL at 20:53

## 2018-06-07 RX ADMIN — BUPROPION HYDROCHLORIDE 150 MG: 150 TABLET, EXTENDED RELEASE ORAL at 21:10

## 2018-06-07 RX ADMIN — ROPINIROLE HYDROCHLORIDE 1 MG: 1 TABLET, FILM COATED ORAL at 13:26

## 2018-06-07 RX ADMIN — HYDROCODONE BITARTRATE AND ACETAMINOPHEN 1 TABLET: 5; 325 TABLET ORAL at 13:09

## 2018-06-07 RX ADMIN — Medication 1 UNITS: at 21:11

## 2018-06-07 RX ADMIN — PREGABALIN 300 MG: 75 CAPSULE ORAL at 09:20

## 2018-06-07 RX ADMIN — CITALOPRAM 40 MG: 40 TABLET, FILM COATED ORAL at 09:18

## 2018-06-07 RX ADMIN — ROPINIROLE HYDROCHLORIDE 1 MG: 1 TABLET, FILM COATED ORAL at 09:18

## 2018-06-07 RX ADMIN — LINAGLIPTIN 5 MG: 5 TABLET, FILM COATED ORAL at 09:19

## 2018-06-07 RX ADMIN — INSULIN LISPRO 1 UNITS: 100 INJECTION, SOLUTION INTRAVENOUS; SUBCUTANEOUS at 12:31

## 2018-06-07 RX ADMIN — ATORVASTATIN CALCIUM 20 MG: 20 TABLET, FILM COATED ORAL at 09:16

## 2018-06-07 RX ADMIN — Medication 10 ML: at 21:10

## 2018-06-07 RX ADMIN — TRAMADOL HYDROCHLORIDE 100 MG: 50 TABLET, FILM COATED ORAL at 11:14

## 2018-06-07 ASSESSMENT — PAIN SCALES - GENERAL
PAINLEVEL_OUTOF10: 7
PAINLEVEL_OUTOF10: 9
PAINLEVEL_OUTOF10: 8
PAINLEVEL_OUTOF10: 0
PAINLEVEL_OUTOF10: 7
PAINLEVEL_OUTOF10: 7
PAINLEVEL_OUTOF10: 6
PAINLEVEL_OUTOF10: 8
PAINLEVEL_OUTOF10: 8
PAINLEVEL_OUTOF10: 0
PAINLEVEL_OUTOF10: 7
PAINLEVEL_OUTOF10: 0
PAINLEVEL_OUTOF10: 9
PAINLEVEL_OUTOF10: 8
PAINLEVEL_OUTOF10: 0

## 2018-06-07 ASSESSMENT — PAIN DESCRIPTION - DESCRIPTORS
DESCRIPTORS: SHARP
DESCRIPTORS: SHARP

## 2018-06-07 ASSESSMENT — PAIN DESCRIPTION - LOCATION
LOCATION: COCCYX
LOCATION: FOOT
LOCATION: OTHER (COMMENT)
LOCATION: BUTTOCKS

## 2018-06-07 ASSESSMENT — PAIN DESCRIPTION - PAIN TYPE: TYPE: ACUTE PAIN

## 2018-06-07 ASSESSMENT — PAIN DESCRIPTION - ORIENTATION: ORIENTATION: RIGHT;LOWER

## 2018-06-07 ASSESSMENT — PAIN DESCRIPTION - FREQUENCY
FREQUENCY: INTERMITTENT
FREQUENCY: INTERMITTENT

## 2018-06-08 ENCOUNTER — APPOINTMENT (OUTPATIENT)
Dept: GENERAL RADIOLOGY | Age: 58
DRG: 603 | End: 2018-06-08
Attending: FAMILY MEDICINE
Payer: MEDICARE

## 2018-06-08 LAB
GLUCOSE BLD-MCNC: 137 MG/DL (ref 70–108)
GLUCOSE BLD-MCNC: 150 MG/DL (ref 70–108)
GLUCOSE BLD-MCNC: 158 MG/DL (ref 70–108)
GLUCOSE BLD-MCNC: 219 MG/DL (ref 70–108)

## 2018-06-08 PROCEDURE — 05HY33Z INSERTION OF INFUSION DEVICE INTO UPPER VEIN, PERCUTANEOUS APPROACH: ICD-10-PCS | Performed by: FAMILY MEDICINE

## 2018-06-08 PROCEDURE — 6360000002 HC RX W HCPCS: Performed by: INTERNAL MEDICINE

## 2018-06-08 PROCEDURE — 2500000003 HC RX 250 WO HCPCS: Performed by: FAMILY MEDICINE

## 2018-06-08 PROCEDURE — 97530 THERAPEUTIC ACTIVITIES: CPT

## 2018-06-08 PROCEDURE — 2580000003 HC RX 258: Performed by: INTERNAL MEDICINE

## 2018-06-08 PROCEDURE — 6360000002 HC RX W HCPCS: Performed by: FAMILY MEDICINE

## 2018-06-08 PROCEDURE — 82948 REAGENT STRIP/BLOOD GLUCOSE: CPT

## 2018-06-08 PROCEDURE — C1751 CATH, INF, PER/CENT/MIDLINE: HCPCS

## 2018-06-08 PROCEDURE — 71045 X-RAY EXAM CHEST 1 VIEW: CPT

## 2018-06-08 PROCEDURE — 6370000000 HC RX 637 (ALT 250 FOR IP): Performed by: INTERNAL MEDICINE

## 2018-06-08 PROCEDURE — 97110 THERAPEUTIC EXERCISES: CPT

## 2018-06-08 PROCEDURE — 97535 SELF CARE MNGMENT TRAINING: CPT

## 2018-06-08 PROCEDURE — 2500000003 HC RX 250 WO HCPCS: Performed by: INTERNAL MEDICINE

## 2018-06-08 PROCEDURE — 2580000003 HC RX 258: Performed by: FAMILY MEDICINE

## 2018-06-08 PROCEDURE — 1290000000 HC SEMI PRIVATE OTHER R&B

## 2018-06-08 PROCEDURE — 76937 US GUIDE VASCULAR ACCESS: CPT

## 2018-06-08 PROCEDURE — 6370000000 HC RX 637 (ALT 250 FOR IP): Performed by: FAMILY MEDICINE

## 2018-06-08 PROCEDURE — 36569 INSJ PICC 5 YR+ W/O IMAGING: CPT

## 2018-06-08 RX ORDER — SODIUM CHLORIDE 0.9 % (FLUSH) 0.9 %
10 SYRINGE (ML) INJECTION PRN
Status: DISCONTINUED | OUTPATIENT
Start: 2018-06-08 | End: 2018-06-08 | Stop reason: SDUPTHER

## 2018-06-08 RX ORDER — LIDOCAINE HYDROCHLORIDE 10 MG/ML
5 INJECTION, SOLUTION EPIDURAL; INFILTRATION; INTRACAUDAL; PERINEURAL ONCE
Status: COMPLETED | OUTPATIENT
Start: 2018-06-08 | End: 2018-06-08

## 2018-06-08 RX ORDER — SODIUM CHLORIDE 0.9 % (FLUSH) 0.9 %
10 SYRINGE (ML) INJECTION EVERY 12 HOURS SCHEDULED
Status: DISCONTINUED | OUTPATIENT
Start: 2018-06-08 | End: 2018-06-08 | Stop reason: SDUPTHER

## 2018-06-08 RX ADMIN — MEROPENEM 1 G: 1 INJECTION, POWDER, FOR SOLUTION INTRAVENOUS at 16:01

## 2018-06-08 RX ADMIN — TRAMADOL HYDROCHLORIDE 100 MG: 50 TABLET, FILM COATED ORAL at 16:09

## 2018-06-08 RX ADMIN — TROSPIUM CHLORIDE 20 MG: 20 TABLET ORAL at 22:06

## 2018-06-08 RX ADMIN — MEROPENEM 1 G: 1 INJECTION, POWDER, FOR SOLUTION INTRAVENOUS at 00:34

## 2018-06-08 RX ADMIN — ROPINIROLE HYDROCHLORIDE 1 MG: 1 TABLET, FILM COATED ORAL at 22:07

## 2018-06-08 RX ADMIN — LIDOCAINE HYDROCHLORIDE 5 ML: 10 INJECTION, SOLUTION EPIDURAL; INFILTRATION; INTRACAUDAL; PERINEURAL at 15:13

## 2018-06-08 RX ADMIN — CITALOPRAM 40 MG: 40 TABLET, FILM COATED ORAL at 11:10

## 2018-06-08 RX ADMIN — HYDROCODONE BITARTRATE AND ACETAMINOPHEN 1 TABLET: 5; 325 TABLET ORAL at 08:04

## 2018-06-08 RX ADMIN — ROPINIROLE HYDROCHLORIDE 1 MG: 1 TABLET, FILM COATED ORAL at 13:19

## 2018-06-08 RX ADMIN — Medication 1 UNITS: at 22:08

## 2018-06-08 RX ADMIN — INSULIN LISPRO 1 UNITS: 100 INJECTION, SOLUTION INTRAVENOUS; SUBCUTANEOUS at 12:25

## 2018-06-08 RX ADMIN — INSULIN GLARGINE 48 UNITS: 100 INJECTION, SOLUTION SUBCUTANEOUS at 22:08

## 2018-06-08 RX ADMIN — BUPROPION HYDROCHLORIDE 150 MG: 150 TABLET, EXTENDED RELEASE ORAL at 22:06

## 2018-06-08 RX ADMIN — OMEGA-3-ACID ETHYL ESTERS 1000 MG: 1 CAPSULE, LIQUID FILLED ORAL at 11:11

## 2018-06-08 RX ADMIN — TRAMADOL HYDROCHLORIDE 100 MG: 50 TABLET, FILM COATED ORAL at 22:07

## 2018-06-08 RX ADMIN — PANTOPRAZOLE SODIUM 40 MG: 40 TABLET, DELAYED RELEASE ORAL at 11:11

## 2018-06-08 RX ADMIN — Medication 5 UNITS: at 11:16

## 2018-06-08 RX ADMIN — TRAMADOL HYDROCHLORIDE 100 MG: 50 TABLET, FILM COATED ORAL at 11:02

## 2018-06-08 RX ADMIN — Medication 10 ML: at 22:13

## 2018-06-08 RX ADMIN — PREGABALIN 300 MG: 75 CAPSULE ORAL at 22:07

## 2018-06-08 RX ADMIN — LINAGLIPTIN 5 MG: 5 TABLET, FILM COATED ORAL at 11:10

## 2018-06-08 RX ADMIN — PREGABALIN 300 MG: 75 CAPSULE ORAL at 11:11

## 2018-06-08 RX ADMIN — ENOXAPARIN SODIUM 40 MG: 40 INJECTION, SOLUTION INTRAVENOUS; SUBCUTANEOUS at 11:15

## 2018-06-08 RX ADMIN — Medication 1 CAPSULE: at 09:27

## 2018-06-08 RX ADMIN — BUPROPION HYDROCHLORIDE 150 MG: 150 TABLET, EXTENDED RELEASE ORAL at 11:09

## 2018-06-08 RX ADMIN — ONDANSETRON HYDROCHLORIDE 4 MG: 4 TABLET, FILM COATED ORAL at 13:19

## 2018-06-08 RX ADMIN — HYDROCODONE BITARTRATE AND ACETAMINOPHEN 2 TABLET: 5; 325 TABLET ORAL at 01:42

## 2018-06-08 RX ADMIN — ROPINIROLE HYDROCHLORIDE 1 MG: 1 TABLET, FILM COATED ORAL at 11:10

## 2018-06-08 RX ADMIN — ATORVASTATIN CALCIUM 20 MG: 20 TABLET, FILM COATED ORAL at 11:08

## 2018-06-08 RX ADMIN — TRAZODONE HYDROCHLORIDE 100 MG: 100 TABLET ORAL at 22:07

## 2018-06-08 RX ADMIN — INSULIN LISPRO 1 UNITS: 100 INJECTION, SOLUTION INTRAVENOUS; SUBCUTANEOUS at 09:29

## 2018-06-08 ASSESSMENT — PAIN DESCRIPTION - PAIN TYPE
TYPE: ACUTE PAIN
TYPE_2: ACUTE PAIN
TYPE: ACUTE PAIN

## 2018-06-08 ASSESSMENT — PAIN SCALES - GENERAL
PAINLEVEL_OUTOF10: 4
PAINLEVEL_OUTOF10: 8
PAINLEVEL_OUTOF10: 8
PAINLEVEL_OUTOF10: 6
PAINLEVEL_OUTOF10: 6
PAINLEVEL_OUTOF10: 4
PAINLEVEL_OUTOF10: 3
PAINLEVEL_OUTOF10: 7
PAINLEVEL_OUTOF10: 8
PAINLEVEL_OUTOF10: 8
PAINLEVEL_OUTOF10: 6
PAINLEVEL_OUTOF10: 8
PAINLEVEL_OUTOF10: 6

## 2018-06-08 ASSESSMENT — PAIN DESCRIPTION - DESCRIPTORS
DESCRIPTORS: SHARP
DESCRIPTORS: ACHING
DESCRIPTORS: ACHING;SHARP
DESCRIPTORS: SHARP

## 2018-06-08 ASSESSMENT — PAIN DESCRIPTION - FREQUENCY
FREQUENCY: INTERMITTENT
FREQUENCY: INTERMITTENT

## 2018-06-08 ASSESSMENT — PAIN DESCRIPTION - ORIENTATION
ORIENTATION: RIGHT;LOWER
ORIENTATION: POSTERIOR
ORIENTATION: RIGHT;LOWER
ORIENTATION_2: POSTERIOR

## 2018-06-08 ASSESSMENT — PAIN DESCRIPTION - LOCATION
LOCATION: COCCYX
LOCATION: COCCYX
LOCATION: ABDOMEN
LOCATION: COCCYX
LOCATION_2: COCCYX

## 2018-06-08 ASSESSMENT — PAIN DESCRIPTION - INTENSITY: RATING_2: 7

## 2018-06-09 LAB
GLUCOSE BLD-MCNC: 138 MG/DL (ref 70–108)
GLUCOSE BLD-MCNC: 165 MG/DL (ref 70–108)
GLUCOSE BLD-MCNC: 192 MG/DL (ref 70–108)
GLUCOSE BLD-MCNC: 304 MG/DL (ref 70–108)

## 2018-06-09 PROCEDURE — 6360000002 HC RX W HCPCS: Performed by: INTERNAL MEDICINE

## 2018-06-09 PROCEDURE — 6370000000 HC RX 637 (ALT 250 FOR IP): Performed by: INTERNAL MEDICINE

## 2018-06-09 PROCEDURE — 82948 REAGENT STRIP/BLOOD GLUCOSE: CPT

## 2018-06-09 PROCEDURE — 2580000003 HC RX 258: Performed by: INTERNAL MEDICINE

## 2018-06-09 PROCEDURE — 1290000000 HC SEMI PRIVATE OTHER R&B

## 2018-06-09 PROCEDURE — 6370000000 HC RX 637 (ALT 250 FOR IP): Performed by: FAMILY MEDICINE

## 2018-06-09 PROCEDURE — 2580000003 HC RX 258: Performed by: FAMILY MEDICINE

## 2018-06-09 RX ADMIN — OMEGA-3-ACID ETHYL ESTERS 1000 MG: 1 CAPSULE, LIQUID FILLED ORAL at 09:19

## 2018-06-09 RX ADMIN — TROSPIUM CHLORIDE 20 MG: 20 TABLET ORAL at 21:39

## 2018-06-09 RX ADMIN — INSULIN GLARGINE 48 UNITS: 100 INJECTION, SOLUTION SUBCUTANEOUS at 21:40

## 2018-06-09 RX ADMIN — BUPROPION HYDROCHLORIDE 150 MG: 150 TABLET, EXTENDED RELEASE ORAL at 21:40

## 2018-06-09 RX ADMIN — BUPROPION HYDROCHLORIDE 150 MG: 150 TABLET, EXTENDED RELEASE ORAL at 09:19

## 2018-06-09 RX ADMIN — TRAZODONE HYDROCHLORIDE 100 MG: 100 TABLET ORAL at 21:40

## 2018-06-09 RX ADMIN — MEROPENEM 1 G: 1 INJECTION, POWDER, FOR SOLUTION INTRAVENOUS at 01:41

## 2018-06-09 RX ADMIN — MEROPENEM 1 G: 1 INJECTION, POWDER, FOR SOLUTION INTRAVENOUS at 16:44

## 2018-06-09 RX ADMIN — TRAMADOL HYDROCHLORIDE 100 MG: 50 TABLET, FILM COATED ORAL at 10:54

## 2018-06-09 RX ADMIN — ATORVASTATIN CALCIUM 20 MG: 20 TABLET, FILM COATED ORAL at 09:19

## 2018-06-09 RX ADMIN — PREGABALIN 300 MG: 75 CAPSULE ORAL at 21:39

## 2018-06-09 RX ADMIN — HYDROCODONE BITARTRATE AND ACETAMINOPHEN 2 TABLET: 5; 325 TABLET ORAL at 15:13

## 2018-06-09 RX ADMIN — ENOXAPARIN SODIUM 40 MG: 40 INJECTION, SOLUTION INTRAVENOUS; SUBCUTANEOUS at 09:19

## 2018-06-09 RX ADMIN — PANTOPRAZOLE SODIUM 40 MG: 40 TABLET, DELAYED RELEASE ORAL at 09:19

## 2018-06-09 RX ADMIN — Medication 1 CAPSULE: at 09:20

## 2018-06-09 RX ADMIN — LINAGLIPTIN 5 MG: 5 TABLET, FILM COATED ORAL at 09:19

## 2018-06-09 RX ADMIN — INSULIN LISPRO 1 UNITS: 100 INJECTION, SOLUTION INTRAVENOUS; SUBCUTANEOUS at 17:23

## 2018-06-09 RX ADMIN — ROPINIROLE HYDROCHLORIDE 1 MG: 1 TABLET, FILM COATED ORAL at 09:19

## 2018-06-09 RX ADMIN — HYDROCODONE BITARTRATE AND ACETAMINOPHEN 2 TABLET: 5; 325 TABLET ORAL at 01:44

## 2018-06-09 RX ADMIN — TRAMADOL HYDROCHLORIDE 100 MG: 50 TABLET, FILM COATED ORAL at 16:44

## 2018-06-09 RX ADMIN — Medication 10 ML: at 09:20

## 2018-06-09 RX ADMIN — Medication 10 ML: at 21:49

## 2018-06-09 RX ADMIN — Medication 2 UNITS: at 21:40

## 2018-06-09 RX ADMIN — HYDROCODONE BITARTRATE AND ACETAMINOPHEN 2 TABLET: 5; 325 TABLET ORAL at 21:53

## 2018-06-09 RX ADMIN — MEROPENEM 1 G: 1 INJECTION, POWDER, FOR SOLUTION INTRAVENOUS at 09:20

## 2018-06-09 RX ADMIN — CITALOPRAM 40 MG: 40 TABLET, FILM COATED ORAL at 09:19

## 2018-06-09 RX ADMIN — PREGABALIN 300 MG: 75 CAPSULE ORAL at 09:19

## 2018-06-09 RX ADMIN — ROPINIROLE HYDROCHLORIDE 1 MG: 1 TABLET, FILM COATED ORAL at 15:13

## 2018-06-09 RX ADMIN — ROPINIROLE HYDROCHLORIDE 1 MG: 1 TABLET, FILM COATED ORAL at 21:40

## 2018-06-09 ASSESSMENT — PAIN DESCRIPTION - LOCATION: LOCATION: BUTTOCKS

## 2018-06-09 ASSESSMENT — PAIN SCALES - GENERAL
PAINLEVEL_OUTOF10: 7
PAINLEVEL_OUTOF10: 4
PAINLEVEL_OUTOF10: 8
PAINLEVEL_OUTOF10: 7
PAINLEVEL_OUTOF10: 8
PAINLEVEL_OUTOF10: 6
PAINLEVEL_OUTOF10: 4
PAINLEVEL_OUTOF10: 6
PAINLEVEL_OUTOF10: 8

## 2018-06-09 ASSESSMENT — PAIN DESCRIPTION - FREQUENCY: FREQUENCY: CONTINUOUS

## 2018-06-09 ASSESSMENT — PAIN DESCRIPTION - DESCRIPTORS: DESCRIPTORS: SORE

## 2018-06-09 ASSESSMENT — PAIN DESCRIPTION - PAIN TYPE: TYPE: ACUTE PAIN

## 2018-06-10 LAB
ANION GAP SERPL CALCULATED.3IONS-SCNC: 13 MEQ/L (ref 8–16)
BASOPHILS # BLD: 0.2 %
BASOPHILS ABSOLUTE: 0 THOU/MM3 (ref 0–0.1)
BUN BLDV-MCNC: 21 MG/DL (ref 7–22)
CALCIUM SERPL-MCNC: 9.8 MG/DL (ref 8.5–10.5)
CHLORIDE BLD-SCNC: 101 MEQ/L (ref 98–111)
CO2: 26 MEQ/L (ref 23–33)
CREAT SERPL-MCNC: 0.7 MG/DL (ref 0.4–1.2)
EOSINOPHIL # BLD: 2.8 %
EOSINOPHILS ABSOLUTE: 0.3 THOU/MM3 (ref 0–0.4)
GFR SERPL CREATININE-BSD FRML MDRD: 86 ML/MIN/1.73M2
GLUCOSE BLD-MCNC: 139 MG/DL (ref 70–108)
GLUCOSE BLD-MCNC: 146 MG/DL (ref 70–108)
GLUCOSE BLD-MCNC: 168 MG/DL (ref 70–108)
GLUCOSE BLD-MCNC: 231 MG/DL (ref 70–108)
GLUCOSE BLD-MCNC: 261 MG/DL (ref 70–108)
HCT VFR BLD CALC: 40.1 % (ref 37–47)
HEMOGLOBIN: 13.5 GM/DL (ref 12–16)
LYMPHOCYTES # BLD: 43.2 %
LYMPHOCYTES ABSOLUTE: 4.2 THOU/MM3 (ref 1–4.8)
MCH RBC QN AUTO: 31.1 PG (ref 27–31)
MCHC RBC AUTO-ENTMCNC: 33.8 GM/DL (ref 33–37)
MCV RBC AUTO: 92.2 FL (ref 81–99)
MONOCYTES # BLD: 11.7 %
MONOCYTES ABSOLUTE: 1.1 THOU/MM3 (ref 0.4–1.3)
NUCLEATED RED BLOOD CELLS: 0 /100 WBC
PDW BLD-RTO: 14.5 % (ref 11.5–14.5)
PLATELET # BLD: 224 THOU/MM3 (ref 130–400)
PMV BLD AUTO: 10.2 FL (ref 7.4–10.4)
POTASSIUM SERPL-SCNC: 5.2 MEQ/L (ref 3.5–5.2)
RBC # BLD: 4.35 MILL/MM3 (ref 4.2–5.4)
SEG NEUTROPHILS: 42.1 %
SEGMENTED NEUTROPHILS ABSOLUTE COUNT: 4.1 THOU/MM3 (ref 1.8–7.7)
SODIUM BLD-SCNC: 140 MEQ/L (ref 135–145)
WBC # BLD: 9.7 THOU/MM3 (ref 4.8–10.8)

## 2018-06-10 PROCEDURE — 2580000003 HC RX 258: Performed by: INTERNAL MEDICINE

## 2018-06-10 PROCEDURE — 2580000003 HC RX 258: Performed by: FAMILY MEDICINE

## 2018-06-10 PROCEDURE — 6370000000 HC RX 637 (ALT 250 FOR IP): Performed by: FAMILY MEDICINE

## 2018-06-10 PROCEDURE — 97535 SELF CARE MNGMENT TRAINING: CPT

## 2018-06-10 PROCEDURE — 6360000002 HC RX W HCPCS: Performed by: INTERNAL MEDICINE

## 2018-06-10 PROCEDURE — 80048 BASIC METABOLIC PNL TOTAL CA: CPT

## 2018-06-10 PROCEDURE — 82948 REAGENT STRIP/BLOOD GLUCOSE: CPT

## 2018-06-10 PROCEDURE — 1290000000 HC SEMI PRIVATE OTHER R&B

## 2018-06-10 PROCEDURE — 97110 THERAPEUTIC EXERCISES: CPT

## 2018-06-10 PROCEDURE — 6370000000 HC RX 637 (ALT 250 FOR IP): Performed by: INTERNAL MEDICINE

## 2018-06-10 PROCEDURE — 36415 COLL VENOUS BLD VENIPUNCTURE: CPT

## 2018-06-10 PROCEDURE — 85025 COMPLETE CBC W/AUTO DIFF WBC: CPT

## 2018-06-10 PROCEDURE — 97116 GAIT TRAINING THERAPY: CPT

## 2018-06-10 PROCEDURE — 97530 THERAPEUTIC ACTIVITIES: CPT

## 2018-06-10 RX ORDER — INSULIN GLARGINE 100 [IU]/ML
50 INJECTION, SOLUTION SUBCUTANEOUS NIGHTLY
Status: DISCONTINUED | OUTPATIENT
Start: 2018-06-10 | End: 2018-06-15 | Stop reason: HOSPADM

## 2018-06-10 RX ADMIN — ROPINIROLE HYDROCHLORIDE 1 MG: 1 TABLET, FILM COATED ORAL at 08:13

## 2018-06-10 RX ADMIN — ROPINIROLE HYDROCHLORIDE 1 MG: 1 TABLET, FILM COATED ORAL at 12:29

## 2018-06-10 RX ADMIN — TRAZODONE HYDROCHLORIDE 100 MG: 100 TABLET ORAL at 21:11

## 2018-06-10 RX ADMIN — PREGABALIN 300 MG: 75 CAPSULE ORAL at 21:11

## 2018-06-10 RX ADMIN — ATORVASTATIN CALCIUM 20 MG: 20 TABLET, FILM COATED ORAL at 08:14

## 2018-06-10 RX ADMIN — Medication 1 CAPSULE: at 08:14

## 2018-06-10 RX ADMIN — INSULIN GLARGINE 50 UNITS: 100 INJECTION, SOLUTION SUBCUTANEOUS at 21:12

## 2018-06-10 RX ADMIN — HYDROCORTISONE: 25 CREAM TOPICAL at 17:39

## 2018-06-10 RX ADMIN — MEROPENEM 1 G: 1 INJECTION, POWDER, FOR SOLUTION INTRAVENOUS at 10:10

## 2018-06-10 RX ADMIN — Medication 2 UNITS: at 21:12

## 2018-06-10 RX ADMIN — MEROPENEM 1 G: 1 INJECTION, POWDER, FOR SOLUTION INTRAVENOUS at 16:31

## 2018-06-10 RX ADMIN — Medication 10 ML: at 23:14

## 2018-06-10 RX ADMIN — ENOXAPARIN SODIUM 40 MG: 40 INJECTION, SOLUTION INTRAVENOUS; SUBCUTANEOUS at 08:20

## 2018-06-10 RX ADMIN — TRAMADOL HYDROCHLORIDE 100 MG: 50 TABLET, FILM COATED ORAL at 08:16

## 2018-06-10 RX ADMIN — TRAMADOL HYDROCHLORIDE 100 MG: 50 TABLET, FILM COATED ORAL at 16:30

## 2018-06-10 RX ADMIN — Medication 10 ML: at 10:10

## 2018-06-10 RX ADMIN — TROSPIUM CHLORIDE 20 MG: 20 TABLET ORAL at 21:12

## 2018-06-10 RX ADMIN — TRAMADOL HYDROCHLORIDE 100 MG: 50 TABLET, FILM COATED ORAL at 21:17

## 2018-06-10 RX ADMIN — PANTOPRAZOLE SODIUM 40 MG: 40 TABLET, DELAYED RELEASE ORAL at 08:14

## 2018-06-10 RX ADMIN — CITALOPRAM 40 MG: 40 TABLET, FILM COATED ORAL at 08:14

## 2018-06-10 RX ADMIN — MEROPENEM 1 G: 1 INJECTION, POWDER, FOR SOLUTION INTRAVENOUS at 00:55

## 2018-06-10 RX ADMIN — BUPROPION HYDROCHLORIDE 150 MG: 150 TABLET, EXTENDED RELEASE ORAL at 21:11

## 2018-06-10 RX ADMIN — BUPROPION HYDROCHLORIDE 150 MG: 150 TABLET, EXTENDED RELEASE ORAL at 08:14

## 2018-06-10 RX ADMIN — LINAGLIPTIN 5 MG: 5 TABLET, FILM COATED ORAL at 08:14

## 2018-06-10 RX ADMIN — INSULIN LISPRO 1 UNITS: 100 INJECTION, SOLUTION INTRAVENOUS; SUBCUTANEOUS at 08:18

## 2018-06-10 RX ADMIN — PREGABALIN 300 MG: 75 CAPSULE ORAL at 08:14

## 2018-06-10 RX ADMIN — ROPINIROLE HYDROCHLORIDE 1 MG: 1 TABLET, FILM COATED ORAL at 21:12

## 2018-06-10 RX ADMIN — INSULIN LISPRO 2 UNITS: 100 INJECTION, SOLUTION INTRAVENOUS; SUBCUTANEOUS at 12:26

## 2018-06-10 RX ADMIN — INSULIN LISPRO 1 UNITS: 100 INJECTION, SOLUTION INTRAVENOUS; SUBCUTANEOUS at 17:03

## 2018-06-10 RX ADMIN — OMEGA-3-ACID ETHYL ESTERS 1000 MG: 1 CAPSULE, LIQUID FILLED ORAL at 08:14

## 2018-06-10 ASSESSMENT — PAIN SCALES - GENERAL
PAINLEVEL_OUTOF10: 9
PAINLEVEL_OUTOF10: 8

## 2018-06-10 ASSESSMENT — PAIN DESCRIPTION - PAIN TYPE
TYPE: ACUTE PAIN
TYPE: ACUTE PAIN
TYPE: CHRONIC PAIN
TYPE: ACUTE PAIN

## 2018-06-10 ASSESSMENT — PAIN DESCRIPTION - PROGRESSION
CLINICAL_PROGRESSION: NOT CHANGED

## 2018-06-10 ASSESSMENT — PAIN DESCRIPTION - DESCRIPTORS
DESCRIPTORS: ACHING;CONSTANT
DESCRIPTORS: ACHING;CONSTANT;DISCOMFORT;SORE

## 2018-06-10 ASSESSMENT — PAIN DESCRIPTION - ORIENTATION
ORIENTATION: RIGHT;LEFT;MID;LOWER
ORIENTATION: LOWER
ORIENTATION: RIGHT;LEFT;MID;LOWER
ORIENTATION: RIGHT;LEFT

## 2018-06-10 ASSESSMENT — PAIN DESCRIPTION - LOCATION
LOCATION: BACK;BUTTOCKS
LOCATION: BACK

## 2018-06-10 ASSESSMENT — PAIN DESCRIPTION - FREQUENCY
FREQUENCY: CONTINUOUS
FREQUENCY: CONTINUOUS

## 2018-06-10 ASSESSMENT — PAIN DESCRIPTION - ONSET
ONSET: ON-GOING
ONSET: ON-GOING

## 2018-06-11 LAB
GLUCOSE BLD-MCNC: 148 MG/DL (ref 70–108)
GLUCOSE BLD-MCNC: 154 MG/DL (ref 70–108)
GLUCOSE BLD-MCNC: 201 MG/DL (ref 70–108)
GLUCOSE BLD-MCNC: 240 MG/DL (ref 70–108)

## 2018-06-11 PROCEDURE — 6360000002 HC RX W HCPCS: Performed by: INTERNAL MEDICINE

## 2018-06-11 PROCEDURE — 97530 THERAPEUTIC ACTIVITIES: CPT

## 2018-06-11 PROCEDURE — 2580000003 HC RX 258: Performed by: FAMILY MEDICINE

## 2018-06-11 PROCEDURE — 97110 THERAPEUTIC EXERCISES: CPT

## 2018-06-11 PROCEDURE — 6370000000 HC RX 637 (ALT 250 FOR IP): Performed by: FAMILY MEDICINE

## 2018-06-11 PROCEDURE — 82948 REAGENT STRIP/BLOOD GLUCOSE: CPT

## 2018-06-11 PROCEDURE — 1290000000 HC SEMI PRIVATE OTHER R&B

## 2018-06-11 PROCEDURE — 6370000000 HC RX 637 (ALT 250 FOR IP): Performed by: INTERNAL MEDICINE

## 2018-06-11 PROCEDURE — 2580000003 HC RX 258: Performed by: INTERNAL MEDICINE

## 2018-06-11 PROCEDURE — 97116 GAIT TRAINING THERAPY: CPT

## 2018-06-11 RX ADMIN — BUPROPION HYDROCHLORIDE 150 MG: 150 TABLET, EXTENDED RELEASE ORAL at 08:54

## 2018-06-11 RX ADMIN — TRAZODONE HYDROCHLORIDE 100 MG: 100 TABLET ORAL at 21:30

## 2018-06-11 RX ADMIN — PREGABALIN 300 MG: 75 CAPSULE ORAL at 21:30

## 2018-06-11 RX ADMIN — HYDROCORTISONE: 25 CREAM TOPICAL at 08:47

## 2018-06-11 RX ADMIN — INSULIN LISPRO 2 UNITS: 100 INJECTION, SOLUTION INTRAVENOUS; SUBCUTANEOUS at 12:19

## 2018-06-11 RX ADMIN — TRAMADOL HYDROCHLORIDE 100 MG: 50 TABLET, FILM COATED ORAL at 21:28

## 2018-06-11 RX ADMIN — HYDROCORTISONE: 25 CREAM TOPICAL at 21:28

## 2018-06-11 RX ADMIN — TRAMADOL HYDROCHLORIDE 100 MG: 50 TABLET, FILM COATED ORAL at 14:45

## 2018-06-11 RX ADMIN — INSULIN LISPRO 1 UNITS: 100 INJECTION, SOLUTION INTRAVENOUS; SUBCUTANEOUS at 08:56

## 2018-06-11 RX ADMIN — INSULIN LISPRO 1 UNITS: 100 INJECTION, SOLUTION INTRAVENOUS; SUBCUTANEOUS at 18:10

## 2018-06-11 RX ADMIN — MEROPENEM 1 G: 1 INJECTION, POWDER, FOR SOLUTION INTRAVENOUS at 18:09

## 2018-06-11 RX ADMIN — MEROPENEM 1 G: 1 INJECTION, POWDER, FOR SOLUTION INTRAVENOUS at 08:49

## 2018-06-11 RX ADMIN — TROSPIUM CHLORIDE 20 MG: 20 TABLET ORAL at 21:28

## 2018-06-11 RX ADMIN — BUPROPION HYDROCHLORIDE 150 MG: 150 TABLET, EXTENDED RELEASE ORAL at 21:28

## 2018-06-11 RX ADMIN — ROPINIROLE HYDROCHLORIDE 1 MG: 1 TABLET, FILM COATED ORAL at 08:53

## 2018-06-11 RX ADMIN — INSULIN GLARGINE 50 UNITS: 100 INJECTION, SOLUTION SUBCUTANEOUS at 21:35

## 2018-06-11 RX ADMIN — Medication 10 ML: at 21:38

## 2018-06-11 RX ADMIN — Medication 1 UNITS: at 21:36

## 2018-06-11 RX ADMIN — MEROPENEM 1 G: 1 INJECTION, POWDER, FOR SOLUTION INTRAVENOUS at 00:10

## 2018-06-11 RX ADMIN — Medication 10 ML: at 08:54

## 2018-06-11 RX ADMIN — ROPINIROLE HYDROCHLORIDE 1 MG: 1 TABLET, FILM COATED ORAL at 21:28

## 2018-06-11 RX ADMIN — PANTOPRAZOLE SODIUM 40 MG: 40 TABLET, DELAYED RELEASE ORAL at 08:49

## 2018-06-11 RX ADMIN — ENOXAPARIN SODIUM 40 MG: 40 INJECTION, SOLUTION INTRAVENOUS; SUBCUTANEOUS at 08:49

## 2018-06-11 RX ADMIN — ROPINIROLE HYDROCHLORIDE 1 MG: 1 TABLET, FILM COATED ORAL at 12:18

## 2018-06-11 RX ADMIN — LINAGLIPTIN 5 MG: 5 TABLET, FILM COATED ORAL at 08:53

## 2018-06-11 RX ADMIN — ATORVASTATIN CALCIUM 20 MG: 20 TABLET, FILM COATED ORAL at 08:54

## 2018-06-11 RX ADMIN — PREGABALIN 300 MG: 75 CAPSULE ORAL at 08:49

## 2018-06-11 RX ADMIN — TRAMADOL HYDROCHLORIDE 100 MG: 50 TABLET, FILM COATED ORAL at 08:55

## 2018-06-11 RX ADMIN — HYDROCODONE BITARTRATE AND ACETAMINOPHEN 2 TABLET: 5; 325 TABLET ORAL at 18:10

## 2018-06-11 RX ADMIN — CITALOPRAM 40 MG: 40 TABLET, FILM COATED ORAL at 08:54

## 2018-06-11 RX ADMIN — OMEGA-3-ACID ETHYL ESTERS 1000 MG: 1 CAPSULE, LIQUID FILLED ORAL at 08:48

## 2018-06-11 RX ADMIN — Medication 1 CAPSULE: at 08:54

## 2018-06-11 ASSESSMENT — PAIN DESCRIPTION - ORIENTATION: ORIENTATION: LOWER

## 2018-06-11 ASSESSMENT — PAIN DESCRIPTION - LOCATION: LOCATION: BACK;BUTTOCKS

## 2018-06-11 ASSESSMENT — PAIN SCALES - GENERAL
PAINLEVEL_OUTOF10: 9
PAINLEVEL_OUTOF10: 8
PAINLEVEL_OUTOF10: 7
PAINLEVEL_OUTOF10: 8
PAINLEVEL_OUTOF10: 8

## 2018-06-12 LAB
GLUCOSE BLD-MCNC: 135 MG/DL (ref 70–108)
GLUCOSE BLD-MCNC: 160 MG/DL (ref 70–108)
GLUCOSE BLD-MCNC: 174 MG/DL (ref 70–108)
GLUCOSE BLD-MCNC: 194 MG/DL (ref 70–108)

## 2018-06-12 PROCEDURE — 6360000002 HC RX W HCPCS: Performed by: INTERNAL MEDICINE

## 2018-06-12 PROCEDURE — 1290000000 HC SEMI PRIVATE OTHER R&B

## 2018-06-12 PROCEDURE — 82948 REAGENT STRIP/BLOOD GLUCOSE: CPT

## 2018-06-12 PROCEDURE — 97116 GAIT TRAINING THERAPY: CPT

## 2018-06-12 PROCEDURE — 97535 SELF CARE MNGMENT TRAINING: CPT

## 2018-06-12 PROCEDURE — 97110 THERAPEUTIC EXERCISES: CPT

## 2018-06-12 PROCEDURE — 6370000000 HC RX 637 (ALT 250 FOR IP): Performed by: INTERNAL MEDICINE

## 2018-06-12 PROCEDURE — 0220000000 HC SKILLED NURSING FACILITY

## 2018-06-12 PROCEDURE — 2580000003 HC RX 258: Performed by: INTERNAL MEDICINE

## 2018-06-12 PROCEDURE — 2580000003 HC RX 258: Performed by: FAMILY MEDICINE

## 2018-06-12 PROCEDURE — 6370000000 HC RX 637 (ALT 250 FOR IP): Performed by: FAMILY MEDICINE

## 2018-06-12 RX ADMIN — ROPINIROLE HYDROCHLORIDE 1 MG: 1 TABLET, FILM COATED ORAL at 21:29

## 2018-06-12 RX ADMIN — INSULIN LISPRO 1 UNITS: 100 INJECTION, SOLUTION INTRAVENOUS; SUBCUTANEOUS at 18:10

## 2018-06-12 RX ADMIN — OMEGA-3-ACID ETHYL ESTERS 1000 MG: 1 CAPSULE, LIQUID FILLED ORAL at 10:16

## 2018-06-12 RX ADMIN — Medication 10 ML: at 21:31

## 2018-06-12 RX ADMIN — MEROPENEM 1 G: 1 INJECTION, POWDER, FOR SOLUTION INTRAVENOUS at 11:12

## 2018-06-12 RX ADMIN — HYDROCODONE BITARTRATE AND ACETAMINOPHEN 2 TABLET: 5; 325 TABLET ORAL at 10:17

## 2018-06-12 RX ADMIN — BUPROPION HYDROCHLORIDE 150 MG: 150 TABLET, EXTENDED RELEASE ORAL at 21:30

## 2018-06-12 RX ADMIN — Medication 1 CAPSULE: at 10:16

## 2018-06-12 RX ADMIN — MEROPENEM 1 G: 1 INJECTION, POWDER, FOR SOLUTION INTRAVENOUS at 01:27

## 2018-06-12 RX ADMIN — TROSPIUM CHLORIDE 20 MG: 20 TABLET ORAL at 23:00

## 2018-06-12 RX ADMIN — Medication 1 UNITS: at 21:30

## 2018-06-12 RX ADMIN — HYDROCODONE BITARTRATE AND ACETAMINOPHEN 1 TABLET: 5; 325 TABLET ORAL at 01:28

## 2018-06-12 RX ADMIN — TRAZODONE HYDROCHLORIDE 100 MG: 100 TABLET ORAL at 23:00

## 2018-06-12 RX ADMIN — MEROPENEM 1 G: 1 INJECTION, POWDER, FOR SOLUTION INTRAVENOUS at 18:09

## 2018-06-12 RX ADMIN — TRAMADOL HYDROCHLORIDE 100 MG: 50 TABLET, FILM COATED ORAL at 21:29

## 2018-06-12 RX ADMIN — PREGABALIN 300 MG: 75 CAPSULE ORAL at 21:29

## 2018-06-12 RX ADMIN — INSULIN GLARGINE 50 UNITS: 100 INJECTION, SOLUTION SUBCUTANEOUS at 22:50

## 2018-06-12 RX ADMIN — TRAMADOL HYDROCHLORIDE 100 MG: 50 TABLET, FILM COATED ORAL at 13:14

## 2018-06-12 RX ADMIN — ROPINIROLE HYDROCHLORIDE 1 MG: 1 TABLET, FILM COATED ORAL at 13:13

## 2018-06-12 RX ADMIN — TRAMADOL HYDROCHLORIDE 100 MG: 50 TABLET, FILM COATED ORAL at 18:08

## 2018-06-12 RX ADMIN — ENOXAPARIN SODIUM 40 MG: 40 INJECTION, SOLUTION INTRAVENOUS; SUBCUTANEOUS at 11:26

## 2018-06-12 RX ADMIN — Medication 10 ML: at 11:12

## 2018-06-12 ASSESSMENT — PAIN SCALES - GENERAL
PAINLEVEL_OUTOF10: 10
PAINLEVEL_OUTOF10: 7
PAINLEVEL_OUTOF10: 8
PAINLEVEL_OUTOF10: 8
PAINLEVEL_OUTOF10: 7

## 2018-06-12 ASSESSMENT — PAIN DESCRIPTION - LOCATION: LOCATION: BACK;BUTTOCKS

## 2018-06-13 LAB
GLUCOSE BLD-MCNC: 139 MG/DL (ref 70–108)
GLUCOSE BLD-MCNC: 164 MG/DL (ref 70–108)
GLUCOSE BLD-MCNC: 167 MG/DL (ref 70–108)
GLUCOSE BLD-MCNC: 169 MG/DL (ref 70–108)
GLUCOSE BLD-MCNC: 256 MG/DL (ref 70–108)

## 2018-06-13 PROCEDURE — 6360000002 HC RX W HCPCS: Performed by: INTERNAL MEDICINE

## 2018-06-13 PROCEDURE — 2580000003 HC RX 258: Performed by: INTERNAL MEDICINE

## 2018-06-13 PROCEDURE — A5120 SKIN BARRIER, WIPE OR SWAB: HCPCS

## 2018-06-13 PROCEDURE — 6370000000 HC RX 637 (ALT 250 FOR IP): Performed by: INTERNAL MEDICINE

## 2018-06-13 PROCEDURE — 2580000003 HC RX 258: Performed by: FAMILY MEDICINE

## 2018-06-13 PROCEDURE — 97535 SELF CARE MNGMENT TRAINING: CPT

## 2018-06-13 PROCEDURE — 97116 GAIT TRAINING THERAPY: CPT

## 2018-06-13 PROCEDURE — 82948 REAGENT STRIP/BLOOD GLUCOSE: CPT

## 2018-06-13 PROCEDURE — 6360000002 HC RX W HCPCS: Performed by: FAMILY MEDICINE

## 2018-06-13 PROCEDURE — 97530 THERAPEUTIC ACTIVITIES: CPT

## 2018-06-13 PROCEDURE — 1290000000 HC SEMI PRIVATE OTHER R&B

## 2018-06-13 PROCEDURE — 97110 THERAPEUTIC EXERCISES: CPT

## 2018-06-13 PROCEDURE — 6370000000 HC RX 637 (ALT 250 FOR IP): Performed by: FAMILY MEDICINE

## 2018-06-13 RX ADMIN — PANTOPRAZOLE SODIUM 40 MG: 40 TABLET, DELAYED RELEASE ORAL at 08:06

## 2018-06-13 RX ADMIN — TRAMADOL HYDROCHLORIDE 100 MG: 50 TABLET, FILM COATED ORAL at 22:07

## 2018-06-13 RX ADMIN — Medication 2 UNITS: at 22:08

## 2018-06-13 RX ADMIN — LINAGLIPTIN 5 MG: 5 TABLET, FILM COATED ORAL at 08:04

## 2018-06-13 RX ADMIN — Medication 1 CAPSULE: at 08:05

## 2018-06-13 RX ADMIN — ENOXAPARIN SODIUM 40 MG: 40 INJECTION, SOLUTION INTRAVENOUS; SUBCUTANEOUS at 08:05

## 2018-06-13 RX ADMIN — ROPINIROLE HYDROCHLORIDE 1 MG: 1 TABLET, FILM COATED ORAL at 22:07

## 2018-06-13 RX ADMIN — TRAMADOL HYDROCHLORIDE 100 MG: 50 TABLET, FILM COATED ORAL at 15:48

## 2018-06-13 RX ADMIN — TROSPIUM CHLORIDE 20 MG: 20 TABLET ORAL at 22:07

## 2018-06-13 RX ADMIN — Medication 10 ML: at 08:06

## 2018-06-13 RX ADMIN — TRAZODONE HYDROCHLORIDE 100 MG: 100 TABLET ORAL at 22:08

## 2018-06-13 RX ADMIN — BUPROPION HYDROCHLORIDE 150 MG: 150 TABLET, EXTENDED RELEASE ORAL at 08:05

## 2018-06-13 RX ADMIN — ONDANSETRON HYDROCHLORIDE 4 MG: 4 TABLET, FILM COATED ORAL at 09:58

## 2018-06-13 RX ADMIN — ROPINIROLE HYDROCHLORIDE 1 MG: 1 TABLET, FILM COATED ORAL at 13:01

## 2018-06-13 RX ADMIN — INSULIN LISPRO 1 UNITS: 100 INJECTION, SOLUTION INTRAVENOUS; SUBCUTANEOUS at 12:55

## 2018-06-13 RX ADMIN — HYDROCODONE BITARTRATE AND ACETAMINOPHEN 2 TABLET: 5; 325 TABLET ORAL at 02:08

## 2018-06-13 RX ADMIN — MEROPENEM 1 G: 1 INJECTION, POWDER, FOR SOLUTION INTRAVENOUS at 15:48

## 2018-06-13 RX ADMIN — HYDROCODONE BITARTRATE AND ACETAMINOPHEN 2 TABLET: 5; 325 TABLET ORAL at 08:04

## 2018-06-13 RX ADMIN — PREGABALIN 300 MG: 75 CAPSULE ORAL at 22:07

## 2018-06-13 RX ADMIN — ATORVASTATIN CALCIUM 20 MG: 20 TABLET, FILM COATED ORAL at 08:05

## 2018-06-13 RX ADMIN — Medication 10 ML: at 22:30

## 2018-06-13 RX ADMIN — ROPINIROLE HYDROCHLORIDE 1 MG: 1 TABLET, FILM COATED ORAL at 08:04

## 2018-06-13 RX ADMIN — MEROPENEM 1 G: 1 INJECTION, POWDER, FOR SOLUTION INTRAVENOUS at 01:00

## 2018-06-13 RX ADMIN — PREGABALIN 300 MG: 75 CAPSULE ORAL at 08:02

## 2018-06-13 RX ADMIN — INSULIN GLARGINE 50 UNITS: 100 INJECTION, SOLUTION SUBCUTANEOUS at 22:29

## 2018-06-13 RX ADMIN — INSULIN LISPRO 1 UNITS: 100 INJECTION, SOLUTION INTRAVENOUS; SUBCUTANEOUS at 17:11

## 2018-06-13 RX ADMIN — MEROPENEM 1 G: 1 INJECTION, POWDER, FOR SOLUTION INTRAVENOUS at 08:09

## 2018-06-13 RX ADMIN — TRAMADOL HYDROCHLORIDE 100 MG: 50 TABLET, FILM COATED ORAL at 11:39

## 2018-06-13 RX ADMIN — BUPROPION HYDROCHLORIDE 150 MG: 150 TABLET, EXTENDED RELEASE ORAL at 22:07

## 2018-06-13 RX ADMIN — CITALOPRAM 40 MG: 40 TABLET, FILM COATED ORAL at 08:05

## 2018-06-13 RX ADMIN — OMEGA-3-ACID ETHYL ESTERS 1000 MG: 1 CAPSULE, LIQUID FILLED ORAL at 08:03

## 2018-06-13 ASSESSMENT — PAIN DESCRIPTION - LOCATION
LOCATION: BUTTOCKS

## 2018-06-13 ASSESSMENT — PAIN SCALES - GENERAL
PAINLEVEL_OUTOF10: 8
PAINLEVEL_OUTOF10: 8
PAINLEVEL_OUTOF10: 7
PAINLEVEL_OUTOF10: 8
PAINLEVEL_OUTOF10: 8
PAINLEVEL_OUTOF10: 6
PAINLEVEL_OUTOF10: 7
PAINLEVEL_OUTOF10: 8
PAINLEVEL_OUTOF10: 8
PAINLEVEL_OUTOF10: 9
PAINLEVEL_OUTOF10: 7
PAINLEVEL_OUTOF10: 9

## 2018-06-13 ASSESSMENT — PAIN DESCRIPTION - DESCRIPTORS
DESCRIPTORS: SHARP
DESCRIPTORS: ACHING;SHARP
DESCRIPTORS: SHARP

## 2018-06-13 ASSESSMENT — PAIN DESCRIPTION - PAIN TYPE: TYPE: ACUTE PAIN

## 2018-06-13 ASSESSMENT — PAIN DESCRIPTION - ORIENTATION: ORIENTATION: LOWER

## 2018-06-13 ASSESSMENT — PAIN DESCRIPTION - PROGRESSION: CLINICAL_PROGRESSION: NOT CHANGED

## 2018-06-13 ASSESSMENT — PAIN DESCRIPTION - FREQUENCY: FREQUENCY: CONTINUOUS

## 2018-06-13 ASSESSMENT — PAIN DESCRIPTION - ONSET: ONSET: ON-GOING

## 2018-06-14 LAB
GLUCOSE BLD-MCNC: 123 MG/DL (ref 70–108)
GLUCOSE BLD-MCNC: 128 MG/DL (ref 70–108)
GLUCOSE BLD-MCNC: 160 MG/DL (ref 70–108)
GLUCOSE BLD-MCNC: 165 MG/DL (ref 70–108)

## 2018-06-14 PROCEDURE — 82948 REAGENT STRIP/BLOOD GLUCOSE: CPT

## 2018-06-14 PROCEDURE — 6360000002 HC RX W HCPCS: Performed by: INTERNAL MEDICINE

## 2018-06-14 PROCEDURE — 97530 THERAPEUTIC ACTIVITIES: CPT

## 2018-06-14 PROCEDURE — 2580000003 HC RX 258: Performed by: FAMILY MEDICINE

## 2018-06-14 PROCEDURE — 6370000000 HC RX 637 (ALT 250 FOR IP): Performed by: INTERNAL MEDICINE

## 2018-06-14 PROCEDURE — 97110 THERAPEUTIC EXERCISES: CPT

## 2018-06-14 PROCEDURE — 2580000003 HC RX 258: Performed by: INTERNAL MEDICINE

## 2018-06-14 PROCEDURE — 1290000000 HC SEMI PRIVATE OTHER R&B

## 2018-06-14 PROCEDURE — A5120 SKIN BARRIER, WIPE OR SWAB: HCPCS

## 2018-06-14 PROCEDURE — 6370000000 HC RX 637 (ALT 250 FOR IP): Performed by: FAMILY MEDICINE

## 2018-06-14 RX ADMIN — TRAZODONE HYDROCHLORIDE 100 MG: 100 TABLET ORAL at 23:00

## 2018-06-14 RX ADMIN — PANTOPRAZOLE SODIUM 40 MG: 40 TABLET, DELAYED RELEASE ORAL at 09:38

## 2018-06-14 RX ADMIN — PREGABALIN 300 MG: 75 CAPSULE ORAL at 08:46

## 2018-06-14 RX ADMIN — ROPINIROLE HYDROCHLORIDE 1 MG: 1 TABLET, FILM COATED ORAL at 22:59

## 2018-06-14 RX ADMIN — HYDROCODONE BITARTRATE AND ACETAMINOPHEN 2 TABLET: 5; 325 TABLET ORAL at 13:19

## 2018-06-14 RX ADMIN — OMEGA-3-ACID ETHYL ESTERS 1000 MG: 1 CAPSULE, LIQUID FILLED ORAL at 09:38

## 2018-06-14 RX ADMIN — TRAMADOL HYDROCHLORIDE 100 MG: 50 TABLET, FILM COATED ORAL at 16:38

## 2018-06-14 RX ADMIN — Medication 10 ML: at 09:41

## 2018-06-14 RX ADMIN — Medication 10 ML: at 23:05

## 2018-06-14 RX ADMIN — MEROPENEM 1 G: 1 INJECTION, POWDER, FOR SOLUTION INTRAVENOUS at 16:38

## 2018-06-14 RX ADMIN — TRAMADOL HYDROCHLORIDE 100 MG: 50 TABLET, FILM COATED ORAL at 23:00

## 2018-06-14 RX ADMIN — HYDROCODONE BITARTRATE AND ACETAMINOPHEN 2 TABLET: 5; 325 TABLET ORAL at 05:01

## 2018-06-14 RX ADMIN — ROPINIROLE HYDROCHLORIDE 1 MG: 1 TABLET, FILM COATED ORAL at 13:18

## 2018-06-14 RX ADMIN — TROSPIUM CHLORIDE 20 MG: 20 TABLET ORAL at 22:59

## 2018-06-14 RX ADMIN — ROPINIROLE HYDROCHLORIDE 1 MG: 1 TABLET, FILM COATED ORAL at 09:37

## 2018-06-14 RX ADMIN — MEROPENEM 1 G: 1 INJECTION, POWDER, FOR SOLUTION INTRAVENOUS at 01:00

## 2018-06-14 RX ADMIN — ENOXAPARIN SODIUM 40 MG: 40 INJECTION, SOLUTION INTRAVENOUS; SUBCUTANEOUS at 09:44

## 2018-06-14 RX ADMIN — HYDROCODONE BITARTRATE AND ACETAMINOPHEN 2 TABLET: 5; 325 TABLET ORAL at 18:32

## 2018-06-14 RX ADMIN — Medication 1 CAPSULE: at 08:41

## 2018-06-14 RX ADMIN — PREGABALIN 300 MG: 75 CAPSULE ORAL at 22:59

## 2018-06-14 RX ADMIN — LINAGLIPTIN 5 MG: 5 TABLET, FILM COATED ORAL at 09:36

## 2018-06-14 RX ADMIN — TRAMADOL HYDROCHLORIDE 100 MG: 50 TABLET, FILM COATED ORAL at 10:35

## 2018-06-14 RX ADMIN — BUPROPION HYDROCHLORIDE 150 MG: 150 TABLET, EXTENDED RELEASE ORAL at 08:49

## 2018-06-14 RX ADMIN — CITALOPRAM 40 MG: 40 TABLET, FILM COATED ORAL at 08:50

## 2018-06-14 RX ADMIN — Medication 1 UNITS: at 22:57

## 2018-06-14 RX ADMIN — ATORVASTATIN CALCIUM 20 MG: 20 TABLET, FILM COATED ORAL at 08:48

## 2018-06-14 RX ADMIN — MEROPENEM 1 G: 1 INJECTION, POWDER, FOR SOLUTION INTRAVENOUS at 08:43

## 2018-06-14 RX ADMIN — INSULIN GLARGINE 50 UNITS: 100 INJECTION, SOLUTION SUBCUTANEOUS at 22:57

## 2018-06-14 RX ADMIN — INSULIN LISPRO 1 UNITS: 100 INJECTION, SOLUTION INTRAVENOUS; SUBCUTANEOUS at 12:16

## 2018-06-14 RX ADMIN — Medication 10 ML: at 08:54

## 2018-06-14 RX ADMIN — BUPROPION HYDROCHLORIDE 150 MG: 150 TABLET, EXTENDED RELEASE ORAL at 22:58

## 2018-06-14 ASSESSMENT — PAIN SCALES - GENERAL
PAINLEVEL_OUTOF10: 8
PAINLEVEL_OUTOF10: 8
PAINLEVEL_OUTOF10: 5
PAINLEVEL_OUTOF10: 0
PAINLEVEL_OUTOF10: 6
PAINLEVEL_OUTOF10: 9
PAINLEVEL_OUTOF10: 5
PAINLEVEL_OUTOF10: 9
PAINLEVEL_OUTOF10: 7
PAINLEVEL_OUTOF10: 7
PAINLEVEL_OUTOF10: 8

## 2018-06-14 ASSESSMENT — PAIN DESCRIPTION - DESCRIPTORS
DESCRIPTORS: SHARP;BURNING
DESCRIPTORS: SHARP

## 2018-06-14 ASSESSMENT — PAIN DESCRIPTION - LOCATION
LOCATION: BUTTOCKS
LOCATION: BUTTOCKS

## 2018-06-14 ASSESSMENT — PAIN DESCRIPTION - PAIN TYPE: TYPE: ACUTE PAIN

## 2018-06-15 VITALS
BODY MASS INDEX: 41.25 KG/M2 | OXYGEN SATURATION: 96 % | HEART RATE: 68 BPM | SYSTOLIC BLOOD PRESSURE: 125 MMHG | DIASTOLIC BLOOD PRESSURE: 61 MMHG | RESPIRATION RATE: 16 BRPM | WEIGHT: 272.2 LBS | HEIGHT: 68 IN | TEMPERATURE: 97.8 F

## 2018-06-15 LAB — GLUCOSE BLD-MCNC: 123 MG/DL (ref 70–108)

## 2018-06-15 PROCEDURE — 6360000002 HC RX W HCPCS: Performed by: INTERNAL MEDICINE

## 2018-06-15 PROCEDURE — 2580000003 HC RX 258: Performed by: FAMILY MEDICINE

## 2018-06-15 PROCEDURE — 2580000003 HC RX 258: Performed by: INTERNAL MEDICINE

## 2018-06-15 PROCEDURE — 6370000000 HC RX 637 (ALT 250 FOR IP): Performed by: FAMILY MEDICINE

## 2018-06-15 PROCEDURE — 82948 REAGENT STRIP/BLOOD GLUCOSE: CPT

## 2018-06-15 PROCEDURE — 6370000000 HC RX 637 (ALT 250 FOR IP): Performed by: INTERNAL MEDICINE

## 2018-06-15 RX ORDER — TROSPIUM CHLORIDE 20 MG/1
20 TABLET, FILM COATED ORAL NIGHTLY
Qty: 30 TABLET | Refills: 0 | Status: SHIPPED | OUTPATIENT
Start: 2018-06-15 | End: 2018-07-18 | Stop reason: ALTCHOICE

## 2018-06-15 RX ADMIN — BUPROPION HYDROCHLORIDE 150 MG: 150 TABLET, EXTENDED RELEASE ORAL at 09:14

## 2018-06-15 RX ADMIN — Medication 10 ML: at 07:30

## 2018-06-15 RX ADMIN — MEROPENEM 1 G: 1 INJECTION, POWDER, FOR SOLUTION INTRAVENOUS at 07:30

## 2018-06-15 RX ADMIN — PREGABALIN 300 MG: 75 CAPSULE ORAL at 09:14

## 2018-06-15 RX ADMIN — HYDROCODONE BITARTRATE AND ACETAMINOPHEN 2 TABLET: 5; 325 TABLET ORAL at 03:07

## 2018-06-15 RX ADMIN — MEROPENEM 1 G: 1 INJECTION, POWDER, FOR SOLUTION INTRAVENOUS at 00:01

## 2018-06-15 RX ADMIN — ROPINIROLE HYDROCHLORIDE 1 MG: 1 TABLET, FILM COATED ORAL at 09:14

## 2018-06-15 RX ADMIN — OMEGA-3-ACID ETHYL ESTERS 1000 MG: 1 CAPSULE, LIQUID FILLED ORAL at 09:16

## 2018-06-15 RX ADMIN — PANTOPRAZOLE SODIUM 40 MG: 40 TABLET, DELAYED RELEASE ORAL at 09:15

## 2018-06-15 RX ADMIN — Medication 1 CAPSULE: at 09:15

## 2018-06-15 RX ADMIN — LINAGLIPTIN 5 MG: 5 TABLET, FILM COATED ORAL at 09:15

## 2018-06-15 RX ADMIN — ATORVASTATIN CALCIUM 20 MG: 20 TABLET, FILM COATED ORAL at 09:14

## 2018-06-15 RX ADMIN — ENOXAPARIN SODIUM 40 MG: 40 INJECTION, SOLUTION INTRAVENOUS; SUBCUTANEOUS at 09:15

## 2018-06-15 RX ADMIN — CITALOPRAM 40 MG: 40 TABLET, FILM COATED ORAL at 09:14

## 2018-06-15 ASSESSMENT — PAIN SCALES - GENERAL
PAINLEVEL_OUTOF10: 0
PAINLEVEL_OUTOF10: 9

## 2018-06-16 ENCOUNTER — CARE COORDINATION (OUTPATIENT)
Dept: CASE MANAGEMENT | Age: 58
End: 2018-06-16

## 2018-06-16 DIAGNOSIS — L02.31 GLUTEAL ABSCESS: Primary | ICD-10-CM

## 2018-06-16 PROCEDURE — 1111F DSCHRG MED/CURRENT MED MERGE: CPT | Performed by: NURSE PRACTITIONER

## 2018-06-19 ENCOUNTER — TELEPHONE (OUTPATIENT)
Dept: FAMILY MEDICINE CLINIC | Age: 58
End: 2018-06-19

## 2018-06-20 ENCOUNTER — OFFICE VISIT (OUTPATIENT)
Dept: FAMILY MEDICINE CLINIC | Age: 58
End: 2018-06-20
Payer: MEDICARE

## 2018-06-20 ENCOUNTER — TELEPHONE (OUTPATIENT)
Dept: FAMILY MEDICINE CLINIC | Age: 58
End: 2018-06-20

## 2018-06-20 VITALS
HEIGHT: 68 IN | WEIGHT: 273 LBS | SYSTOLIC BLOOD PRESSURE: 110 MMHG | HEART RATE: 63 BPM | TEMPERATURE: 97.7 F | RESPIRATION RATE: 16 BRPM | DIASTOLIC BLOOD PRESSURE: 64 MMHG | BODY MASS INDEX: 41.37 KG/M2

## 2018-06-20 DIAGNOSIS — L08.9 INFECTED BITE WOUND: ICD-10-CM

## 2018-06-20 DIAGNOSIS — Z79.4 TYPE 2 DIABETES MELLITUS WITH OTHER CIRCULATORY COMPLICATION, WITH LONG-TERM CURRENT USE OF INSULIN (HCC): ICD-10-CM

## 2018-06-20 DIAGNOSIS — E11.59 TYPE 2 DIABETES MELLITUS WITH OTHER CIRCULATORY COMPLICATION, WITH LONG-TERM CURRENT USE OF INSULIN (HCC): ICD-10-CM

## 2018-06-20 DIAGNOSIS — D72.829 LEUKOCYTOSIS, UNSPECIFIED TYPE: ICD-10-CM

## 2018-06-20 DIAGNOSIS — T14.8XXA INFECTED BITE WOUND: ICD-10-CM

## 2018-06-20 DIAGNOSIS — Z09 HOSPITAL DISCHARGE FOLLOW-UP: Primary | ICD-10-CM

## 2018-06-20 DIAGNOSIS — R25.1 SHAKING: ICD-10-CM

## 2018-06-20 DIAGNOSIS — E78.2 HYPERLIPIDEMIA, MIXED: ICD-10-CM

## 2018-06-20 DIAGNOSIS — R53.81 WEAKENED PATIENT: ICD-10-CM

## 2018-06-20 PROCEDURE — 99214 OFFICE O/P EST MOD 30 MIN: CPT | Performed by: NURSE PRACTITIONER

## 2018-06-20 RX ORDER — ATORVASTATIN CALCIUM 20 MG/1
TABLET, FILM COATED ORAL
Qty: 90 TABLET | Refills: 3 | Status: SHIPPED | OUTPATIENT
Start: 2018-06-20 | End: 2019-05-02 | Stop reason: SDUPTHER

## 2018-06-20 ASSESSMENT — ENCOUNTER SYMPTOMS
ABDOMINAL PAIN: 0
RESPIRATORY NEGATIVE: 1
RHINORRHEA: 0
COLOR CHANGE: 1
ABDOMINAL DISTENTION: 0

## 2018-06-21 ENCOUNTER — CARE COORDINATION (OUTPATIENT)
Dept: CARE COORDINATION | Age: 58
End: 2018-06-21

## 2018-06-23 NOTE — PROGRESS NOTES
(ACCU-CHEK DAVIDA) strip 1 each by In Vitro route daily As needed. 100 each 3    atorvastatin (LIPITOR) 20 MG tablet Take 1 tablet by mouth daily 90 tablet 0    insulin lispro (HUMALOG KWIKPEN) 100 UNIT/ML pen Inject 8 Units into the skin 3 times daily (before meals) 5 Pen 3    insulin detemir (LEVEMIR FLEXTOUCH) 100 UNIT/ML injection pen Inject 30 Units into the skin nightly 5 Pen 3    pregabalin (LYRICA) 300 MG capsule Take 1 capsule by mouth 2 times daily 180 capsule 1    Omega-3 Fatty Acids (FISH OIL) 1000 MG CAPS Take 1,000 mg by mouth daily      Cinnamon 500 MG CAPS Take 1 capsule by mouth daily      traZODone (DESYREL) 100 MG tablet Take 1 tablet by mouth nightly 90 tablet 1    pantoprazole (PROTONIX) 40 MG tablet Take 1 tablet by mouth daily 90 tablet 1    citalopram (CELEXA) 40 MG tablet Take 1 tablet by mouth daily 90 tablet 1    buPROPion (WELLBUTRIN SR) 150 MG extended release tablet TAKE 1 TABLET BY MOUTH TWICE DAILY 90 tablet 1    Cholecalciferol (VITAMIN D3) 2000 units CAPS Take by mouth daily      vitamin B-12 (CYANOCOBALAMIN) 500 MCG tablet Take 500 mcg by mouth daily      rOPINIRole (REQUIP) 1 MG tablet Take 1 mg by mouth 3 times daily as needed       traMADol (ULTRAM) 50 MG tablet Take 50 mg by mouth every 6 hours as needed for Pain      HYDROcodone-acetaminophen (NORCO) 5-325 MG per tablet Take 1 tablet by mouth every 4 hours as needed for Pain (For moderate pain level 4-6). 60 tablet 0    acetaminophen 650 MG TABS Take 650 mg by mouth every 4 hours as needed for Pain (For mild pain level 1-3 or for fever > 100.5). 120 tablet      No current facility-administered medications on file prior to encounter. Vitals from current and previous visits: Wt: 270, Ht: 5'7\"   -Body mass index is 42.29 kg/m². Greater than 40 - Morbid Obesity / Extreme Obesity / Grade III. -Weight goal: lose weight.    Nutrition Diagnosis:   Food and nutrition-related knowledge deficit related to Lack of Abdomen soft, non-tender, no guarding.

## 2018-06-25 ENCOUNTER — TELEPHONE (OUTPATIENT)
Dept: FAMILY MEDICINE CLINIC | Age: 58
End: 2018-06-25

## 2018-06-26 ENCOUNTER — OFFICE VISIT (OUTPATIENT)
Dept: SURGERY | Age: 58
End: 2018-06-26

## 2018-06-26 ENCOUNTER — HOSPITAL ENCOUNTER (OUTPATIENT)
Age: 58
Discharge: HOME | End: 2018-06-26
Payer: MEDICARE

## 2018-06-26 VITALS
SYSTOLIC BLOOD PRESSURE: 122 MMHG | BODY MASS INDEX: 42.53 KG/M2 | DIASTOLIC BLOOD PRESSURE: 66 MMHG | TEMPERATURE: 97.5 F | OXYGEN SATURATION: 93 % | WEIGHT: 271 LBS | HEIGHT: 67 IN | RESPIRATION RATE: 16 BRPM | HEART RATE: 77 BPM

## 2018-06-26 DIAGNOSIS — Z79.4 TYPE 2 DIABETES MELLITUS WITH OTHER CIRCULATORY COMPLICATION, WITH LONG-TERM CURRENT USE OF INSULIN (HCC): ICD-10-CM

## 2018-06-26 DIAGNOSIS — D72.829 LEUKOCYTOSIS, UNSPECIFIED TYPE: ICD-10-CM

## 2018-06-26 DIAGNOSIS — E11.59 TYPE 2 DIABETES MELLITUS WITH OTHER CIRCULATORY COMPLICATION, WITH LONG-TERM CURRENT USE OF INSULIN (HCC): ICD-10-CM

## 2018-06-26 DIAGNOSIS — Z51.89 VISIT FOR WOUND CHECK: Primary | ICD-10-CM

## 2018-06-26 LAB
BASOPHILS # BLD: 0.8 %
BASOPHILS ABSOLUTE: 0.1 THOU/MM3 (ref 0–0.1)
C-PEPTIDE: 7 NG/ML (ref 1.1–4.4)
EOSINOPHIL # BLD: 2.9 %
EOSINOPHILS ABSOLUTE: 0.3 THOU/MM3 (ref 0–0.4)
ERYTHROCYTE [DISTWIDTH] IN BLOOD BY AUTOMATED COUNT: 13.5 % (ref 11.5–14.5)
ERYTHROCYTE [DISTWIDTH] IN BLOOD BY AUTOMATED COUNT: 43.8 FL (ref 35–45)
HCT VFR BLD CALC: 40.2 % (ref 37–47)
HEMOGLOBIN: 13.7 GM/DL (ref 12–16)
IMMATURE GRANS (ABS): 0.06 THOU/MM3 (ref 0–0.07)
IMMATURE GRANULOCYTES: 0.5 %
LYMPHOCYTES # BLD: 31.4 %
LYMPHOCYTES ABSOLUTE: 3.7 THOU/MM3 (ref 1–4.8)
MCH RBC QN AUTO: 30.7 PG (ref 26–33)
MCHC RBC AUTO-ENTMCNC: 34.1 GM/DL (ref 32–35)
MCV RBC AUTO: 90.1 FL (ref 81–99)
MONOCYTES # BLD: 7.2 %
MONOCYTES ABSOLUTE: 0.9 THOU/MM3 (ref 0.4–1.3)
NUCLEATED RED BLOOD CELLS: 0 /100 WBC
PLATELET # BLD: 248 THOU/MM3 (ref 130–400)
PMV BLD AUTO: 11.5 FL (ref 9–12)
RBC # BLD: 4.46 MILL/MM3 (ref 4.2–5.4)
SEG NEUTROPHILS: 57.2 %
SEGMENTED NEUTROPHILS ABSOLUTE COUNT: 6.8 THOU/MM3 (ref 1.8–7.7)
WBC # BLD: 11.9 THOU/MM3 (ref 4.8–10.8)

## 2018-06-26 PROCEDURE — 36415 COLL VENOUS BLD VENIPUNCTURE: CPT

## 2018-06-26 PROCEDURE — 85025 COMPLETE CBC W/AUTO DIFF WBC: CPT

## 2018-06-26 PROCEDURE — 99024 POSTOP FOLLOW-UP VISIT: CPT | Performed by: NURSE PRACTITIONER

## 2018-06-26 PROCEDURE — 84681 ASSAY OF C-PEPTIDE: CPT

## 2018-06-26 ASSESSMENT — ENCOUNTER SYMPTOMS
COLOR CHANGE: 0
ABDOMINAL PAIN: 0
EYE REDNESS: 0
EYE DISCHARGE: 0
SORE THROAT: 0
NAUSEA: 0
PHOTOPHOBIA: 0
EYE ITCHING: 0
FACIAL SWELLING: 0
VOICE CHANGE: 0
CHEST TIGHTNESS: 0
CHOKING: 0
SINUS PRESSURE: 0
DIARRHEA: 1
WHEEZING: 0
SHORTNESS OF BREATH: 0
BLOOD IN STOOL: 0
APNEA: 0
BACK PAIN: 0
RHINORRHEA: 0
COUGH: 0
STRIDOR: 0
RECTAL PAIN: 0
EYE PAIN: 0
ANAL BLEEDING: 0
VOMITING: 0
ABDOMINAL DISTENTION: 0
CONSTIPATION: 0
TROUBLE SWALLOWING: 0

## 2018-06-27 ENCOUNTER — HOSPITAL ENCOUNTER (OUTPATIENT)
Dept: WOUND CARE | Age: 58
Discharge: HOME | End: 2018-06-27
Payer: MEDICARE

## 2018-06-27 VITALS
DIASTOLIC BLOOD PRESSURE: 56 MMHG | HEART RATE: 78 BPM | SYSTOLIC BLOOD PRESSURE: 116 MMHG | OXYGEN SATURATION: 95 % | TEMPERATURE: 97.8 F | HEIGHT: 68 IN | BODY MASS INDEX: 41.24 KG/M2 | RESPIRATION RATE: 18 BRPM | WEIGHT: 272.1 LBS

## 2018-06-27 PROCEDURE — 99212 OFFICE O/P EST SF 10 MIN: CPT

## 2018-06-27 ASSESSMENT — PAIN DESCRIPTION - PAIN TYPE: TYPE: ACUTE PAIN

## 2018-06-27 ASSESSMENT — PAIN DESCRIPTION - ORIENTATION: ORIENTATION: RIGHT

## 2018-06-27 ASSESSMENT — PAIN SCALES - GENERAL: PAINLEVEL_OUTOF10: 8

## 2018-06-27 ASSESSMENT — PAIN DESCRIPTION - LOCATION: LOCATION: BUTTOCKS

## 2018-06-28 ENCOUNTER — HOSPITAL ENCOUNTER (OUTPATIENT)
Dept: NEUROLOGY | Age: 58
Discharge: HOME | End: 2018-06-28
Payer: MEDICARE

## 2018-06-28 DIAGNOSIS — R25.1 SHAKING: ICD-10-CM

## 2018-06-28 PROCEDURE — 95819 EEG AWAKE AND ASLEEP: CPT

## 2018-07-10 ENCOUNTER — TELEPHONE (OUTPATIENT)
Dept: SURGERY | Age: 58
End: 2018-07-10

## 2018-07-10 ENCOUNTER — OFFICE VISIT (OUTPATIENT)
Dept: SURGERY | Age: 58
End: 2018-07-10

## 2018-07-10 VITALS
RESPIRATION RATE: 22 BRPM | TEMPERATURE: 97.4 F | BODY MASS INDEX: 41.66 KG/M2 | HEART RATE: 77 BPM | OXYGEN SATURATION: 98 % | DIASTOLIC BLOOD PRESSURE: 72 MMHG | SYSTOLIC BLOOD PRESSURE: 118 MMHG | WEIGHT: 274 LBS

## 2018-07-10 DIAGNOSIS — Z12.11 SCREENING FOR COLON CANCER: ICD-10-CM

## 2018-07-10 DIAGNOSIS — Z51.89 VISIT FOR WOUND CHECK: Primary | ICD-10-CM

## 2018-07-10 DIAGNOSIS — Z86.010 HISTORY OF COLON POLYPS: ICD-10-CM

## 2018-07-10 PROCEDURE — 99024 POSTOP FOLLOW-UP VISIT: CPT | Performed by: NURSE PRACTITIONER

## 2018-07-10 ASSESSMENT — ENCOUNTER SYMPTOMS
ROS SKIN COMMENTS: BUTTOCK
VOICE CHANGE: 0
ANAL BLEEDING: 0
EYE ITCHING: 0
SINUS PRESSURE: 0
ABDOMINAL PAIN: 0
APNEA: 0
EYE DISCHARGE: 0
BACK PAIN: 1
COLOR CHANGE: 0
RHINORRHEA: 0
TROUBLE SWALLOWING: 0
BLOOD IN STOOL: 0
FACIAL SWELLING: 0
COUGH: 0
VOMITING: 0
EYE PAIN: 0
CHOKING: 0
NAUSEA: 0
SINUS PAIN: 0
PHOTOPHOBIA: 0
SHORTNESS OF BREATH: 0
WHEEZING: 0
SORE THROAT: 0
CHEST TIGHTNESS: 0
ABDOMINAL DISTENTION: 0
RECTAL PAIN: 0
CONSTIPATION: 0
EYE REDNESS: 0
STRIDOR: 0

## 2018-07-10 NOTE — TELEPHONE ENCOUNTER
Scheduled Gilmar Killian for a colonoscopy on Tues 7/31 at 8am & she will arrive at 7am. The prep instructions were given.

## 2018-07-18 ENCOUNTER — OFFICE VISIT (OUTPATIENT)
Dept: FAMILY MEDICINE CLINIC | Age: 58
End: 2018-07-18
Payer: MEDICARE

## 2018-07-18 VITALS
TEMPERATURE: 98 F | HEART RATE: 74 BPM | WEIGHT: 277.8 LBS | HEIGHT: 67 IN | SYSTOLIC BLOOD PRESSURE: 102 MMHG | BODY MASS INDEX: 43.6 KG/M2 | RESPIRATION RATE: 16 BRPM | DIASTOLIC BLOOD PRESSURE: 62 MMHG

## 2018-07-18 DIAGNOSIS — K21.9 GASTROESOPHAGEAL REFLUX DISEASE WITHOUT ESOPHAGITIS: ICD-10-CM

## 2018-07-18 DIAGNOSIS — E11.42 DIABETIC POLYNEUROPATHY ASSOCIATED WITH TYPE 2 DIABETES MELLITUS (HCC): ICD-10-CM

## 2018-07-18 DIAGNOSIS — M79.7 FIBROMYALGIA: ICD-10-CM

## 2018-07-18 DIAGNOSIS — Z79.4 TYPE 2 DIABETES MELLITUS WITH OTHER CIRCULATORY COMPLICATION, WITH LONG-TERM CURRENT USE OF INSULIN (HCC): Primary | ICD-10-CM

## 2018-07-18 DIAGNOSIS — F33.42 RECURRENT MAJOR DEPRESSIVE DISORDER, IN FULL REMISSION (HCC): ICD-10-CM

## 2018-07-18 DIAGNOSIS — E11.59 TYPE 2 DIABETES MELLITUS WITH OTHER CIRCULATORY COMPLICATION, WITH LONG-TERM CURRENT USE OF INSULIN (HCC): Primary | ICD-10-CM

## 2018-07-18 PROBLEM — L02.31 GLUTEAL ABSCESS: Status: RESOLVED | Noted: 2018-05-31 | Resolved: 2018-07-18

## 2018-07-18 LAB
CREATININE URINE POCT: NORMAL
HBA1C MFR BLD: 8.3 %
MICROALBUMIN/CREAT 24H UR: NORMAL MG/G{CREAT}
MICROALBUMIN/CREAT UR-RTO: NORMAL

## 2018-07-18 PROCEDURE — 82044 UR ALBUMIN SEMIQUANTITATIVE: CPT | Performed by: NURSE PRACTITIONER

## 2018-07-18 PROCEDURE — 83036 HEMOGLOBIN GLYCOSYLATED A1C: CPT | Performed by: NURSE PRACTITIONER

## 2018-07-18 PROCEDURE — 99214 OFFICE O/P EST MOD 30 MIN: CPT | Performed by: NURSE PRACTITIONER

## 2018-07-18 RX ORDER — PIOGLITAZONEHYDROCHLORIDE 15 MG/1
15 TABLET ORAL DAILY
Qty: 90 TABLET | Refills: 3 | Status: SHIPPED | OUTPATIENT
Start: 2018-07-18 | End: 2018-10-29 | Stop reason: SDUPTHER

## 2018-07-18 RX ORDER — PREGABALIN 300 MG/1
300 CAPSULE ORAL 2 TIMES DAILY
Qty: 180 CAPSULE | Refills: 0 | Status: SHIPPED | OUTPATIENT
Start: 2018-07-18 | End: 2018-10-29 | Stop reason: SDUPTHER

## 2018-07-18 RX ORDER — CITALOPRAM 40 MG/1
40 TABLET ORAL DAILY
Qty: 90 TABLET | Refills: 1 | Status: SHIPPED | OUTPATIENT
Start: 2018-07-18 | End: 2018-12-27 | Stop reason: SDUPTHER

## 2018-07-18 RX ORDER — PANTOPRAZOLE SODIUM 40 MG/1
40 TABLET, DELAYED RELEASE ORAL DAILY
Qty: 90 TABLET | Refills: 1 | Status: SHIPPED | OUTPATIENT
Start: 2018-07-18 | End: 2019-05-02 | Stop reason: SDUPTHER

## 2018-07-18 RX ORDER — TRAZODONE HYDROCHLORIDE 100 MG/1
100 TABLET ORAL NIGHTLY
Qty: 90 TABLET | Refills: 1 | Status: SHIPPED | OUTPATIENT
Start: 2018-07-18 | End: 2018-12-27 | Stop reason: SDUPTHER

## 2018-07-18 RX ORDER — PREGABALIN 300 MG/1
300 CAPSULE ORAL 2 TIMES DAILY
Status: CANCELLED | OUTPATIENT
Start: 2018-07-18

## 2018-07-18 RX ORDER — PREGABALIN 300 MG/1
300 CAPSULE ORAL 2 TIMES DAILY
COMMUNITY
End: 2018-07-18 | Stop reason: SDUPTHER

## 2018-07-18 NOTE — PROGRESS NOTES
Visit Information    Have you changed or started any medications since your last visit including any over-the-counter medicines, vitamins, or herbal medicines? no   Are you having any side effects from any of your medications? -  no  Have you stopped taking any of your medications? Is so, why? -  no    Have you seen any other physician or provider since your last visit? Yes - Records Obtained  Have you had any other diagnostic tests since your last visit? Yes - Records Obtained  Have you been seen in the emergency room and/or had an admission to a hospital since we last saw you? No  Have you had your routine dental cleaning in the past 6 months? no    Have you activated your Julep account? If not, what are your barriers? No     Patient Care Team:  ALLEN Castaneda CNP as PCP - General (Family Nurse Practitioner)  ALLEN Maddox CNP as PCP - S Attributed Provider    Medical History Review  Past Medical, Family, and Social History     Defer to provider.
Pulse 74   Temp 98 °F (36.7 °C) (Oral)   Resp 16   Ht 5' 7\" (1.702 m)   Wt 277 lb 12.8 oz (126 kg)   BMI 43.51 kg/m²   Appearance: alert, well appearing, and in no distress, normal appearing weight and well hydrated. Neck exam - supple, no significant adenopathy. overweight  CVS exam: normal rate, regular rhythm, normal S1, S2, no murmurs, rubs, clicks or gallops. Lungs: clear to auscultation, no wheezes, rales or rhonchi, symmetric air entry. Abdomen:  BS normal, soft, non tender, non distended, no rebound or guarding  Mental Status: normal mood, affect behavior, speech, dress,  and thought processes. Neuro:  Alert, muscle tone grossly normal  Skin exam: normal coloration and turgor, no rashes, no suspicious skin lesions noted. Foot exam:  No pedal edema, no erythematous lesions noted b/l, sensation wnl b/l, PT and TP pulses wnl b/l    ASSESSMENT & PLAN  Jose Juan Mariscal was seen today for follow-up, follow up after procedure and other. Diagnoses and all orders for this visit:    Type 2 diabetes mellitus with other circulatory complication, with long-term current use of insulin ([facility])  -     Liraglutide (VICTOZA) 18 MG/3ML SOPN SC injection; Inject 1.8 mg into the skin daily  -     POCT glycosylated hemoglobin (Hb A1C)  -     POCT microalbumin  Increase victoza to 1.8 mg  Add actos  Increase levemir to 48 units at night   Gastroesophageal reflux disease without esophagitis  -     pantoprazole (PROTONIX) 40 MG tablet; Take 1 tablet by mouth daily  gerd diet  Recurrent major [facility], [facility]  -     traZODone (DESYREL) 100 MG tablet; Take 1 tablet by mouth nightly  -     citalopram (CELEXA) 40 MG tablet; Take 1 tablet by mouth daily    Fibromyalgia  -     pregabalin (LYRICA) 300 MG capsule; Take 1 capsule by mouth 2 times daily for 90 days. .    Diabetic polyneuropathy associated with type 2 diabetes mellitus ([facility]    Other orders  -     insulin detemir (LEVEMIR FLEXTOUCH) 100 UNIT/ML

## 2018-07-19 ENCOUNTER — TELEPHONE (OUTPATIENT)
Dept: SURGERY | Age: 58
End: 2018-07-19

## 2018-07-25 ENCOUNTER — HOSPITAL ENCOUNTER (OUTPATIENT)
Dept: WOUND CARE | Age: 58
Discharge: HOME | End: 2018-07-25
Payer: MEDICARE

## 2018-07-25 VITALS
WEIGHT: 277 LBS | SYSTOLIC BLOOD PRESSURE: 131 MMHG | TEMPERATURE: 97.6 F | HEART RATE: 72 BPM | BODY MASS INDEX: 43.38 KG/M2 | OXYGEN SATURATION: 96 % | DIASTOLIC BLOOD PRESSURE: 69 MMHG | RESPIRATION RATE: 18 BRPM

## 2018-07-25 PROCEDURE — 99212 OFFICE O/P EST SF 10 MIN: CPT

## 2018-07-25 ASSESSMENT — PAIN DESCRIPTION - LOCATION: LOCATION: BUTTOCKS

## 2018-07-25 ASSESSMENT — PAIN DESCRIPTION - ORIENTATION: ORIENTATION: RIGHT

## 2018-07-25 ASSESSMENT — PAIN SCALES - GENERAL: PAINLEVEL_OUTOF10: 3

## 2018-07-25 ASSESSMENT — PAIN DESCRIPTION - PAIN TYPE: TYPE: ACUTE PAIN

## 2018-07-25 NOTE — PROGRESS NOTES
[facility]          Progress Note and Procedure Note      Libby Goldman [address] RECORD NUMBER:  [id number]  AGE: 61 y.o. GENDER: female  : 1960  EPISODE DATE:  2018    Subjective:     SUBJECTIVE     Chief Complaint   Patient presents with    Wound Check     Right gluteal           HISTORY OF PRESENT ILLNESS   She was seen for follow up visit. Had gluteal wound that required I and d. The wound is healing well, no drainage , no pain. She was seen by her surgeon.      PAST MEDICAL HISTORY         Diagnosis Date    Anxiety     Arthritis     Carpal tunnel syndrome     Chronic back pain     Depression     Diabetes mellitus ([address]     Emphysema of lung (HCC)     Fibromyalgia     GERD (gastroesophageal reflux disease)     Glaucoma     Hiatal hernia     esophagus closes    Hx of blood clots     foot post op    Hyperlipidemia, mixed 2017    IBS (irritable bowel syndrome)     Leg pain     nerve damage right leg    MDRO (multiple drug resistant organisms) resistance     wound and nasal    Guzman's neuroma     left foot    Nausea & vomiting     Neuropathic pain     Osteoarthritis     PONV (postoperative nausea and vomiting)     Pulmonary emphysema ([address] 2017    Sleep apnea     DOES NOT USE CPAP         PAST SURGICAL HISTORY     Past Surgical History:   Procedure Laterality Date    ABDOMEN SURGERY      APPENDECTOMY      BACK SURGERY      lower x 3    CERVICAL FUSION       SECTION      x3    CHOLECYSTECTOMY      COLONOSCOPY      ECTOPIC PREGNANCY SURGERY      EYE SURGERY Bilateral     LASER FOR GLAUCOMA    HYSTERECTOMY      JOINT REPLACEMENT      OTHER SURGICAL HISTORY  Aug 5, 2013    Left knee total arthroplasty (Dr. Jenn Aponte, [facility])    OH DEBRIDEMENT OPEN WOUND 20 SQ CM< Right 2018    INCISIONAL DEBRIEDMENT INCISION AND DRAINAGE RIGHT GLUTEAL ABCESS performed by Helene Martin MD at [address] [place] [address] OFFICE/OUTPT VISIT,PROCEDURE ONLY Right 5/25/2018    EXCISIONAL DEBRIDEMENT OF RIGHT BUTTOCKS WOUND performed by Lizz Buitrago DO at [address] Left     little toe bone removed    TONSILLECTOMY      TOTAL KNEE ARTHROPLASTY Right 1/26/2016    UPPER GASTROINTESTINAL ENDOSCOPY       FAMILY HISTORY         Family History   Problem Relation Age of Onset    Arthritis Mother     Heart Disease Mother         CHF    High Blood Pressure Mother     Kidney Disease Mother     Cancer Father     Heart Disease Sister         diastolic dysfunction    Other Sister         thyroid cysts    Cancer Brother     Kidney Disease Brother     Diabetes Brother     Kidney Disease Brother      SOCIAL HISTORY       Social History   Substance Use Topics    Smoking status: Former Smoker     Packs/day: 0.10     Years: 35.00     Types: Cigarettes     Start date: 11/14/1982     Quit date: 5/10/2018    Smokeless tobacco: Never Used      Comment: 1 pack per day, has cut down to 5 cigs a day    Alcohol use No     ALLERGIES         Allergies   Allergen Reactions    Latex Itching and Rash     MEDICATIONS         Current Outpatient Prescriptions on File Prior to Encounter   Medication Sig Dispense Refill    insulin detemir (LEVEMIR FLEXTOUCH) 100 UNIT/ML injection pen Inject 48 Units into the skin nightly 5 pen 3    Liraglutide (VICTOZA) 18 MG/3ML SOPN SC injection Inject 1.8 mg into the skin daily 3 pen 3    pantoprazole (PROTONIX) 40 MG tablet Take 1 tablet by mouth daily 90 tablet 1    traZODone (DESYREL) 100 MG tablet Take 1 tablet by mouth nightly 90 tablet 1    citalopram (CELEXA) 40 MG tablet Take 1 tablet by mouth daily 90 tablet 1    pioglitazone (ACTOS) 15 MG tablet Take 1 tablet by mouth daily 90 tablet 3    SITagliptin (JANUVIA) 100 MG tablet Take 1 tablet by mouth daily 90 tablet 1    pregabalin (LYRICA) 300 MG capsule Take 1 capsule by mouth 2 times daily for 90 days. . 180 capsule 0    atorvastatin (LIPITOR) 20 MG tablet TAKE 1 TABLET DAILY 90 tablet 3    HYDROcodone-acetaminophen (NORCO) 5-325 MG per tablet Take 1 tablet by mouth every 8 hours as needed for Pain. [place] traMADol (ULTRAM) 50 MG tablet Take 100 mg by mouth 3 times daily. [place] buPROPion (WELLBUTRIN SR) 150 MG extended release tablet TAKE 1 TABLET BY MOUTH TWICE DAILY 90 tablet 1    albuterol sulfate HFA (PROAIR HFA) 108 (90 Base) MCG/ACT inhaler Inhale 2 puffs into the lungs every 6 hours as needed for Wheezing 1 Inhaler 3    insulin lispro (HUMALOG KWIKPEN) 100 UNIT/ML pen 10 units TID with meals plus SS. Blood sugar:  - NO insulin, 151-200: 3 u, 201-250 : 6 u, 251-300 : 8 u, 301-350: 12 u, 351-400: 15 u 5 pen 3    OXYGEN Inhale 1 L into the lungs      Vitamins-Lipotropics (BALANCED B-100 COMPLEX CR) TBCR Take 1 tablet by mouth 4 times daily (after meals and at bedtime) (Patient taking differently: Take 1 tablet by mouth 2 times daily ) 120 tablet 5    glucose blood VI test strips (ACCU-CHEK DAVIDA) strip 1 each by In Vitro route daily As needed. 100 each 3    Omega-3 Fatty Acids (FISH OIL) 1000 MG CAPS Take 1,000 mg by mouth daily      Cinnamon 500 MG CAPS Take 1 capsule by mouth daily      Cholecalciferol (VITAMIN D3) 2000 units CAPS Take by mouth daily      vitamin B-12 (CYANOCOBALAMIN) 500 MCG tablet Take 500 mcg by mouth daily      rOPINIRole (REQUIP) 1 MG tablet Take 1 mg by mouth 3 times daily as needed       acetaminophen 650 MG TABS Take 650 mg by mouth every 4 hours as needed for Pain (For mild pain level 1-3 or for fever > 100.5). 120 tablet     Elastic Bandages & Supports (WRIST BRACE/RIGHT MEDIUM) MISC Wear brace on right wrist nightly 1 each 0    Magnesium Gluconate 250 MG TABS One pill twice a day 60 tablet 2     No current facility-administered medications on file prior to encounter. REVIEW OF SYSTEMS:     Constitutional: no fever, no night sweats, no fatigue.   Head: no head ache , no head injury, no 7/25/2018 11:46 AM   Drainage Description Serosanguinous 7/25/2018 11:46 AM   Odor None 7/25/2018 11:46 AM   Dressing/Treatment Dry dressing 6/27/2018 10:05 AM   Dressing Changed Changed/New 6/27/2018 10:05 AM   Dressing Status Intact; Old drainage 6/27/2018 10:05 AM   Number of days: 56       LABS      Lab Results   Component Value Date    BC No growth-preliminary  No growth   05/24/2018    BC No growth-preliminary  No growth   05/24/2018       Assessment:   Right glureatl abscess had I and d. The wound is healing well. It is superficial and granular  Advised to keep it clean  Follow up as needed. Patient Active Problem List   Diagnosis Code    OA (osteoarthritis) of knee M17.10    S/P TKR (total knee replacement) Z96.659    DM2 (diabetes mellitus, type 2) ([facility] E11.9    Primary osteoarthritis of right knee M17.11    Diabetic polyneuropathy associated with type 2 diabetes mellitus (HCC) E11.42    Status post total right knee replacement Z96.651    Pulmonary emphysema (HCC) J43.9    Hyperlipidemia, mixed E78.2    Herniated cervical disc M50.20    Sepsis (HCC) A41.9    Cellulitis of buttock L03.317    Abscess of buttock, right L02.31    Abscess, gluteal, right L02.31    Hepatic steatosis K76.0    Class 3 obesity due to excess calories with serious comorbidity and body mass index (BMI) of 40.0 to 44.9 in adult ([facility] E66.09, Z68.41    Depression F32.9         Plan:     Patient examined and evaluated    Treatment: No orders of the defined types were placed in this encounter. Please see attached Discharge Instructions    Written patient dismissal instructions given to patient and signed by patient or POA.              Electronically signed by Kalen Collins MD on 7/25/2018 at 12:11 PM

## 2018-08-08 ENCOUNTER — TELEPHONE (OUTPATIENT)
Dept: SURGERY | Age: 58
End: 2018-08-08

## 2018-08-08 NOTE — TELEPHONE ENCOUNTER
Taylor Marian on my voice mail that she is cancelling her colonoscopy which is scheduled for 8/15 & she did not mention rescheduling. I called her back but also had to leave a message that the colon will be cancelled & she needs to get back with us if she wants to reschedule again.

## 2018-09-29 ENCOUNTER — CARE COORDINATION (OUTPATIENT)
Dept: CASE MANAGEMENT | Age: 58
End: 2018-09-29

## 2018-10-05 DIAGNOSIS — Z79.4 TYPE 2 DIABETES MELLITUS WITHOUT COMPLICATION, WITH LONG-TERM CURRENT USE OF INSULIN (HCC): Primary | ICD-10-CM

## 2018-10-05 DIAGNOSIS — E11.9 TYPE 2 DIABETES MELLITUS WITHOUT COMPLICATION, WITH LONG-TERM CURRENT USE OF INSULIN (HCC): Primary | ICD-10-CM

## 2018-10-26 ENCOUNTER — OFFICE VISIT (OUTPATIENT)
Dept: PULMONOLOGY | Age: 58
End: 2018-10-26
Payer: MEDICARE

## 2018-10-26 VITALS
HEART RATE: 69 BPM | OXYGEN SATURATION: 98 % | HEIGHT: 67 IN | BODY MASS INDEX: 45.11 KG/M2 | DIASTOLIC BLOOD PRESSURE: 70 MMHG | TEMPERATURE: 98 F | RESPIRATION RATE: 22 BRPM | SYSTOLIC BLOOD PRESSURE: 124 MMHG | WEIGHT: 287.4 LBS

## 2018-10-26 DIAGNOSIS — J98.4 RESTRICTIVE LUNG DISEASE SECONDARY TO OBESITY: ICD-10-CM

## 2018-10-26 DIAGNOSIS — J43.9 PULMONARY EMPHYSEMA, UNSPECIFIED EMPHYSEMA TYPE (HCC): ICD-10-CM

## 2018-10-26 DIAGNOSIS — F17.210 CIGARETTE NICOTINE DEPENDENCE, UNCOMPLICATED: Primary | ICD-10-CM

## 2018-10-26 DIAGNOSIS — E66.01 MORBID OBESITY WITH BMI OF 40.0-44.9, ADULT (HCC): ICD-10-CM

## 2018-10-26 DIAGNOSIS — J96.11 CHRONIC RESPIRATORY FAILURE WITH HYPOXIA (HCC): ICD-10-CM

## 2018-10-26 DIAGNOSIS — F12.10 MARIJUANA ABUSE, CONTINUOUS: ICD-10-CM

## 2018-10-26 DIAGNOSIS — E66.9 RESTRICTIVE LUNG DISEASE SECONDARY TO OBESITY: ICD-10-CM

## 2018-10-26 PROCEDURE — 99214 OFFICE O/P EST MOD 30 MIN: CPT | Performed by: NURSE PRACTITIONER

## 2018-10-26 PROCEDURE — G0296 VISIT TO DETERM LDCT ELIG: HCPCS | Performed by: NURSE PRACTITIONER

## 2018-10-26 RX ORDER — TROSPIUM CHLORIDE 20 MG/1
20 TABLET, FILM COATED ORAL 2 TIMES DAILY
COMMUNITY
End: 2019-01-14

## 2018-10-26 ASSESSMENT — ENCOUNTER SYMPTOMS
SHORTNESS OF BREATH: 1
RHINORRHEA: 1
BACK PAIN: 0
EYES NEGATIVE: 1
WHEEZING: 1
COUGH: 1
VOMITING: 0
SINUS PRESSURE: 1
DIARRHEA: 0
NAUSEA: 0
STRIDOR: 0
CHEST TIGHTNESS: 0
ABDOMINAL PAIN: 0
ALLERGIC/IMMUNOLOGIC NEGATIVE: 1

## 2018-10-26 NOTE — PROGRESS NOTES
on potential dangerous/ S/E of tobacco and Marijuana smoke. (7 min)   -recommend weight loss    Will see Aashish Bernard back in: 2 months    Electronically signed by ALLEN Cooney CNP on 10/26/2018 at 12:44 PM  10/26/2018       Low Dose CT (LDCT) Lung Screening criteria met   Age 50-69   Pack year smoking >30   Still smoking or less than 15 year since quit   No sign or symptoms of lung cancer   > 11 months since last LDCT     Risks and benefits of lung cancer screening with LDCT scans discussed:    Significance of positive screen - False-positive LDCT results often occur. 95% of all positive results do not lead to a diagnosis of cancer. Usually further imaging can resolve most false-positive results; however, some patients may require invasive procedures. Over diagnosis risk - 10% to 12% of screen-detected lung cancer cases are over diagnosed--that is, the cancer would not have been detected in the patient's lifetime without the screening. Need for follow up screens annually to continue lung cancer screening effectiveness     Risks associated with radiation from annual LDCT- Radiation exposure is about the same as for a mammogram, which is about 1/3 of the annual background radiation exposure from everyday life. Starting screening at age 54 is not likely to increase cancer risk from radiation exposure. Patients with comorbidities resulting in life expectancy of < 10 years, or that would preclude treatment of an abnormality identified on CT, should not be screened due to lack of benefit.     To obtain maximal benefit from this screening, smoking cessation and long-term abstinence from smoking is critical

## 2018-10-29 ENCOUNTER — OFFICE VISIT (OUTPATIENT)
Dept: FAMILY MEDICINE CLINIC | Age: 58
End: 2018-10-29
Payer: MEDICARE

## 2018-10-29 VITALS
DIASTOLIC BLOOD PRESSURE: 70 MMHG | HEART RATE: 66 BPM | HEIGHT: 67 IN | TEMPERATURE: 98 F | SYSTOLIC BLOOD PRESSURE: 122 MMHG | WEIGHT: 287.6 LBS | BODY MASS INDEX: 45.14 KG/M2 | RESPIRATION RATE: 16 BRPM

## 2018-10-29 DIAGNOSIS — E11.8 TYPE 2 DIABETES MELLITUS WITH COMPLICATION, UNSPECIFIED WHETHER LONG TERM INSULIN USE: ICD-10-CM

## 2018-10-29 DIAGNOSIS — E55.9 VITAMIN D DEFICIENCY: ICD-10-CM

## 2018-10-29 DIAGNOSIS — F33.42 RECURRENT MAJOR DEPRESSIVE DISORDER, IN FULL REMISSION (HCC): ICD-10-CM

## 2018-10-29 DIAGNOSIS — E11.42 DIABETIC POLYNEUROPATHY ASSOCIATED WITH TYPE 2 DIABETES MELLITUS (HCC): Primary | ICD-10-CM

## 2018-10-29 DIAGNOSIS — M79.7 FIBROMYALGIA: ICD-10-CM

## 2018-10-29 DIAGNOSIS — R63.5 WEIGHT GAIN: ICD-10-CM

## 2018-10-29 LAB — HBA1C MFR BLD: 7.8 %

## 2018-10-29 PROCEDURE — 83036 HEMOGLOBIN GLYCOSYLATED A1C: CPT | Performed by: NURSE PRACTITIONER

## 2018-10-29 PROCEDURE — 99215 OFFICE O/P EST HI 40 MIN: CPT | Performed by: NURSE PRACTITIONER

## 2018-10-29 RX ORDER — PIOGLITAZONEHYDROCHLORIDE 30 MG/1
15 TABLET ORAL DAILY
Qty: 90 TABLET | Refills: 1 | Status: SHIPPED | OUTPATIENT
Start: 2018-10-29 | End: 2019-01-14

## 2018-10-29 RX ORDER — PREGABALIN 300 MG/1
300 CAPSULE ORAL 2 TIMES DAILY
Qty: 180 CAPSULE | Refills: 0 | Status: SHIPPED | OUTPATIENT
Start: 2018-10-29 | End: 2019-05-02 | Stop reason: SDUPTHER

## 2018-10-29 RX ORDER — PIOGLITAZONEHYDROCHLORIDE 30 MG/1
30 TABLET ORAL DAILY
Qty: 90 TABLET | Refills: 1 | Status: SHIPPED | OUTPATIENT
Start: 2018-10-29 | End: 2019-01-14

## 2018-10-29 RX ORDER — PREGABALIN 300 MG/1
300 CAPSULE ORAL 2 TIMES DAILY
Qty: 180 CAPSULE | Refills: 0 | Status: SHIPPED | OUTPATIENT
Start: 2018-10-29 | End: 2018-10-29 | Stop reason: SDUPTHER

## 2018-10-29 NOTE — PROGRESS NOTES
Anahi Meyer is a 60 y.o. female for 3 Month Follow-Up (DM)      Diabetes Type 2    Glucose control:   Does patient check blood glucoses at home? Yes  Report of hypoglycemia: no  Lab Results   Component Value Date    LABA1C 7.8 10/29/2018     No results found for: EAG    Symptoms  Polyuria, Polydipsia or Polyphagia? No  Chest Pain, SOB, or Palpitations? -  No  New Vision complaints? No  Paresthesias of the extremities? Yes - controlled with lyrica    Medications  Current medication were reviewed. Compliant with medications? yes  Medication side effects? No  On ACE-I or ARB? Yes  On antiplatelet therapy? Yes  On Statin? Yes    Last Diabetic Eye Exam: 8/2017    Exercise  Exercise? No  Wt Readings from Last 3 Encounters:   10/29/18 287 lb 9.6 oz (130.5 kg)   10/26/18 287 lb 6.4 oz (130.4 kg)   07/25/18 277 lb (125.6 kg)       Diet discipline?:  Low salt, fat, sugar diet?  no  , she does not follow a diabetic diet, she has gained weight since her last visit here. She does not count calories. Chronic Pain    HPI:    Patient has chronic pain of the bilateral LE and arthritis pain in her back from degenrative disc disease. States the lyrica helps to control her pain, she still has some pain at times. .  Pain control: adequate  Improvement in function and ADLs? yes  Present for: several months  Current Medications:    Escalation of dosage recently?  no    Evidence for abuse or diversion? no   Seen specialist or pain clinic?: no  Last UDS:  5/26/18  Pain contract: no    She has  A history of vitamin d deficiency and has been taking 2000 mg of vitamin d a day will recheck. Her depression is in remission with her medication. she denies thoughts of hurting herself or others.      Controlled Substances Monitoring:      Blood pressure control:  BP Readings from Last 3 Encounters:   10/29/18 122/70   10/26/18 124/70   07/25/18 131/69       Lab Results   Component Value Date    LABMICR < 1.20 07/31/2017

## 2018-10-29 NOTE — PATIENT INSTRUCTIONS
blood sugar levels. A registered dietitian or diabetes educator can help you plan how much carbohydrate to include in each meal and snack. A guideline for your daily amount of carbohydrate is:  · 45 to 60 grams at each meal. That's about the same as 3 to 4 carbohydrate servings. · 15 to 20 grams at each snack. That's about the same as 1 carbohydrate serving. The Nutrition Facts label on packaged foods tells you how much carbohydrate is in a serving of the food. First, look at the serving size on the food label. Is that the amount you eat in a serving? All of the nutrition information on a food label is based on that serving size. So if you eat more or less than that, you'll need to adjust the other numbers. Total carbohydrate is the next thing you need to look for on the label. If you count carbohydrate servings, one serving of carbohydrate is 15 grams. For foods that don't come with labels, such as fresh fruits and vegetables, you'll need a guide that lists carbohydrate in these foods. Ask your doctor, dietitian, or diabetes educator about books or other nutrition guides you can use. If you take insulin, you need to know how many grams of carbohydrate are in a meal. This lets you know how much rapid-acting insulin to take before you eat. If you use an insulin pump, you get a constant rate of insulin during the day. So the pump must be programmed at meals to give you extra insulin to cover the rise in blood sugar after meals. When you know how much carbohydrate you will eat, you can take the right amount of insulin. Or, if you always use the same amount of insulin, you need to make sure that you eat the same amount of carbohydrate at meals. If you need more help to understand carbohydrate counting and food labels, ask your doctor, dietitian, or diabetes educator. How do you get started with meal planning? Here are some tips to get started:  · Plan your meals a week at a time.  Don't forget to include snacks too.  · Use cookbooks or online recipes to plan several main meals. Plan some quick meals for busy nights. You also can double some recipes that freeze well. Then you can save half for other busy nights when you don't have time to cook. · Make sure you have the ingredients you need for your recipes. If you're running low on basic items, put these items on your shopping list too. · List foods that you use to make breakfasts, lunches, and snacks. List plenty of fruits and vegetables. · Post this list on the refrigerator. Add to it as you think of more things you need. · Take the list to the store to do your weekly shopping. Follow-up care is a key part of your treatment and safety. Be sure to make and go to all appointments, and call your doctor if you are having problems. It's also a good idea to know your test results and keep a list of the medicines you take. Where can you learn more? Go to [email or web address] and sign in to your [employer] account. Enter T276 in the [employer] box to learn more about \"Learning About Meal Planning for Diabetes. \"     If you do not have an account, please click on the \"Sign Up Now\" link. Current as of: December 7, 2017  Content Version: 11.7  © 9972-4305 [facility]. Care instructions adapted under license by [facility] ([facility]). If you have questions about a medical condition or this instruction, always ask your healthcare professional. [place] [address] any warranty or liability for your use of this information.

## 2018-10-29 NOTE — PROGRESS NOTES
Visit Information    Have you changed or started any medications since your last visit including any over-the-counter medicines, vitamins, or herbal medicines? no   Are you having any side effects from any of your medications? -  no  Have you stopped taking any of your medications? Is so, why? -  no    Have you seen any other physician or provider since your last visit? Yes - Records Obtained pulmonary VINCENZO Jensen  Have you had any other diagnostic tests since your last visit? No  Have you been seen in the emergency room and/or had an admission to a hospital since we last saw you? No  Have you had your routine dental cleaning in the past 6 months? no    Have you activated your [employer] account? If not, what are your barriers?  No     Patient Care Team:  ALLEN Santiago CNP as PCP - General (Family Nurse Practitioner)  ALLEN Messer CNP as PCP - S Attributed Provider    Medical History Review  Deferred to PCP    Health Maintenance   Topic Date Due    Pneumococcal med risk (1 of 1 - PPSV23) 10/29/1978    Cervical cancer screen  10/29/1980    Colon cancer screen colonoscopy  10/29/2009    Low dose CT lung screening  10/29/2014    Shingles Vaccine (1 of 2 - 2 Dose Series) 12/18/2016    Diabetic retinal exam  08/10/2018    Flu vaccine (1) 09/01/2018    Lipid screen  11/08/2018    Diabetic foot exam  03/01/2019    A1C test (Diabetic or Prediabetic)  07/18/2019    Diabetic microalbuminuria test  07/18/2019    Breast cancer screen  10/27/2019    DTaP/Tdap/Td vaccine (2 - Td) 07/18/2024    Hepatitis C screen  Completed    HIV screen  Completed

## 2018-11-01 ENCOUNTER — TELEPHONE (OUTPATIENT)
Dept: FAMILY MEDICINE CLINIC | Age: 58
End: 2018-11-01

## 2018-11-09 ENCOUNTER — HOSPITAL ENCOUNTER (OUTPATIENT)
Dept: WOMENS IMAGING | Age: 58
Discharge: HOME | End: 2018-11-09
Payer: MEDICARE

## 2018-11-09 ENCOUNTER — HOSPITAL ENCOUNTER (OUTPATIENT)
Age: 58
Discharge: HOME | End: 2018-11-09
Payer: MEDICARE

## 2018-11-09 DIAGNOSIS — R63.5 WEIGHT GAIN: ICD-10-CM

## 2018-11-09 DIAGNOSIS — Z12.31 VISIT FOR SCREENING MAMMOGRAM: ICD-10-CM

## 2018-11-09 DIAGNOSIS — E55.9 VITAMIN D DEFICIENCY: ICD-10-CM

## 2018-11-09 DIAGNOSIS — E11.8 TYPE 2 DIABETES MELLITUS WITH COMPLICATION, UNSPECIFIED WHETHER LONG TERM INSULIN USE: ICD-10-CM

## 2018-11-09 LAB
CHOLESTEROL, TOTAL: 170 MG/DL (ref 100–199)
HDLC SERPL-MCNC: 40 MG/DL
LDL CHOLESTEROL CALCULATED: 102 MG/DL
T4 FREE: 1.1 NG/DL (ref 0.93–1.76)
TRIGL SERPL-MCNC: 139 MG/DL (ref 0–199)
TSH SERPL DL<=0.05 MIU/L-ACNC: 1.12 UIU/ML (ref 0.4–4.2)
VITAMIN D 25-HYDROXY: 30 NG/ML (ref 30–100)

## 2018-11-09 PROCEDURE — 84443 ASSAY THYROID STIM HORMONE: CPT

## 2018-11-09 PROCEDURE — 80061 LIPID PANEL: CPT

## 2018-11-09 PROCEDURE — 36415 COLL VENOUS BLD VENIPUNCTURE: CPT

## 2018-11-09 PROCEDURE — 82306 VITAMIN D 25 HYDROXY: CPT

## 2018-11-09 PROCEDURE — 77063 BREAST TOMOSYNTHESIS BI: CPT

## 2018-11-09 PROCEDURE — 84439 ASSAY OF FREE THYROXINE: CPT

## 2018-11-12 ENCOUNTER — TELEPHONE (OUTPATIENT)
Dept: FAMILY MEDICINE CLINIC | Age: 58
End: 2018-11-12

## 2018-11-29 ENCOUNTER — CARE COORDINATION (OUTPATIENT)
Dept: CASE MANAGEMENT | Age: 58
End: 2018-11-29

## 2018-11-30 ENCOUNTER — CARE COORDINATION (OUTPATIENT)
Dept: CARE COORDINATION | Age: 58
End: 2018-11-30

## 2018-12-07 RX ORDER — INSULIN LISPRO 100 [IU]/ML
INJECTION, SOLUTION INTRAVENOUS; SUBCUTANEOUS
Qty: 66 ML | Refills: 3 | OUTPATIENT
Start: 2018-12-07

## 2018-12-14 ENCOUNTER — HOSPITAL ENCOUNTER (OUTPATIENT)
Dept: CT IMAGING | Age: 58
Discharge: HOME | End: 2018-12-14
Payer: MEDICARE

## 2018-12-14 ENCOUNTER — HOSPITAL ENCOUNTER (OUTPATIENT)
Dept: PULMONOLOGY | Age: 58
Discharge: HOME | End: 2018-12-14
Payer: MEDICARE

## 2018-12-14 DIAGNOSIS — J43.9 PULMONARY EMPHYSEMA, UNSPECIFIED EMPHYSEMA TYPE (HCC): ICD-10-CM

## 2018-12-14 DIAGNOSIS — F17.210 CIGARETTE NICOTINE DEPENDENCE, UNCOMPLICATED: ICD-10-CM

## 2018-12-14 PROCEDURE — 2709999900 HC NON-CHARGEABLE SUPPLY

## 2018-12-14 PROCEDURE — 94060 EVALUATION OF WHEEZING: CPT

## 2018-12-14 PROCEDURE — 94726 PLETHYSMOGRAPHY LUNG VOLUMES: CPT

## 2018-12-14 PROCEDURE — 94729 DIFFUSING CAPACITY: CPT

## 2018-12-14 PROCEDURE — G0297 LDCT FOR LUNG CA SCREEN: HCPCS

## 2018-12-19 ENCOUNTER — TELEPHONE (OUTPATIENT)
Dept: FAMILY MEDICINE CLINIC | Age: 58
End: 2018-12-19

## 2018-12-24 ENCOUNTER — TELEPHONE (OUTPATIENT)
Dept: FAMILY MEDICINE CLINIC | Age: 58
End: 2018-12-24

## 2018-12-27 DIAGNOSIS — F33.42 RECURRENT MAJOR DEPRESSIVE DISORDER, IN FULL REMISSION (HCC): ICD-10-CM

## 2018-12-27 RX ORDER — CITALOPRAM 40 MG/1
TABLET ORAL
Qty: 90 TABLET | Refills: 1 | Status: SHIPPED | OUTPATIENT
Start: 2018-12-27 | End: 2019-05-02 | Stop reason: ALTCHOICE

## 2018-12-27 RX ORDER — SITAGLIPTIN 100 MG/1
TABLET, FILM COATED ORAL
Qty: 90 TABLET | Refills: 1 | Status: SHIPPED | OUTPATIENT
Start: 2018-12-27 | End: 2019-01-14

## 2018-12-27 RX ORDER — BUPROPION HYDROCHLORIDE 150 MG/1
TABLET, EXTENDED RELEASE ORAL
Qty: 90 TABLET | Refills: 3 | Status: SHIPPED | OUTPATIENT
Start: 2018-12-27 | End: 2019-12-05 | Stop reason: SDUPTHER

## 2018-12-27 RX ORDER — TRAZODONE HYDROCHLORIDE 100 MG/1
TABLET ORAL
Qty: 90 TABLET | Refills: 1 | Status: SHIPPED | OUTPATIENT
Start: 2018-12-27 | End: 2019-05-02 | Stop reason: SDUPTHER

## 2019-01-14 ENCOUNTER — HOSPITAL ENCOUNTER (EMERGENCY)
Age: 59
Discharge: HOME | End: 2019-01-14
Payer: MEDICARE

## 2019-01-14 ENCOUNTER — APPOINTMENT (OUTPATIENT)
Dept: GENERAL RADIOLOGY | Age: 59
End: 2019-01-14
Payer: MEDICARE

## 2019-01-14 VITALS
HEART RATE: 69 BPM | HEIGHT: 67 IN | WEIGHT: 290 LBS | BODY MASS INDEX: 45.52 KG/M2 | OXYGEN SATURATION: 95 % | TEMPERATURE: 97.6 F | RESPIRATION RATE: 14 BRPM | DIASTOLIC BLOOD PRESSURE: 57 MMHG | SYSTOLIC BLOOD PRESSURE: 120 MMHG

## 2019-01-14 DIAGNOSIS — N30.00 ACUTE CYSTITIS WITHOUT HEMATURIA: ICD-10-CM

## 2019-01-14 DIAGNOSIS — J44.1 COPD EXACERBATION (HCC): Primary | ICD-10-CM

## 2019-01-14 LAB
ANION GAP SERPL CALCULATED.3IONS-SCNC: 12 MEQ/L (ref 8–16)
BACTERIA: ABNORMAL /HPF
BASOPHILS # BLD: 0.6 %
BASOPHILS ABSOLUTE: 0.1 THOU/MM3 (ref 0–0.1)
BILIRUBIN URINE: NEGATIVE
BLOOD, URINE: NEGATIVE
BUN BLDV-MCNC: 19 MG/DL (ref 7–22)
CALCIUM SERPL-MCNC: 9.6 MG/DL (ref 8.5–10.5)
CASTS 2: ABNORMAL /LPF
CASTS UA: ABNORMAL /LPF
CHARACTER, URINE: ABNORMAL
CHLORIDE BLD-SCNC: 105 MEQ/L (ref 98–111)
CO2: 26 MEQ/L (ref 23–33)
COLOR: YELLOW
CREAT SERPL-MCNC: 0.9 MG/DL (ref 0.4–1.2)
CRYSTALS, UA: ABNORMAL
EKG ATRIAL RATE: 67 BPM
EKG P AXIS: 56 DEGREES
EKG P-R INTERVAL: 162 MS
EKG Q-T INTERVAL: 374 MS
EKG QRS DURATION: 80 MS
EKG QTC CALCULATION (BAZETT): 395 MS
EKG R AXIS: 66 DEGREES
EKG T AXIS: 57 DEGREES
EKG VENTRICULAR RATE: 67 BPM
EOSINOPHIL # BLD: 1.4 %
EOSINOPHILS ABSOLUTE: 0.2 THOU/MM3 (ref 0–0.4)
EPITHELIAL CELLS, UA: ABNORMAL /HPF
ERYTHROCYTE [DISTWIDTH] IN BLOOD BY AUTOMATED COUNT: 13.4 % (ref 11.5–14.5)
ERYTHROCYTE [DISTWIDTH] IN BLOOD BY AUTOMATED COUNT: 45.5 FL (ref 35–45)
FLU A ANTIGEN: NEGATIVE
FLU B ANTIGEN: NEGATIVE
GFR SERPL CREATININE-BSD FRML MDRD: 64 ML/MIN/1.73M2
GLUCOSE BLD-MCNC: 209 MG/DL (ref 70–108)
GLUCOSE URINE: NEGATIVE MG/DL
HCT VFR BLD CALC: 42.3 % (ref 37–47)
HEMOGLOBIN: 14 GM/DL (ref 12–16)
IMMATURE GRANS (ABS): 0.06 THOU/MM3 (ref 0–0.07)
IMMATURE GRANULOCYTES: 0.5 %
KETONES, URINE: NEGATIVE
LEUKOCYTE ESTERASE, URINE: ABNORMAL
LYMPHOCYTES # BLD: 24.9 %
LYMPHOCYTES ABSOLUTE: 3.1 THOU/MM3 (ref 1–4.8)
MCH RBC QN AUTO: 31 PG (ref 26–33)
MCHC RBC AUTO-ENTMCNC: 33.1 GM/DL (ref 32.2–35.5)
MCV RBC AUTO: 93.8 FL (ref 81–99)
MISCELLANEOUS 2: ABNORMAL
MONOCYTES # BLD: 7.8 %
MONOCYTES ABSOLUTE: 1 THOU/MM3 (ref 0.4–1.3)
NITRITE, URINE: NEGATIVE
NUCLEATED RED BLOOD CELLS: 0 /100 WBC
OSMOLALITY CALCULATION: 293.4 MOSMOL/KG (ref 275–300)
PH UA: 5.5
PLATELET # BLD: 230 THOU/MM3 (ref 130–400)
PMV BLD AUTO: 11.5 FL (ref 9.4–12.4)
POTASSIUM REFLEX MAGNESIUM: 4.9 MEQ/L (ref 3.5–5.2)
PROTEIN UA: NEGATIVE
RBC # BLD: 4.51 MILL/MM3 (ref 4.2–5.4)
RBC URINE: ABNORMAL /HPF
RENAL EPITHELIAL, UA: ABNORMAL
SEG NEUTROPHILS: 64.8 %
SEGMENTED NEUTROPHILS ABSOLUTE COUNT: 8.1 THOU/MM3 (ref 1.8–7.7)
SODIUM BLD-SCNC: 143 MEQ/L (ref 135–145)
SPECIFIC GRAVITY, URINE: 1.02 (ref 1–1.03)
TROPONIN T: < 0.01 NG/ML
UROBILINOGEN, URINE: 0.2 EU/DL
WBC # BLD: 12.5 THOU/MM3 (ref 4.8–10.8)
WBC UA: > 100 /HPF
YEAST: ABNORMAL

## 2019-01-14 PROCEDURE — 6370000000 HC RX 637 (ALT 250 FOR IP): Performed by: NURSE PRACTITIONER

## 2019-01-14 PROCEDURE — 2709999900 HC NON-CHARGEABLE SUPPLY

## 2019-01-14 PROCEDURE — 87184 SC STD DISK METHOD PER PLATE: CPT

## 2019-01-14 PROCEDURE — 87186 SC STD MICRODIL/AGAR DIL: CPT

## 2019-01-14 PROCEDURE — 36415 COLL VENOUS BLD VENIPUNCTURE: CPT

## 2019-01-14 PROCEDURE — 87086 URINE CULTURE/COLONY COUNT: CPT

## 2019-01-14 PROCEDURE — 87077 CULTURE AEROBIC IDENTIFY: CPT

## 2019-01-14 PROCEDURE — 85025 COMPLETE CBC W/AUTO DIFF WBC: CPT

## 2019-01-14 PROCEDURE — 71046 X-RAY EXAM CHEST 2 VIEWS: CPT

## 2019-01-14 PROCEDURE — 99285 EMERGENCY DEPT VISIT HI MDM: CPT

## 2019-01-14 PROCEDURE — 94640 AIRWAY INHALATION TREATMENT: CPT

## 2019-01-14 PROCEDURE — 80048 BASIC METABOLIC PNL TOTAL CA: CPT

## 2019-01-14 PROCEDURE — 87804 INFLUENZA ASSAY W/OPTIC: CPT

## 2019-01-14 PROCEDURE — 84484 ASSAY OF TROPONIN QUANT: CPT

## 2019-01-14 PROCEDURE — 93005 ELECTROCARDIOGRAM TRACING: CPT | Performed by: NURSE PRACTITIONER

## 2019-01-14 PROCEDURE — 93010 ELECTROCARDIOGRAM REPORT: CPT | Performed by: INTERNAL MEDICINE

## 2019-01-14 PROCEDURE — 81001 URINALYSIS AUTO W/SCOPE: CPT

## 2019-01-14 RX ORDER — AZITHROMYCIN 250 MG/1
TABLET, FILM COATED ORAL
Qty: 1 PACKET | Refills: 0 | Status: SHIPPED | OUTPATIENT
Start: 2019-01-14 | End: 2019-01-24

## 2019-01-14 RX ORDER — PREDNISONE 20 MG/1
60 TABLET ORAL ONCE
Status: COMPLETED | OUTPATIENT
Start: 2019-01-14 | End: 2019-01-14

## 2019-01-14 RX ORDER — IPRATROPIUM BROMIDE AND ALBUTEROL SULFATE 2.5; .5 MG/3ML; MG/3ML
1 SOLUTION RESPIRATORY (INHALATION) ONCE
Status: COMPLETED | OUTPATIENT
Start: 2019-01-14 | End: 2019-01-14

## 2019-01-14 RX ORDER — METHYLPREDNISOLONE SODIUM SUCCINATE 125 MG/2ML
125 INJECTION, POWDER, LYOPHILIZED, FOR SOLUTION INTRAMUSCULAR; INTRAVENOUS ONCE
Status: DISCONTINUED | OUTPATIENT
Start: 2019-01-14 | End: 2019-01-14

## 2019-01-14 RX ORDER — BENZONATATE 100 MG/1
100 CAPSULE ORAL 3 TIMES DAILY PRN
Qty: 30 CAPSULE | Refills: 0 | Status: SHIPPED | OUTPATIENT
Start: 2019-01-14 | End: 2019-01-21

## 2019-01-14 RX ORDER — PREDNISONE 10 MG/1
TABLET ORAL
Qty: 20 TABLET | Refills: 0 | Status: SHIPPED | OUTPATIENT
Start: 2019-01-14 | End: 2019-01-24

## 2019-01-14 RX ORDER — NITROFURANTOIN 25; 75 MG/1; MG/1
100 CAPSULE ORAL 2 TIMES DAILY
Qty: 14 CAPSULE | Refills: 0 | Status: SHIPPED | OUTPATIENT
Start: 2019-01-14 | End: 2019-01-21

## 2019-01-14 RX ADMIN — IPRATROPIUM BROMIDE AND ALBUTEROL SULFATE 1 AMPULE: .5; 3 SOLUTION RESPIRATORY (INHALATION) at 10:33

## 2019-01-14 RX ADMIN — PREDNISONE 60 MG: 20 TABLET ORAL at 12:00

## 2019-01-14 ASSESSMENT — ENCOUNTER SYMPTOMS
PHOTOPHOBIA: 0
EYE REDNESS: 0
VOMITING: 0
RHINORRHEA: 1
BLOOD IN STOOL: 0
SINUS PRESSURE: 0
ABDOMINAL DISTENTION: 0
DIARRHEA: 0
ABDOMINAL PAIN: 0
CONSTIPATION: 0
BACK PAIN: 0
SHORTNESS OF BREATH: 1
VOICE CHANGE: 0
COLOR CHANGE: 0
SORE THROAT: 0
COUGH: 0
NAUSEA: 0
WHEEZING: 0
CHEST TIGHTNESS: 1

## 2019-01-15 ENCOUNTER — CARE COORDINATION (OUTPATIENT)
Dept: CASE MANAGEMENT | Age: 59
End: 2019-01-15

## 2019-01-16 ENCOUNTER — OFFICE VISIT (OUTPATIENT)
Dept: FAMILY MEDICINE CLINIC | Age: 59
End: 2019-01-16
Payer: MEDICARE

## 2019-01-16 VITALS
OXYGEN SATURATION: 97 % | DIASTOLIC BLOOD PRESSURE: 78 MMHG | TEMPERATURE: 97.6 F | SYSTOLIC BLOOD PRESSURE: 124 MMHG | HEIGHT: 67 IN | WEIGHT: 290.4 LBS | BODY MASS INDEX: 45.58 KG/M2 | HEART RATE: 64 BPM | RESPIRATION RATE: 18 BRPM

## 2019-01-16 DIAGNOSIS — J44.1 COPD EXACERBATION (HCC): Primary | ICD-10-CM

## 2019-01-16 DIAGNOSIS — N30.00 ACUTE CYSTITIS WITHOUT HEMATURIA: ICD-10-CM

## 2019-01-16 DIAGNOSIS — E11.8 TYPE 2 DIABETES MELLITUS WITH COMPLICATION, UNSPECIFIED WHETHER LONG TERM INSULIN USE: ICD-10-CM

## 2019-01-16 PROBLEM — A41.9 SEPSIS (HCC): Status: RESOLVED | Noted: 2018-05-24 | Resolved: 2019-01-16

## 2019-01-16 PROBLEM — L02.31 ABSCESS OF BUTTOCK, RIGHT: Status: RESOLVED | Noted: 2018-05-26 | Resolved: 2019-01-16

## 2019-01-16 LAB
BILIRUBIN, POC: NORMAL
BLOOD URINE, POC: NORMAL
CLARITY, POC: NORMAL
COLOR, POC: NORMAL
GLUCOSE URINE, POC: NORMAL
HBA1C MFR BLD: 8.1 %
KETONES, POC: NORMAL
LEUKOCYTE EST, POC: NORMAL
NITRITE, POC: NORMAL
ORGANISM: ABNORMAL
PH, POC: 6
PROTEIN, POC: NORMAL
SPECIFIC GRAVITY, POC: >=1.03
URINE CULTURE REFLEX: ABNORMAL
UROBILINOGEN, POC: 0.2

## 2019-01-16 PROCEDURE — 83036 HEMOGLOBIN GLYCOSYLATED A1C: CPT | Performed by: NURSE PRACTITIONER

## 2019-01-16 PROCEDURE — 99214 OFFICE O/P EST MOD 30 MIN: CPT | Performed by: NURSE PRACTITIONER

## 2019-01-16 PROCEDURE — 81002 URINALYSIS NONAUTO W/O SCOPE: CPT | Performed by: NURSE PRACTITIONER

## 2019-01-17 ENCOUNTER — CARE COORDINATION (OUTPATIENT)
Dept: CASE MANAGEMENT | Age: 59
End: 2019-01-17

## 2019-01-22 ENCOUNTER — CARE COORDINATION (OUTPATIENT)
Dept: CARE COORDINATION | Age: 59
End: 2019-01-22

## 2019-01-23 ENCOUNTER — TELEPHONE (OUTPATIENT)
Dept: FAMILY MEDICINE CLINIC | Age: 59
End: 2019-01-23

## 2019-01-25 ENCOUNTER — OFFICE VISIT (OUTPATIENT)
Dept: PULMONOLOGY | Age: 59
End: 2019-01-25
Payer: MEDICARE

## 2019-01-25 VITALS
WEIGHT: 290 LBS | DIASTOLIC BLOOD PRESSURE: 69 MMHG | TEMPERATURE: 97.9 F | RESPIRATION RATE: 16 BRPM | OXYGEN SATURATION: 97 % | HEIGHT: 67 IN | BODY MASS INDEX: 45.52 KG/M2 | HEART RATE: 72 BPM | SYSTOLIC BLOOD PRESSURE: 130 MMHG

## 2019-01-25 DIAGNOSIS — F17.210 CIGARETTE NICOTINE DEPENDENCE, UNCOMPLICATED: ICD-10-CM

## 2019-01-25 DIAGNOSIS — E66.9 RESTRICTIVE LUNG DISEASE SECONDARY TO OBESITY: ICD-10-CM

## 2019-01-25 DIAGNOSIS — J43.9 PULMONARY EMPHYSEMA, UNSPECIFIED EMPHYSEMA TYPE (HCC): Primary | ICD-10-CM

## 2019-01-25 DIAGNOSIS — F12.10 MARIJUANA ABUSE, CONTINUOUS: ICD-10-CM

## 2019-01-25 DIAGNOSIS — G47.33 OSA (OBSTRUCTIVE SLEEP APNEA): ICD-10-CM

## 2019-01-25 DIAGNOSIS — R93.89 NODULAR RADIOLOGIC DENSITY: ICD-10-CM

## 2019-01-25 DIAGNOSIS — J98.4 RESTRICTIVE LUNG DISEASE SECONDARY TO OBESITY: ICD-10-CM

## 2019-01-25 DIAGNOSIS — J96.11 CHRONIC RESPIRATORY FAILURE WITH HYPOXIA (HCC): ICD-10-CM

## 2019-01-25 DIAGNOSIS — E66.01 MORBID OBESITY WITH BMI OF 40.0-44.9, ADULT (HCC): ICD-10-CM

## 2019-01-25 PROCEDURE — 99214 OFFICE O/P EST MOD 30 MIN: CPT | Performed by: NURSE PRACTITIONER

## 2019-01-25 ASSESSMENT — ENCOUNTER SYMPTOMS
CHEST TIGHTNESS: 0
EYES NEGATIVE: 1
SHORTNESS OF BREATH: 1
DYSPNEA ASSOCIATED WITH: EXERTION
COUGH: 1
STRIDOR: 0
BACK PAIN: 0
WHEEZING: 1
DIARRHEA: 0
ALLERGIC/IMMUNOLOGIC NEGATIVE: 1
NAUSEA: 0

## 2019-01-28 ENCOUNTER — TELEPHONE (OUTPATIENT)
Dept: FAMILY MEDICINE CLINIC | Age: 59
End: 2019-01-28

## 2019-01-28 ENCOUNTER — TELEPHONE (OUTPATIENT)
Dept: FAMILY MEDICINE CLINIC | Age: 59
End: 2019-01-28
Payer: MEDICARE

## 2019-01-28 DIAGNOSIS — R19.5 POSITIVE FIT (FECAL IMMUNOCHEMICAL TEST): ICD-10-CM

## 2019-01-28 DIAGNOSIS — Z53.20 COLONOSCOPY REFUSED: Primary | ICD-10-CM

## 2019-01-28 LAB
CONTROL: PRESENT
HEMOCCULT STL QL: ABNORMAL

## 2019-01-28 PROCEDURE — 82274 ASSAY TEST FOR BLOOD FECAL: CPT | Performed by: NURSE PRACTITIONER

## 2019-02-04 ENCOUNTER — CARE COORDINATION (OUTPATIENT)
Dept: CARE COORDINATION | Age: 59
End: 2019-02-04

## 2019-02-04 ASSESSMENT — ENCOUNTER SYMPTOMS: DYSPNEA ASSOCIATED WITH: EXERTION

## 2019-02-27 ENCOUNTER — TELEPHONE (OUTPATIENT)
Dept: FAMILY MEDICINE CLINIC | Age: 59
End: 2019-02-27

## 2019-03-07 ENCOUNTER — CARE COORDINATION (OUTPATIENT)
Dept: CARE COORDINATION | Age: 59
End: 2019-03-07

## 2019-03-14 ENCOUNTER — TELEPHONE (OUTPATIENT)
Dept: FAMILY MEDICINE CLINIC | Age: 59
End: 2019-03-14

## 2019-03-22 ENCOUNTER — CARE COORDINATION (OUTPATIENT)
Dept: CARE COORDINATION | Age: 59
End: 2019-03-22

## 2019-03-22 ASSESSMENT — ENCOUNTER SYMPTOMS: DYSPNEA ASSOCIATED WITH: EXERTION

## 2019-04-03 DIAGNOSIS — E11.59 TYPE 2 DIABETES MELLITUS WITH OTHER CIRCULATORY COMPLICATION, WITH LONG-TERM CURRENT USE OF INSULIN (HCC): ICD-10-CM

## 2019-04-03 DIAGNOSIS — Z79.4 TYPE 2 DIABETES MELLITUS WITH OTHER CIRCULATORY COMPLICATION, WITH LONG-TERM CURRENT USE OF INSULIN (HCC): ICD-10-CM

## 2019-04-24 ENCOUNTER — CARE COORDINATION (OUTPATIENT)
Dept: CARE COORDINATION | Age: 59
End: 2019-04-24

## 2019-04-24 ASSESSMENT — ENCOUNTER SYMPTOMS: DYSPNEA ASSOCIATED WITH: EXERTION

## 2019-04-24 NOTE — CARE COORDINATION
Ambulatory Care Coordination Note  4/24/2019  CM Risk Score: 10  C[place]on Mortality Risk Score:      ACC: Delilah Dorantes RN    Summary Note: Spoke with Pollo Luo. States she has not been feeling well with increased congestion, cough, thicker mucus than baseline. She has appt with PCP tomorrow. States she has been trying to manage her diet around avoiding concentrated sweets and states she is surprised her blood sugars readings are not coming down. We discussed activity level and she is going through job training right now which keeps her active. Was not able to complete Med Rec during phone call but she states she is taking her medications as ordered. Encouraged her to discuss this issues with PCP during appt tomorrow. Plan:  -  Follow up on resolve of acute infection to prevent COPD exacerbation  - Follow up on blood sugar readings to see if at a more therapeutic range  - Reinforce education given and complete additional education to help manage chronic conditions to prevent hospital admission and ED visits. Diabetes Assessment    Medic Alert ID:  No  Meal Planning:  Avoidance of concentrated sweets   How often do you test your blood sugar?:  Daily, Meals, Bedtime   Do you have barriers with adherence to non-pharmacologic self-management interventions?  (Nutrition/Exercise/Self-Monitoring):  No   Have you ever had to go to the ED for symptoms of low blood sugar?:  No       Do you have hyperglycemia symptoms?:  No   Do you have hypoglycemia symptoms?:  No   Last Blood Sugar Value:  264   Blood Sugar Monitoring Regimen:  Morning Fasting, Before Meals   Blood Sugar Trends:  Fluctuating      ,   Congestive Heart Failure Assessment           Symptoms:          and   General Assessment    Do you have any symptoms that are causing concern?:  Yes  Progression since Onset:  Gradually Worsening  Reported Symptoms:  Cough, Congestion, Abdominal Pain               Care Coordination Interventions    Program Enrollment: Complex Care  Referral from Primary Care Provider:  Yes  Suggested Interventions and Community Resources  Diabetes Education:  Completed  Registered Dietician:  Declined  Smoking Cessation:  Declined  Zone Management Tools:  Completed  Other Services or Interventions:  Acey Kocher for job training/placement         Goals Addressed                 This Visit's Progress     Conditions and Symptoms   On track     I will follow my Zone Management tool to seek urgent or emergent care. I will notify my provider of any symptoms that indicate a worsening of my condition. Barriers: overwhelmed by complexity of regimen  Plan for overcoming my barriers: ACC support  Confidence: 8/10  Anticipated Goal Completion Date: 4-25-19, updated goal 6-25-19    Update: Shirley Valderrama has been utlizing PCP office for changes in her condition versus seeking ED treatment 4-24-19         Self Monitoring   On track     Self-Monitored Blood Glucose - I will check my blood sugar Other: as ordered    None Recently Recorded    Barriers: need for additional support and education  Plan for overcoming my barriers: ACC support  Confidence: 9/10  Anticipated Goal Completion Date: 4-25-19, updated 6-25-19    Update: Shirley Valderrama has been checking her blood sugars routinely twice daily, readings continue to fluctuate. 4-24-19              Prior to Admission medications    Medication Sig Start Date End Date Taking? Authorizing Provider   Liraglutide (VICTOZA) 18 MG/3ML SOPN SC injection Inject 1.8 mg into the skin daily 4/3/19   ALLEN Gaytan CNP   tiotropium (SPIRIVA HANDIHALER) 18 MCG inhalation capsule Inhale 1 capsule into the lungs daily 1 capsule via inhalation daily.  1/25/19 1/25/20  ALLEN Crowell CNP   citalopram (CELEXA) 40 MG tablet TAKE 1 TABLET DAILY 12/27/18   Valente Chol, DO   traZODone (DESYREL) 100 MG tablet TAKE 1 TABLET NIGHTLY 12/27/18   Valente Chol, DO   buPROPion [facility]) 150 MG extended release tablet TAKE 1 TABLET BY MOUTH TWICE DAILY 12/27/18   ALLEN Walden CNP   insulin detemir (LEVEMIR FLEXTOUCH) 100 UNIT/ML injection pen Inject 53 Units into the skin nightly 12/7/18   ALLEN Walden CNP   insulin lispro (HUMALOG KWIKPEN) 100 UNIT/ML pen 10 units TID with meals plus SS. Blood sugar:  - NO insulin, 151-200: 3 u, 201-250 : 6 u, 251-300 : 8 u, 301-350: 12 u, 351-400: 15 u 12/7/18   ALLEN Walden CNP   pregabalin (LYRICA) 300 MG capsule Take 1 capsule by mouth 2 times daily for 90 days. . 10/29/18 1/27/19  ALLEN Rosales CNP   Handicap Placard MISC by Does not apply route Duration: three years    Type II idabetes  Diabetic neuropathy 10/29/18   ALLEN Rosales CNP   blood glucose test strips (ACCU-CHEK DAVIDA) strip 1 each by In Vitro route daily As needed. 10/8/18   ALLEN Rosales CNP   blood glucose test strips (ACCU-CHEK DAVIDA) strip Test once daily 10/5/18   ALLEN Rosales CNP   pantoprazole (PROTONIX) 40 MG tablet Take 1 tablet by mouth daily 7/18/18   Maheshia ALLEN Hendricks CNP   atorvastatin (LIPITOR) 20 MG tablet TAKE 1 TABLET DAILY 6/20/18   ALLEN Rosales CNP   HYDROcodone-acetaminophen (NORCO) 5-325 MG per tablet Take 1 tablet by mouth every 8 hours as needed for Pain. Charbel Bowling Historical Provider, MD   traMADol (ULTRAM) 50 MG tablet Take 100 mg by mouth 3 times daily. Charbel Bowling     Historical Provider, MD   albuterol sulfate HFA (PROAIR HFA) 108 (90 Base) MCG/ACT inhaler Inhale 2 puffs into the lungs every 6 hours as needed for Wheezing 4/27/18   Jessica Delay, ALLEN - CNP   OXYGEN Inhale 2 L into the lungs     Historical Provider, MD   Elastic Bandages & Supports (WRIST BRACE/RIGHT MEDIUM) MISC Wear brace on right wrist nightly 12/20/17   Jessica Delay, APRN - CNP   Vitamins-Lipotropics (BALANCED B-100 COMPLEX CR) TBCR Take 1 tablet by mouth 4 times daily (after meals and at bedtime)  Patient taking differently: Take 1 tablet by mouth 2 times daily  10/9/17   Caitlyn Payan

## 2019-04-30 ENCOUNTER — TELEPHONE (OUTPATIENT)
Dept: FAMILY MEDICINE CLINIC | Age: 59
End: 2019-04-30

## 2019-04-30 DIAGNOSIS — M79.7 FIBROMYALGIA: ICD-10-CM

## 2019-04-30 RX ORDER — PREGABALIN 300 MG/1
300 CAPSULE ORAL 2 TIMES DAILY
Qty: 180 CAPSULE | Refills: 0 | Status: CANCELLED | OUTPATIENT
Start: 2019-04-30 | End: 2019-07-29

## 2019-04-30 NOTE — TELEPHONE ENCOUNTER
Patient is out of the Humalog. She is wanting to know if she can use the Levemir or Victoza in place of the Humalog since she has been out for a couple days.     Please advise  [phone]  Message if she does not answer

## 2019-05-01 ENCOUNTER — TELEPHONE (OUTPATIENT)
Dept: FAMILY MEDICINE CLINIC | Age: 59
End: 2019-05-01

## 2019-05-01 NOTE — TELEPHONE ENCOUNTER
pt asking for a refill of humalog, I reordered it yesterday can we contact the pharmacy to see what is going on? thanks

## 2019-05-01 NOTE — TELEPHONE ENCOUNTER
Pt asking for a refill of lyrica, she will need a visit for this.  You can use a same day for tomorrow

## 2019-05-02 ENCOUNTER — OFFICE VISIT (OUTPATIENT)
Dept: FAMILY MEDICINE CLINIC | Age: 59
End: 2019-05-02
Payer: MEDICARE

## 2019-05-02 VITALS
DIASTOLIC BLOOD PRESSURE: 68 MMHG | HEIGHT: 67 IN | HEART RATE: 68 BPM | RESPIRATION RATE: 16 BRPM | SYSTOLIC BLOOD PRESSURE: 112 MMHG | TEMPERATURE: 98.5 F | BODY MASS INDEX: 44.2 KG/M2 | WEIGHT: 281.6 LBS

## 2019-05-02 DIAGNOSIS — K21.9 GASTROESOPHAGEAL REFLUX DISEASE WITHOUT ESOPHAGITIS: ICD-10-CM

## 2019-05-02 DIAGNOSIS — G62.9 NEUROPATHY: ICD-10-CM

## 2019-05-02 DIAGNOSIS — Z79.4 TYPE 2 DIABETES MELLITUS WITH OTHER CIRCULATORY COMPLICATION, WITH LONG-TERM CURRENT USE OF INSULIN (HCC): Primary | ICD-10-CM

## 2019-05-02 DIAGNOSIS — J44.9 MODERATE COPD (CHRONIC OBSTRUCTIVE PULMONARY DISEASE) (HCC): ICD-10-CM

## 2019-05-02 DIAGNOSIS — E11.59 TYPE 2 DIABETES MELLITUS WITH OTHER CIRCULATORY COMPLICATION, WITH LONG-TERM CURRENT USE OF INSULIN (HCC): Primary | ICD-10-CM

## 2019-05-02 DIAGNOSIS — F33.42 RECURRENT MAJOR DEPRESSIVE DISORDER, IN FULL REMISSION (HCC): ICD-10-CM

## 2019-05-02 DIAGNOSIS — M79.7 FIBROMYALGIA: ICD-10-CM

## 2019-05-02 DIAGNOSIS — E78.2 HYPERLIPIDEMIA, MIXED: ICD-10-CM

## 2019-05-02 LAB — HBA1C MFR BLD: 9.9 %

## 2019-05-02 PROCEDURE — 99215 OFFICE O/P EST HI 40 MIN: CPT | Performed by: NURSE PRACTITIONER

## 2019-05-02 PROCEDURE — 83036 HEMOGLOBIN GLYCOSYLATED A1C: CPT | Performed by: NURSE PRACTITIONER

## 2019-05-02 RX ORDER — ALBUTEROL SULFATE 90 UG/1
2 AEROSOL, METERED RESPIRATORY (INHALATION) EVERY 6 HOURS PRN
Qty: 1 INHALER | Refills: 3 | Status: SHIPPED | OUTPATIENT
Start: 2019-05-02 | End: 2020-08-20 | Stop reason: SDUPTHER

## 2019-05-02 RX ORDER — VENLAFAXINE HYDROCHLORIDE 75 MG/1
75 CAPSULE, EXTENDED RELEASE ORAL DAILY
Qty: 90 CAPSULE | Refills: 0 | Status: SHIPPED | OUTPATIENT
Start: 2019-05-02 | End: 2019-06-05 | Stop reason: SDUPTHER

## 2019-05-02 RX ORDER — ATORVASTATIN CALCIUM 20 MG/1
TABLET, FILM COATED ORAL
Qty: 90 TABLET | Refills: 3 | Status: SHIPPED | OUTPATIENT
Start: 2019-05-02 | End: 2020-05-04 | Stop reason: SDUPTHER

## 2019-05-02 RX ORDER — CITALOPRAM 40 MG/1
TABLET ORAL
Qty: 90 TABLET | Refills: 1 | Status: CANCELLED | OUTPATIENT
Start: 2019-05-02

## 2019-05-02 RX ORDER — PREGABALIN 300 MG/1
300 CAPSULE ORAL 2 TIMES DAILY
Qty: 180 CAPSULE | Refills: 0 | Status: SHIPPED | OUTPATIENT
Start: 2019-05-02 | End: 2019-07-24 | Stop reason: SDUPTHER

## 2019-05-02 RX ORDER — PANTOPRAZOLE SODIUM 40 MG/1
40 TABLET, DELAYED RELEASE ORAL DAILY
Qty: 90 TABLET | Refills: 3 | Status: SHIPPED | OUTPATIENT
Start: 2019-05-02 | End: 2020-07-29 | Stop reason: SDUPTHER

## 2019-05-02 RX ORDER — TRAZODONE HYDROCHLORIDE 100 MG/1
TABLET ORAL
Qty: 90 TABLET | Refills: 3 | Status: SHIPPED | OUTPATIENT
Start: 2019-05-02 | End: 2019-12-05 | Stop reason: SDUPTHER

## 2019-05-02 NOTE — PROGRESS NOTES
Sanaz John is a 60 y.o. female for 3 Month Follow-Up (Type 2 Diabetes, Fibromyalgia, and Depression) and Neck Pain (back of neck pain that travels down Right shoulder and Left arm per patient has had for years around 2010 and getting worse)      Diabetes Type 2    Glucose control:   Does patient check blood glucoses at home? Yes  Report of hypoglycemia: no  Lab Results   Component Value Date    LABA1C 8.1 01/16/2019     No results found for: EAG    Symptoms  Polyuria, Polydipsia or Polyphagia? No  Chest Pain, SOB, or Palpitations? -  No  New Vision complaints? No  Paresthesias of the extremities? Yes - she states she is taking lyrica 300 mg bid and her feet are burning and tingling and painful    Medications  Current medication were reviewed. Compliant with medications? yes  Medication side effects? No  On ACE-I or ARB? Yes  On antiplatelet therapy? Yes  On Statin? Yes    Last Diabetic Eye Exam: 8/2017  Pt states she has been taking  60 units of levemir at night because her sugars have been high. she does not follow a diabetic diet. Exercise  Exercise? No  Wt Readings from Last 3 Encounters:   05/02/19 281 lb 9.6 oz (127.7 kg)   01/25/19 290 lb (131.5 kg)   01/16/19 290 lb 6.4 oz (131.7 kg)       Diet discipline?:  Low salt, fat, sugar diet?  no  Chronic Pain    HPI:    Patient has chronic pain of the bilateral feet. She also sees pain management Dr. Zondra Cabot who is also managing her neck and shoulder pain. She states she does have bulging discs in her neck  Pain control: states her pain level has increased. Improvement in function and ADLs? It did help for  A period of time. Present for: approximately 1 month  Current Medications:    Escalation of dosage recently?  no    Evidence for abuse or diversion?  no   Seen specialist or pain clinic?: yes  Last UDS:  Unknown , done at D.r Zondra Cabot office  Pain contract: no    Controlled Substances Monitoring:      GERD    HPI:     Symptoms: heartburn  Length of symptoms: 2 years  Current therapy and response:  prilsoec with adequate control  Recent change in symptoms? No  Symptoms associated with certain foods? Yes  Alcohol use? No  Tobacco use? Yes    Abdominal Pain? No  Mid Back Pain? No  Melena? No    Depressed Mood    HPI:    Depressed Mood? yes - states the celexa is not woking for her mod, states she feels down and sad and depressed , she cries a lot. The wellbutrin has not helped her to feel better. She does continue to smoke   Anhedonia? yes -   Appetite changes?  no  Sleep disturbances? yes - does take trazodone which helps  Feelings of guilt? no  Decreased energy? yes -   Impaired concentration? no  Substance abuse? no    Suicidal/Homicidal Ideation? no      Compliant with meds: Yes  Med side effects: no   Sees therapist?:  no  Family History of Mental Illness? yes -     Review of Systems - Psychological ROS: positive for - anxiety, depression, irritability and sleep disturbances, negative for - disorientation, hallucinations or suicidal ideation  COPD    HPI:    Current medication regimen - spiriva and uses albuterol three times a day    Compliant with medications? yes    Limitations in function - increased physical activity  Does patient smoke? Yes    Chronic cough?: yes  Chest pain/Tightness?:  no  Shortness of breath?: no  Wheezing?  no    Last PFTs - 12/2018, she is current with pulmonary but missed her last appt. Hospitalized and/or intubated in the past?: Yes  Number of times prescribed oral steroids in the past year - 0  Influenza vaccine up to date? Yes  Pneumococcal vaccine up to date?   Yes      Blood pressure control:  BP Readings from Last 3 Encounters:   05/02/19 112/68   01/25/19 130/69   01/16/19 124/78       Lab Results   Component Value Date    LABMICR < 1.20 07/31/2017       Lab Results   Component Value Date    LDLCALC 102 11/09/2018       Physical Exam    /68 (Site: Right Upper Arm, Position: Sitting, Cuff Size: Large Adult)   Pulse 68   Temp 98.5 °F (36.9 °C) (Oral)   Resp 16   Ht 5' 7\" (1.702 m)   Wt 281 lb 9.6 oz (127.7 kg)   BMI 44.10 kg/m²   Appearance: alert, well appearing, and in no distress, normal appearing weight and well hydrated. Neck exam - supple, no significant adenopathy. CVS exam: normal rate, regular rhythm, normal S1, S2, no murmurs, rubs, clicks or gallops. Lungs: clear to auscultation, no wheezes, rales or rhonchi, symmetric air entry. Abdomen:  BS normal, soft, non tender, non distended, no rebound or guarding  Mental Status: normal mood, affect behavior, speech, dress,  and thought processes. Neuro:  Alert, muscle tone grossly normal  Skin exam: normal coloration and turgor, no rashes, no suspicious skin lesions noted. Foot exam:  No pedal edema, no erythematous lesions noted b/l, sensation wnl b/l, PT and TP pulses wnl b/l, pt noted to have positive sensation. NO edema or swelling. ASSESSMENT & PLAN  Misha Martínez was seen today for 3 month follow-up and neck pain. Diagnoses and all orders for this visit:    Type 2 diabetes mellitus with other circulatory complication, with long-term current use of insulin ([facility])  -     POCT glycosylated hemoglobin (Hb A1C)  -      DIABETES FOOT EXAM  increase levemir to 63 units at night  Increase humalog to 12 units tid with scale  Fibromyalgia  -     pregabalin (LYRICA) 300 MG capsule; Take 1 capsule by mouth 2 times daily for 90 days. Gastroesophageal reflux disease without esophagitis  -     pantoprazole (PROTONIX) 40 MG tablet; Take 1 tablet by mouth daily  gerd diet  Hyperlipidemia, mixed  -     atorvastatin (LIPITOR) 20 MG tablet; TAKE 1 TABLET DAILY  Low fat low cholesterol diet  Moderate COPD (chronic obstructive pulmonary disease) ([facility])  -     albuterol sulfate HFA (PROAIR HFA) 108 (90 Base) MCG/ACT inhaler;  Inhale 2 puffs into the lungs every 6 hours as needed for Wheezing    Recurrent major depressive disorder, in full

## 2019-05-08 ENCOUNTER — APPOINTMENT (OUTPATIENT)
Dept: CT IMAGING | Age: 59
DRG: 392 | End: 2019-05-08
Payer: MEDICARE

## 2019-05-08 ENCOUNTER — APPOINTMENT (OUTPATIENT)
Dept: GENERAL RADIOLOGY | Age: 59
DRG: 392 | End: 2019-05-08
Payer: MEDICARE

## 2019-05-08 ENCOUNTER — HOSPITAL ENCOUNTER (INPATIENT)
Age: 59
LOS: 2 days | Discharge: HOME | DRG: 392 | End: 2019-05-10
Attending: INTERNAL MEDICINE | Admitting: INTERNAL MEDICINE
Payer: MEDICARE

## 2019-05-08 DIAGNOSIS — R11.2 NAUSEA AND VOMITING, INTRACTABILITY OF VOMITING NOT SPECIFIED, UNSPECIFIED VOMITING TYPE: Primary | ICD-10-CM

## 2019-05-08 DIAGNOSIS — R73.9 HYPERGLYCEMIA: ICD-10-CM

## 2019-05-08 PROBLEM — K92.1 MELENA: Status: ACTIVE | Noted: 2019-05-08

## 2019-05-08 PROBLEM — E87.4 MIXED ACID BASE BALANCE DISORDER: Status: ACTIVE | Noted: 2019-05-08

## 2019-05-08 PROBLEM — R10.9 ABDOMINAL PAIN: Status: ACTIVE | Noted: 2019-05-08

## 2019-05-08 LAB
ABO: NORMAL
ALBUMIN SERPL-MCNC: 4.3 G/DL (ref 3.5–5.1)
ALBUMIN SERPL-MCNC: 4.3 G/DL (ref 3.5–5.1)
ALLEN TEST: POSITIVE
ALP BLD-CCNC: 156 U/L (ref 38–126)
ALP BLD-CCNC: 158 U/L (ref 38–126)
ALT SERPL-CCNC: 18 U/L (ref 11–66)
ALT SERPL-CCNC: 18 U/L (ref 11–66)
ANION GAP SERPL CALCULATED.3IONS-SCNC: 17 MEQ/L (ref 8–16)
ANION GAP SERPL CALCULATED.3IONS-SCNC: 19 MEQ/L (ref 8–16)
ANTIBODY SCREEN: NORMAL
AST SERPL-CCNC: 16 U/L (ref 5–40)
AST SERPL-CCNC: 16 U/L (ref 5–40)
BACTERIA: ABNORMAL /HPF
BASE EXCESS (CALCULATED): -1.6 MMOL/L (ref -2.5–2.5)
BASOPHILS # BLD: 0.6 %
BASOPHILS ABSOLUTE: 0.1 THOU/MM3 (ref 0–0.1)
BETA-HYDROXYBUTYRATE: 1.44 MG/DL (ref 0.2–2.81)
BILIRUB SERPL-MCNC: 0.8 MG/DL (ref 0.3–1.2)
BILIRUB SERPL-MCNC: 0.8 MG/DL (ref 0.3–1.2)
BILIRUBIN DIRECT: < 0.2 MG/DL (ref 0–0.3)
BILIRUBIN URINE: NEGATIVE
BLOOD, URINE: NEGATIVE
BUN BLDV-MCNC: 11 MG/DL (ref 7–22)
BUN BLDV-MCNC: 11 MG/DL (ref 7–22)
CALCIUM SERPL-MCNC: 9.8 MG/DL (ref 8.5–10.5)
CALCIUM SERPL-MCNC: 9.9 MG/DL (ref 8.5–10.5)
CASTS 2: ABNORMAL /LPF
CASTS UA: ABNORMAL /LPF
CHARACTER, URINE: ABNORMAL
CHLORIDE BLD-SCNC: 103 MEQ/L (ref 98–111)
CHLORIDE BLD-SCNC: 104 MEQ/L (ref 98–111)
CO2: 18 MEQ/L (ref 23–33)
CO2: 19 MEQ/L (ref 23–33)
COLLECTED BY:: ABNORMAL
COLOR: YELLOW
CREAT SERPL-MCNC: 0.7 MG/DL (ref 0.4–1.2)
CREAT SERPL-MCNC: 0.7 MG/DL (ref 0.4–1.2)
CRYSTALS, UA: ABNORMAL
DEVICE: ABNORMAL
EOSINOPHIL # BLD: 0.7 %
EOSINOPHILS ABSOLUTE: 0.1 THOU/MM3 (ref 0–0.4)
EPITHELIAL CELLS, UA: ABNORMAL /HPF
ERYTHROCYTE [DISTWIDTH] IN BLOOD BY AUTOMATED COUNT: 13.5 % (ref 11.5–14.5)
ERYTHROCYTE [DISTWIDTH] IN BLOOD BY AUTOMATED COUNT: 43.8 FL (ref 35–45)
ETHYL ALCOHOL, SERUM: < 0.01 %
GFR SERPL CREATININE-BSD FRML MDRD: 86 ML/MIN/1.73M2
GFR SERPL CREATININE-BSD FRML MDRD: 86 ML/MIN/1.73M2
GLUCOSE BLD-MCNC: 135 MG/DL (ref 70–108)
GLUCOSE BLD-MCNC: 138 MG/DL (ref 70–108)
GLUCOSE BLD-MCNC: 171 MG/DL (ref 70–108)
GLUCOSE BLD-MCNC: 193 MG/DL (ref 70–108)
GLUCOSE BLD-MCNC: 194 MG/DL (ref 70–108)
GLUCOSE URINE: NEGATIVE MG/DL
HCO3: 20 MMOL/L (ref 23–28)
HCT VFR BLD CALC: 47.6 % (ref 37–47)
HEMOGLOBIN: 16.6 GM/DL (ref 12–16)
IMMATURE GRANS (ABS): 0.05 THOU/MM3 (ref 0–0.07)
IMMATURE GRANULOCYTES: 0.4 %
KETONES, URINE: NEGATIVE
LACTIC ACID: 1.4 MMOL/L (ref 0.5–2.2)
LEUKOCYTE ESTERASE, URINE: NEGATIVE
LIPASE: 9.3 U/L (ref 5.6–51.3)
LYMPHOCYTES # BLD: 22.7 %
LYMPHOCYTES ABSOLUTE: 3 THOU/MM3 (ref 1–4.8)
MAGNESIUM: 1.8 MG/DL (ref 1.6–2.4)
MCH RBC QN AUTO: 31.3 PG (ref 26–33)
MCHC RBC AUTO-ENTMCNC: 34.9 GM/DL (ref 32.2–35.5)
MCV RBC AUTO: 89.8 FL (ref 81–99)
MISCELLANEOUS 2: ABNORMAL
MONOCYTES # BLD: 6.6 %
MONOCYTES ABSOLUTE: 0.9 THOU/MM3 (ref 0.4–1.3)
NITRITE, URINE: NEGATIVE
NUCLEATED RED BLOOD CELLS: 0 /100 WBC
O2 SATURATION: 98 %
OSMOLALITY CALCULATION: 284.1 MOSMOL/KG (ref 275–300)
OSMOLALITY CALCULATION: 284.1 MOSMOL/KG (ref 275–300)
PCO2: 27 MMHG (ref 35–45)
PH BLOOD GAS: 7.48 (ref 7.35–7.45)
PH UA: 7 (ref 5–9)
PHOSPHORUS: 2.7 MG/DL (ref 2.4–4.7)
PLATELET # BLD: 255 THOU/MM3 (ref 130–400)
PMV BLD AUTO: 11.7 FL (ref 9.4–12.4)
PO2: 88 MMHG (ref 71–104)
POTASSIUM REFLEX MAGNESIUM: 4.2 MEQ/L (ref 3.5–5.2)
POTASSIUM SERPL-SCNC: 4.2 MEQ/L (ref 3.5–5.2)
PROTEIN UA: NEGATIVE
RBC # BLD: 5.3 MILL/MM3 (ref 4.2–5.4)
RBC URINE: ABNORMAL /HPF
RENAL EPITHELIAL, UA: ABNORMAL
RH FACTOR: NORMAL
SALICYLATE, SERUM: < 0.3 MG/DL (ref 2–10)
SEG NEUTROPHILS: 69 %
SEGMENTED NEUTROPHILS ABSOLUTE COUNT: 9.1 THOU/MM3 (ref 1.8–7.7)
SODIUM BLD-SCNC: 140 MEQ/L (ref 135–145)
SODIUM BLD-SCNC: 140 MEQ/L (ref 135–145)
SOURCE, BLOOD GAS: ABNORMAL
SPECIFIC GRAVITY, URINE: 1.01 (ref 1–1.03)
TOTAL CK: 146 U/L (ref 30–135)
TOTAL PROTEIN: 7.9 G/DL (ref 6.1–8)
TOTAL PROTEIN: 8.1 G/DL (ref 6.1–8)
TROPONIN T: < 0.01 NG/ML
TSH SERPL DL<=0.05 MIU/L-ACNC: 2.96 UIU/ML (ref 0.4–4.2)
UROBILINOGEN, URINE: 0.2 EU/DL (ref 0–1)
WBC # BLD: 13.2 THOU/MM3 (ref 4.8–10.8)
WBC UA: ABNORMAL /HPF
YEAST: ABNORMAL

## 2019-05-08 PROCEDURE — 99285 EMERGENCY DEPT VISIT HI MDM: CPT

## 2019-05-08 PROCEDURE — 81001 URINALYSIS AUTO W/SCOPE: CPT

## 2019-05-08 PROCEDURE — 6360000002 HC RX W HCPCS: Performed by: INTERNAL MEDICINE

## 2019-05-08 PROCEDURE — 80076 HEPATIC FUNCTION PANEL: CPT

## 2019-05-08 PROCEDURE — G0480 DRUG TEST DEF 1-7 CLASSES: HCPCS

## 2019-05-08 PROCEDURE — 84443 ASSAY THYROID STIM HORMONE: CPT

## 2019-05-08 PROCEDURE — 71045 X-RAY EXAM CHEST 1 VIEW: CPT

## 2019-05-08 PROCEDURE — 2140000000 HC CCU INTERMEDIATE R&B

## 2019-05-08 PROCEDURE — 6370000000 HC RX 637 (ALT 250 FOR IP): Performed by: INTERNAL MEDICINE

## 2019-05-08 PROCEDURE — 2580000003 HC RX 258: Performed by: INTERNAL MEDICINE

## 2019-05-08 PROCEDURE — 83605 ASSAY OF LACTIC ACID: CPT

## 2019-05-08 PROCEDURE — 85018 HEMOGLOBIN: CPT

## 2019-05-08 PROCEDURE — 2709999900 HC NON-CHARGEABLE SUPPLY

## 2019-05-08 PROCEDURE — 84100 ASSAY OF PHOSPHORUS: CPT

## 2019-05-08 PROCEDURE — 82705 FATS/LIPIDS FECES QUAL: CPT

## 2019-05-08 PROCEDURE — 83735 ASSAY OF MAGNESIUM: CPT

## 2019-05-08 PROCEDURE — 85014 HEMATOCRIT: CPT

## 2019-05-08 PROCEDURE — 80053 COMPREHEN METABOLIC PANEL: CPT

## 2019-05-08 PROCEDURE — 96361 HYDRATE IV INFUSION ADD-ON: CPT

## 2019-05-08 PROCEDURE — 96374 THER/PROPH/DIAG INJ IV PUSH: CPT

## 2019-05-08 PROCEDURE — 86901 BLOOD TYPING SEROLOGIC RH(D): CPT

## 2019-05-08 PROCEDURE — 82550 ASSAY OF CK (CPK): CPT

## 2019-05-08 PROCEDURE — 82948 REAGENT STRIP/BLOOD GLUCOSE: CPT

## 2019-05-08 PROCEDURE — 96375 TX/PRO/DX INJ NEW DRUG ADDON: CPT

## 2019-05-08 PROCEDURE — 84484 ASSAY OF TROPONIN QUANT: CPT

## 2019-05-08 PROCEDURE — 36600 WITHDRAWAL OF ARTERIAL BLOOD: CPT

## 2019-05-08 PROCEDURE — 82803 BLOOD GASES ANY COMBINATION: CPT

## 2019-05-08 PROCEDURE — 74177 CT ABD & PELVIS W/CONTRAST: CPT

## 2019-05-08 PROCEDURE — 85025 COMPLETE CBC W/AUTO DIFF WBC: CPT

## 2019-05-08 PROCEDURE — C9113 INJ PANTOPRAZOLE SODIUM, VIA: HCPCS | Performed by: INTERNAL MEDICINE

## 2019-05-08 PROCEDURE — 36415 COLL VENOUS BLD VENIPUNCTURE: CPT

## 2019-05-08 PROCEDURE — 6360000004 HC RX CONTRAST MEDICATION: Performed by: INTERNAL MEDICINE

## 2019-05-08 PROCEDURE — 6360000002 HC RX W HCPCS

## 2019-05-08 PROCEDURE — 86900 BLOOD TYPING SEROLOGIC ABO: CPT

## 2019-05-08 PROCEDURE — 82010 KETONE BODYS QUAN: CPT

## 2019-05-08 PROCEDURE — 83690 ASSAY OF LIPASE: CPT

## 2019-05-08 PROCEDURE — 82272 OCCULT BLD FECES 1-3 TESTS: CPT

## 2019-05-08 PROCEDURE — 82553 CREATINE MB FRACTION: CPT

## 2019-05-08 PROCEDURE — 87507 IADNA-DNA/RNA PROBE TQ 12-25: CPT

## 2019-05-08 PROCEDURE — 80048 BASIC METABOLIC PNL TOTAL CA: CPT

## 2019-05-08 PROCEDURE — 87040 BLOOD CULTURE FOR BACTERIA: CPT

## 2019-05-08 PROCEDURE — 86850 RBC ANTIBODY SCREEN: CPT

## 2019-05-08 RX ORDER — ATORVASTATIN CALCIUM 20 MG/1
20 TABLET, FILM COATED ORAL NIGHTLY
Status: DISCONTINUED | OUTPATIENT
Start: 2019-05-08 | End: 2019-05-10 | Stop reason: HOSPADM

## 2019-05-08 RX ORDER — PANTOPRAZOLE SODIUM 40 MG/10ML
40 INJECTION, POWDER, LYOPHILIZED, FOR SOLUTION INTRAVENOUS DAILY
Status: DISCONTINUED | OUTPATIENT
Start: 2019-05-08 | End: 2019-05-08

## 2019-05-08 RX ORDER — PREGABALIN 75 MG/1
300 CAPSULE ORAL 2 TIMES DAILY
Status: DISCONTINUED | OUTPATIENT
Start: 2019-05-08 | End: 2019-05-10 | Stop reason: HOSPADM

## 2019-05-08 RX ORDER — NICOTINE POLACRILEX 4 MG
15 LOZENGE BUCCAL PRN
Status: DISCONTINUED | OUTPATIENT
Start: 2019-05-08 | End: 2019-05-10 | Stop reason: HOSPADM

## 2019-05-08 RX ORDER — SODIUM CHLORIDE 0.9 % (FLUSH) 0.9 %
10 SYRINGE (ML) INJECTION EVERY 12 HOURS SCHEDULED
Status: DISCONTINUED | OUTPATIENT
Start: 2019-05-08 | End: 2019-05-10 | Stop reason: HOSPADM

## 2019-05-08 RX ORDER — SODIUM CHLORIDE 0.9 % (FLUSH) 0.9 %
10 SYRINGE (ML) INJECTION PRN
Status: DISCONTINUED | OUTPATIENT
Start: 2019-05-08 | End: 2019-05-10 | Stop reason: HOSPADM

## 2019-05-08 RX ORDER — SODIUM CHLORIDE 9 MG/ML
INJECTION, SOLUTION INTRAVENOUS CONTINUOUS
Status: DISCONTINUED | OUTPATIENT
Start: 2019-05-08 | End: 2019-05-10 | Stop reason: HOSPADM

## 2019-05-08 RX ORDER — LANOLIN ALCOHOL/MO/W.PET/CERES
500 CREAM (GRAM) TOPICAL DAILY
Status: DISCONTINUED | OUTPATIENT
Start: 2019-05-08 | End: 2019-05-10 | Stop reason: HOSPADM

## 2019-05-08 RX ORDER — M-VIT,TX,IRON,MINS/CALC/FOLIC 27MG-0.4MG
1 TABLET ORAL 2 TIMES DAILY
Status: DISCONTINUED | OUTPATIENT
Start: 2019-05-08 | End: 2019-05-10 | Stop reason: HOSPADM

## 2019-05-08 RX ORDER — DEXTROSE MONOHYDRATE 25 G/50ML
12.5 INJECTION, SOLUTION INTRAVENOUS PRN
Status: DISCONTINUED | OUTPATIENT
Start: 2019-05-08 | End: 2019-05-10 | Stop reason: HOSPADM

## 2019-05-08 RX ORDER — TRAZODONE HYDROCHLORIDE 100 MG/1
100 TABLET ORAL NIGHTLY
Status: DISCONTINUED | OUTPATIENT
Start: 2019-05-08 | End: 2019-05-10 | Stop reason: HOSPADM

## 2019-05-08 RX ORDER — 0.9 % SODIUM CHLORIDE 0.9 %
1000 INTRAVENOUS SOLUTION INTRAVENOUS ONCE
Status: COMPLETED | OUTPATIENT
Start: 2019-05-08 | End: 2019-05-08

## 2019-05-08 RX ORDER — VENLAFAXINE HYDROCHLORIDE 75 MG/1
75 CAPSULE, EXTENDED RELEASE ORAL DAILY
Status: DISCONTINUED | OUTPATIENT
Start: 2019-05-08 | End: 2019-05-10 | Stop reason: HOSPADM

## 2019-05-08 RX ORDER — BUPROPION HYDROCHLORIDE 150 MG/1
150 TABLET, EXTENDED RELEASE ORAL 2 TIMES DAILY
Status: DISCONTINUED | OUTPATIENT
Start: 2019-05-08 | End: 2019-05-10 | Stop reason: HOSPADM

## 2019-05-08 RX ORDER — ONDANSETRON 2 MG/ML
4 INJECTION INTRAMUSCULAR; INTRAVENOUS ONCE
Status: COMPLETED | OUTPATIENT
Start: 2019-05-08 | End: 2019-05-08

## 2019-05-08 RX ORDER — PANTOPRAZOLE SODIUM 40 MG/10ML
40 INJECTION, POWDER, LYOPHILIZED, FOR SOLUTION INTRAVENOUS EVERY 12 HOURS
Status: DISCONTINUED | OUTPATIENT
Start: 2019-05-08 | End: 2019-05-10 | Stop reason: HOSPADM

## 2019-05-08 RX ORDER — ONDANSETRON 2 MG/ML
INJECTION INTRAMUSCULAR; INTRAVENOUS
Status: COMPLETED
Start: 2019-05-08 | End: 2019-05-08

## 2019-05-08 RX ORDER — 0.9 % SODIUM CHLORIDE 0.9 %
1000 INTRAVENOUS SOLUTION INTRAVENOUS ONCE
Status: DISCONTINUED | OUTPATIENT
Start: 2019-05-08 | End: 2019-05-08

## 2019-05-08 RX ORDER — ACETAMINOPHEN 325 MG/1
650 TABLET ORAL EVERY 4 HOURS PRN
Status: DISCONTINUED | OUTPATIENT
Start: 2019-05-08 | End: 2019-05-10 | Stop reason: HOSPADM

## 2019-05-08 RX ORDER — DEXTROSE MONOHYDRATE 50 MG/ML
100 INJECTION, SOLUTION INTRAVENOUS PRN
Status: DISCONTINUED | OUTPATIENT
Start: 2019-05-08 | End: 2019-05-10 | Stop reason: HOSPADM

## 2019-05-08 RX ORDER — ACETAMINOPHEN 325 MG/1
650 TABLET ORAL ONCE
Status: COMPLETED | OUTPATIENT
Start: 2019-05-08 | End: 2019-05-08

## 2019-05-08 RX ORDER — TRAMADOL HYDROCHLORIDE 50 MG/1
100 TABLET ORAL 3 TIMES DAILY
Status: DISCONTINUED | OUTPATIENT
Start: 2019-05-08 | End: 2019-05-10 | Stop reason: HOSPADM

## 2019-05-08 RX ORDER — ONDANSETRON 2 MG/ML
4 INJECTION INTRAMUSCULAR; INTRAVENOUS EVERY 6 HOURS PRN
Status: DISCONTINUED | OUTPATIENT
Start: 2019-05-08 | End: 2019-05-10 | Stop reason: HOSPADM

## 2019-05-08 RX ORDER — ROPINIROLE 1 MG/1
1 TABLET, FILM COATED ORAL 3 TIMES DAILY PRN
Status: DISCONTINUED | OUTPATIENT
Start: 2019-05-08 | End: 2019-05-10 | Stop reason: HOSPADM

## 2019-05-08 RX ADMIN — Medication 10 ML: at 21:28

## 2019-05-08 RX ADMIN — TRAMADOL HYDROCHLORIDE 100 MG: 50 TABLET ORAL at 21:27

## 2019-05-08 RX ADMIN — BUPROPION HYDROCHLORIDE 150 MG: 150 TABLET, EXTENDED RELEASE ORAL at 21:27

## 2019-05-08 RX ADMIN — ATORVASTATIN CALCIUM 20 MG: 20 TABLET, FILM COATED ORAL at 21:34

## 2019-05-08 RX ADMIN — Medication 10 ML: at 23:13

## 2019-05-08 RX ADMIN — ONDANSETRON 4 MG: 2 INJECTION INTRAMUSCULAR; INTRAVENOUS at 11:50

## 2019-05-08 RX ADMIN — VENLAFAXINE HYDROCHLORIDE 75 MG: 75 CAPSULE, EXTENDED RELEASE ORAL at 21:27

## 2019-05-08 RX ADMIN — ROPINIROLE HYDROCHLORIDE 1 MG: 1 TABLET, FILM COATED ORAL at 21:27

## 2019-05-08 RX ADMIN — ACETAMINOPHEN 650 MG: 325 TABLET ORAL at 14:18

## 2019-05-08 RX ADMIN — SODIUM CHLORIDE 1000 ML: 9 INJECTION, SOLUTION INTRAVENOUS at 11:50

## 2019-05-08 RX ADMIN — PANTOPRAZOLE SODIUM 40 MG: 40 INJECTION, POWDER, FOR SOLUTION INTRAVENOUS at 21:27

## 2019-05-08 RX ADMIN — PREGABALIN 300 MG: 75 CAPSULE ORAL at 21:28

## 2019-05-08 RX ADMIN — Medication 500 MCG: at 21:27

## 2019-05-08 RX ADMIN — TRAZODONE HYDROCHLORIDE 100 MG: 100 TABLET ORAL at 21:28

## 2019-05-08 RX ADMIN — IOPAMIDOL 80 ML: 755 INJECTION, SOLUTION INTRAVENOUS at 20:42

## 2019-05-08 RX ADMIN — ONDANSETRON 4 MG: 2 INJECTION INTRAMUSCULAR; INTRAVENOUS at 23:13

## 2019-05-08 RX ADMIN — SODIUM CHLORIDE: 9 INJECTION, SOLUTION INTRAVENOUS at 17:58

## 2019-05-08 RX ADMIN — MULTIPLE VITAMINS W/ MINERALS TAB 1 TABLET: TAB at 21:27

## 2019-05-08 ASSESSMENT — PAIN SCALES - GENERAL
PAINLEVEL_OUTOF10: 8
PAINLEVEL_OUTOF10: 8
PAINLEVEL_OUTOF10: 6
PAINLEVEL_OUTOF10: 8

## 2019-05-08 ASSESSMENT — PAIN DESCRIPTION - PAIN TYPE
TYPE: CHRONIC PAIN
TYPE: ACUTE PAIN
TYPE: ACUTE PAIN

## 2019-05-08 ASSESSMENT — ENCOUNTER SYMPTOMS
EYE DISCHARGE: 0
NAUSEA: 1
CONSTIPATION: 0
COUGH: 1
DIARRHEA: 0
EYE PAIN: 0
SORE THROAT: 0
BACK PAIN: 0
RHINORRHEA: 0
SHORTNESS OF BREATH: 0
VOMITING: 1
WHEEZING: 0
ABDOMINAL PAIN: 0

## 2019-05-08 ASSESSMENT — PAIN DESCRIPTION - DESCRIPTORS
DESCRIPTORS: SHARP
DESCRIPTORS: ACHING

## 2019-05-08 ASSESSMENT — PAIN DESCRIPTION - LOCATION
LOCATION: HEAD
LOCATION: GENERALIZED
LOCATION: ABDOMEN

## 2019-05-08 ASSESSMENT — PAIN DESCRIPTION - ORIENTATION: ORIENTATION: MID;UPPER

## 2019-05-08 ASSESSMENT — PAIN DESCRIPTION - FREQUENCY: FREQUENCY: CONTINUOUS

## 2019-05-09 ENCOUNTER — ANESTHESIA EVENT (OUTPATIENT)
Dept: ENDOSCOPY | Age: 59
DRG: 392 | End: 2019-05-09
Payer: MEDICARE

## 2019-05-09 ENCOUNTER — ANESTHESIA (OUTPATIENT)
Dept: ENDOSCOPY | Age: 59
DRG: 392 | End: 2019-05-09
Payer: MEDICARE

## 2019-05-09 ENCOUNTER — TELEPHONE (OUTPATIENT)
Dept: FAMILY MEDICINE CLINIC | Age: 59
End: 2019-05-09

## 2019-05-09 VITALS
DIASTOLIC BLOOD PRESSURE: 76 MMHG | OXYGEN SATURATION: 97 % | SYSTOLIC BLOOD PRESSURE: 144 MMHG | RESPIRATION RATE: 21 BRPM

## 2019-05-09 LAB
ADENOVIRUS F 40 41 PCR: NOT DETECTED
ALBUMIN SERPL-MCNC: 3.6 G/DL (ref 3.5–5.1)
ALP BLD-CCNC: 128 U/L (ref 38–126)
ALT SERPL-CCNC: 22 U/L (ref 11–66)
ANION GAP SERPL CALCULATED.3IONS-SCNC: 12 MEQ/L (ref 8–16)
AST SERPL-CCNC: 25 U/L (ref 5–40)
ASTROVIRUS PCR: NOT DETECTED
AVERAGE GLUCOSE: 231 MG/DL (ref 70–126)
BASOPHILS # BLD: 0.7 %
BASOPHILS ABSOLUTE: 0.1 THOU/MM3 (ref 0–0.1)
BILIRUB SERPL-MCNC: 0.9 MG/DL (ref 0.3–1.2)
BUN BLDV-MCNC: 9 MG/DL (ref 7–22)
CALCIUM SERPL-MCNC: 8.6 MG/DL (ref 8.5–10.5)
CAMPYLOBACTER PCR: NOT DETECTED
CHLORIDE BLD-SCNC: 106 MEQ/L (ref 98–111)
CK MB: 1.8 NG/ML
CLOSTRIDIUM DIFFICILE, PCR: NOT DETECTED
CO2: 22 MEQ/L (ref 23–33)
CREAT SERPL-MCNC: 0.7 MG/DL (ref 0.4–1.2)
CRYPTOSPORIDIUM PCR: NOT DETECTED
CYCLOSPORA CAYETANENSIS PCR: NOT DETECTED
E COLI 0157 PCR: NORMAL
E COLI ENTEROAGGREGATIVE PCR: NOT DETECTED
E COLI ENTEROPATHOGENIC PCR: NOT DETECTED
E COLI ENTEROTOXIGENIC PCR: NOT DETECTED
E COLI SHIGA LIKE TOXIN PCR: NOT DETECTED
E COLI SHIGELLA/ENTEROINVASIVE PCR: NOT DETECTED
E HISTOLYTICA GI FILM ARRAY: NOT DETECTED
EOSINOPHIL # BLD: 1.2 %
EOSINOPHILS ABSOLUTE: 0.1 THOU/MM3 (ref 0–0.4)
ERYTHROCYTE [DISTWIDTH] IN BLOOD BY AUTOMATED COUNT: 13.4 % (ref 11.5–14.5)
ERYTHROCYTE [DISTWIDTH] IN BLOOD BY AUTOMATED COUNT: 45.1 FL (ref 35–45)
GFR SERPL CREATININE-BSD FRML MDRD: 85 ML/MIN/1.73M2
GIARDIA LAMBLIA PCR: NOT DETECTED
GLUCOSE BLD-MCNC: 150 MG/DL (ref 70–108)
GLUCOSE BLD-MCNC: 175 MG/DL (ref 70–108)
GLUCOSE BLD-MCNC: 180 MG/DL (ref 70–108)
GLUCOSE BLD-MCNC: 201 MG/DL (ref 70–108)
GLUCOSE BLD-MCNC: 210 MG/DL (ref 70–108)
HBA1C MFR BLD: 9.7 % (ref 4.4–6.4)
HCT VFR BLD CALC: 39.7 % (ref 37–47)
HCT VFR BLD CALC: 41.7 % (ref 37–47)
HCT VFR BLD CALC: 41.9 % (ref 37–47)
HCT VFR BLD CALC: 43.3 % (ref 37–47)
HCT VFR BLD CALC: 43.7 % (ref 37–47)
HEMOCCULT STL QL: NEGATIVE
HEMOGLOBIN: 13.2 GM/DL (ref 12–16)
HEMOGLOBIN: 14 GM/DL (ref 12–16)
HEMOGLOBIN: 14.1 GM/DL (ref 12–16)
HEMOGLOBIN: 14.2 GM/DL (ref 12–16)
HEMOGLOBIN: 14.6 GM/DL (ref 12–16)
IMMATURE GRANS (ABS): 0.04 THOU/MM3 (ref 0–0.07)
IMMATURE GRANULOCYTES: 0.4 %
LYMPHOCYTES # BLD: 37.2 %
LYMPHOCYTES ABSOLUTE: 3.9 THOU/MM3 (ref 1–4.8)
MCH RBC QN AUTO: 31.2 PG (ref 26–33)
MCHC RBC AUTO-ENTMCNC: 33.7 GM/DL (ref 32.2–35.5)
MCV RBC AUTO: 92.7 FL (ref 81–99)
MONOCYTES # BLD: 8 %
MONOCYTES ABSOLUTE: 0.8 THOU/MM3 (ref 0.4–1.3)
NOROVIRUS GI GII PCR: NOT DETECTED
NUCLEATED RED BLOOD CELLS: 0 /100 WBC
PLATELET # BLD: 190 THOU/MM3 (ref 130–400)
PLESIOMONAS SHIGELLOIDES PCR: NOT DETECTED
PMV BLD AUTO: 11.3 FL (ref 9.4–12.4)
POTASSIUM REFLEX MAGNESIUM: 3.9 MEQ/L (ref 3.5–5.2)
RBC # BLD: 4.52 MILL/MM3 (ref 4.2–5.4)
ROTAVIRUS A PCR: NOT DETECTED
SALMONELLA PCR: NOT DETECTED
SAPOVIRUS PCR: NOT DETECTED
SEG NEUTROPHILS: 52.5 %
SEGMENTED NEUTROPHILS ABSOLUTE COUNT: 5.5 THOU/MM3 (ref 1.8–7.7)
SODIUM BLD-SCNC: 140 MEQ/L (ref 135–145)
TOTAL PROTEIN: 6.3 G/DL (ref 6.1–8)
VIBRIO CHOLERAE PCR: NOT DETECTED
VIBRIO PCR: NOT DETECTED
WBC # BLD: 10.5 THOU/MM3 (ref 4.8–10.8)
YERSINIA ENTEROCOLITICA PCR: NOT DETECTED

## 2019-05-09 PROCEDURE — 2709999900 HC NON-CHARGEABLE SUPPLY: Performed by: INTERNAL MEDICINE

## 2019-05-09 PROCEDURE — 2140000000 HC CCU INTERMEDIATE R&B

## 2019-05-09 PROCEDURE — 6360000002 HC RX W HCPCS: Performed by: INTERNAL MEDICINE

## 2019-05-09 PROCEDURE — 3609012700 HC EGD DILATION SAVORY: Performed by: INTERNAL MEDICINE

## 2019-05-09 PROCEDURE — 83036 HEMOGLOBIN GLYCOSYLATED A1C: CPT

## 2019-05-09 PROCEDURE — 6370000000 HC RX 637 (ALT 250 FOR IP): Performed by: INTERNAL MEDICINE

## 2019-05-09 PROCEDURE — 3700000001 HC ADD 15 MINUTES (ANESTHESIA): Performed by: INTERNAL MEDICINE

## 2019-05-09 PROCEDURE — 6360000002 HC RX W HCPCS: Performed by: REGISTERED NURSE

## 2019-05-09 PROCEDURE — 80053 COMPREHEN METABOLIC PANEL: CPT

## 2019-05-09 PROCEDURE — 85014 HEMATOCRIT: CPT

## 2019-05-09 PROCEDURE — 85025 COMPLETE CBC W/AUTO DIFF WBC: CPT

## 2019-05-09 PROCEDURE — 96361 HYDRATE IV INFUSION ADD-ON: CPT

## 2019-05-09 PROCEDURE — 36415 COLL VENOUS BLD VENIPUNCTURE: CPT

## 2019-05-09 PROCEDURE — 7100000000 HC PACU RECOVERY - FIRST 15 MIN: Performed by: INTERNAL MEDICINE

## 2019-05-09 PROCEDURE — 94640 AIRWAY INHALATION TREATMENT: CPT

## 2019-05-09 PROCEDURE — 82948 REAGENT STRIP/BLOOD GLUCOSE: CPT

## 2019-05-09 PROCEDURE — 2580000003 HC RX 258: Performed by: INTERNAL MEDICINE

## 2019-05-09 PROCEDURE — 85018 HEMOGLOBIN: CPT

## 2019-05-09 PROCEDURE — C9113 INJ PANTOPRAZOLE SODIUM, VIA: HCPCS | Performed by: INTERNAL MEDICINE

## 2019-05-09 PROCEDURE — 96372 THER/PROPH/DIAG INJ SC/IM: CPT

## 2019-05-09 PROCEDURE — 3700000000 HC ANESTHESIA ATTENDED CARE: Performed by: INTERNAL MEDICINE

## 2019-05-09 PROCEDURE — 2500000003 HC RX 250 WO HCPCS: Performed by: REGISTERED NURSE

## 2019-05-09 PROCEDURE — 0D758ZZ DILATION OF ESOPHAGUS, VIA NATURAL OR ARTIFICIAL OPENING ENDOSCOPIC: ICD-10-PCS | Performed by: INTERNAL MEDICINE

## 2019-05-09 PROCEDURE — 96376 TX/PRO/DX INJ SAME DRUG ADON: CPT

## 2019-05-09 RX ORDER — LIDOCAINE HYDROCHLORIDE 20 MG/ML
INJECTION, SOLUTION EPIDURAL; INFILTRATION; INTRACAUDAL; PERINEURAL PRN
Status: DISCONTINUED | OUTPATIENT
Start: 2019-05-09 | End: 2019-05-09 | Stop reason: SDUPTHER

## 2019-05-09 RX ORDER — PROPOFOL 10 MG/ML
INJECTION, EMULSION INTRAVENOUS PRN
Status: DISCONTINUED | OUTPATIENT
Start: 2019-05-09 | End: 2019-05-09 | Stop reason: SDUPTHER

## 2019-05-09 RX ADMIN — LIDOCAINE HYDROCHLORIDE 100 MG: 20 INJECTION, SOLUTION EPIDURAL; INFILTRATION; INTRACAUDAL; PERINEURAL at 07:15

## 2019-05-09 RX ADMIN — SODIUM CHLORIDE: 9 INJECTION, SOLUTION INTRAVENOUS at 07:14

## 2019-05-09 RX ADMIN — INSULIN LISPRO 4 UNITS: 100 INJECTION, SOLUTION INTRAVENOUS; SUBCUTANEOUS at 12:33

## 2019-05-09 RX ADMIN — PROPOFOL 200 MG: 10 INJECTION, EMULSION INTRAVENOUS at 07:15

## 2019-05-09 RX ADMIN — SODIUM CHLORIDE: 9 INJECTION, SOLUTION INTRAVENOUS at 12:33

## 2019-05-09 RX ADMIN — ONDANSETRON 4 MG: 2 INJECTION INTRAMUSCULAR; INTRAVENOUS at 09:49

## 2019-05-09 RX ADMIN — TRAMADOL HYDROCHLORIDE 100 MG: 50 TABLET ORAL at 20:33

## 2019-05-09 RX ADMIN — Medication 10 ML: at 09:54

## 2019-05-09 RX ADMIN — SODIUM CHLORIDE: 9 INJECTION, SOLUTION INTRAVENOUS at 20:48

## 2019-05-09 RX ADMIN — TRAMADOL HYDROCHLORIDE 100 MG: 50 TABLET ORAL at 09:48

## 2019-05-09 RX ADMIN — INSULIN LISPRO 2 UNITS: 100 INJECTION, SOLUTION INTRAVENOUS; SUBCUTANEOUS at 16:50

## 2019-05-09 RX ADMIN — BUPROPION HYDROCHLORIDE 150 MG: 150 TABLET, EXTENDED RELEASE ORAL at 20:33

## 2019-05-09 RX ADMIN — INSULIN LISPRO 1 UNITS: 100 INJECTION, SOLUTION INTRAVENOUS; SUBCUTANEOUS at 20:33

## 2019-05-09 RX ADMIN — TIOTROPIUM BROMIDE 18 MCG: 18 CAPSULE ORAL; RESPIRATORY (INHALATION) at 09:15

## 2019-05-09 RX ADMIN — VENLAFAXINE HYDROCHLORIDE 75 MG: 75 CAPSULE, EXTENDED RELEASE ORAL at 09:49

## 2019-05-09 RX ADMIN — PREGABALIN 300 MG: 75 CAPSULE ORAL at 20:33

## 2019-05-09 RX ADMIN — Medication 500 MCG: at 09:49

## 2019-05-09 RX ADMIN — BUPROPION HYDROCHLORIDE 150 MG: 150 TABLET, EXTENDED RELEASE ORAL at 09:49

## 2019-05-09 RX ADMIN — PANTOPRAZOLE SODIUM 40 MG: 40 INJECTION, POWDER, FOR SOLUTION INTRAVENOUS at 09:49

## 2019-05-09 RX ADMIN — TRAZODONE HYDROCHLORIDE 100 MG: 100 TABLET ORAL at 20:33

## 2019-05-09 RX ADMIN — ENOXAPARIN SODIUM 40 MG: 40 INJECTION SUBCUTANEOUS at 12:37

## 2019-05-09 RX ADMIN — ATORVASTATIN CALCIUM 20 MG: 20 TABLET, FILM COATED ORAL at 20:33

## 2019-05-09 RX ADMIN — MULTIPLE VITAMINS W/ MINERALS TAB 1 TABLET: TAB at 12:36

## 2019-05-09 RX ADMIN — Medication 10 ML: at 09:50

## 2019-05-09 RX ADMIN — INSULIN LISPRO 2 UNITS: 100 INJECTION, SOLUTION INTRAVENOUS; SUBCUTANEOUS at 10:03

## 2019-05-09 RX ADMIN — TRAMADOL HYDROCHLORIDE 100 MG: 50 TABLET ORAL at 13:40

## 2019-05-09 RX ADMIN — PREGABALIN 300 MG: 75 CAPSULE ORAL at 09:48

## 2019-05-09 RX ADMIN — PANTOPRAZOLE SODIUM 40 MG: 40 INJECTION, POWDER, FOR SOLUTION INTRAVENOUS at 20:33

## 2019-05-09 ASSESSMENT — PAIN DESCRIPTION - DESCRIPTORS
DESCRIPTORS: DULL;CRAMPING
DESCRIPTORS: CRAMPING;DULL
DESCRIPTORS: SHARP;CRAMPING

## 2019-05-09 ASSESSMENT — PAIN DESCRIPTION - PAIN TYPE
TYPE: ACUTE PAIN
TYPE: CHRONIC PAIN
TYPE: ACUTE PAIN
TYPE: ACUTE PAIN

## 2019-05-09 ASSESSMENT — PAIN DESCRIPTION - ORIENTATION
ORIENTATION: LOWER
ORIENTATION: MID
ORIENTATION: MID;UPPER
ORIENTATION: MID

## 2019-05-09 ASSESSMENT — PAIN DESCRIPTION - FREQUENCY
FREQUENCY: INTERMITTENT
FREQUENCY: INTERMITTENT

## 2019-05-09 ASSESSMENT — PAIN SCALES - GENERAL
PAINLEVEL_OUTOF10: 0
PAINLEVEL_OUTOF10: 0
PAINLEVEL_OUTOF10: 4
PAINLEVEL_OUTOF10: 8
PAINLEVEL_OUTOF10: 8
PAINLEVEL_OUTOF10: 5
PAINLEVEL_OUTOF10: 6
PAINLEVEL_OUTOF10: 5

## 2019-05-09 ASSESSMENT — PAIN DESCRIPTION - LOCATION
LOCATION: ABDOMEN
LOCATION: ABDOMEN
LOCATION: BACK
LOCATION: ABDOMEN

## 2019-05-09 ASSESSMENT — PAIN - FUNCTIONAL ASSESSMENT: PAIN_FUNCTIONAL_ASSESSMENT: 0-10

## 2019-05-09 NOTE — TELEPHONE ENCOUNTER
I see pt has been in the ER and her hgb is dropping,. In January she had a positive FIT test her and I advsied to follow up with GI for the colonoscopy and she refused. Please contact her again and let her know with her hgb falling and the positive stool it is likely she has a bleed somewhere and needs the colonoscopy and/or EGD to find out where.

## 2019-05-09 NOTE — TELEPHONE ENCOUNTER
Pt returned call, pt informed and verbalized understanding  pt is currently admitted @ [facility]  She had a CT of the abdomen and they discussed Dr Leydi Cruz doing a colonoscopy.

## 2019-05-09 NOTE — ANESTHESIA PRE PROCEDURE
times daily. .   Yes Historical Provider, MD   Omega-3 Fatty Acids (FISH OIL) 1000 MG CAPS Take 1,000 mg by mouth daily   Yes Historical Provider, MD   Cholecalciferol (VITAMIN D3) 2000 units CAPS Take by mouth daily   Yes Historical Provider, MD   vitamin B-12 (CYANOCOBALAMIN) 500 MCG tablet Take 500 mcg by mouth daily   Yes Historical Provider, MD   rOPINIRole (REQUIP) 1 MG tablet Take 1 mg by mouth 3 times daily as needed    Yes Historical Provider, MD   albuterol sulfate HFA (PROAIR HFA) 108 (90 Base) MCG/ACT inhaler Inhale 2 puffs into the lungs every 6 hours as needed for Wheezing 5/2/19   ALLEN Locke CNP   Handicap Placard MISC by Does not apply route Duration: three years    Type II idabetes  Diabetic neuropathy 10/29/18   ALLEN Locke CNP   blood glucose test strips (ACCU-CHEK DAVIDA) strip Test once daily 10/5/18   ALLEN Locke CNP   HYDROcodone-acetaminophen (NORCO) 5-325 MG per tablet Take 1 tablet by mouth every 8 hours as needed for Pain. Abdelrahman Roberts Historical Provider, MD   OXYGEN Inhale 2 L into the lungs     Historical Provider, MD   Elastic Bandages & Supports (WRIST BRACE/RIGHT MEDIUM) MISC Wear brace on right wrist nightly 12/20/17   ALLEN Rausch CNP   Vitamins-Lipotropics (BALANCED B-100 COMPLEX CR) TBCR Take 1 tablet by mouth 4 times daily (after meals and at bedtime)  Patient taking differently: Take 1 tablet by mouth 2 times daily  10/9/17   Dilia Ortega DO   acetaminophen 650 MG TABS Take 650 mg by mouth every 4 hours as needed for Pain (For mild pain level 1-3 or for fever > 100.5).  8/16/13   Jyotsna Bell MD       Current medications:    Current Facility-Administered Medications   Medication Dose Route Frequency Provider Last Rate Last Dose    atorvastatin (LIPITOR) tablet 20 mg  20 mg Oral Nightly River Higuera MD   20 mg at 05/08/19 2134    buPROPion [facility]) extended release tablet 150 mg  150 mg Oral BID River Higuera MD   150 mg at 05/08/19 2127    pregabalin (LYRICA) capsule 300 mg  300 mg Oral BID Roya Dupree MD   300 mg at 05/08/19 2128    rOPINIRole (REQUIP) tablet 1 mg  1 mg Oral TID PRN Roya Dupree MD   1 mg at 05/08/19 2127    tiotropium (SPIRIVA) inhalation capsule 18 mcg  18 mcg Inhalation Daily Roya Dupree MD        traMADol Jaynee Elissa) tablet 100 mg  100 mg Oral TID Roya Dupree MD   100 mg at 05/08/19 2127    traZODone (DESYREL) tablet 100 mg  100 mg Oral Nightly Roya Dupree MD   100 mg at 05/08/19 2128    venlafaxine (EFFEXOR XR) extended release capsule 75 mg  75 mg Oral Daily Roya Dupree MD   75 mg at 05/08/19 2127    vitamin B-12 (CYANOCOBALAMIN) tablet 500 mcg  500 mcg Oral Daily Roya Dupree MD   500 mcg at 05/08/19 2127    therapeutic multivitamin-minerals 1 tablet  1 tablet Oral BID Roya Dupree MD   1 tablet at 05/08/19 2127    sodium chloride flush 0.9 % injection 10 mL  10 mL Intravenous 2 times per day Roya Dupree MD   10 mL at 05/08/19 2128    sodium chloride flush 0.9 % injection 10 mL  10 mL Intravenous PRN Roya Dupree MD   10 mL at 05/08/19 2313    ondansetron (ZOFRAN) injection 4 mg  4 mg Intravenous Q6H PRN Roya Dupree MD   4 mg at 05/08/19 2313    acetaminophen (TYLENOL) tablet 650 mg  650 mg Oral Q4H PRN Roya Dupree MD        0.9 % sodium chloride infusion   Intravenous Continuous Roya Dupree MD   Stopped at 05/09/19 0420    insulin lispro (HUMALOG) injection vial 0-12 Units  0-12 Units Subcutaneous TID WC Roya Dupree MD        insulin lispro (HUMALOG) injection vial 0-6 Units  0-6 Units Subcutaneous Nightly Roya Dupree MD        glucose (GLUTOSE) 40 % oral gel 15 g  15 g Oral PRN Roya Dupree MD        dextrose 50 % solution 12.5 g  12.5 g Intravenous PRN Roya Dupree MD        glucagon (rDNA) injection 1 mg  1 mg Intramuscular PRN Nimisha Bee MD        dextrose 5 % solution  100 mL/hr Intravenous PRN Nimisha Bee MD        pantoprazole (PROTONIX) injection 40 mg  40 mg Intravenous Q12H Nimisha Bee MD   40 mg at 05/08/19 2127       Allergies:     Allergies   Allergen Reactions    Latex Itching and Rash       Problem List:    Patient Active Problem List   Diagnosis Code    OA (osteoarthritis) of knee M17.10    S/P TKR (total knee replacement) Z96.659    DM2 (diabetes mellitus, type 2) ([facility] [address] E11.9    Primary osteoarthritis of right knee M17.11    Diabetic polyneuropathy associated with type 2 diabetes mellitus (HCC) E11.42    Status post total right knee replacement Z96.651    Pulmonary emphysema ([facility] [address] J43.9    Hyperlipidemia, mixed E78.2    Herniated cervical disc M50.20    Cellulitis of buttock L03.317    Abscess, gluteal, right L02.31    Hepatic steatosis K76.0    Class 3 obesity due to excess calories with serious comorbidity and body mass index (BMI) of 40.0 to 44.9 in adult [id number]    Depression F32.9    Hyperglycemia R73.9    Intractable vomiting with nausea R11.2    Mixed acid base balance disorder E87.4    Melena K92.1    Abdominal pain R10.9       Past Medical History:        Diagnosis Date    Anxiety     Arthritis     Carpal tunnel syndrome     Cellulitis     Chronic back pain     Depression     Diabetes mellitus ([address]     Emphysema of lung (HCC)     Fibromyalgia     GERD (gastroesophageal reflux disease)     Glaucoma     Hiatal hernia     esophagus closes    Hx of blood clots     foot post op    Hyperlipidemia, mixed 7/12/2017    IBS (irritable bowel syndrome)     Leg pain     nerve damage right leg    MDRO (multiple drug resistant organisms) resistance 2008    wound and nasal    Guzman's neuroma     left foot    Nausea & vomiting     Neuropathic pain     Osteoarthritis     PONV (postoperative nausea and vomiting)     Pulmonary emphysema ([address] 7/12/2017    Sleep apnea     DOES NOT USE CPAP       Past Surgical History:        Procedure Laterality Date    ABDOMEN SURGERY      APPENDECTOMY      BACK SURGERY      lower x 3    CERVICAL FUSION       SECTION      x3    CHOLECYSTECTOMY      COLONOSCOPY      ECTOPIC PREGNANCY SURGERY      EYE SURGERY Bilateral 2011    LASER FOR GLAUCOMA    HYSTERECTOMY, TOTAL ABDOMINAL      JOINT REPLACEMENT      OTHER SURGICAL HISTORY  Aug 5, 2013    Left knee total arthroplasty (Dr. Cash Ramos, Psychiatric)    OR DEBRIDEMENT OPEN WOUND 20 SQ CM< Right 2018    INCISIONAL DEBRIEDMENT INCISION AND DRAINAGE RIGHT GLUTEAL ABCESS performed by Jackelin Vinson MD at [facility] OFFICE/OUTPT [address] Right 2018    EXCISIONAL DEBRIDEMENT OF RIGHT BUTTOCKS WOUND performed by Kvng Maradiaga DO at [address] Left     little toe bone removed    TONSILLECTOMY      TOTAL KNEE ARTHROPLASTY Right 2016    UPPER GASTROINTESTINAL ENDOSCOPY         Social History:    Social History     Tobacco Use    Smoking status: Current Every Day Smoker     Packs/day: 1.00     Years: 35.00     Pack years: 35.00     Types: Cigarettes     Start date: 1982     Last attempt to quit: 11/15/2015     Years since quitting: 3.4    Smokeless tobacco: Never Used    Tobacco comment: 2-4 cigarettes/day    Substance Use Topics    Alcohol use:  No                                Ready to quit: Not Answered  Counseling given: Not Answered  Comment: 2-4 cigarettes/day       Vital Signs (Current):   Vitals:    19 0653 19 0654 19 0655 19 0656   BP:   (!) 140/65    Pulse: 59 61 59 63   Resp: 17 22 10 (!) 7   Temp:       TempSrc:       SpO2: 92% 90% 97% 94%   Weight:       Height:                                                  BP Readings from Last 3 Encounters:   19 (!) 140/65   19 112/68   19 130/69       NPO Status: Time of last liquid consumption:  Time of last solid consumption: 1900                        Date of last liquid consumption: 05/08/19                        Date of last solid food consumption: 05/07/19    BMI:   Wt Readings from Last 3 Encounters:   05/09/19 276 lb (125.2 kg)   05/02/19 281 lb 9.6 oz (127.7 kg)   01/25/19 290 lb (131.5 kg)     Body mass index is 43.23 kg/m². CBC:   Lab Results   Component Value Date    WBC 10.5 05/09/2019    RBC 4.52 05/09/2019    HGB 14.1 05/09/2019    HGB 14.0 05/09/2019    HCT 41.9 05/09/2019    HCT 41.7 05/09/2019    MCV 92.7 05/09/2019    RDW 14.5 06/10/2018     05/09/2019       CMP:   Lab Results   Component Value Date     05/09/2019    K 3.9 05/09/2019     05/09/2019    CO2 22 05/09/2019    BUN 9 05/09/2019    CREATININE 0.7 05/09/2019    LABGLOM 85 05/09/2019    GLUCOSE 201 05/09/2019    PROT 6.3 05/09/2019    CALCIUM 8.6 05/09/2019    BILITOT 0.9 05/09/2019    ALKPHOS 128 05/09/2019    AST 25 05/09/2019    ALT 22 05/09/2019       POC Tests:   Recent Labs     05/08/19 2004   POCGLU 138*       Coags: No results found for: PROTIME, INR, APTT    HCG (If Applicable): No results found for: PREGTESTUR, PREGSERUM, HCG, HCGQUANT     ABGs: No results found for: PHART, PO2ART, RSM6THV, INT3ZQI, BEART, D2YBSQEC     Type & Screen (If Applicable):  Lab Results   Component Value Date    [address] POS 05/08/2019       Anesthesia Evaluation  Patient summary reviewed   history of anesthetic complications: PONV.   Airway: Mallampati: III  TM distance: <3 FB   Neck ROM: full  Mouth opening: < 3 FB Dental:          Pulmonary:normal exam    (+) COPD:  sleep apnea: on CPAP and noncompliant,                             Cardiovascular:  Exercise tolerance: poor (<4 METS),         ECG reviewed               Beta Blocker:  Not on Beta Blocker         Neuro/Psych:   (+) neuromuscular disease:, psychiatric history:            GI/Hepatic/Renal:   (+) hiatal hernia, GERD:, liver disease:,           Endo/Other: (+) DiabetesType II DM, no interval change, using insulin, . Abdominal:   (+) obese,     Abdomen: soft. Vascular: negative vascular ROS. Anesthesia Plan      MAC     ASA 3       Induction: intravenous. Anesthetic plan and risks discussed with patient. Plan discussed with CRNA.     Attending anesthesiologist reviewed and agrees with [address] [place], [place] [address]ALLEN - CRNA   5/9/2019

## 2019-05-10 ENCOUNTER — TELEPHONE (OUTPATIENT)
Dept: FAMILY MEDICINE CLINIC | Age: 59
End: 2019-05-10

## 2019-05-10 VITALS
BODY MASS INDEX: 43.32 KG/M2 | SYSTOLIC BLOOD PRESSURE: 157 MMHG | HEART RATE: 52 BPM | HEIGHT: 67 IN | DIASTOLIC BLOOD PRESSURE: 74 MMHG | RESPIRATION RATE: 18 BRPM | TEMPERATURE: 98 F | OXYGEN SATURATION: 97 % | WEIGHT: 276 LBS

## 2019-05-10 LAB
ANION GAP SERPL CALCULATED.3IONS-SCNC: 11 MEQ/L (ref 8–16)
BUN BLDV-MCNC: 10 MG/DL (ref 7–22)
CALCIUM SERPL-MCNC: 8.4 MG/DL (ref 8.5–10.5)
CHLORIDE BLD-SCNC: 109 MEQ/L (ref 98–111)
CO2: 21 MEQ/L (ref 23–33)
CREAT SERPL-MCNC: 0.8 MG/DL (ref 0.4–1.2)
GFR SERPL CREATININE-BSD FRML MDRD: 73 ML/MIN/1.73M2
GLUCOSE BLD-MCNC: 186 MG/DL (ref 70–108)
GLUCOSE BLD-MCNC: 199 MG/DL (ref 70–108)
GLUCOSE BLD-MCNC: 203 MG/DL (ref 70–108)
GLUCOSE BLD-MCNC: 210 MG/DL (ref 70–108)
POTASSIUM SERPL-SCNC: 3.8 MEQ/L (ref 3.5–5.2)
SODIUM BLD-SCNC: 141 MEQ/L (ref 135–145)

## 2019-05-10 PROCEDURE — 2709999900 HC NON-CHARGEABLE SUPPLY

## 2019-05-10 PROCEDURE — 82948 REAGENT STRIP/BLOOD GLUCOSE: CPT

## 2019-05-10 PROCEDURE — 6370000000 HC RX 637 (ALT 250 FOR IP): Performed by: INTERNAL MEDICINE

## 2019-05-10 PROCEDURE — C9113 INJ PANTOPRAZOLE SODIUM, VIA: HCPCS | Performed by: INTERNAL MEDICINE

## 2019-05-10 PROCEDURE — 36415 COLL VENOUS BLD VENIPUNCTURE: CPT

## 2019-05-10 PROCEDURE — 94640 AIRWAY INHALATION TREATMENT: CPT

## 2019-05-10 PROCEDURE — 6360000002 HC RX W HCPCS: Performed by: INTERNAL MEDICINE

## 2019-05-10 PROCEDURE — 80048 BASIC METABOLIC PNL TOTAL CA: CPT

## 2019-05-10 RX ADMIN — MULTIPLE VITAMINS W/ MINERALS TAB 1 TABLET: TAB at 09:16

## 2019-05-10 RX ADMIN — PANTOPRAZOLE SODIUM 40 MG: 40 INJECTION, POWDER, FOR SOLUTION INTRAVENOUS at 09:17

## 2019-05-10 RX ADMIN — INSULIN LISPRO 4 UNITS: 100 INJECTION, SOLUTION INTRAVENOUS; SUBCUTANEOUS at 09:17

## 2019-05-10 RX ADMIN — Medication 500 MCG: at 09:16

## 2019-05-10 RX ADMIN — INSULIN LISPRO 2 UNITS: 100 INJECTION, SOLUTION INTRAVENOUS; SUBCUTANEOUS at 16:29

## 2019-05-10 RX ADMIN — TRAMADOL HYDROCHLORIDE 100 MG: 50 TABLET ORAL at 09:17

## 2019-05-10 RX ADMIN — INSULIN LISPRO 4 UNITS: 100 INJECTION, SOLUTION INTRAVENOUS; SUBCUTANEOUS at 11:41

## 2019-05-10 RX ADMIN — ACETAMINOPHEN 650 MG: 325 TABLET ORAL at 03:57

## 2019-05-10 RX ADMIN — VENLAFAXINE HYDROCHLORIDE 75 MG: 75 CAPSULE, EXTENDED RELEASE ORAL at 09:16

## 2019-05-10 RX ADMIN — PREGABALIN 300 MG: 75 CAPSULE ORAL at 09:26

## 2019-05-10 RX ADMIN — TRAMADOL HYDROCHLORIDE 100 MG: 50 TABLET ORAL at 14:58

## 2019-05-10 RX ADMIN — TIOTROPIUM BROMIDE 18 MCG: 18 CAPSULE ORAL; RESPIRATORY (INHALATION) at 07:40

## 2019-05-10 RX ADMIN — BUPROPION HYDROCHLORIDE 150 MG: 150 TABLET, EXTENDED RELEASE ORAL at 09:17

## 2019-05-10 RX ADMIN — ONDANSETRON 4 MG: 2 INJECTION INTRAMUSCULAR; INTRAVENOUS at 09:21

## 2019-05-10 ASSESSMENT — PAIN SCALES - GENERAL
PAINLEVEL_OUTOF10: 8
PAINLEVEL_OUTOF10: 5
PAINLEVEL_OUTOF10: 3
PAINLEVEL_OUTOF10: 9

## 2019-05-10 ASSESSMENT — PAIN DESCRIPTION - FREQUENCY: FREQUENCY: INTERMITTENT

## 2019-05-10 ASSESSMENT — PAIN DESCRIPTION - PAIN TYPE
TYPE: ACUTE PAIN
TYPE: ACUTE PAIN
TYPE: CHRONIC PAIN

## 2019-05-10 ASSESSMENT — PAIN DESCRIPTION - DESCRIPTORS
DESCRIPTORS: CRAMPING
DESCRIPTORS: DULL;BURNING
DESCRIPTORS: DULL;CRAMPING

## 2019-05-10 ASSESSMENT — PAIN DESCRIPTION - ORIENTATION
ORIENTATION: LEFT;RIGHT
ORIENTATION: MID

## 2019-05-10 ASSESSMENT — PAIN DESCRIPTION - LOCATION
LOCATION: ABDOMEN
LOCATION: ABDOMEN
LOCATION: LEG

## 2019-05-10 NOTE — TELEPHONE ENCOUNTER
[facility] calling in to schedule this patient for a hospital follow up with Belinda Flores in 5-7 days. Patient was admitted for hyperglycemia, is being discharged home 5/10/19 and her readmission score is 24%. At time of call only same days and freeze thaws available with [facility] in 5-7 day time frame. Please call patient to advise when she can be seen.

## 2019-05-11 ENCOUNTER — CARE COORDINATION (OUTPATIENT)
Dept: CASE MANAGEMENT | Age: 59
End: 2019-05-11

## 2019-05-11 LAB — FECAL FAT, QUALITATIVE: NORMAL

## 2019-05-11 NOTE — CARE COORDINATION
[facility] Transitions Initial Follow Up Call    Call within 2 business days of discharge: Yes    Patient: Heather Felix Patient : 1960   MRN: <[id number]>  Reason for Admission: N/V  Discharge Date: 5/10/19 RARS: Readmission Risk Score: 24      Last Discharge [address] [address]       Complaint Diagnosis Description Type Department Provider    19 Nausea; Hyperglycemia Nausea and vomiting, intractability of vomiting not specified, unspecified vomiting type . .. ED to Hosp-Admission (Discharged) (ADMITTED) [facility] Lashae Dumas MD; Wero Bryan Spoke with: [address]: [facility]     Non-face-to-face services provided:  Obtained and reviewed discharge summary and/or continuity of care documents    Care Transitions 24 Hour Call    Do you have any ongoing symptoms?:  No  Do you have a copy of your discharge instructions?:  Yes  Do you have all of your prescriptions and are they filled?:  Yes  Have you been contacted by a [facility] Pharmacist?:  No  Have you scheduled your follow up appointment?:  Yes  How are you going to get to your appointment?:  Car - family or friend to transport  Were you discharged with any Home Care or Post Acute Services:  No  Post Acute Services:  Home Health (Comment: [facility] )  Patient Home Equipment:  Oxygen  Do you have support at home?:  Alone  Do you feel like you have everything you need to keep you well at home?:  Yes  Are you an active caregiver in your home?:  No  Care Transitions Interventions     Spoke with patient who reports she is doing ok, patient stated she had an episode of sharp pain in abd today but since has passed. Patient denied any n/v and able to tolerate food and fluids. Patient stated she will call PCP office Monday and setup follow up apt. Patient stated she a bowel movement this morning and denied any constipation. Reviewed changed medications with patient and she reported she is taking all her medications as prescribed. Denies any needs at present time. Agreeable to f/u calls. Gave reminder that if they have any questions/concerns at anytime, they can always call PCP/specialist as they always have an MD on call 24/7.             Follow Up  Future Appointments   Date Time Provider Scarlett Ibarra   5/22/2019  3:00 PM ALLEN Bustamante CNP [facility] [address]   6/5/2019  3:40 PM ALLEN Dominguez CNP [facility] [address]   7/18/2019  4:40 PM STR CT IMAGING RM1  [facility] STRZ OUT EXP STR Radiolog   7/25/2019  3:30 PM ALLEN Bustamante CNP [facility] Umm Ramírez RN, BSN, [address] \[facility] [place] [facility]\"" Transitions Coordinator    [phone]

## 2019-05-13 ENCOUNTER — TELEPHONE (OUTPATIENT)
Dept: FAMILY MEDICINE CLINIC | Age: 59
End: 2019-05-13

## 2019-05-13 NOTE — TELEPHONE ENCOUNTER
Dimas Jacome with [facility] called requesting max units per day for Humalog Kwikpen prescribed 5/6/19.

## 2019-05-14 LAB
BLOOD CULTURE, ROUTINE: NORMAL
BLOOD CULTURE, ROUTINE: NORMAL

## 2019-05-17 ENCOUNTER — CARE COORDINATION (OUTPATIENT)
Dept: CARE COORDINATION | Age: 59
End: 2019-05-17

## 2019-05-28 ENCOUNTER — CARE COORDINATION (OUTPATIENT)
Dept: CARE COORDINATION | Age: 59
End: 2019-05-28

## 2019-05-28 ASSESSMENT — ENCOUNTER SYMPTOMS: DYSPNEA ASSOCIATED WITH: EXERTION

## 2019-05-28 NOTE — CARE COORDINATION
daily as needed    Yes Historical Provider, MD   acetaminophen 650 MG TABS Take 650 mg by mouth every 4 hours as needed for Pain (For mild pain level 1-3 or for fever > 100.5).  8/16/13  Yes Cheryle Base, MD       Future Appointments   Date Time Provider Scarlett Stacey   6/5/2019  3:40 PM ALLEN Butcher [facility] [address]   6/25/2019  7:15 AM STR NM INJECTION RM1 STRZ NUC MED STR Radiolog   6/25/2019  7:30 AM STR NUCLEAR MEDICINE RM3 GE INFINIA 1 STRZ NUC MED STR Radiolog   6/25/2019  8:15 AM STR NUCLEAR MEDICINE RM3 GE INFINIA 1 STRZ NUC MED STR Radiolog   6/25/2019  9:15 AM STR NUCLEAR MEDICINE RM3 GE INFINIA 1 STRZ NUC MED STR Radiolog   6/25/2019 10:15 AM STR NUCLEAR MEDICINE RM3 GE INFINIA 1 STRZ NUC MED STR Radiolog   6/25/2019 11:15 AM STR NUCLEAR MEDICINE RM3 GE INFINIA 1 STRZ NUC MED STR Radiolog   6/28/2019  8:00 AM Magnolia Dance, MD AFL[facility]   7/18/2019  4:40 PM STR CT IMAGING RM1  [facility] STRZ OUT EXP STR Radiolog   7/31/2019  8:30 AM ALLEN Mallory - [facility]NP [facility] [place] [address]

## 2019-06-05 ENCOUNTER — OFFICE VISIT (OUTPATIENT)
Dept: FAMILY MEDICINE CLINIC | Age: 59
End: 2019-06-05
Payer: MEDICARE

## 2019-06-05 VITALS
DIASTOLIC BLOOD PRESSURE: 58 MMHG | HEIGHT: 67 IN | BODY MASS INDEX: 44.26 KG/M2 | TEMPERATURE: 98.4 F | HEART RATE: 68 BPM | RESPIRATION RATE: 18 BRPM | SYSTOLIC BLOOD PRESSURE: 110 MMHG | WEIGHT: 282 LBS

## 2019-06-05 DIAGNOSIS — Z79.4 TYPE 2 DIABETES MELLITUS WITH OTHER CIRCULATORY COMPLICATION, WITH LONG-TERM CURRENT USE OF INSULIN (HCC): ICD-10-CM

## 2019-06-05 DIAGNOSIS — E11.59 TYPE 2 DIABETES MELLITUS WITH OTHER CIRCULATORY COMPLICATION, WITH LONG-TERM CURRENT USE OF INSULIN (HCC): ICD-10-CM

## 2019-06-05 DIAGNOSIS — F33.42 RECURRENT MAJOR DEPRESSIVE DISORDER, IN FULL REMISSION (HCC): Primary | ICD-10-CM

## 2019-06-05 PROCEDURE — 99213 OFFICE O/P EST LOW 20 MIN: CPT | Performed by: NURSE PRACTITIONER

## 2019-06-05 RX ORDER — VENLAFAXINE HYDROCHLORIDE 75 MG/1
75 CAPSULE, EXTENDED RELEASE ORAL DAILY
Qty: 90 CAPSULE | Refills: 3 | Status: SHIPPED | OUTPATIENT
Start: 2019-06-05 | End: 2019-12-05 | Stop reason: SDUPTHER

## 2019-06-05 RX ORDER — CITALOPRAM 40 MG/1
40 TABLET ORAL DAILY
COMMUNITY
End: 2019-06-05 | Stop reason: ALTCHOICE

## 2019-06-05 RX ORDER — PIOGLITAZONEHYDROCHLORIDE 30 MG/1
30 TABLET ORAL DAILY
COMMUNITY
End: 2019-12-05

## 2019-06-05 NOTE — PROGRESS NOTES
SUBJECTIVE:  Jeremías Puckett is a 60 y.o. female for   Chief Complaint   Patient presents with    Follow-up     follow up Effexor. Pt states she is feeling much better, and not crying so often        HPI:    Depressed Mood? yes - but better with the effexor, states she is not as emotional  Anhedonia?  no  Appetite changes?  no  Sleep disturbances? no  Feelings of guilt? no  Decreased energy? yes - she did have but it is better now and she has more energy  Impaired concentration? no  Substance abuse? no    Suicidal/Homicidal Ideation? no      Compliant with meds: Yes  Med side effects: no   Sees therapist?:  no  Family History of Mental Illness?  no    Psychological ROS: negative for - behavioral disorder, depression, sleep disturbances or suicidal ideation    She stats since she increased her insulin her sugars have been running better. OBJECTIVE:    BP (!) 110/58   Pulse 68   Temp 98.4 °F (36.9 °C) (Oral)   Resp 18   Ht 5' 7\" (1.702 m)   Wt 282 lb (127.9 kg)   BMI 44.17 kg/m²   General Appearance: well developed and well- nourished, in no acute distress  Head: normocephalic and atraumatic  Pulmonary/Chest: clear to auscultation bilaterally- no wheezes, rales or rhonchi, normal air movement, no respiratory distress  Cardiovascular: normal rate, regular rhythm, normal S1 and S2, no murmurs, rubs, clicks, or gallops, distal pulses intact, no carotid bruits  Abdomen: soft, non-tender, non-distended, normal bowel sounds, no masses or organomegaly, no rebound or guarding  Skin: warm and dry, no rash or erythema    Denies auditory or visual hallucinations. Affect is appropriate. Mood is congruent. She denies suicidal and homicidal thought, plan, or intent. She has good insight into current situation. ASSESSMENT & PLAN  Billy Watts was seen today for follow-up.     Diagnoses and all orders for this visit:    Recurrent major depressive disorder, in full remission ([facility] [address]  -     venlafaxine (EFFEXOR XR) 75 MG

## 2019-06-11 ENCOUNTER — CARE COORDINATION (OUTPATIENT)
Dept: CARE COORDINATION | Age: 59
End: 2019-06-11

## 2019-06-11 NOTE — CARE COORDINATION
Ambulatory Care Coordination Note  6/11/2019  CM Risk Score: 10  Cameron Mortality Risk Score:      ACC: Jessica Vyas, RN    Summary Note: Spoke with Isaac Quintanilla. She reports that her blood sugars have been trending down since she started taking the Victoza at night versus in the am.  States she has been running below 200's. Reports a reading today of 197 but states that was because she ate [place] food very late last evening. I praised her on the improvements of her blood sugars and encouraged her to continue positive momentum but doing the tweeks to her diet as we have discussed before and increasing her activity level. She continues to decline dietician referral.  Was not able to discuss tele health calls due to quickly hanging up phone as her boss was yelling at her. States she her stomach pain has improved greatly and has a colonoscopy and EGD scheduled for this month. Denies changes from her baseline COPD. Plan-  -reinforce education completed and complete additional education to help manage chronic conditions  -reinforce positive trending improvement with blood sugars readings and encourage diet modification and increased activity  -dive further into tele health calls and positive support those calls can provide  Diabetes Assessment    Medic Alert ID:  No  Meal Planning:  Avoidance of concentrated sweets   How often do you test your blood sugar?:  Daily, Meals, Bedtime   Do you have barriers with adherence to non-pharmacologic self-management interventions?  (Nutrition/Exercise/Self-Monitoring):  No   Have you ever had to go to the ED for symptoms of low blood sugar?:  No       Do you have hyperglycemia symptoms?:  No   Do you have hypoglycemia symptoms?:  No   Last Blood Sugar Value:  197   Blood Sugar Monitoring Regimen:  Morning Fasting, Before Meals   Blood Sugar Trends:  Steady Decrease       and   COPD Assessment    Does the patient understand envrionmental exposure?:  Yes  Is the patient able to verbalize Rescue vs. Long Acting medications?:  No  Does the patient have a nebulizer?:  No  Does the patient use a space with inhaled medications?:  No            Symptoms:   None:  Yes      Symptom course:  stable  Breathlessness:  none  Increase use of rapid acting/rescue inhaled medications?:  No  Change in chronic cough?:  No/At Baseline  Change in sputum?:  No/At Baseline  Have you had a recent diagnosis of pneumonia either by PCP or at a hospital?:  No               Care Coordination Interventions    Program Enrollment:  Complex Care  Referral from Primary Care Provider:  Yes  Suggested Interventions and Community Resources  Diabetes Education:  Completed  Registered Dietician:  Declined  Smoking Cessation:  Declined  Zone Management Tools:  Completed  Other Services or Interventions:  [employer] for job training/placement         Goals Addressed                 This Visit's Progress     Conditions and Symptoms   On track     I will follow my Zone Management tool to seek urgent or emergent care. I will notify my provider of any symptoms that indicate a worsening of my condition. COPD, Diabetes  Barriers: overwhelmed by complexity of regimen  Plan for overcoming my barriers: ACC support  Confidence: 8/10  Anticipated Goal Completion Date: 4-25-19, updated goal 6-25-19    Update: Cj Chaudhari has been utlizing PCP office for changes in her condition versus seeking ED treatment 4-24-19  Update: Cj Chaudhari was recently hospitalized for v/n abdominal pain. She is following up with Dr. Elvis Cordero with colonscopy and gastric emptying scan 5-28-19   Update: States stomach issues have improved significantly, has colonscopy scheduled and EGD this month.  6-11-19         Nutrition Plan   No change     I will low carb diet  Diabetes  Barriers: lack of motivation  Plan for overcoming my barriers: family and ACC support  Confidence: 4/10  Anticipated Goal Completion Date: 6-28-19    Cj Chaudhari has declined dietician referral, she has been educated on low carb, low sugar diets in the past and continues to be non adherent. 5-28-19             Self Monitoring   On track     Self-Monitored Blood Glucose - I will check my blood sugar Other: as ordered  Diabetes, COPD  None Recently Recorded    Barriers: need for additional support and education  Plan for overcoming my barriers: ACC support  Confidence: 9/10  Anticipated Goal Completion Date: 4-25-19, updated 6-25-19    Update: Chuck Beauchamp has been checking her blood sugars routinely twice daily, readings continue to fluctuate. 4-24-19  Update: Chuck Beauchamp has been checking her blood sugars but admits to not eating well resulting in higher chems. 5-28-19  Update: Chuck Beauchamp states she started taking her Victoza at night and has been seeing improvements with her blood sugars. States they are all running below 200's. Reinforced tweaking diet and increasing activity level to keep positive trend going. 6-11-19              Prior to Admission medications    Medication Sig Start Date End Date Taking? Authorizing Provider   pioglitazone (ACTOS) 30 MG tablet Take 30 mg by mouth daily   Yes Historical Provider, MD   venlafaxine (EFFEXOR XR) 75 MG extended release capsule Take 1 capsule by mouth daily 6/5/19  Yes Dorris [facility], APRN - CNP   insulin lispro (HUMALOG KWIKPEN) 100 UNIT/ML pen INJECT 10 UNITS INTO THE SKIN THREE TIMES DAILY WITH MEALS PLUS SLIDING SCALE  Patient taking differently: INJECT 12 UNITS INTO THE SKIN THREE TIMES DAILY WITH MEALS PLUS SLIDING SCALE 5/6/19  Yes [facility], APRN - CNP   pregabalin (LYRICA) 300 MG capsule Take 1 capsule by mouth 2 times daily for 90 days.  5/2/19 7/31/19 Yes [facility], APRN - CNP   pantoprazole (PROTONIX) 40 MG tablet Take 1 tablet by mouth daily 5/2/19  Yes Stevo Nevaeh, APRN - CNP   atorvastatin (LIPITOR) 20 MG tablet TAKE 1 TABLET DAILY 5/2/19  Yes Stevo Nevaeh, APRN - CNP   albuterol sulfate HFA (PROAIR HFA) 108 (90 Base) MCG/ACT inhaler Inhale 2 puffs into the

## 2019-06-17 ENCOUNTER — TELEPHONE (OUTPATIENT)
Dept: FAMILY MEDICINE CLINIC | Age: 59
End: 2019-06-17

## 2019-07-03 ENCOUNTER — CARE COORDINATION (OUTPATIENT)
Dept: CARE COORDINATION | Age: 59
End: 2019-07-03

## 2019-07-10 ENCOUNTER — CARE COORDINATION (OUTPATIENT)
Dept: CARE COORDINATION | Age: 59
End: 2019-07-10

## 2019-07-17 ENCOUNTER — CARE COORDINATION (OUTPATIENT)
Dept: CARE COORDINATION | Age: 59
End: 2019-07-17

## 2019-07-17 DIAGNOSIS — Z79.4 TYPE 2 DIABETES MELLITUS WITH OTHER CIRCULATORY COMPLICATION, WITH LONG-TERM CURRENT USE OF INSULIN (HCC): Primary | ICD-10-CM

## 2019-07-17 DIAGNOSIS — E11.59 TYPE 2 DIABETES MELLITUS WITH OTHER CIRCULATORY COMPLICATION, WITH LONG-TERM CURRENT USE OF INSULIN (HCC): Primary | ICD-10-CM

## 2019-07-23 DIAGNOSIS — M79.7 FIBROMYALGIA: ICD-10-CM

## 2019-07-24 RX ORDER — PREGABALIN 300 MG/1
300 CAPSULE ORAL 2 TIMES DAILY
Qty: 180 CAPSULE | Refills: 0 | Status: SHIPPED | OUTPATIENT
Start: 2019-07-24 | End: 2019-11-14 | Stop reason: SDUPTHER

## 2019-07-25 ENCOUNTER — CARE COORDINATION (OUTPATIENT)
Dept: CARE COORDINATION | Age: 59
End: 2019-07-25

## 2019-07-26 ENCOUNTER — CARE COORDINATION (OUTPATIENT)
Dept: CARE COORDINATION | Age: 59
End: 2019-07-26

## 2019-07-26 ENCOUNTER — HOSPITAL ENCOUNTER (OUTPATIENT)
Dept: CT IMAGING | Age: 59
Discharge: HOME | End: 2019-07-26
Payer: MEDICARE

## 2019-07-26 DIAGNOSIS — J98.4 RESTRICTIVE LUNG DISEASE SECONDARY TO OBESITY: ICD-10-CM

## 2019-07-26 DIAGNOSIS — J43.9 PULMONARY EMPHYSEMA, UNSPECIFIED EMPHYSEMA TYPE (HCC): ICD-10-CM

## 2019-07-26 DIAGNOSIS — R93.89 NODULAR RADIOLOGIC DENSITY: ICD-10-CM

## 2019-07-26 DIAGNOSIS — E66.9 RESTRICTIVE LUNG DISEASE SECONDARY TO OBESITY: ICD-10-CM

## 2019-07-26 PROCEDURE — 71250 CT THORAX DX C-: CPT

## 2019-07-31 ENCOUNTER — TELEPHONE (OUTPATIENT)
Dept: PULMONOLOGY | Age: 59
End: 2019-07-31

## 2019-07-31 ENCOUNTER — OFFICE VISIT (OUTPATIENT)
Dept: PULMONOLOGY | Age: 59
End: 2019-07-31
Payer: MEDICARE

## 2019-07-31 VITALS
HEART RATE: 70 BPM | TEMPERATURE: 96.9 F | WEIGHT: 282.8 LBS | SYSTOLIC BLOOD PRESSURE: 116 MMHG | BODY MASS INDEX: 44.39 KG/M2 | HEIGHT: 67 IN | DIASTOLIC BLOOD PRESSURE: 64 MMHG | OXYGEN SATURATION: 98 %

## 2019-07-31 DIAGNOSIS — J96.11 CHRONIC RESPIRATORY FAILURE WITH HYPOXIA (HCC): ICD-10-CM

## 2019-07-31 DIAGNOSIS — F17.210 CIGARETTE NICOTINE DEPENDENCE, UNCOMPLICATED: ICD-10-CM

## 2019-07-31 DIAGNOSIS — G47.33 OSA (OBSTRUCTIVE SLEEP APNEA): ICD-10-CM

## 2019-07-31 DIAGNOSIS — J41.0 SMOKERS' COUGH (HCC): ICD-10-CM

## 2019-07-31 DIAGNOSIS — F12.10 MARIJUANA ABUSE, CONTINUOUS: ICD-10-CM

## 2019-07-31 DIAGNOSIS — J43.9 PULMONARY EMPHYSEMA, UNSPECIFIED EMPHYSEMA TYPE (HCC): Primary | ICD-10-CM

## 2019-07-31 PROCEDURE — 94618 PULMONARY STRESS TESTING: CPT | Performed by: NURSE PRACTITIONER

## 2019-07-31 PROCEDURE — 99406 BEHAV CHNG SMOKING 3-10 MIN: CPT | Performed by: NURSE PRACTITIONER

## 2019-07-31 PROCEDURE — 99214 OFFICE O/P EST MOD 30 MIN: CPT | Performed by: NURSE PRACTITIONER

## 2019-07-31 ASSESSMENT — ENCOUNTER SYMPTOMS
ABDOMINAL PAIN: 0
EYES NEGATIVE: 1
DIARRHEA: 0
COUGH: 1
SHORTNESS OF BREATH: 1
NAUSEA: 0
CHEST TIGHTNESS: 1
VOMITING: 0
WHEEZING: 0

## 2019-07-31 NOTE — PROGRESS NOTES
[facility]  [facility]     Patient: Rhett Georges, 60 y.o.   : 1960  2019    Pt of Dr. Giovanni Peck   Patient presents with    Follow-up     RLD 6 month with CT 19@ [facility]        HPI  Eather Dakin is here for 6 month follow up for COPD. On home O2 - prescribed 1-2L/min with sleep and activity - has not been using her O2 with activity   Does have some SOB with exertion, not feeling any more SOB since not using the O2 during day,  Is still using her O2 at night at 2LPM     Started new job, wants off O2 - fears of having to wear it at work. is doing  work. Has CARLOTTA- non compliant with CPAP - wears O2   Smoking 4-5 cigarettes per day, this is down from 1 PPD. - no desire to quit completely   Still smoking Marijuana at least once daily, would  More if had money     PFT 2018: normal FEV 1, normal lung volumes,  Moderate reduction in diffusion capacity   LDCT screening 2018- small nodular focus right mid lung - likely fibrosis- LUNG RADS 3- 6 month f/u recommended.    She did have repeat CT chest completed for review today   Currently on Spiriva handihaler ( started in January with decline in DLCO)   Using rescue sporadically- may use 3 times in one day, other days not at all- depends on activity     Chronic pain - follows with Pain Management       Past Medical hx   PMH:  Past Medical History:   Diagnosis Date    Anxiety     Arthritis     Carpal tunnel syndrome     Cellulitis     Chronic back pain     Depression     Diabetes mellitus ([address]     Emphysema of lung ([address]     Fibromyalgia     GERD (gastroesophageal reflux disease)     Glaucoma     Hiatal hernia     esophagus closes    Hx of blood clots     foot post op    Hyperlipidemia, mixed 2017    IBS (irritable bowel syndrome)     Leg pain     nerve damage right leg    MDRO (multiple drug resistant organisms) resistance     wound and nasal    Guzman's neuroma     left foot    Nausea & vomiting     Neuropathic pain     Osteoarthritis     PONV (postoperative nausea and vomiting)     Pulmonary emphysema ([address] 2017    Sleep apnea     DOES NOT USE CPAP     SURGICAL HISTORY:  Past Surgical History:   Procedure Laterality Date    ABDOMEN SURGERY      APPENDECTOMY      BACK SURGERY      lower x 3    CERVICAL FUSION       SECTION      x3    CHOLECYSTECTOMY      COLONOSCOPY      ECTOPIC PREGNANCY SURGERY      EYE SURGERY Bilateral     LASER FOR GLAUCOMA    HYSTERECTOMY, TOTAL ABDOMINAL      JOINT REPLACEMENT      OTHER SURGICAL HISTORY  Aug 5, 2013    Left knee total arthroplasty (Dr. Alexandru Cain, [facility])    IL DEBRIDEMENT OPEN WOUND 20 SQ CM< Right 2018    INCISIONAL DEBRIEDMENT INCISION AND DRAINAGE RIGHT GLUTEAL ABCESS performed by Jevon Helm MD at [facility] OFFICE/OUTPT [address] Right 2018    EXCISIONAL DEBRIDEMENT OF RIGHT BUTTOCKS WOUND performed by Harriet Cabrera DO at [address] Left     little toe bone removed    TONSILLECTOMY      TOTAL KNEE ARTHROPLASTY Right 2016    UPPER GASTROINTESTINAL ENDOSCOPY      UPPER GASTROINTESTINAL ENDOSCOPY Left 2019    EGD DILATION SAVORY performed by Bisi Rangel MD at [facility]     SOCIAL HISTORY:  Social History     Tobacco Use    Smoking status: Current Every Day Smoker     Packs/day: 1.00     Years: 35.00     Pack years: 35.00     Types: Cigarettes     Start date: 1982     Last attempt to quit: 11/15/2015     Years since quitting: 3.7    Smokeless tobacco: Never Used    Tobacco comment: 2-4 cigarettes/day    Substance Use Topics    Alcohol use: No    Drug use: Yes     Types: Marijuana     Comment: Occasion      ALLERGIES:  Allergies   Allergen Reactions    Latex Itching and Rash     FAMILY HISTORY:  Family History   Problem Relation Age of Onset    Arthritis Mother     Heart Disease Mother         [facility]   Lisset Hendricks adverse effects of cigarette and Marijuana smoking   -she should discuss her Marijuana use and continued uncontrolled pain with her pain management specialist   -continue Spiriva Handihaler  -continue PRN Albuterol  -discontinue home O2 during day based on above 6 min walk  -continue 2LPM O2 at night for CARLOTTA- was unable to tolerate PAP   -annual CT lung screen (order at next appt)     Will see Carmelina Schaumann in: 6 months    Electronically signed by ALLEN Swift CNP on 7/31/2019 at 9:30 AM

## 2019-08-07 DIAGNOSIS — E11.59 TYPE 2 DIABETES MELLITUS WITH OTHER CIRCULATORY COMPLICATION, WITH LONG-TERM CURRENT USE OF INSULIN (HCC): ICD-10-CM

## 2019-08-07 DIAGNOSIS — Z79.4 TYPE 2 DIABETES MELLITUS WITH OTHER CIRCULATORY COMPLICATION, WITH LONG-TERM CURRENT USE OF INSULIN (HCC): ICD-10-CM

## 2019-08-08 ENCOUNTER — CARE COORDINATION (OUTPATIENT)
Dept: CARE COORDINATION | Age: 59
End: 2019-08-08

## 2019-08-08 NOTE — CARE COORDINATION
ALLEN Richardson CNP   insulin lispro (HUMALOG KWIKPEN) 100 UNIT/ML pen INJECT 12 UNITS INTO THE SKIN THREE TIMES DAILY WITH MEALS PLUS SLIDING SCALE 7/15/19   ALLEN Hebert CNP   pioglitazone (ACTOS) 30 MG tablet Take 30 mg by mouth daily    Historical Provider, MD   venlafaxine (EFFEXOR XR) 75 MG extended release capsule Take 1 capsule by mouth daily 6/5/19   ALLEN Hebert CNP   pantoprazole (PROTONIX) 40 MG tablet Take 1 tablet by mouth daily 5/2/19   ALLEN Hebert CNP   atorvastatin (LIPITOR) 20 MG tablet TAKE 1 TABLET DAILY 5/2/19   ALLEN Hebert CNP   albuterol sulfate HFA (PROAIR HFA) 108 (90 Base) MCG/ACT inhaler Inhale 2 puffs into the lungs every 6 hours as needed for Wheezing 5/2/19   ALLEN Hebert CNP   traZODone (DESYREL) 100 MG tablet TAKE 1 TABLET NIGHTLY 5/2/19   ALLEN Hebert CNP   tiotropium (SPIRIVA HANDIHALER) 18 MCG inhalation capsule Inhale 1 capsule into the lungs daily 1 capsule via inhalation daily. 1/25/19 1/25/20  ALLEN Blanchard CNP   buPROPion (WELLBUTRIN SR) 150 MG extended release tablet TAKE 1 TABLET BY MOUTH TWICE DAILY 12/27/18   ALLEN Monroy CNP   Handicap Placard MISC by Does not apply route Duration: three years    Type II idabetes  Diabetic neuropathy 10/29/18   ALLEN Hebert CNP   blood glucose test strips (ACCU-CHEK DAVIDA) strip Test once daily 10/5/18   ALLEN Hebert CNP   HYDROcodone-acetaminophen (NORCO) 5-325 MG per tablet Take 1 tablet by mouth every 8 hours as needed for Pain. Nikky Wayne Historical Provider, MD   traMADol (ULTRAM) 50 MG tablet Take 100 mg by mouth 3 times daily. Nikky Wayne     Historical Provider, MD   OXYGEN Inhale 2 L into the lungs     Historical Provider, MD   Elastic Bandages & Supports (WRIST BRACE/RIGHT MEDIUM) MISC Wear brace on right wrist nightly 12/20/17   Joaquina Standing, APRN - CNP   Vitamins-Lipotropics (BALANCED B-100 COMPLEX CR) TBCR Take 1 tablet by mouth 4 times daily

## 2019-08-14 ENCOUNTER — TELEPHONE (OUTPATIENT)
Dept: FAMILY MEDICINE CLINIC | Age: 59
End: 2019-08-14

## 2019-08-16 NOTE — TELEPHONE ENCOUNTER
Pt is using 12 units TID, and states the highest amount she would give as per her sliding scale is 15 units more units. Pt states she rarely has to give anymore than the 12 units TID.

## 2019-09-05 ENCOUNTER — OFFICE VISIT (OUTPATIENT)
Dept: FAMILY MEDICINE CLINIC | Age: 59
End: 2019-09-05
Payer: MEDICARE

## 2019-09-05 VITALS
HEART RATE: 80 BPM | BODY MASS INDEX: 44.57 KG/M2 | WEIGHT: 284 LBS | RESPIRATION RATE: 14 BRPM | TEMPERATURE: 97.7 F | HEIGHT: 67 IN | SYSTOLIC BLOOD PRESSURE: 132 MMHG | DIASTOLIC BLOOD PRESSURE: 68 MMHG

## 2019-09-05 DIAGNOSIS — K12.0 APHTHOUS ULCER: ICD-10-CM

## 2019-09-05 DIAGNOSIS — E11.59 TYPE 2 DIABETES MELLITUS WITH OTHER CIRCULATORY COMPLICATION, WITH LONG-TERM CURRENT USE OF INSULIN (HCC): Primary | ICD-10-CM

## 2019-09-05 DIAGNOSIS — E10.42 DIABETIC POLYNEUROPATHY ASSOCIATED WITH TYPE 1 DIABETES MELLITUS (HCC): ICD-10-CM

## 2019-09-05 DIAGNOSIS — Z79.4 TYPE 2 DIABETES MELLITUS WITH OTHER CIRCULATORY COMPLICATION, WITH LONG-TERM CURRENT USE OF INSULIN (HCC): Primary | ICD-10-CM

## 2019-09-05 DIAGNOSIS — E78.5 HYPERLIPIDEMIA, UNSPECIFIED HYPERLIPIDEMIA TYPE: ICD-10-CM

## 2019-09-05 LAB
CREATININE URINE POCT: 300
HBA1C MFR BLD: 7.2 %
MICROALBUMIN/CREAT 24H UR: 30 MG/G{CREAT}
MICROALBUMIN/CREAT UR-RTO: <30

## 2019-09-05 PROCEDURE — 83036 HEMOGLOBIN GLYCOSYLATED A1C: CPT | Performed by: NURSE PRACTITIONER

## 2019-09-05 PROCEDURE — 99214 OFFICE O/P EST MOD 30 MIN: CPT | Performed by: NURSE PRACTITIONER

## 2019-09-05 PROCEDURE — 82044 UR ALBUMIN SEMIQUANTITATIVE: CPT | Performed by: NURSE PRACTITIONER

## 2019-09-05 RX ORDER — CHLORHEXIDINE GLUCONATE 0.12 MG/ML
15 RINSE ORAL 2 TIMES DAILY
Qty: 420 ML | Refills: 0 | Status: SHIPPED | OUTPATIENT
Start: 2019-09-05 | End: 2019-09-19

## 2019-09-11 ENCOUNTER — CARE COORDINATION (OUTPATIENT)
Dept: CARE COORDINATION | Age: 59
End: 2019-09-11

## 2019-09-11 NOTE — LETTER
9/11/2019    [id number]  [place] [address]     Congratulations on the progress you have made improving and taking charge of your health! Your recent follow up with ALLEN Peck CNP finds you doing well and are no longer in need of Care Coordination services. I know you will continue to use the knowledge and tools you have gained to continue down a healthy path. As you have demonstrated that you are able to successfully manage your health and wellness, I will no longer contact you on a regular basis. Again, congratulations and please know if there are any changes or you have a need for my services in the future, you may always contact me for questions or concerns.   In good health,       Cyndee Johnson RN

## 2019-10-08 ENCOUNTER — TELEPHONE (OUTPATIENT)
Dept: FAMILY MEDICINE CLINIC | Age: 59
End: 2019-10-08

## 2019-10-08 DIAGNOSIS — E11.59 TYPE 2 DIABETES MELLITUS WITH OTHER CIRCULATORY COMPLICATION, WITH LONG-TERM CURRENT USE OF INSULIN (HCC): Primary | ICD-10-CM

## 2019-10-08 DIAGNOSIS — Z79.4 TYPE 2 DIABETES MELLITUS WITH OTHER CIRCULATORY COMPLICATION, WITH LONG-TERM CURRENT USE OF INSULIN (HCC): Primary | ICD-10-CM

## 2019-10-09 DIAGNOSIS — Z79.4 TYPE 2 DIABETES MELLITUS WITH OTHER CIRCULATORY COMPLICATION, WITH LONG-TERM CURRENT USE OF INSULIN (HCC): Primary | ICD-10-CM

## 2019-10-09 DIAGNOSIS — E11.59 TYPE 2 DIABETES MELLITUS WITH OTHER CIRCULATORY COMPLICATION, WITH LONG-TERM CURRENT USE OF INSULIN (HCC): Primary | ICD-10-CM

## 2019-11-01 ENCOUNTER — TELEPHONE (OUTPATIENT)
Dept: FAMILY MEDICINE CLINIC | Age: 59
End: 2019-11-01

## 2019-11-05 DIAGNOSIS — E11.59 TYPE 2 DIABETES MELLITUS WITH OTHER CIRCULATORY COMPLICATION, WITH LONG-TERM CURRENT USE OF INSULIN (HCC): Primary | ICD-10-CM

## 2019-11-05 DIAGNOSIS — Z79.4 TYPE 2 DIABETES MELLITUS WITH OTHER CIRCULATORY COMPLICATION, WITH LONG-TERM CURRENT USE OF INSULIN (HCC): Primary | ICD-10-CM

## 2019-11-05 RX ORDER — INSULIN LISPRO 100 [IU]/ML
INJECTION, SOLUTION INTRAVENOUS; SUBCUTANEOUS
Qty: 15 ML | Refills: 3 | Status: SHIPPED | OUTPATIENT
Start: 2019-11-05 | End: 2020-02-17

## 2019-11-14 ENCOUNTER — HOSPITAL ENCOUNTER (OUTPATIENT)
Dept: WOMENS IMAGING | Age: 59
Discharge: HOME | End: 2019-11-14
Payer: MEDICARE

## 2019-11-14 DIAGNOSIS — Z12.39 BREAST SCREENING: ICD-10-CM

## 2019-11-14 DIAGNOSIS — M79.7 FIBROMYALGIA: ICD-10-CM

## 2019-11-14 PROCEDURE — 77063 BREAST TOMOSYNTHESIS BI: CPT

## 2019-11-14 RX ORDER — PREGABALIN 300 MG/1
300 CAPSULE ORAL 2 TIMES DAILY
Qty: 180 CAPSULE | Refills: 0 | Status: SHIPPED | OUTPATIENT
Start: 2019-11-14 | End: 2020-02-10 | Stop reason: SDUPTHER

## 2019-12-02 DIAGNOSIS — E11.59 TYPE 2 DIABETES MELLITUS WITH OTHER CIRCULATORY COMPLICATION, WITH LONG-TERM CURRENT USE OF INSULIN (HCC): ICD-10-CM

## 2019-12-02 DIAGNOSIS — Z79.4 TYPE 2 DIABETES MELLITUS WITH OTHER CIRCULATORY COMPLICATION, WITH LONG-TERM CURRENT USE OF INSULIN (HCC): ICD-10-CM

## 2019-12-05 ENCOUNTER — TELEPHONE (OUTPATIENT)
Dept: FAMILY MEDICINE CLINIC | Age: 59
End: 2019-12-05

## 2019-12-05 ENCOUNTER — OFFICE VISIT (OUTPATIENT)
Dept: FAMILY MEDICINE CLINIC | Age: 59
End: 2019-12-05
Payer: MEDICARE

## 2019-12-05 VITALS
HEART RATE: 88 BPM | BODY MASS INDEX: 44.89 KG/M2 | HEIGHT: 67 IN | TEMPERATURE: 98.4 F | SYSTOLIC BLOOD PRESSURE: 121 MMHG | OXYGEN SATURATION: 97 % | RESPIRATION RATE: 14 BRPM | WEIGHT: 286 LBS | DIASTOLIC BLOOD PRESSURE: 77 MMHG

## 2019-12-05 DIAGNOSIS — Z79.4 TYPE 2 DIABETES MELLITUS WITH OTHER CIRCULATORY COMPLICATION, WITH LONG-TERM CURRENT USE OF INSULIN (HCC): ICD-10-CM

## 2019-12-05 DIAGNOSIS — Z13.31 POSITIVE DEPRESSION SCREENING: Primary | ICD-10-CM

## 2019-12-05 DIAGNOSIS — F33.42 RECURRENT MAJOR DEPRESSIVE DISORDER, IN FULL REMISSION (HCC): ICD-10-CM

## 2019-12-05 DIAGNOSIS — E11.59 TYPE 2 DIABETES MELLITUS WITH OTHER CIRCULATORY COMPLICATION, WITH LONG-TERM CURRENT USE OF INSULIN (HCC): ICD-10-CM

## 2019-12-05 DIAGNOSIS — Z00.00 ROUTINE GENERAL MEDICAL EXAMINATION AT A HEALTH CARE FACILITY: ICD-10-CM

## 2019-12-05 LAB — HBA1C MFR BLD: 7.2 %

## 2019-12-05 PROCEDURE — 83036 HEMOGLOBIN GLYCOSYLATED A1C: CPT | Performed by: NURSE PRACTITIONER

## 2019-12-05 PROCEDURE — G8431 POS CLIN DEPRES SCRN F/U DOC: HCPCS | Performed by: NURSE PRACTITIONER

## 2019-12-05 PROCEDURE — G0438 PPPS, INITIAL VISIT: HCPCS | Performed by: NURSE PRACTITIONER

## 2019-12-05 RX ORDER — BUPROPION HYDROCHLORIDE 150 MG/1
TABLET, EXTENDED RELEASE ORAL
Qty: 90 TABLET | Refills: 3 | Status: ON HOLD | OUTPATIENT
Start: 2019-12-05 | End: 2020-01-29

## 2019-12-05 RX ORDER — TRAZODONE HYDROCHLORIDE 150 MG/1
TABLET ORAL
Qty: 90 TABLET | Refills: 3 | Status: SHIPPED | OUTPATIENT
Start: 2019-12-05 | End: 2020-10-19

## 2019-12-05 RX ORDER — VENLAFAXINE HYDROCHLORIDE 150 MG/1
150 CAPSULE, EXTENDED RELEASE ORAL DAILY
Qty: 90 CAPSULE | Refills: 3 | Status: SHIPPED | OUTPATIENT
Start: 2019-12-05 | End: 2020-11-23

## 2019-12-05 RX ORDER — FLASH GLUCOSE SENSOR
1 KIT MISCELLANEOUS 2 TIMES DAILY
Qty: 2 EACH | Refills: 3 | Status: SHIPPED | OUTPATIENT
Start: 2019-12-05 | End: 2020-03-02 | Stop reason: SDUPTHER

## 2019-12-05 ASSESSMENT — LIFESTYLE VARIABLES: HOW OFTEN DO YOU HAVE A DRINK CONTAINING ALCOHOL: 0

## 2019-12-05 ASSESSMENT — PATIENT HEALTH QUESTIONNAIRE - PHQ9: SUM OF ALL RESPONSES TO PHQ QUESTIONS 1-9: 22

## 2020-01-22 RX ORDER — CITALOPRAM 20 MG/1
40 TABLET ORAL DAILY
Qty: 30 TABLET | OUTPATIENT
Start: 2020-01-22

## 2020-01-27 RX ORDER — TIOTROPIUM BROMIDE 18 UG/1
CAPSULE ORAL; RESPIRATORY (INHALATION)
Qty: 30 CAPSULE | Refills: 11 | Status: SHIPPED | OUTPATIENT
Start: 2020-01-27 | End: 2020-02-03 | Stop reason: ALTCHOICE

## 2020-01-28 ENCOUNTER — NURSE TRIAGE (OUTPATIENT)
Dept: OTHER | Facility: CLINIC | Age: 60
End: 2020-01-28

## 2020-01-28 ENCOUNTER — APPOINTMENT (OUTPATIENT)
Dept: GENERAL RADIOLOGY | Age: 60
End: 2020-01-28
Payer: MEDICARE

## 2020-01-28 ENCOUNTER — HOSPITAL ENCOUNTER (OUTPATIENT)
Age: 60
Setting detail: OBSERVATION
Discharge: HOME | End: 2020-01-29
Attending: INTERNAL MEDICINE | Admitting: INTERNAL MEDICINE
Payer: MEDICARE

## 2020-01-28 PROBLEM — R07.9 CHEST PAIN: Status: ACTIVE | Noted: 2020-01-28

## 2020-01-28 LAB
ALBUMIN SERPL-MCNC: 3.6 G/DL (ref 3.5–5.1)
ALP BLD-CCNC: 136 U/L (ref 38–126)
ALT SERPL-CCNC: 14 U/L (ref 11–66)
ANION GAP SERPL CALCULATED.3IONS-SCNC: 12 MEQ/L (ref 8–16)
AST SERPL-CCNC: 12 U/L (ref 5–40)
BASOPHILS # BLD: 0.8 %
BASOPHILS ABSOLUTE: 0.1 THOU/MM3 (ref 0–0.1)
BILIRUB SERPL-MCNC: 0.5 MG/DL (ref 0.3–1.2)
BILIRUBIN DIRECT: < 0.2 MG/DL (ref 0–0.3)
BUN BLDV-MCNC: 16 MG/DL (ref 7–22)
CALCIUM SERPL-MCNC: 9.2 MG/DL (ref 8.5–10.5)
CHLORIDE BLD-SCNC: 103 MEQ/L (ref 98–111)
CO2: 22 MEQ/L (ref 23–33)
CREAT SERPL-MCNC: 0.8 MG/DL (ref 0.4–1.2)
EKG ATRIAL RATE: 73 BPM
EKG P AXIS: 69 DEGREES
EKG P-R INTERVAL: 150 MS
EKG Q-T INTERVAL: 372 MS
EKG QRS DURATION: 80 MS
EKG QTC CALCULATION (BAZETT): 409 MS
EKG R AXIS: 87 DEGREES
EKG T AXIS: 68 DEGREES
EKG VENTRICULAR RATE: 73 BPM
EOSINOPHIL # BLD: 1.7 %
EOSINOPHILS ABSOLUTE: 0.2 THOU/MM3 (ref 0–0.4)
ERYTHROCYTE [DISTWIDTH] IN BLOOD BY AUTOMATED COUNT: 13.9 % (ref 11.5–14.5)
ERYTHROCYTE [DISTWIDTH] IN BLOOD BY AUTOMATED COUNT: 46.4 FL (ref 35–45)
GFR SERPL CREATININE-BSD FRML MDRD: 73 ML/MIN/1.73M2
GLUCOSE BLD-MCNC: 146 MG/DL (ref 70–108)
GLUCOSE BLD-MCNC: 161 MG/DL (ref 70–108)
HCT VFR BLD CALC: 42.7 % (ref 37–47)
HEMOGLOBIN: 14 GM/DL (ref 12–16)
IMMATURE GRANS (ABS): 0.04 THOU/MM3 (ref 0–0.07)
IMMATURE GRANULOCYTES: 0.3 %
LYMPHOCYTES # BLD: 40.1 %
LYMPHOCYTES ABSOLUTE: 4.8 THOU/MM3 (ref 1–4.8)
MCH RBC QN AUTO: 30.5 PG (ref 26–33)
MCHC RBC AUTO-ENTMCNC: 32.8 GM/DL (ref 32.2–35.5)
MCV RBC AUTO: 93 FL (ref 81–99)
MONOCYTES # BLD: 8.5 %
MONOCYTES ABSOLUTE: 1 THOU/MM3 (ref 0.4–1.3)
NUCLEATED RED BLOOD CELLS: 0 /100 WBC
OSMOLALITY CALCULATION: 277.6 MOSMOL/KG (ref 275–300)
PLATELET # BLD: 223 THOU/MM3 (ref 130–400)
PMV BLD AUTO: 11.3 FL (ref 9.4–12.4)
POTASSIUM REFLEX MAGNESIUM: 4.2 MEQ/L (ref 3.5–5.2)
PRO-BNP: 42.9 PG/ML (ref 0–900)
RBC # BLD: 4.59 MILL/MM3 (ref 4.2–5.4)
SCAN OF BLOOD SMEAR: NORMAL
SEG NEUTROPHILS: 48.6 %
SEGMENTED NEUTROPHILS ABSOLUTE COUNT: 5.8 THOU/MM3 (ref 1.8–7.7)
SODIUM BLD-SCNC: 137 MEQ/L (ref 135–145)
TOTAL PROTEIN: 7.1 G/DL (ref 6.1–8)
TROPONIN T: < 0.01 NG/ML
TROPONIN T: < 0.01 NG/ML
WBC # BLD: 11.9 THOU/MM3 (ref 4.8–10.8)

## 2020-01-28 PROCEDURE — 2709999900 HC NON-CHARGEABLE SUPPLY

## 2020-01-28 PROCEDURE — G0378 HOSPITAL OBSERVATION PER HR: HCPCS

## 2020-01-28 PROCEDURE — 83880 ASSAY OF NATRIURETIC PEPTIDE: CPT

## 2020-01-28 PROCEDURE — 93005 ELECTROCARDIOGRAM TRACING: CPT | Performed by: EMERGENCY MEDICINE

## 2020-01-28 PROCEDURE — 80076 HEPATIC FUNCTION PANEL: CPT

## 2020-01-28 PROCEDURE — 84484 ASSAY OF TROPONIN QUANT: CPT

## 2020-01-28 PROCEDURE — 80048 BASIC METABOLIC PNL TOTAL CA: CPT

## 2020-01-28 PROCEDURE — 71045 X-RAY EXAM CHEST 1 VIEW: CPT

## 2020-01-28 PROCEDURE — 2580000003 HC RX 258: Performed by: NURSE PRACTITIONER

## 2020-01-28 PROCEDURE — 85025 COMPLETE CBC W/AUTO DIFF WBC: CPT

## 2020-01-28 PROCEDURE — 93010 ELECTROCARDIOGRAM REPORT: CPT | Performed by: NUCLEAR MEDICINE

## 2020-01-28 PROCEDURE — 36415 COLL VENOUS BLD VENIPUNCTURE: CPT

## 2020-01-28 PROCEDURE — 99285 EMERGENCY DEPT VISIT HI MDM: CPT

## 2020-01-28 PROCEDURE — 82948 REAGENT STRIP/BLOOD GLUCOSE: CPT

## 2020-01-28 PROCEDURE — 6370000000 HC RX 637 (ALT 250 FOR IP): Performed by: NURSE PRACTITIONER

## 2020-01-28 PROCEDURE — 6370000000 HC RX 637 (ALT 250 FOR IP): Performed by: PHYSICIAN ASSISTANT

## 2020-01-28 PROCEDURE — 99220 PR INITIAL OBSERVATION CARE/DAY 70 MINUTES: CPT | Performed by: NURSE PRACTITIONER

## 2020-01-28 PROCEDURE — 94761 N-INVAS EAR/PLS OXIMETRY MLT: CPT

## 2020-01-28 RX ORDER — SODIUM CHLORIDE 9 MG/ML
500 INJECTION, SOLUTION INTRAVENOUS CONTINUOUS PRN
Status: ACTIVE | OUTPATIENT
Start: 2020-01-29 | End: 2020-01-29

## 2020-01-28 RX ORDER — NITROGLYCERIN 0.4 MG/1
0.4 TABLET SUBLINGUAL EVERY 5 MIN PRN
Status: ACTIVE | OUTPATIENT
Start: 2020-01-29 | End: 2020-01-29

## 2020-01-28 RX ORDER — AMINOPHYLLINE DIHYDRATE 25 MG/ML
50 INJECTION, SOLUTION INTRAVENOUS PRN
Status: ACTIVE | OUTPATIENT
Start: 2020-01-29 | End: 2020-01-29

## 2020-01-28 RX ORDER — ASPIRIN 81 MG/1
324 TABLET, CHEWABLE ORAL ONCE
Status: COMPLETED | OUTPATIENT
Start: 2020-01-28 | End: 2020-01-28

## 2020-01-28 RX ORDER — DEXTROSE MONOHYDRATE 50 MG/ML
100 INJECTION, SOLUTION INTRAVENOUS PRN
Status: DISCONTINUED | OUTPATIENT
Start: 2020-01-28 | End: 2020-01-29 | Stop reason: HOSPADM

## 2020-01-28 RX ORDER — SODIUM CHLORIDE 0.9 % (FLUSH) 0.9 %
10 SYRINGE (ML) INJECTION PRN
Status: ACTIVE | OUTPATIENT
Start: 2020-01-28 | End: 2020-01-29

## 2020-01-28 RX ORDER — VENLAFAXINE HYDROCHLORIDE 150 MG/1
150 CAPSULE, EXTENDED RELEASE ORAL DAILY
Status: DISCONTINUED | OUTPATIENT
Start: 2020-01-29 | End: 2020-01-29

## 2020-01-28 RX ORDER — DEXTROSE MONOHYDRATE 25 G/50ML
12.5 INJECTION, SOLUTION INTRAVENOUS PRN
Status: DISCONTINUED | OUTPATIENT
Start: 2020-01-28 | End: 2020-01-29 | Stop reason: HOSPADM

## 2020-01-28 RX ORDER — ALBUTEROL SULFATE 90 UG/1
2 AEROSOL, METERED RESPIRATORY (INHALATION) EVERY 6 HOURS PRN
Status: DISCONTINUED | OUTPATIENT
Start: 2020-01-28 | End: 2020-01-29 | Stop reason: HOSPADM

## 2020-01-28 RX ORDER — POTASSIUM CHLORIDE 7.45 MG/ML
10 INJECTION INTRAVENOUS PRN
Status: DISCONTINUED | OUTPATIENT
Start: 2020-01-28 | End: 2020-01-29 | Stop reason: HOSPADM

## 2020-01-28 RX ORDER — ALBUTEROL SULFATE 90 UG/1
2 AEROSOL, METERED RESPIRATORY (INHALATION) PRN
Status: ACTIVE | OUTPATIENT
Start: 2020-01-29 | End: 2020-01-29

## 2020-01-28 RX ORDER — ONDANSETRON 2 MG/ML
4 INJECTION INTRAMUSCULAR; INTRAVENOUS EVERY 6 HOURS PRN
Status: DISCONTINUED | OUTPATIENT
Start: 2020-01-28 | End: 2020-01-29 | Stop reason: HOSPADM

## 2020-01-28 RX ORDER — OMEGA-3-ACID ETHYL ESTERS 1 G/1
1000 CAPSULE, LIQUID FILLED ORAL DAILY
Status: DISCONTINUED | OUTPATIENT
Start: 2020-01-29 | End: 2020-01-29 | Stop reason: HOSPADM

## 2020-01-28 RX ORDER — POTASSIUM CHLORIDE 20 MEQ/1
40 TABLET, EXTENDED RELEASE ORAL PRN
Status: DISCONTINUED | OUTPATIENT
Start: 2020-01-28 | End: 2020-01-29 | Stop reason: HOSPADM

## 2020-01-28 RX ORDER — ACETAMINOPHEN 325 MG/1
650 TABLET ORAL EVERY 4 HOURS PRN
Status: DISCONTINUED | OUTPATIENT
Start: 2020-01-28 | End: 2020-01-29 | Stop reason: HOSPADM

## 2020-01-28 RX ORDER — HYDROCODONE BITARTRATE AND ACETAMINOPHEN 5; 325 MG/1; MG/1
1 TABLET ORAL EVERY 8 HOURS PRN
Status: DISCONTINUED | OUTPATIENT
Start: 2020-01-28 | End: 2020-01-29 | Stop reason: HOSPADM

## 2020-01-28 RX ORDER — INSULIN GLARGINE 100 [IU]/ML
30 INJECTION, SOLUTION SUBCUTANEOUS NIGHTLY
Status: COMPLETED | OUTPATIENT
Start: 2020-01-28 | End: 2020-01-29

## 2020-01-28 RX ORDER — ROPINIROLE 1 MG/1
1 TABLET, FILM COATED ORAL 3 TIMES DAILY PRN
Status: DISCONTINUED | OUTPATIENT
Start: 2020-01-28 | End: 2020-01-29

## 2020-01-28 RX ORDER — TRAMADOL HYDROCHLORIDE 50 MG/1
100 TABLET ORAL 3 TIMES DAILY
Status: DISCONTINUED | OUTPATIENT
Start: 2020-01-28 | End: 2020-01-29 | Stop reason: HOSPADM

## 2020-01-28 RX ORDER — ATORVASTATIN CALCIUM 20 MG/1
20 TABLET, FILM COATED ORAL DAILY
Status: DISCONTINUED | OUTPATIENT
Start: 2020-01-29 | End: 2020-01-29

## 2020-01-28 RX ORDER — BUPROPION HYDROCHLORIDE 150 MG/1
150 TABLET, EXTENDED RELEASE ORAL 2 TIMES DAILY
Status: DISCONTINUED | OUTPATIENT
Start: 2020-01-28 | End: 2020-01-29

## 2020-01-28 RX ORDER — SODIUM CHLORIDE 0.9 % (FLUSH) 0.9 %
10 SYRINGE (ML) INJECTION PRN
Status: DISCONTINUED | OUTPATIENT
Start: 2020-01-28 | End: 2020-01-29 | Stop reason: HOSPADM

## 2020-01-28 RX ORDER — NICOTINE 21 MG/24HR
1 PATCH, TRANSDERMAL 24 HOURS TRANSDERMAL DAILY
Status: DISCONTINUED | OUTPATIENT
Start: 2020-01-29 | End: 2020-01-29 | Stop reason: HOSPADM

## 2020-01-28 RX ORDER — SENNA PLUS 8.6 MG/1
1 TABLET ORAL DAILY PRN
Status: DISCONTINUED | OUTPATIENT
Start: 2020-01-28 | End: 2020-01-29 | Stop reason: HOSPADM

## 2020-01-28 RX ORDER — ATROPINE SULFATE 0.1 MG/ML
0.5 INJECTION INTRAVENOUS EVERY 5 MIN PRN
Status: ACTIVE | OUTPATIENT
Start: 2020-01-29 | End: 2020-01-29

## 2020-01-28 RX ORDER — PANTOPRAZOLE SODIUM 40 MG/1
40 TABLET, DELAYED RELEASE ORAL
Status: DISCONTINUED | OUTPATIENT
Start: 2020-01-29 | End: 2020-01-29 | Stop reason: HOSPADM

## 2020-01-28 RX ORDER — LANOLIN ALCOHOL/MO/W.PET/CERES
500 CREAM (GRAM) TOPICAL DAILY
Status: DISCONTINUED | OUTPATIENT
Start: 2020-01-29 | End: 2020-01-29 | Stop reason: HOSPADM

## 2020-01-28 RX ORDER — NICOTINE POLACRILEX 4 MG
15 LOZENGE BUCCAL PRN
Status: DISCONTINUED | OUTPATIENT
Start: 2020-01-28 | End: 2020-01-29 | Stop reason: HOSPADM

## 2020-01-28 RX ORDER — ASPIRIN 81 MG/1
81 TABLET, CHEWABLE ORAL DAILY
Status: DISCONTINUED | OUTPATIENT
Start: 2020-01-29 | End: 2020-01-29 | Stop reason: HOSPADM

## 2020-01-28 RX ORDER — NITROGLYCERIN 0.4 MG/1
0.4 TABLET SUBLINGUAL EVERY 5 MIN PRN
Status: DISCONTINUED | OUTPATIENT
Start: 2020-01-28 | End: 2020-01-29 | Stop reason: HOSPADM

## 2020-01-28 RX ORDER — SODIUM CHLORIDE 0.9 % (FLUSH) 0.9 %
10 SYRINGE (ML) INJECTION EVERY 12 HOURS SCHEDULED
Status: DISCONTINUED | OUTPATIENT
Start: 2020-01-28 | End: 2020-01-29 | Stop reason: HOSPADM

## 2020-01-28 RX ORDER — PREGABALIN 75 MG/1
300 CAPSULE ORAL 2 TIMES DAILY
Status: DISCONTINUED | OUTPATIENT
Start: 2020-01-28 | End: 2020-01-29 | Stop reason: HOSPADM

## 2020-01-28 RX ORDER — METOPROLOL TARTRATE 5 MG/5ML
5 INJECTION INTRAVENOUS EVERY 5 MIN PRN
Status: ACTIVE | OUTPATIENT
Start: 2020-01-29 | End: 2020-01-29

## 2020-01-28 RX ADMIN — ASPIRIN 81 MG 324 MG: 81 TABLET ORAL at 17:49

## 2020-01-28 RX ADMIN — SODIUM CHLORIDE, PRESERVATIVE FREE 10 ML: 5 INJECTION INTRAVENOUS at 22:55

## 2020-01-28 ASSESSMENT — PAIN DESCRIPTION - ONSET
ONSET: ON-GOING
ONSET: ON-GOING

## 2020-01-28 ASSESSMENT — ENCOUNTER SYMPTOMS
PHOTOPHOBIA: 0
VOMITING: 0
RHINORRHEA: 0
NAUSEA: 0
BACK PAIN: 0
SHORTNESS OF BREATH: 1

## 2020-01-28 ASSESSMENT — PAIN DESCRIPTION - INTENSITY: RATING_2: 6

## 2020-01-28 ASSESSMENT — PAIN DESCRIPTION - FREQUENCY
FREQUENCY: CONTINUOUS
FREQUENCY: INTERMITTENT

## 2020-01-28 ASSESSMENT — PAIN SCALES - GENERAL
PAINLEVEL_OUTOF10: 8
PAINLEVEL_OUTOF10: 6
PAINLEVEL_OUTOF10: 5

## 2020-01-28 ASSESSMENT — PAIN DESCRIPTION - PAIN TYPE
TYPE: ACUTE PAIN;CHRONIC PAIN
TYPE: ACUTE PAIN

## 2020-01-28 ASSESSMENT — PAIN DESCRIPTION - PROGRESSION
CLINICAL_PROGRESSION: NOT CHANGED
CLINICAL_PROGRESSION: NOT CHANGED

## 2020-01-28 ASSESSMENT — HEART SCORE: ECG: 1

## 2020-01-28 ASSESSMENT — PAIN DESCRIPTION - LOCATION
LOCATION: SHOULDER
LOCATION_2: CHEST
LOCATION: ARM
LOCATION: ARM;LEG

## 2020-01-28 ASSESSMENT — PAIN DESCRIPTION - ORIENTATION
ORIENTATION: LEFT
ORIENTATION: RIGHT;LEFT
ORIENTATION: LEFT

## 2020-01-28 ASSESSMENT — PAIN DESCRIPTION - DIRECTION: RADIATING_TOWARDS: DOWN LEFT ARM

## 2020-01-28 ASSESSMENT — PAIN - FUNCTIONAL ASSESSMENT: PAIN_FUNCTIONAL_ASSESSMENT: ACTIVITIES ARE NOT PREVENTED

## 2020-01-28 ASSESSMENT — PAIN DESCRIPTION - DESCRIPTORS
DESCRIPTORS: ACHING
DESCRIPTORS_2: TIGHTNESS
DESCRIPTORS: TIGHTNESS

## 2020-01-28 NOTE — ED NOTES
Patient presents to ED with complaint of left arm pain, chest tightness and shortness of breath for past week. Patient states she is supposed to wear oxygen, but does not because of smoking. Respirations unlabored. EKG obtained.      Melani Price RN  01/28/20 0116

## 2020-01-28 NOTE — ED NOTES
Pt here for evaluation of chest pain, SOB, and left arm pain for about a week. Pt states she normally has pain in BLE and BUE however this time pain in left arm feels different, pt states her left arm feels heavier. She states she has prescription for home oxygen use as she gets SOB with long distances however she states she doesn't use it. Pt noted with glucose reader on left upper arm.        Charbel Console, YAA  01/28/20 9531

## 2020-01-28 NOTE — ED PROVIDER NOTES
(LIPITOR) 20 MG TABLET    TAKE 1 TABLET DAILY    BENZOCAINE (ANBESOL) 10 % MUCOSAL GEL    Take by mouth as needed. BLOOD GLUCOSE TEST STRIPS (ACCU-CHEK DAVIDA) STRIP    Test once daily    BUPROPION (WELLBUTRIN SR) 150 MG EXTENDED RELEASE TABLET    TAKE 1 TABLET BY MOUTH TWICE DAILY    CHOLECALCIFEROL (VITAMIN D3) 2000 UNITS CAPS    Take by mouth daily    CONTINUOUS BLOOD GLUC SENSOR (FREESTYLE MICKI 14 DAY SENSOR) [facility]    1 Device by Does not apply route 2 times daily    ELASTIC BANDAGES & SUPPORTS (WRIST BRACE/RIGHT MEDIUM) MISC    Wear brace on right wrist nightly    HANDICAP PLACARD MISC    by Does not apply route Duration: three years    Type II idabetes  Diabetic neuropathy    HYDROCODONE-ACETAMINOPHEN (NORCO) 5-325 MG PER TABLET    Take 1 tablet by mouth every 8 hours as needed for Pain. .    INSULIN DETEMIR (LEVEMIR FLEXTOUCH) 100 UNIT/ML INJECTION PEN    Inject 60 Units into the skin nightly    INSULIN LISPRO, 1 UNIT DIAL, (HUMALOG KWIKPEN) 100 UNIT/ML SOPN    INJECT 12 UNITS INTO THE SKIN THREE TIMES DAILY WITH MEALS PLUS SLIDING SCALE Max dose 60 units a day    INSULIN PEN NEEDLE 31G X 8 MM MISC    BD Ultra fine pen needles. 8 mm use once daily    LIRAGLUTIDE (VICTOZA) 18 MG/3ML SOPN SC INJECTION    Inject 1.8 mg into the skin daily    OMEGA-3 FATTY ACIDS (FISH OIL) 1000 MG CAPS    Take 1,000 mg by mouth daily    OXYGEN    Inhale 2 L into the lungs     PANTOPRAZOLE (PROTONIX) 40 MG TABLET    Take 1 tablet by mouth daily    PREGABALIN (LYRICA) 300 MG CAPSULE    Take 1 capsule by mouth 2 times daily for 90 days. ROPINIROLE (REQUIP) 1 MG TABLET    Take 1 mg by mouth 3 times daily as needed     SPIRIVA HANDIHALER 18 MCG INHALATION CAPSULE    INHALE THE CONTENTS OF 1 CAPSULE INTO THE LUNGS USING HANDIHALER EVERY DAY    TRAMADOL (ULTRAM) 50 MG TABLET    Take 100 mg by mouth 3 times daily. .    TRAZODONE (DESYREL) 150 MG TABLET    TAKE 1 TABLET NIGHTLY    VENLAFAXINE (EFFEXOR XR) 150 MG EXTENDED RELEASE (Bazett) P Axis R Axis T Axis   01/28/20 16:39:22 01/28/20 16:39:22 409 69 87 68         Final result                Narrative:    Sinus rhythm with marked sinus arrhythmia  Nonspecific ST abnormality  Abnormal ECG  When compared with ECG of 14-JAN-2019 10:17,  No significant change was found  Confirmed by NASREEN RIVERA ([id number]) on 1/28/2020 6:28:24 PM              RADIOLOGY: non-plain film images(s) such as CT,Ultrasound and MRI are read by the radiologist.  Plain radiographic images are visualized and preliminarily interpreted by the emergency physician unless otherwise stated below. XR CHEST PORTABLE   Final Result      No acute cardiopulmonary disease. COPD. **This report has been created using voice recognition software. It may contain minor errors which are inherent in voice recognition technology. **      Final report electronically signed by Dr. Susan Prakash on 1/28/2020 5:25 PM          LABS:   Labs Reviewed   BASIC METABOLIC PANEL W/ REFLEX TO MG FOR LOW K - Abnormal; Notable for the following components:       Result Value    CO2 22 (*)     Glucose 146 (*)     All other components within normal limits   CBC WITH AUTO DIFFERENTIAL - Abnormal; Notable for the following components:    WBC 11.9 (*)     RDW-SD 46.4 (*)     All other components within normal limits   HEPATIC FUNCTION PANEL - Abnormal; Notable for the following components:    Alkaline Phosphatase 136 (*)     All other components within normal limits   GLOMERULAR FILTRATION RATE, ESTIMATED - Abnormal; Notable for the following components:    Est, Glom Filt Rate 73 (*)     All other components within normal limits   BRAIN NATRIURETIC PEPTIDE   TROPONIN   ANION GAP   OSMOLALITY   SCAN OF BLOOD SMEAR   URINE DRUG SCREEN       EMERGENCY DEPARTMENT COURSE:   Vitals:    Vitals:    01/28/20 1739 01/28/20 1839 01/28/20 1937 01/28/20 2021   BP: 116/78 (!) 119/53 124/71 (!) 101/54   Pulse: 62 65 74 61   Resp: 12 27 22 20   Temp:       TempSrc: SpO2: 94% 95% 97% 97%   Weight:       Height:         Heart Score for chest pain patients  History: Moderately Suspicious  ECG: Non-Specifc repolarization disturbance/LBTB/PM  Patient Age: > 39 and < 65 years  *Risk factors for Atherosclerotic disease: Cigarette smoking, Diabetes Mellitus, Obesity  Risk Factors: > 3 Risk factors or history of atherosclerotic disease*  Troponin: < 1X normal limit  Heart Score Total: 5    6:05PM: Patient was seen and evaluated. Appropriate labs, imaging, and medications ordered. Discussed with patient possible admission. 6:05PM: CBC, BNP, BMP, anion gap, GFR, osmolality, and troponin resulted. Chest x-ray resulted showing COPD.    6:19PM: Hepatic function panel. 8:00PM: I consulted Watson Gilbert CNP (Hospitalist) who graciously agreed to admit the patient. I discussed this with the patient who was amenable to my decision. MDM:  The patient was seen by me in the emergency department for chest pain with radiation into the left upper extremity. Vital signs reviewed notice stable. Old records were reviewed. Appropriate labs and imaging were ordered. No acute abnormalities were noted. However the patient had multiple risk factors for coronary vascular disease and rated a heart score of five. I did not feel the patient was appropriate for discharge. I discussed results and admission with the patient and she was amenable. I consulted Watson Gilbert CNP who graciously accepted admission. The patient was admitted to the hospital in fair condition. CRITICAL CARE:   None    CONSULTS:  Watson Gilbert CNP    PROCEDURES:  None    FINAL IMPRESSION      1. Chest pain, unspecified type    2. Left arm pain          DISPOSITION/PLAN     1. Chest pain, unspecified type    2.  Left arm pain    Admission    (Please note that portions of this note were completed with a voice recognition program.  Efforts were made to edit the dictations but occasionally words are mis-transcribed.)    Scribe:  Vista Sark 1/28/20 6:05 PM Scribing for and in the presence of DEMARCUS Rodriguez. Signed by: Lyssa Saldivar, 01/28/20 9:10 PM    Provider:  I personally performed the services described in the documentation, reviewed and edited the documentation which was dictated to the scribe in my presence, and it accurately records my words and actions.     DEMARCUS Rodriguez 01/28/20 9:10 PM    Vianney Landis PA-C  01/30/20 [id number]

## 2020-01-29 ENCOUNTER — TELEPHONE (OUTPATIENT)
Dept: FAMILY MEDICINE CLINIC | Age: 60
End: 2020-01-29

## 2020-01-29 ENCOUNTER — APPOINTMENT (OUTPATIENT)
Dept: NON INVASIVE DIAGNOSTICS | Age: 60
End: 2020-01-29
Payer: MEDICARE

## 2020-01-29 VITALS
OXYGEN SATURATION: 97 % | TEMPERATURE: 98.2 F | HEIGHT: 69 IN | HEART RATE: 59 BPM | BODY MASS INDEX: 41.49 KG/M2 | WEIGHT: 280.13 LBS | RESPIRATION RATE: 16 BRPM | DIASTOLIC BLOOD PRESSURE: 81 MMHG | SYSTOLIC BLOOD PRESSURE: 122 MMHG

## 2020-01-29 PROBLEM — J43.1 PANLOBULAR EMPHYSEMA (HCC): Status: ACTIVE | Noted: 2017-07-12

## 2020-01-29 LAB
AMPHETAMINE+METHAMPHETAMINE URINE SCREEN: NEGATIVE
ANION GAP SERPL CALCULATED.3IONS-SCNC: 13 MEQ/L (ref 8–16)
AVERAGE GLUCOSE: 189 MG/DL (ref 70–126)
BARBITURATE QUANTITATIVE URINE: NEGATIVE
BENZODIAZEPINE QUANTITATIVE URINE: NEGATIVE
BUN BLDV-MCNC: 15 MG/DL (ref 7–22)
CALCIUM SERPL-MCNC: 9.1 MG/DL (ref 8.5–10.5)
CANNABINOID QUANTITATIVE URINE: POSITIVE
CHLORIDE BLD-SCNC: 106 MEQ/L (ref 98–111)
CHOLESTEROL, TOTAL: 155 MG/DL (ref 100–199)
CO2: 23 MEQ/L (ref 23–33)
COCAINE METABOLITE QUANTITATIVE URINE: NEGATIVE
CREAT SERPL-MCNC: 0.8 MG/DL (ref 0.4–1.2)
EKG ATRIAL RATE: 60 BPM
EKG P AXIS: 63 DEGREES
EKG P-R INTERVAL: 168 MS
EKG Q-T INTERVAL: 424 MS
EKG QRS DURATION: 86 MS
EKG QTC CALCULATION (BAZETT): 424 MS
EKG R AXIS: 63 DEGREES
EKG T AXIS: 59 DEGREES
EKG VENTRICULAR RATE: 60 BPM
ERYTHROCYTE [DISTWIDTH] IN BLOOD BY AUTOMATED COUNT: 13.8 % (ref 11.5–14.5)
ERYTHROCYTE [DISTWIDTH] IN BLOOD BY AUTOMATED COUNT: 47.2 FL (ref 35–45)
GFR SERPL CREATININE-BSD FRML MDRD: 73 ML/MIN/1.73M2
GLUCOSE BLD-MCNC: 153 MG/DL (ref 70–108)
GLUCOSE BLD-MCNC: 160 MG/DL (ref 70–108)
GLUCOSE BLD-MCNC: 184 MG/DL (ref 70–108)
GLUCOSE BLD-MCNC: 278 MG/DL (ref 70–108)
HBA1C MFR BLD: 8.3 % (ref 4.4–6.4)
HCT VFR BLD CALC: 43.8 % (ref 37–47)
HDLC SERPL-MCNC: 28 MG/DL
HEMOGLOBIN: 14 GM/DL (ref 12–16)
LDL CHOLESTEROL CALCULATED: 74 MG/DL
LV EF: 63 %
LVEF MODALITY: NORMAL
MCH RBC QN AUTO: 30 PG (ref 26–33)
MCHC RBC AUTO-ENTMCNC: 32 GM/DL (ref 32.2–35.5)
MCV RBC AUTO: 93.8 FL (ref 81–99)
OPIATES, URINE: NEGATIVE
OXYCODONE: NEGATIVE
PHENCYCLIDINE QUANTITATIVE URINE: NEGATIVE
PLATELET # BLD: 190 THOU/MM3 (ref 130–400)
PMV BLD AUTO: 11.1 FL (ref 9.4–12.4)
POTASSIUM REFLEX MAGNESIUM: 3.9 MEQ/L (ref 3.5–5.2)
RBC # BLD: 4.67 MILL/MM3 (ref 4.2–5.4)
SODIUM BLD-SCNC: 142 MEQ/L (ref 135–145)
TRIGL SERPL-MCNC: 266 MG/DL (ref 0–199)
TROPONIN T: < 0.01 NG/ML
WBC # BLD: 10.4 THOU/MM3 (ref 4.8–10.8)

## 2020-01-29 PROCEDURE — 93005 ELECTROCARDIOGRAM TRACING: CPT | Performed by: NURSE PRACTITIONER

## 2020-01-29 PROCEDURE — 83036 HEMOGLOBIN GLYCOSYLATED A1C: CPT

## 2020-01-29 PROCEDURE — 2709999900 HC NON-CHARGEABLE SUPPLY

## 2020-01-29 PROCEDURE — G0378 HOSPITAL OBSERVATION PER HR: HCPCS

## 2020-01-29 PROCEDURE — 96372 THER/PROPH/DIAG INJ SC/IM: CPT

## 2020-01-29 PROCEDURE — 80048 BASIC METABOLIC PNL TOTAL CA: CPT

## 2020-01-29 PROCEDURE — 36415 COLL VENOUS BLD VENIPUNCTURE: CPT

## 2020-01-29 PROCEDURE — 6360000002 HC RX W HCPCS

## 2020-01-29 PROCEDURE — 99217 PR OBSERVATION CARE DISCHARGE MANAGEMENT: CPT | Performed by: INTERNAL MEDICINE

## 2020-01-29 PROCEDURE — 3430000000 HC RX DIAGNOSTIC RADIOPHARMACEUTICAL: Performed by: NURSE PRACTITIONER

## 2020-01-29 PROCEDURE — 6370000000 HC RX 637 (ALT 250 FOR IP): Performed by: NURSE PRACTITIONER

## 2020-01-29 PROCEDURE — 93010 ELECTROCARDIOGRAM REPORT: CPT | Performed by: NUCLEAR MEDICINE

## 2020-01-29 PROCEDURE — 80061 LIPID PANEL: CPT

## 2020-01-29 PROCEDURE — 93306 TTE W/DOPPLER COMPLETE: CPT

## 2020-01-29 PROCEDURE — 84484 ASSAY OF TROPONIN QUANT: CPT

## 2020-01-29 PROCEDURE — 82948 REAGENT STRIP/BLOOD GLUCOSE: CPT

## 2020-01-29 PROCEDURE — 6360000002 HC RX W HCPCS: Performed by: NURSE PRACTITIONER

## 2020-01-29 PROCEDURE — A9500 TC99M SESTAMIBI: HCPCS | Performed by: NURSE PRACTITIONER

## 2020-01-29 PROCEDURE — 78452 HT MUSCLE IMAGE SPECT MULT: CPT

## 2020-01-29 PROCEDURE — 2580000003 HC RX 258: Performed by: NURSE PRACTITIONER

## 2020-01-29 PROCEDURE — 85027 COMPLETE CBC AUTOMATED: CPT

## 2020-01-29 PROCEDURE — 80307 DRUG TEST PRSMV CHEM ANLYZR: CPT

## 2020-01-29 PROCEDURE — 93017 CV STRESS TEST TRACING ONLY: CPT

## 2020-01-29 RX ORDER — VENLAFAXINE HYDROCHLORIDE 150 MG/1
150 CAPSULE, EXTENDED RELEASE ORAL NIGHTLY
Status: DISCONTINUED | OUTPATIENT
Start: 2020-01-29 | End: 2020-01-29 | Stop reason: HOSPADM

## 2020-01-29 RX ORDER — ROPINIROLE 1 MG/1
1 TABLET, FILM COATED ORAL NIGHTLY
Status: DISCONTINUED | OUTPATIENT
Start: 2020-01-29 | End: 2020-01-29 | Stop reason: HOSPADM

## 2020-01-29 RX ORDER — NICOTINE 21 MG/24HR
1 PATCH, TRANSDERMAL 24 HOURS TRANSDERMAL DAILY
Qty: 30 PATCH | Refills: 3 | COMMUNITY
Start: 2020-01-30 | End: 2020-09-28

## 2020-01-29 RX ORDER — ROPINIROLE 1 MG/1
5 TABLET, FILM COATED ORAL 3 TIMES DAILY
Status: DISCONTINUED | OUTPATIENT
Start: 2020-01-29 | End: 2020-01-29

## 2020-01-29 RX ORDER — VITAMIN B COMPLEX
1 CAPSULE ORAL DAILY
COMMUNITY
End: 2021-06-08 | Stop reason: ALTCHOICE

## 2020-01-29 RX ADMIN — INSULIN LISPRO 2 UNITS: 100 INJECTION, SOLUTION INTRAVENOUS; SUBCUTANEOUS at 16:38

## 2020-01-29 RX ADMIN — TRAZODONE HYDROCHLORIDE 150 MG: 50 TABLET ORAL at 00:00

## 2020-01-29 RX ADMIN — INSULIN GLARGINE 30 UNITS: 100 INJECTION, SOLUTION SUBCUTANEOUS at 00:02

## 2020-01-29 RX ADMIN — ENOXAPARIN SODIUM 40 MG: 40 INJECTION SUBCUTANEOUS at 00:07

## 2020-01-29 RX ADMIN — OMEGA-3-ACID ETHYL ESTERS 1000 MG: 1 CAPSULE, LIQUID FILLED ORAL at 09:46

## 2020-01-29 RX ADMIN — Medication 500 MCG: at 09:40

## 2020-01-29 RX ADMIN — PREGABALIN 300 MG: 75 CAPSULE ORAL at 00:00

## 2020-01-29 RX ADMIN — PANTOPRAZOLE SODIUM 40 MG: 40 TABLET, DELAYED RELEASE ORAL at 05:47

## 2020-01-29 RX ADMIN — TRAMADOL HYDROCHLORIDE 100 MG: 50 TABLET, FILM COATED ORAL at 14:08

## 2020-01-29 RX ADMIN — Medication 31 MILLICURIE: at 08:25

## 2020-01-29 RX ADMIN — ENOXAPARIN SODIUM 40 MG: 40 INJECTION SUBCUTANEOUS at 11:19

## 2020-01-29 RX ADMIN — ASPIRIN 81 MG 81 MG: 81 TABLET ORAL at 09:40

## 2020-01-29 RX ADMIN — Medication 9.2 MILLICURIE: at 07:25

## 2020-01-29 RX ADMIN — TRAMADOL HYDROCHLORIDE 100 MG: 50 TABLET, FILM COATED ORAL at 09:40

## 2020-01-29 RX ADMIN — TRAMADOL HYDROCHLORIDE 100 MG: 50 TABLET, FILM COATED ORAL at 00:00

## 2020-01-29 RX ADMIN — SODIUM CHLORIDE, PRESERVATIVE FREE 10 ML: 5 INJECTION INTRAVENOUS at 09:45

## 2020-01-29 RX ADMIN — INSULIN LISPRO 1 UNITS: 100 INJECTION, SOLUTION INTRAVENOUS; SUBCUTANEOUS at 00:02

## 2020-01-29 RX ADMIN — INSULIN LISPRO 6 UNITS: 100 INJECTION, SOLUTION INTRAVENOUS; SUBCUTANEOUS at 12:27

## 2020-01-29 RX ADMIN — PREGABALIN 300 MG: 75 CAPSULE ORAL at 09:41

## 2020-01-29 ASSESSMENT — PAIN DESCRIPTION - DESCRIPTORS
DESCRIPTORS: HEAVINESS
DESCRIPTORS: HEAVINESS

## 2020-01-29 ASSESSMENT — PAIN DESCRIPTION - FREQUENCY
FREQUENCY: CONTINUOUS
FREQUENCY: CONTINUOUS

## 2020-01-29 ASSESSMENT — PAIN SCALES - GENERAL
PAINLEVEL_OUTOF10: 0
PAINLEVEL_OUTOF10: 7
PAINLEVEL_OUTOF10: 0

## 2020-01-29 ASSESSMENT — PAIN DESCRIPTION - PAIN TYPE
TYPE: ACUTE PAIN
TYPE: ACUTE PAIN

## 2020-01-29 ASSESSMENT — PAIN DESCRIPTION - LOCATION
LOCATION: ARM
LOCATION: ARM

## 2020-01-29 ASSESSMENT — PAIN DESCRIPTION - ORIENTATION
ORIENTATION: LEFT
ORIENTATION: LEFT

## 2020-01-29 ASSESSMENT — PAIN SCALES - WONG BAKER: WONGBAKER_NUMERICALRESPONSE: 0

## 2020-01-29 NOTE — TELEPHONE ENCOUNTER
.Transition of Care visit scheduled.   2/5/2020  Patient is being discharged to home  Date of discharge 1/29/20  Discharge from facility [facility]  Reason for admission chest pain

## 2020-01-29 NOTE — CARE COORDINATION
[address] Transitions Interview     2020    Patient: Joey Munroe Patient : 1960   MRN: [id number]  Reason for Admission: Chest pain  RARS: Readmission Risk Score: 24    Patient admitted through ED with chest pain, left arm pain and SOB for the past week and a half. Troponins negative. Positive for Cannabinoids. Hgb A1C 8.3. Stress test done this am. Discussed with Dr. Robbie Diaz, if stress is negative, plan discharge later. Patient admitted under Observation. Spoke with patient. She denies chest pain, fluttering, and SOB at this time. She is independent at home and uses a cane or walker depending on what she feels she needs. Denies the need for any services. Readmission Risk  Patient Active Problem List   Diagnosis    OA (osteoarthritis) of knee    S/P TKR (total knee replacement)    DM2 (diabetes mellitus, type 2) [facility]    Primary osteoarthritis of right knee    Diabetic polyneuropathy associated with type 2 diabetes mellitus ([facility]    Status post total right knee replacement    Panlobular emphysema ([address]    Hyperlipidemia, mixed    Herniated cervical disc    Cellulitis of buttock    Abscess, gluteal, right    Hepatic steatosis    Class 3 obesity due to excess calories with serious comorbidity and body mass index (BMI) of 40.0 to 44.9 in adult    Depression    Hyperglycemia    Intractable vomiting with nausea    Mixed acid base balance disorder    Melena    Abdominal pain    Chest pain    Left arm pain       Inpatient Assessment  Care Transitions Summary    Care Transitions Inpatient Review  Medication Review  Do you have all of your prescriptions and are they filled?:  Yes   Barriers to Medication Adherence:  None  Are you able to afford your medications?:  Yes  How often do you have difficulty taking your medications?:  I always take them as prescribed.   Housing Review  Who do you live with?:  Alone  Are you an active caregiver in your home?: No  Social Support  Do you have a ?:  No  Do you have a [facility]?:  No  Durable Medical Equipment  Patient DME:  Lisset farias  Functional Review  Ability to seek help/take action for Emergent/Urgent situations i.e. fire, crime, inclement weather or health crisis. :  Independent  Ability handle personal hygiene needs (bathing/dressing/grooming): Independent  Ability to manage medications: Independent  Ability to prepare food:  Independent  Ability to maintain home (clean home, laundry): Independent  Ability to drive and/or has transportation:  Independent  Ability to do shopping:  Independent  Ability to manage finances: Independent  Is patient able to live independently?:  Yes  Hearing and Vision  Visual Impairment:  Visual impairment (Glasses/contacts)  Hearing Impairment:  None  Care Transitions Interventions  No Identified Needs                                 Follow Up  Future Appointments   Date Time Provider Scarlett Ibarra   2/3/2020  8:30 AM Lay García, [address] [place] [address]  There are no preventive care reminders to display for this patient.     Satya Solis RN

## 2020-01-29 NOTE — PROGRESS NOTES
Pharmacy Medication History Note      List of current medications patient is taking is complete. Source of information: patient, medication dispense history     Changes made to medication list:  Medications removed (include reason, ex. therapy complete or physician discontinued): · Benzocaine 10% - patient is no longer taking  · Bupropion 150 mg - patient is no longer taking    Medications added/doses adjusted:  · Adjusted ropinirole to 1 mg - Take 1 tablet PO nightly  · Adjusted tramadol to 50 mg - Take 1 tablet PO Q4H PRN for pain  · Adjusted B Complex Vitamin - Take 1 capsule PO daily    Other notes (ex. Recent course of antibiotics, Coumadin dosing):  · Patient was unsure of the dose of her ropinirole, but medication dispense history shows she filled 90 tablets of the 1 mg ropinirole for a 90 day supply on 12/6/2019. · Denies use of other OTC or herbal medications. Allergies reviewed      Electronically signed by James Deleon on 1/29/2020 at 11:05 AM     Chavo Holm clarified with patient, she reports she takes Norco 1 tablet Q8H and Tramadol 50 mg Q4H ATC, both scheduled. [address] [place] [address] office they reported Norco 5/325 mg Q8H however the office notes report pt uses sparingly and should be used PRN for breakthrough pain. Pt fills 30 day supply monthly. The office reports the Tramadol is 100 mg TID scheduled, pt fills 90 day supply every 90 days. Current inpatient orders match how Juan Pablo Nicholson intended for patient to take Tramadol 100 mg TID and Norco 5/325 Q8H PRN.       Panda Leger PharmD 1/29/2020 11:35 AM

## 2020-01-29 NOTE — ED NOTES
ED nurse-to-nurse bedside report    Chief Complaint   Patient presents with    Chest Pain    Shortness of Breath    Arm Pain     left      LOC: alert and orientated to name, place, date  Vital signs   Vitals:    01/28/20 1642 01/28/20 1657 01/28/20 1739 01/28/20 1839   BP: 114/68 124/61 116/78 (!) 119/53   Pulse: 72  62 65   Resp: 18  12 27   Temp: 97.8 °F (36.6 °C)      TempSrc: Oral      SpO2: 95%  94% 95%   Weight: 280 lb (127 kg)      Height: 5' 9\" (1.753 m)         Pain:    Pain Interventions: aspirin  Pain Goal: 3  Oxygen: No    Current needs required : Urine specimen   Telemetry: Yes  LDAs:   Peripheral IV 01/28/20 Right Hand (Active)   Site Assessment Clean;Dry; Intact 1/28/2020  6:46 PM   Line Status Blood return noted;Normal saline locked 1/28/2020  6:46 PM   Dressing Status Clean;Dry; Intact 1/28/2020  6:46 PM   Dressing Intervention New 1/28/2020  6:46 PM     Continuous Infusions:   Mobility: Requires assistance * 1  Fierro Fall Risk Score: Fall Risk 12/5/2019 6/11/2019 5/28/2019 4/24/2019 3/22/2019 1/25/2019 9/27/2017   2 or more falls in past year? yes no no no no no no   Fall with injury in past year? no no no no no no no     Fall Interventions: side rails up X2, call light WNL.   Report given to: [facility]     Lam Blackburn RN  01/28/20 0790

## 2020-01-29 NOTE — ED NOTES
Pt given turkey sandwich and drink per [facility], [place].       Js Flor RN  01/28/20 2021

## 2020-01-29 NOTE — CARE COORDINATION
1/29/20, 9:37 AM  DISCHARGE PLANNING EVALUATION:    Flakito Bloom       Admitted from: ED 1/28/2020/ [address] day: 0   Location: [address] [place] [address]A Reason for admit: Chest pain [R07.9] Status: Obs  Admit order signed?: yes  PMH:  has a past medical history of Anxiety, Arthritis, Carpal tunnel syndrome, Cellulitis, Chronic back pain, Depression, Diabetes mellitus ([address] Emphysema of lung ([address] Fibromyalgia, GERD (gastroesophageal reflux disease), Glaucoma, Hiatal hernia, Hx of blood clots, Hyperlipidemia, mixed, IBS (irritable bowel syndrome), Leg pain, MDRO (multiple drug resistant organisms) resistance, Guzman's neuroma, Nausea & vomiting, Neuropathic pain, Osteoarthritis, PONV (postoperative nausea and vomiting), Pulmonary emphysema ([address] and Sleep apnea. Procedure: 1/29 Stress test, results pending. 1/29 Echo to be done  Pertinent abnormal Imaging: None  Medications:  Scheduled Meds:   venlafaxine  150 mg Oral Nightly    rOPINIRole  5 mg Oral TID    omega-3 acid ethyl esters  1,000 mg Oral Daily    pantoprazole  40 mg Oral QAM AC    pregabalin  300 mg Oral BID    tiotropium  18 mcg Inhalation Daily    traMADol  100 mg Oral TID    traZODone  150 mg Oral Nightly    vitamin B-12  500 mcg Oral Daily    sodium chloride flush  10 mL Intravenous 2 times per day    aspirin  81 mg Oral Daily    nicotine  1 patch Transdermal Daily    enoxaparin  40 mg Subcutaneous Q12H    insulin lispro  0-12 Units Subcutaneous TID WC    insulin lispro  0-6 Units Subcutaneous Nightly     Continuous Infusions:   sodium chloride      dextrose        Pertinent Info/Orders/Treatment Plan:  Pt admitted through ED with chest pain, left arm pain and SOB for the past week and a half. Troponins negative. Positive for Cannabinoids. Hgb A1C 8.3. Stress test done this am. Discussed with Dr. Stefano Bai, if stress is negative, plan discharge later.    Diet: DIET CARB CONTROL; No Caffeine   Smoking status:  reports that she has been smoking cigarettes. She started smoking about 37 years ago. She has a 35.00 pack-year smoking history. She has never used smokeless tobacco.   PCP: ALLEN Rai CNP  Readmission 30 days or less: no   %    Discharge Planning Evaluation  Current Residence:  Private Residence  Living Arrangements:  Alone   Support Systems:  Children  Current Services PTA:     Potential Assistance Needed:  N/A  Potential Assistance Purchasing Medications:  No  Does patient want to participate in local refill/ meds to beds program?  No  Type of Home Care Services:  None  Patient expects to be discharged to:  home alone  Expected Discharge date:  01/30/20  Follow Up Appointment: Best Day/ Time: (before 930am, after 3pm)    Patient Goals/Plan/Treatment Preferences: Spoke with pt. She lives at home alone. She is independent and drives. She has a PCP. States he has a cane and a Rollator that she uses at home. Denies HH or the need for [facility]. Transportation/Food Security/Housekeeping Addressed:  No issues identified.     Evaluation: no

## 2020-01-29 NOTE — H&P
History & Physical        Patient:  Eddi Gorman  YOB: 1960    MRN: [id number]     Acct: [de-identified]    PCP: ALLEN Ellison CNP    Date of Admission: 1/28/2020    Date of Service: Pt seen/examined on 01/28/20  and Admitted to Observation with expected LOS less than two midnights due to medical therapy. ASSESSMENT/PLAN:    1. Acute Chest Pain--initial troponin is negative so will trend to rule out ACS; add aspirin 81 mg p.o. daily; check lipids; order Lexiscan stress test and an echocardiogram; she will be n.p.o. after midnight  2. Leukocytosis--afebrile, monitor  3. DM type II, uncontrolled--since patient will be n.p.o. after midnight for her stress test we will give her half of her Lantus dose tonight, add sliding scale level 2 along with hypoglycemia protocol; resume her Humalog pre-meal bolus when she is able to eat  4. Morbid obesity with a BMI 41.4  5. Restless leg syndrome  6. Fibromyalgia  7.  Tobacco abuse--patient counseling and add NicoDerm 21 mg topically daily      Chief Complaint: Chest pain      History Of Present Illness:    60 y.o. female who presented to [address] [place] [address] with chest pain; patient presents with a proximally 1 week history of left-sided chest pain that radiates into her left arm; she is currently denying any chest pain however she states her left arm feels \"heavy\" and rates 7 out of 10; she relates to dizziness along with some shortness of breath and dyspnea on exertion; since being in the ER she feels a little nauseated and feels like she just needs something to eat; she states she has been more fatigued lately and she does relate to history of fibromyalgia and restless leg syndrome; she smokes 1 pack/day; states she has a history of emphysema and is supposed to be on oxygen however she refused as she is a smoker; he relates to a premature family history of coronary artery disease as she states her brother passed away at age 37 secondary to CAD; in the emergency department her proBNP was 42.9 and her initial troponin T was less than 0.010; does have a mild bump in her white count 11.9 EKG is nonspecific; chest x-ray shows COPD; being admitted to the hospital service for further care and evaluation.     Past Medical History:          Diagnosis Date    Anxiety     Arthritis     Carpal tunnel syndrome     Cellulitis     Chronic back pain     Depression     Diabetes mellitus ([address]     Emphysema of lung (HCC)     Fibromyalgia     GERD (gastroesophageal reflux disease)     Glaucoma     Hiatal hernia     esophagus closes    Hx of blood clots     foot post op    Hyperlipidemia, mixed 2017    IBS (irritable bowel syndrome)     Leg pain     nerve damage right leg    MDRO (multiple drug resistant organisms) resistance     wound and nasal    Guzman's neuroma     left foot    Nausea & vomiting     Neuropathic pain     Osteoarthritis     PONV (postoperative nausea and vomiting)     Pulmonary emphysema ([address] 2017    Sleep apnea     DOES NOT USE CPAP       Past Surgical History:          Procedure Laterality Date    ABDOMEN SURGERY      APPENDECTOMY      BACK SURGERY      lower x 3    CERVICAL FUSION       SECTION      x3    CHOLECYSTECTOMY      COLONOSCOPY      ECTOPIC PREGNANCY SURGERY      EYE SURGERY Bilateral     LASER FOR GLAUCOMA    HYSTERECTOMY, TOTAL ABDOMINAL      JOINT REPLACEMENT      OTHER SURGICAL HISTORY  Aug 5, 2013    Left knee total arthroplasty (Dr. Joey Ramos, [facility])    AZ DEBRIDEMENT OPEN WOUND 20 SQ CM< Right 2018    INCISIONAL DEBRIEDMENT INCISION AND DRAINAGE RIGHT GLUTEAL ABCESS performed by Ramon Edwards MD at [facility] OFFICE/OUTPT [facility] Right 2018    EXCISIONAL DEBRIDEMENT OF RIGHT BUTTOCKS WOUND performed by Laith Combs DO at [address] Left     little toe bone removed    TONSILLECTOMY      TOTAL KNEE ARTHROPLASTY Right she does not drink alcohol. Family History:      Positive as follows:        Problem Relation Age of Onset    Arthritis Mother     Heart Disease Mother         CHF    High Blood Pressure Mother     Kidney Disease Mother     Cancer Father     Heart Disease Sister         diastolic dysfunction    Other Sister         thyroid cysts    Cancer Brother     Kidney Disease Brother     Diabetes Brother     Kidney Disease Brother     Colon Cancer Neg Hx        REVIEW OF SYSTEMS:     Constitutional: ROS: negative for - chills or fever  Head: no headache, no head injury, no migraine   Eyes ROS: denies blurred/double vision  Ears ROS: no hearing difficulty, no tinnitus  Mouth and Throat ROS: no ulceration, dysphagia, dental caries  Psychological ROS: no depression, no anxiety, no panic attacks, denies suicide/homicide ideation  Endocrine ROS: denies polyuria, polydypsia, no heat or cold intolerance  Respiratory ROS: positive for - cough  Cardiovascular ROS: positive for - chest pain, dyspnea on exertion and shortness of breath  Gastrointestinal ROS: no abdominal pain, change in bowel habits, or black or bloody stools  Genito-Urinary ROS: denies dysuria, frequency, urgency; denies hematuria  Musculoskeletal ROS: negative  Neurological ROS: no syncope, no seizures, no numbness or tingling of hands, no numbness or tingling of feet, no paresis  Dermatology: no skin rash, no eczema  Endocrine: no polyuria, polydypsia, no heat/cold intolerance  Hematology: denies bruising easily, denies bleeding problems, denies clotting disorders    PHYSICAL EXAM:    /71   Pulse 74   Temp 97.8 °F (36.6 °C) (Oral)   Resp 22   Ht 5' 9\" (1.753 m)   Wt 280 lb (127 kg)   SpO2 97%   BMI 41.35 kg/m²     General appearance:  No apparent distress, appears older than stated age and cooperative. HEENT:  Normal cephalic, atraumatic without obvious deformity. Pupils equal, round, and reactive to light.    Conjunctivae/corneas clear.  Neck: Supple, with full range of motion. No jugular venous distention. Trachea midline. Respiratory:  Normal respiratory effort. Managed to auscultation, bilaterally without Rales/Wheezes/Rhonchi. Cardiovascular:  Regular rate and rhythm with normal S1/S2 without murmurs, rubs or gallops. Abdomen: Soft, non-tender, non-distended with normal bowel sounds. Obese  Musculoskeletal: Base edema bilaterally ankles. Full range of motion without deformity. Skin: Skin color, texture, turgor normal.    Neurologic:  Neurovascularly intact without any focal sensory/motor deficits. Cranial nerves: II-XII intact, grossly non-focal.  Psychiatric:  Alert and oriented, thought content appropriate  Capillary Refill: Brisk,< 3 seconds   Peripheral Pulses: +2 palpable, equal bilaterally       Labs:     Recent Labs     01/28/20  1730   WBC 11.9*   HGB 14.0   HCT 42.7        Recent Labs     01/28/20  1730      K 4.2      CO2 22*   BUN 16   CREATININE 0.8   CALCIUM 9.2     Recent Labs     01/28/20  1730   AST 12   ALT 14   BILIDIR <0.2   BILITOT 0.5   ALKPHOS 136*     No results for input(s): INR in the last 72 hours. Recent Labs     01/28/20  1730   TROPONINT < 0.010       Urinalysis:      Lab Results   Component Value Date    NITRU NEGATIVE 05/08/2019    WBCUA 0-2 05/08/2019    BACTERIA NONE 05/08/2019    RBCUA 0-2 05/08/2019    BLOODU NEGATIVE 05/08/2019    SPECGRAV >=1.030 01/16/2019    SPECGRAV 1.013 08/07/2013    GLUCOSEU NEGATIVE 05/08/2019       Radiology:     Xr Chest Portable    Result Date: 1/28/2020  FINDINGS: Lines/tubes/devices: none Lungs/pleura:  No pneumonia, pulmonary edema, or obvious mass. Lungs are hyperinflated consistent with COPD. No pleural effusion. No pneumothorax. Heart: Heart size is normal. Mediastinum/ab: No obvious mass or adenopathy. Again noted are right hilar calcified lymph nodes.  Skeleton: The patient is status post lower cervical anterior fusion with a plate and screws. No acute cardiopulmonary disease. COPD. EKG:  I have reviewed the EKG with the following interpretation: SR/SA HR 73    Thank you ALLEN Rai CNP for the opportunity to be involved in this patient's care.     Electronically signed by ALLEN Swan CNP on 1/28/2020 at 7:55 PM

## 2020-01-30 NOTE — DISCHARGE SUMMARY
PM             Consults:     None    Disposition: Home  Condition at Discharge: Stable    Code Status:  Prior     Patient Instructions:    Discharge lab work: Activity: activity as tolerated  Diet: No diet orders on file      Follow-up visits:   No follow-up provider specified. Discharge Medications:      Zoraida Ramirez   Home Medication Instructions [id number]    Printed on:01/30/20 6729   Medication Information                      acetaminophen 650 MG TABS  Take 650 mg by mouth every 4 hours as needed for Pain (For mild pain level 1-3 or for fever > 100.5). albuterol sulfate HFA (PROAIR HFA) 108 (90 Base) MCG/ACT inhaler  Inhale 2 puffs into the lungs every 6 hours as needed for Wheezing             atorvastatin (LIPITOR) 20 MG tablet  TAKE 1 TABLET DAILY             b complex vitamins capsule  Take 1 capsule by mouth daily             blood glucose test strips (ACCU-CHEK DAVIDA) strip  Test once daily             Cholecalciferol (VITAMIN D3) 2000 units CAPS  Take by mouth daily             Continuous Blood Gluc Sensor (FREESTYLE MICKI 14 DAY SENSOR) [facility]  1 Device by Does not apply route 2 times daily             Elastic Bandages & Supports (WRIST BRACE/RIGHT MEDIUM) MISC  Wear brace on right wrist nightly             Handicap Placard MISC  by Does not apply route Duration: three years    Type II idabetes  Diabetic neuropathy             HYDROcodone-acetaminophen (NORCO) 5-325 MG per tablet  Take 1 tablet by mouth every 8 hours as needed for Pain. .             insulin detemir (LEVEMIR FLEXTOUCH) 100 UNIT/ML injection pen  Inject 60 Units into the skin nightly             insulin lispro, 1 Unit Dial, (HUMALOG KWIKPEN) 100 UNIT/ML SOPN  INJECT 12 UNITS INTO THE SKIN THREE TIMES DAILY WITH MEALS PLUS SLIDING SCALE Max dose 60 units a day             Insulin Pen Needle 31G X 8 MM MISC  BD Ultra fine pen needles.  8 mm use once daily             Liraglutide (VICTOZA) 18 MG/3ML SOPN SC injection  Inject 1.8 mg into the skin daily             nicotine (NICODERM CQ) 21 MG/24HR  Place 1 patch onto the skin daily             Omega-3 Fatty Acids (FISH OIL) 1000 MG CAPS  Take 1,000 mg by mouth daily             OXYGEN  Inhale 2 L into the lungs              pantoprazole (PROTONIX) 40 MG tablet  Take 1 tablet by mouth daily             pregabalin (LYRICA) 300 MG capsule  Take 1 capsule by mouth 2 times daily for 90 days. rOPINIRole (REQUIP) 1 MG tablet  Take 1 mg by mouth nightly              SPIRIVA HANDIHALER 18 MCG inhalation capsule  INHALE THE CONTENTS OF 1 CAPSULE INTO THE LUNGS USING HANDIHALER EVERY DAY             traMADol (ULTRAM) 50 MG tablet  Take 100 mg by mouth 3 times daily. traZODone (DESYREL) 150 MG tablet  TAKE 1 TABLET NIGHTLY             venlafaxine (EFFEXOR XR) 150 MG extended release capsule  Take 1 capsule by mouth daily             vitamin B-12 (CYANOCOBALAMIN) 500 MCG tablet  Take 500 mcg by mouth daily                 Time Spent on discharge is more than 20 minutes in the examination, evaluation, counseling and review of medications and discharge plan. Signed: Thank you ALLEN Jackson - RUKHSANA for the opportunity to be involved in this patient's care.     Electronically signed by Yomaira Mitchell MD on 1/30/2020 at 12:54 PM

## 2020-01-30 NOTE — TELEPHONE ENCOUNTER
[facility] Transitions Initial Follow Up Call    Outreach made within 2 business days of discharge: Yes    Patient: Dino Chapman Patient : 1960   MRN: [id number]  Reason for Admission: There are no discharge diagnoses documented for the most recent discharge. Discharge Date: 20       Spoke with: Elsie Cruz    Discharge department/facility: [facility]    TCM Interactive Patient Contact:  Was patient able to fill all prescriptions: No: N/A  Was patient instructed to bring all medications to the follow-up visit: No: N/A  Is patient taking all medications as directed in the discharge summary?  N/A  Does patient understand their discharge instructions: Yes  Does patient have questions or concerns that need addressed prior to 7-14 day follow up office visit: no    Scheduled appointment with PCP within 7-14 days    Follow Up  Future Appointments   Date Time Provider Scarlett Ibarra   2/3/2020  8:30 AM Bhargavi Mc[address] [facility]   2020  9:40 AM ALLEN Richardson [address] [facility] ([address] [place] [address])

## 2020-02-03 ENCOUNTER — OFFICE VISIT (OUTPATIENT)
Dept: PULMONOLOGY | Age: 60
End: 2020-02-03
Payer: MEDICARE

## 2020-02-03 VITALS
DIASTOLIC BLOOD PRESSURE: 66 MMHG | HEART RATE: 73 BPM | OXYGEN SATURATION: 97 % | BODY MASS INDEX: 45.42 KG/M2 | SYSTOLIC BLOOD PRESSURE: 126 MMHG | TEMPERATURE: 97.7 F | WEIGHT: 289.4 LBS | HEIGHT: 67 IN

## 2020-02-03 PROCEDURE — 99214 OFFICE O/P EST MOD 30 MIN: CPT | Performed by: NURSE PRACTITIONER

## 2020-02-03 PROCEDURE — 95012 NITRIC OXIDE EXP GAS DETER: CPT | Performed by: NURSE PRACTITIONER

## 2020-02-03 PROCEDURE — G0296 VISIT TO DETERM LDCT ELIG: HCPCS | Performed by: NURSE PRACTITIONER

## 2020-02-03 PROCEDURE — 99406 BEHAV CHNG SMOKING 3-10 MIN: CPT | Performed by: NURSE PRACTITIONER

## 2020-02-03 ASSESSMENT — ENCOUNTER SYMPTOMS
RHINORRHEA: 1
ALLERGIC/IMMUNOLOGIC NEGATIVE: 1
VOMITING: 0
ABDOMINAL PAIN: 0
COUGH: 1
DIARRHEA: 0
EYES NEGATIVE: 1
SHORTNESS OF BREATH: 1
CHEST TIGHTNESS: 1
WHEEZING: 1
NAUSEA: 0

## 2020-02-03 NOTE — PATIENT INSTRUCTIONS
Patient Education      · Some people find hypnosis, acupuncture, and massage helpful for ending the smoking habit. Stopping Smoking: Care Instructions  Your Care Instructions  Cigarette smokers crave the nicotine in cigarettes. Giving it up is much harder than simply changing a habit. Your body has to stop craving the nicotine. It is hard to quit, but you can do it. There are many tools that people use to quit smoking. You may find that combining tools works best for you. There are several steps to quitting. First you get ready to quit. Then you get support to help you. After that, you learn new skills and behaviors to become a nonsmoker. For many people, a necessary step is getting and using medicine. Your doctor will help you set up the plan that best meets your needs. You may want to attend a smoking cessation program to help you quit smoking. When you choose a program, look for one that has proven success. Ask your doctor for ideas. You will greatly increase your chances of success if you take medicine as well as get counseling or join a cessation program.  Some of the changes you feel when you first quit tobacco are uncomfortable. Your body will miss the nicotine at first, and you may feel short-tempered and grumpy. You may have trouble sleeping or concentrating. Medicine can help you deal with these symptoms. You may struggle with changing your smoking habits and rituals. The last step is the tricky one: Be prepared for the smoking urge to continue for a time. This is a lot to deal with, but keep at it. You will feel better. Follow-up care is a key part of your treatment and safety. Be sure to make and go to all appointments, and call your doctor if you are having problems. It's also a good idea to know your test results and keep a list of the medicines you take. How can you care for yourself at home? · Ask your family, friends, and coworkers for support.  You have a better chance of quitting if you regular exercise. Having healthy habits will help your body move past its craving for nicotine. · Be prepared to keep trying. Most people are not successful the first few times they try to quit. Do not get mad at yourself if you smoke again. Make a list of things you learned and think about when you want to try again, such as next week, next month, or next year. Where can you learn more? Go to https://[employer] Packers[employer]Ravenflow.[employer]. org and sign in to your [employer] account. Enter S785 in the Jobfox box to learn more about \"Stopping Smoking: Care Instructions. \"     If you do not have an account, please click on the \"Sign Up Now\" link. Current as of: July 4, 2019  Content Version: 12.3  © 3161-2502 Healthwise, [employer]. Care instructions adapted under license by [facility] ([facility]). If you have questions about a medical condition or this instruction, always ask your healthcare professional. [place] [address] any warranty or liability for your use of this information. What is lung cancer screening? Lung cancer screening is a way in which doctors check the lungs for early signs of cancer in people who have no symptoms of lung cancer. A low-dose CT scan uses much less radiation than a normal CT scan and shows a more detailed image of the lungs than a standard X-ray. The goal of lung cancer screening is to find cancer early, before it has a chance to grow, spread, or cause problems. One large study found that smokers who were screened with low-dose CT scans were less likely to die of lung cancer than those who were screened with standard X-ray. Below is a summary of the things you need to know regarding screening for lung cancer with low-dose computed tomography (LDCT). This is a screening program that involves routine annual screening with LDCT studies of the lung. The LDCTs are done using low-dose radiation that is not thought to increase your cancer risk.   If you have

## 2020-02-03 NOTE — PROGRESS NOTES
[facility]     Patient: Leah Waldron, 60 y.o.   : 1960  2/3/2020    Pt of Dr. Ольга Hinkle   Patient presents with    Follow-up     COPD  6 month f/u with no testing         HPI  Dionna Kwon is here for 6 month follow up for COPD. Was in hospital in January for chest pain, cardiac w/u negative. Continues to have burning in chest and down her arm, does state she has bulging disc in neck. Feeling more SOB, gradually getting worse, more in past 1 month. C/o increased cough, nasal congestion, wheezing, and chest tightness. Not taking any OTC meds     PMH CARLOTTA- on 2LPM O2 at night. , not on O2 during day, last 6MWT 19: no hypoxia at rest or activity   Chronic pain - follows with Pain Management  Currently on Spiriva handihaler ( started in 2019 with decline in DLCO) , using Proair about 3 times per day in the past month  Did not get influenza vaccine. , has never had a pneumonia vaccine     Using rescue sporadically- may use 3 times in one day, other days not at all- depends on activity   PFT 2018: normal FEV 1, normal lung volumes,  Moderate reduction in diffusion capacity   LDCT screening 2018- small nodular focus right mid lung - likely fibrosis- LUNG RADS 3- 6 month f/u recommended, completed CT 2019: stable slightly improved appearance of chest, previous 5 mm nodule no longer identified, recommend go back to annual screening. Current Marijuana use, smoking whenever she can afford it, has medical marijuana,  still smoking cigarettes about 1 PPD    Discouraged because she can not loose weight, is not gaining.      Past Medical hx   PMH:  Past Medical History:   Diagnosis Date    Anxiety     Arthritis     Carpal tunnel syndrome     Cellulitis     Chronic back pain     Depression     Diabetes mellitus ([address]     Emphysema of lung (HCC)     Fibromyalgia     GERD (gastroesophageal reflux disease)     tablet by mouth daily 90 tablet 3    atorvastatin (LIPITOR) 20 MG tablet TAKE 1 TABLET DAILY 90 tablet 3    albuterol sulfate HFA (PROAIR HFA) 108 (90 Base) MCG/ACT inhaler Inhale 2 puffs into the lungs every 6 hours as needed for Wheezing 1 Inhaler 3    Handicap Placard MISC by Does not apply route Duration: three years    Type II idabetes  Diabetic neuropathy 1 each 0    blood glucose test strips (ACCU-CHEK DAVIDA) strip Test once daily 90 each 3    HYDROcodone-acetaminophen (NORCO) 5-325 MG per tablet Take 1 tablet by mouth every 8 hours as needed for Pain. Maryagnes Course traMADol (ULTRAM) 50 MG tablet Take 100 mg by mouth 3 times daily.  OXYGEN Inhale 2 L into the lungs       Elastic Bandages & Supports (WRIST BRACE/RIGHT MEDIUM) MISC Wear brace on right wrist nightly 1 each 0    Omega-3 Fatty Acids (FISH OIL) 1000 MG CAPS Take 1,000 mg by mouth daily      Cholecalciferol (VITAMIN D3) 2000 units CAPS Take by mouth daily      vitamin B-12 (CYANOCOBALAMIN) 500 MCG tablet Take 500 mcg by mouth daily      rOPINIRole (REQUIP) 1 MG tablet Take 1 mg by mouth nightly       acetaminophen 650 MG TABS Take 650 mg by mouth every 4 hours as needed for Pain (For mild pain level 1-3 or for fever > 100.5). 120 tablet      No current facility-administered medications for this visit. Saskia ASHBY   Review of Systems   Constitutional: Negative for activity change, appetite change, chills, fatigue, fever and unexpected weight change. HENT: Positive for congestion and rhinorrhea (chronic ). Eyes: Negative. Respiratory: Positive for cough (productive morning cough ), chest tightness, shortness of breath and wheezing. Cardiovascular: Negative for chest pain, palpitations and leg swelling. Gastrointestinal: Negative for abdominal pain, diarrhea, nausea and vomiting. Genitourinary: Negative. Musculoskeletal: Negative. Skin: Negative. Allergic/Immunologic: Negative. Neurological: Negative. Hematological: Does not bruise/bleed easily. Psychiatric/Behavioral: Negative for sleep disturbance and suicidal ideas. The patient is nervous/anxious (anxiety ). Physical exam   /66 (Site: Left Upper Arm, Position: Sitting)   Pulse 73   Temp 97.7 °F (36.5 °C)   Ht 5' 7\" (1.702 m)   Wt 289 lb 6.4 oz (131.3 kg)   SpO2 97% Comment: on RA  BMI 45.33 kg/m²        Physical Exam  Vitals signs and nursing note reviewed. Constitutional:       General: She is not in acute distress. Appearance: She is well-developed. She is obese. HENT:      Mouth/Throat:      Lips: Pink. Mouth: Mucous membranes are moist.      Pharynx: Oropharynx is clear. No oropharyngeal exudate or posterior oropharyngeal erythema. Eyes:      Conjunctiva/sclera: Conjunctivae normal.   Neck:      Vascular: No JVD. Cardiovascular:      Rate and Rhythm: Normal rate and regular rhythm. Heart sounds: No murmur. No friction rub. Pulmonary:      Effort: Pulmonary effort is normal. No accessory muscle usage or respiratory distress. Breath sounds: Normal breath sounds. No wheezing, rhonchi or rales. Chest:      Chest wall: No tenderness. Musculoskeletal:      Right lower leg: No edema. Left lower leg: No edema. Skin:     General: Skin is warm and dry. Capillary Refill: Capillary refill takes less than 2 seconds. Nails: There is no clubbing. Neurological:      Mental Status: She is alert. Psychiatric:         Mood and Affect: Mood normal.         Behavior: Behavior normal.         Thought Content:  Thought content normal.         Judgment: Judgment normal.          Test results   Lung Nodule Screening     [x] Qualifies    []Does not qualify   [] Declined    [] Completed             Eosinophils Absolute 0.1  0.0 - 0.4 thou/mm3 Final 05/09/2019  4:10 AM [facility]     Eosinophils Absolute 0.2  0.0 - 0.4 thou/mm3 Final 01/28/2020  5:30 PM [facility]  Lab     Pro-BNP 42.9 0.0 - 900.0 pg/mL Final 01/28/2020  5:30 PM  - [facility]     Troponin T < 0.010  ng/ml Final 01/28/2020  9:48 PM [address] [facility]       CXR 1/28/2020  FINDINGS:   Lines/tubes/devices: none   Lungs/pleura:  No pneumonia, pulmonary edema, or obvious mass. Lungs are hyperinflated consistent with COPD. No pleural effusion. No pneumothorax. Heart: Heart size is normal.   Mediastinum/ab: No obvious mass or adenopathy. Again noted are right hilar calcified lymph nodes. Skeleton: The patient is status post lower cervical anterior fusion with a plate and screws.                Impression       No acute cardiopulmonary disease. COPD.       **This report has been created using voice recognition software. It may contain minor errors which are inherent in voice recognition technology. **       Final report electronically signed by Dr. Moises Garcia on 1/28/2020 5:25 PM                 Fractional Exhaled Nitric Oxide Level today is less than 5, indicating No Th2 driven airway inflammation. Based on this patient would not, likely benefit from corticosteroids. Asthma overlap is unlikely. Electronically signed by ALLEN Goodwin CNP on 2/3/2020         Assessment      Diagnosis Orders   1. Pulmonary emphysema, unspecified emphysema type ([facility] [address]  POCT Nitric Oxide    MT VISIT TO DISCUSS LUNG CA SCREEN W LDCT    CT Lung Screen (Annual)    Full PFT Study With Bronchodilator    CT LUNG SCREENING   2. Shortness of breath  POCT Nitric Oxide    MT VISIT TO DISCUSS LUNG CA SCREEN W LDCT    CT Lung Screen (Annual)    Full PFT Study With Bronchodilator    CT LUNG SCREENING   3. Cigarette nicotine dependence without complication  Full PFT Study With Bronchodilator    CT LUNG SCREENING   4. Marijuana smoker  MT VISIT TO DISCUSS LUNG CA SCREEN W LDCT    CT Lung Screen (Annual)    Full PFT Study With Bronchodilator    CT LUNG SCREENING   5. CARLOTTA (obstructive sleep apnea)     6.  Morbid obesity with body mass index (BMI) of 45.0 to 49.9 in adult [facility])           Plan   Uncontrolled COPD: step up therapy, stop Spiriva and start Anoro  One sample of Anoro given in office, instructed on proper use. Schedule annual LDCT ( due in July 2020)  Get PFT before next visit  Recommend patient stop smoking,Smoking cessation discussed for 3 min,    Educated patient on health hazards and negative effects of smoking. Counseled on ways to quit,  Offered cessation assistance with prescription mediations,  over the counter remedies, community resources/ phone APPs. Discussed ways at home to try to cut back,  Patient refuses additional assistance at this time with NRT  · Some people find hypnosis, acupuncture, and massage helpful for ending the smoking habit. Will see Niall Magallon in: 2 months for med follow up, and 6 months with CT and PFT     Electronically signed by ALLEN Espitia CNP on 2/3/2020 at 9:05 AM   Low Dose CT (LDCT) Lung Screening criteria met   Age 55-77   Pack year smoking >30   Still smoking or less than 15 year since quit   No sign or symptoms of lung cancer   > 11 months since last LDCT     Risks and benefits of lung cancer screening with LDCT scans discussed:    Significance of positive screen - False-positive LDCT results often occur. 95% of all positive results do not lead to a diagnosis of cancer. Usually further imaging can resolve most false-positive results; however, some patients may require invasive procedures. Over diagnosis risk - 10% to 12% of screen-detected lung cancer cases are over diagnosed--that is, the cancer would not have been detected in the patient's lifetime without the screening. Need for follow up screens annually to continue lung cancer screening effectiveness     Risks associated with radiation from annual LDCT- Radiation exposure is about the same as for a mammogram, which is about 1/3 of the annual background radiation exposure from everyday life.   Starting screening at age 54 is not likely to increase cancer risk from radiation exposure. Patients with comorbidities resulting in life expectancy of < 10 years, or that would preclude treatment of an abnormality identified on CT, should not be screened due to lack of benefit.     To obtain maximal benefit from this screening, smoking cessation and long-term abstinence from smoking is critical

## 2020-02-04 ENCOUNTER — OFFICE VISIT (OUTPATIENT)
Dept: FAMILY MEDICINE CLINIC | Age: 60
End: 2020-02-04
Payer: MEDICARE

## 2020-02-04 VITALS
BODY MASS INDEX: 45.33 KG/M2 | HEART RATE: 88 BPM | TEMPERATURE: 97.9 F | RESPIRATION RATE: 16 BRPM | DIASTOLIC BLOOD PRESSURE: 72 MMHG | SYSTOLIC BLOOD PRESSURE: 120 MMHG | WEIGHT: 289.4 LBS

## 2020-02-04 PROCEDURE — 99495 TRANSJ CARE MGMT MOD F2F 14D: CPT | Performed by: NURSE PRACTITIONER

## 2020-02-04 PROCEDURE — 1111F DSCHRG MED/CURRENT MED MERGE: CPT | Performed by: NURSE PRACTITIONER

## 2020-02-04 NOTE — PROGRESS NOTES
Post-Discharge Transitional Care Management Services or Hospital Follow Up      [address] [facility]   YOB: 1960    Date of Office Visit:  2/4/2020  Date of Hospital Admission: 1/28/20  Date of Hospital Discharge: 1/29/20  Risk of hospital readmission (high >=14%. Medium >=10%) :Readmission Risk Score: 16      Care management risk score Rising risk (score 2-5) and Complex Care (Scores >=6): 6     Non face to face  following discharge, date last encounter closed (first attempt may have been earlier): 1/30/2020  9:57 AM    Call initiated 2 business days of discharge: Yes    Patient Active Problem List   Diagnosis    OA (osteoarthritis) of knee    S/P TKR (total knee replacement)    DM2 (diabetes mellitus, type 2) ([facility] [address]    Primary osteoarthritis of right knee    Diabetic polyneuropathy associated with type 2 diabetes mellitus ([facility] [address]    Status post total right knee replacement    Panlobular emphysema ([facility] [address]    Hyperlipidemia, mixed    Herniated cervical disc    Cellulitis of buttock    Abscess, gluteal, right    Hepatic steatosis    Class 3 obesity due to excess calories with serious comorbidity and body mass index (BMI) of 40.0 to 44.9 in adult    Depression    Hyperglycemia    Intractable vomiting with nausea    Mixed acid base balance disorder    Melena    Abdominal pain    Chest pain    Left arm pain       Allergies   Allergen Reactions    Latex Itching and Rash       Medications listed as ordered at the time of discharge from hospital   Yolanda Munroe[address] Medication Instructions HECTOR:    Printed on:02/04/20 7578   Medication Information                      acetaminophen 650 MG TABS  Take 650 mg by mouth every 4 hours as needed for Pain (For mild pain level 1-3 or for fever > 100.5).              albuterol sulfate HFA (PROAIR HFA) 108 (90 Base) MCG/ACT inhaler  Inhale 2 puffs into the lungs every 6 hours as needed for Wheezing             atorvastatin (LIPITOR) 20 MG tablet  TAKE 1 TABLET DAILY             b complex vitamins capsule  Take 1 capsule by mouth daily             blood glucose test strips (ACCU-CHEK DAVIDA) strip  Test once daily             Cholecalciferol (VITAMIN D3) 2000 units CAPS  Take by mouth daily             Continuous Blood Gluc Sensor (FREESTYLE MICKI 14 DAY SENSOR) MISC  1 Device by Does not apply route 2 times daily             Elastic Bandages & Supports (WRIST BRACE/RIGHT MEDIUM) MISC  Wear brace on right wrist nightly             Handicap Placard MISC  by Does not apply route Duration: three years    Type II idabetes  Diabetic neuropathy             HYDROcodone-acetaminophen (NORCO) 5-325 MG per tablet  Take 1 tablet by mouth every 8 hours as needed for Pain. .             insulin detemir (LEVEMIR FLEXTOUCH) 100 UNIT/ML injection pen  Inject 60 Units into the skin nightly             insulin lispro, 1 Unit Dial, (HUMALOG KWIKPEN) 100 UNIT/ML SOPN  INJECT 12 UNITS INTO THE SKIN THREE TIMES DAILY WITH MEALS PLUS SLIDING SCALE Max dose 60 units a day             Insulin Pen Needle 31G X 8 MM MISC  BD Ultra fine pen needles. 8 mm use once daily             Liraglutide (VICTOZA) 18 MG/3ML SOPN SC injection  Inject 1.8 mg into the skin daily             nicotine (NICODERM CQ) 21 MG/24HR  Place 1 patch onto the skin daily             Omega-3 Fatty Acids (FISH OIL) 1000 MG CAPS  Take 1,000 mg by mouth daily             OXYGEN  Inhale 2 L into the lungs              pantoprazole (PROTONIX) 40 MG tablet  Take 1 tablet by mouth daily             pregabalin (LYRICA) 300 MG capsule  Take 1 capsule by mouth 2 times daily for 90 days. rOPINIRole (REQUIP) 1 MG tablet  Take 1 mg by mouth nightly              traMADol (ULTRAM) 50 MG tablet  Take 100 mg by mouth 3 times daily.               traZODone (DESYREL) 150 MG tablet  TAKE 1 TABLET NIGHTLY             umeclidinium-vilanterol (ANORO ELLIPTA) 62.5-25 MCG/INH AEPB inhaler  Inhale 1 puff into the lungs daily             venlafaxine (EFFEXOR XR) 150 MG extended release capsule  Take 1 capsule by mouth daily             vitamin B-12 (CYANOCOBALAMIN) 500 MCG tablet  Take 500 mcg by mouth daily                   Medications marked \"taking\" at this time  Outpatient Medications Marked as Taking for the 2/4/20 encounter (Office Visit) with Graylin Breath, APRN - CNP   Medication Sig Dispense Refill    umeclidinium-vilanterol (ANORO ELLIPTA) 62.5-25 MCG/INH AEPB inhaler Inhale 1 puff into the lungs daily 30 puff 11    b complex vitamins capsule Take 1 capsule by mouth daily      nicotine (NICODERM CQ) 21 MG/24HR Place 1 patch onto the skin daily 30 patch 3    venlafaxine (EFFEXOR XR) 150 MG extended release capsule Take 1 capsule by mouth daily 90 capsule 3    traZODone (DESYREL) 150 MG tablet TAKE 1 TABLET NIGHTLY 90 tablet 3    Continuous Blood Gluc Sensor (FREESTYLE MICKI 14 DAY SENSOR) MISC 1 Device by Does not apply route 2 times daily 2 each 3    insulin detemir (LEVEMIR FLEXTOUCH) 100 UNIT/ML injection pen Inject 60 Units into the skin nightly 6 pen 11    Liraglutide (VICTOZA) 18 MG/3ML SOPN SC injection Inject 1.8 mg into the skin daily 3 pen 3    pregabalin (LYRICA) 300 MG capsule Take 1 capsule by mouth 2 times daily for 90 days. 180 capsule 0    insulin lispro, 1 Unit Dial, (HUMALOG KWIKPEN) 100 UNIT/ML SOPN INJECT 12 UNITS INTO THE SKIN THREE TIMES DAILY WITH MEALS PLUS SLIDING SCALE Max dose 60 units a day 15 mL 3    Insulin Pen Needle 31G X 8 MM MISC BD Ultra fine pen needles.  8 mm use once daily 90 each 3    pantoprazole (PROTONIX) 40 MG tablet Take 1 tablet by mouth daily 90 tablet 3    atorvastatin (LIPITOR) 20 MG tablet TAKE 1 TABLET DAILY 90 tablet 3    albuterol sulfate HFA (PROAIR HFA) 108 (90 Base) MCG/ACT inhaler Inhale 2 puffs into the lungs every 6 hours as needed for Wheezing 1 Inhaler 3    Handicap Placard MISC by Does not apply route Duration: three years    Type II idabetes  Diabetic neuropathy 1 each 0    blood glucose test strips (ACCU-CHEK DAVIDA) strip Test once daily 90 each 3    HYDROcodone-acetaminophen (NORCO) 5-325 MG per tablet Take 1 tablet by mouth every 8 hours as needed for Pain. Zollie Saris traMADol (ULTRAM) 50 MG tablet Take 100 mg by mouth 3 times daily.  OXYGEN Inhale 2 L into the lungs       Elastic Bandages & Supports (WRIST BRACE/RIGHT MEDIUM) MISC Wear brace on right wrist nightly 1 each 0    Omega-3 Fatty Acids (FISH OIL) 1000 MG CAPS Take 1,000 mg by mouth daily      Cholecalciferol (VITAMIN D3) 2000 units CAPS Take by mouth daily      vitamin B-12 (CYANOCOBALAMIN) 500 MCG tablet Take 500 mcg by mouth daily      rOPINIRole (REQUIP) 1 MG tablet Take 1 mg by mouth nightly       acetaminophen 650 MG TABS Take 650 mg by mouth every 4 hours as needed for Pain (For mild pain level 1-3 or for fever > 100.5). 120 tablet         Medications patient taking as of now reconciled against medications ordered at time of hospital discharge: Yes    Chief Complaint   Patient presents with   [address] from Hospital     pt is doing better       History of Present illness - Follow up of Hospital diagnosis(es): copd exacerbation    Inpatient course: Discharge summary reviewed- see chart. Interval history/Current status: stable    A comprehensive review of systems was negative. Diabetes Type 2    Glucose control:   Does patient check blood glucoses at home? Yes  Report of hypoglycemia: no  Lab Results   Component Value Date    LABA1C 8.3 (H) 01/29/2020     No results found for: EAG    Symptoms  Polyuria, Polydipsia or Polyphagia? No  Chest Pain, SOB, or Palpitations? -  No  New Vision complaints? No  Paresthesias of the extremities? No    Medications  Current medication were reviewed. Compliant with medications? yes  Medication side effects? No  On ACE-I or ARB? No  On antiplatelet therapy? Yes  On Statin?   Yes    Last Diabetic Eye Exam: 12/2019    Exercise  Exercise? No  Wt Readings from Last 3 Encounters:   02/04/20 289 lb 6.4 oz (131.3 kg)   02/03/20 289 lb 6.4 oz (131.3 kg)   01/29/20 280 lb 2 oz (127.1 kg)       Diet discipline?:  Low salt, fat, sugar diet?  no    Blood pressure control:  BP Readings from Last 3 Encounters:   02/04/20 120/72   02/03/20 126/66   01/29/20 122/81       Lab Results   Component Value Date    LABMICR < 1.20 07/31/2017       Lab Results   Component Value Date    LDLCALC 74 01/29/2020         Vitals:    02/04/20 1631   BP: 120/72   Pulse: 88   Resp: 16   Temp: 97.9 °F (36.6 °C)   TempSrc: Oral   Weight: 289 lb 6.4 oz (131.3 kg)     Body mass index is 45.33 kg/m². Wt Readings from Last 3 Encounters:   02/04/20 289 lb 6.4 oz (131.3 kg)   02/03/20 289 lb 6.4 oz (131.3 kg)   01/29/20 280 lb 2 oz (127.1 kg)     BP Readings from Last 3 Encounters:   02/04/20 120/72   02/03/20 126/66   01/29/20 122/81        Physical Exam:  Pulmonary/Chest: clear to auscultation bilaterally- no wheezes, rales or rhonchi, normal air movement, no respiratory distress  Cardiovascular: normal rate, normal S1 and S2, no gallops, intact distal pulses and no carotid bruits  Abdomen: soft, non-tender, non-distended, normal bowel sounds, no masses or organomegaly    Assessment/Plan:  1. Hospital discharge follow-up    - SD DISCHARGE MEDS RECONCILED W/ CURRENT OUTPATIENT MED LIST    2. COPD exacerbation ([facility] [address]  stable  - SD DISCHARGE MEDS RECONCILED W/ CURRENT OUTPATIENT MED LIST    3. Type 2 diabetes mellitus with other circulatory complication, with long-term current use of insulin (HCC)  Sugars are getting better had been elevated. Increased lantus to 63 units at night. Pt in agreement with plan.    - SD DISCHARGE MEDS RECONCILED W/ CURRENT OUTPATIENT MED LIST        Medical Decision Making: moderate complexity

## 2020-02-10 RX ORDER — PREGABALIN 300 MG/1
300 CAPSULE ORAL 2 TIMES DAILY
Qty: 180 CAPSULE | Refills: 0 | Status: SHIPPED | OUTPATIENT
Start: 2020-02-10 | End: 2020-05-04 | Stop reason: SDUPTHER

## 2020-02-10 NOTE — TELEPHONE ENCOUNTER
ASSESSMENT & PLAN   Diagnosis Orders   1. Fibromyalgia  pregabalin (LYRICA) 300 MG capsule       OAARS reviewed and appropriate. Controlled Substances Monitoring: Periodic Controlled Substance Monitoring: Possible medication side effects, risk of tolerance/dependence & alternative treatments discussed., No signs of potential drug abuse or diversion identified.  Yulia Garrison DO)      Future Appointments   Date Time Provider Scarlett Ibarra   4/6/2020  9:00 AM ALLEN Dasilva CNP Pulm Med [facility]   5/4/2020  8:00 AM [facility]   8/3/2020  9:40 AM STR CT IMAGING RM1  OP [facility] STRZ OUT EXP STR Radiolog   8/3/2020 10:00 AM STR PULMONARY FUNCTION ROOM 1 STRZ PFT Leyda G[facility]   8/10/2020  9:00 AM ALLEN Dasliva CNP Pulm Med [facility]

## 2020-02-17 RX ORDER — INSULIN LISPRO 100 [IU]/ML
INJECTION, SOLUTION INTRAVENOUS; SUBCUTANEOUS
Qty: 15 ML | Refills: 3 | Status: SHIPPED | OUTPATIENT
Start: 2020-02-17 | End: 2020-05-26

## 2020-03-02 ENCOUNTER — OFFICE VISIT (OUTPATIENT)
Dept: FAMILY MEDICINE CLINIC | Age: 60
End: 2020-03-02
Payer: MEDICARE

## 2020-03-02 VITALS
DIASTOLIC BLOOD PRESSURE: 70 MMHG | TEMPERATURE: 98.1 F | WEIGHT: 283 LBS | SYSTOLIC BLOOD PRESSURE: 132 MMHG | BODY MASS INDEX: 42.89 KG/M2 | HEIGHT: 68 IN | HEART RATE: 78 BPM | RESPIRATION RATE: 14 BRPM

## 2020-03-02 PROCEDURE — 3052F HG A1C>EQUAL 8.0%<EQUAL 9.0%: CPT | Performed by: NURSE PRACTITIONER

## 2020-03-02 PROCEDURE — 69209 REMOVE IMPACTED EAR WAX UNI: CPT | Performed by: NURSE PRACTITIONER

## 2020-03-02 PROCEDURE — 99214 OFFICE O/P EST MOD 30 MIN: CPT | Performed by: NURSE PRACTITIONER

## 2020-03-02 RX ORDER — LORATADINE 10 MG/1
10 TABLET ORAL DAILY
Qty: 90 TABLET | Refills: 1 | Status: SHIPPED | OUTPATIENT
Start: 2020-03-02 | End: 2020-08-05 | Stop reason: SDUPTHER

## 2020-03-02 RX ORDER — GUAIFENESIN 600 MG/1
600 TABLET, EXTENDED RELEASE ORAL 2 TIMES DAILY
Qty: 30 TABLET | Refills: 0 | Status: SHIPPED | OUTPATIENT
Start: 2020-03-02 | End: 2020-03-17

## 2020-03-02 RX ORDER — FLASH GLUCOSE SENSOR
1 KIT MISCELLANEOUS 2 TIMES DAILY
Qty: 2 EACH | Refills: 3 | Status: SHIPPED | OUTPATIENT
Start: 2020-03-02 | End: 2020-07-30

## 2020-03-02 RX ORDER — PREDNISONE 20 MG/1
TABLET ORAL
Qty: 18 TABLET | Refills: 0 | Status: SHIPPED | OUTPATIENT
Start: 2020-03-02 | End: 2020-05-04 | Stop reason: ALTCHOICE

## 2020-03-02 NOTE — PROGRESS NOTES
done    Exercise  Exercise? No  Wt Readings from Last 3 Encounters:   03/02/20 283 lb (128.4 kg)   02/04/20 289 lb 6.4 oz (131.3 kg)   02/03/20 289 lb 6.4 oz (131.3 kg)       Diet discipline?:  Low salt, fat, sugar diet?   yes    Blood pressure control:  BP Readings from Last 3 Encounters:   03/02/20 132/70   02/04/20 120/72   02/03/20 126/66       Lab Results   Component Value Date    LABMICR < 1.20 07/31/2017       Lab Results   Component Value Date    [address] 01/29/2020         Past Medical History:   Diagnosis Date    Anxiety     Arthritis     Carpal tunnel syndrome     Cellulitis     Chronic back pain     Depression     Diabetes mellitus ([facility] [address]     Emphysema of lung (HCC)     Fibromyalgia     GERD (gastroesophageal reflux disease)     Glaucoma     Hiatal hernia     esophagus closes    Hx of blood clots     foot post op    Hyperlipidemia, mixed 7/12/2017    IBS (irritable bowel syndrome)     Leg pain     nerve damage right leg    MDRO (multiple drug resistant organisms) resistance 2008    wound and nasal    Guzman's neuroma     left foot    Nausea & vomiting     Neuropathic pain     Osteoarthritis     PONV (postoperative nausea and vomiting)     Pulmonary emphysema ([facility] [address] 7/12/2017    Sleep apnea     DOES NOT USE CPAP       Social History     Socioeconomic History    Marital status: Single     Spouse name: Not on file    Number of children: 3    Years of education: Not on file    Highest education level: Not on file   Occupational History    Not on file   Social Needs    Financial resource strain: Not on file    Food insecurity:     Worry: Not on file     Inability: Not on file    Transportation needs:     Medical: Not on file     Non-medical: Not on file   Tobacco Use    Smoking status: Current Every Day Smoker     Packs/day: 1.00     Years: 35.00     Pack years: 35.00     Types: Cigarettes     Start date: 11/14/1982     Last attempt to quit: 11/15/2015     Years since quitting: educational materials - see patient instructions. All patient questions answered. Patient voiced understanding. I have reviewed this patient's history, habits, and medication list and have updated the chart where appropriate.

## 2020-04-07 ENCOUNTER — TELEPHONE (OUTPATIENT)
Dept: FAMILY MEDICINE CLINIC | Age: 60
End: 2020-04-07

## 2020-04-16 ENCOUNTER — TELEPHONE (OUTPATIENT)
Dept: FAMILY MEDICINE CLINIC | Age: 60
End: 2020-04-16

## 2020-04-16 NOTE — TELEPHONE ENCOUNTER
appt changed     Future Appointments   Date Time Provider Scarlett Stacey   5/4/2020  8:00 AM [facility] [place] [facility] [place]   5/13/2020  8:30 AM ALLEN Ward CNP Pulm Med [facility] [place] [facility] [place]   8/3/2020  9:40 AM STR CT IMAGING RM1  OP [facility] STRZ OUT EXP STR Radiolog   8/3/2020 10:00 AM STR PULMONARY FUNCTION ROOM 1 STRZ PFT [place] [facility] [place] \[facility] [place] [facility]\""   8/10/2020  9:00 AM ALLEN Ward CNP Pulm Med [facility] [place] [facility] [place]

## 2020-05-04 ENCOUNTER — TELEMEDICINE (OUTPATIENT)
Dept: FAMILY MEDICINE CLINIC | Age: 60
End: 2020-05-04
Payer: MEDICARE

## 2020-05-04 VITALS — RESPIRATION RATE: 16 BRPM | BODY MASS INDEX: 43.03 KG/M2 | WEIGHT: 283 LBS

## 2020-05-04 PROCEDURE — 99214 OFFICE O/P EST MOD 30 MIN: CPT | Performed by: NURSE PRACTITIONER

## 2020-05-04 PROCEDURE — 3052F HG A1C>EQUAL 8.0%<EQUAL 9.0%: CPT | Performed by: NURSE PRACTITIONER

## 2020-05-04 RX ORDER — ATORVASTATIN CALCIUM 20 MG/1
TABLET, FILM COATED ORAL
Qty: 90 TABLET | Refills: 3 | Status: SHIPPED | OUTPATIENT
Start: 2020-05-04 | End: 2021-05-07

## 2020-05-04 RX ORDER — PREGABALIN 300 MG/1
300 CAPSULE ORAL 2 TIMES DAILY
Qty: 180 CAPSULE | Refills: 0 | Status: SHIPPED | OUTPATIENT
Start: 2020-05-04 | End: 2020-07-29 | Stop reason: SDUPTHER

## 2020-05-04 RX ORDER — INSULIN DETEMIR 100 [IU]/ML
67 INJECTION, SOLUTION SUBCUTANEOUS NIGHTLY
Qty: 6 PEN | Refills: 11 | Status: SHIPPED | OUTPATIENT
Start: 2020-05-04 | End: 2021-02-08

## 2020-05-04 NOTE — PROGRESS NOTES
(PROAIR HFA) 108 (90 Base) MCG/ACT inhaler Inhale 2 puffs into the lungs every 6 hours as needed for Wheezing  ALLEN Birmingham CNP   Handicap Placard MISC by Does not apply route Duration: three years    Type II idabetes  Diabetic neuropathy  ALLEN Wheatley CNP   blood glucose test strips (ACCU-CHEK DAVIDA) strip Test once daily  ALLEN Birmingham CNP   HYDROcodone-acetaminophen (NORCO) 5-325 MG per tablet Take 1 tablet by mouth every 8 hours as needed for Pain. Reece Real Historical Provider, MD   traMADol (ULTRAM) 50 MG tablet Take 100 mg by mouth 3 times daily. Historical Provider, MD   OXYGEN Inhale 2 L into the lungs   Historical Provider, MD   Elastic Bandages & Supports (WRIST BRACE/RIGHT MEDIUM) MISC Wear brace on right wrist nightly  Salima Apley, APRN - CNP   Omega-3 Fatty Acids (FISH OIL) 1000 MG CAPS Take 1,000 mg by mouth daily  Historical Provider, MD   Cholecalciferol (VITAMIN D3) 2000 units CAPS Take by mouth daily  Historical Provider, MD   vitamin B-12 (CYANOCOBALAMIN) 500 MCG tablet Take 500 mcg by mouth daily  Historical Provider, MD   rOPINIRole (REQUIP) 1 MG tablet Take 1 mg by mouth nightly   Historical Provider, MD   acetaminophen 650 MG TABS Take 650 mg by mouth every 4 hours as needed for Pain (For mild pain level 1-3 or for fever > 100.5).   Sai Hein MD       Social History     Tobacco Use    Smoking status: Current Every Day Smoker     Packs/day: 1.00     Years: 35.00     Pack years: 35.00     Types: Cigarettes     Start date: 1982     Last attempt to quit: 11/15/2015     Years since quittin.4    Smokeless tobacco: Never Used    Tobacco comment: 2-4 cigarettes/day    Substance Use Topics    Alcohol use: No     Comment: Sober for 26 years    Drug use: Yes     Types: Marijuana     Comment: last use morning of 20        Allergies   Allergen Reactions    Latex Itching and Rash   ,   Past Medical History:   Diagnosis Date    Anxiety     Arthritis     Carpal tunnel syndrome     Cellulitis     Chronic back pain     Depression     Diabetes mellitus (HCC)     Emphysema of lung (HCC)     Fibromyalgia     GERD (gastroesophageal reflux disease)     Glaucoma     Hiatal hernia     esophagus closes    Hx of blood clots     foot post op    Hyperlipidemia, mixed 2017    IBS (irritable bowel syndrome)     Leg pain     nerve damage right leg    MDRO (multiple drug resistant organisms) resistance     wound and nasal    Guzman's neuroma     left foot    Nausea & vomiting     Neuropathic pain     Osteoarthritis     PONV (postoperative nausea and vomiting)     Pulmonary emphysema ([address] 2017    Sleep apnea     DOES NOT USE CPAP   ,   Past Surgical History:   Procedure Laterality Date    ABDOMEN SURGERY      APPENDECTOMY      BACK SURGERY      lower x 3    CERVICAL FUSION       SECTION      x3    CHOLECYSTECTOMY      COLONOSCOPY      ECTOPIC PREGNANCY SURGERY      EYE SURGERY Bilateral     LASER FOR GLAUCOMA    HYSTERECTOMY, TOTAL ABDOMINAL      JOINT REPLACEMENT      OTHER SURGICAL HISTORY  Aug 5, 2013    Left knee total arthroplasty (Dr. Garrick Leblanc, [facility])    [place] DEBRIDEMENT OPEN WOUND 20 SQ CM< Right 2018    INCISIONAL DEBRIEDMENT INCISION AND DRAINAGE RIGHT GLUTEAL ABCESS performed by Lloyd Adams MD at [facility] OFFICE/OUTPT [address] [facility] Right 2018    EXCISIONAL DEBRIDEMENT OF RIGHT BUTTOCKS WOUND performed by Chitra Austin DO at [address] Left     little toe bone removed    TONSILLECTOMY      TOTAL KNEE ARTHROPLASTY Right 2016    UPPER GASTROINTESTINAL ENDOSCOPY      UPPER GASTROINTESTINAL ENDOSCOPY Left 2019    EGD DILATION SAVORY performed by Lestine Dandy, MD at [facility]   ,   Social History     Tobacco Use    Smoking status: Current Every Day Smoker     Packs/day: 1.00     Years: 35.00     Pack years: 35.00     Types: Cigarettes     Start date: 1982     Last attempt to quit: 11/15/2015     Years since quittin.4    Smokeless tobacco: Never Used    Tobacco comment: 2-4 cigarettes/day    Substance Use Topics    Alcohol use: No     Comment: Sober for 26 years    Drug use: Yes     Types: Marijuana     Comment: last use morning of 20   ,   Family History   Problem Relation Age of Onset    Arthritis Mother     Heart Disease Mother         CHF    High Blood Pressure Mother     Kidney Disease Mother     Cancer Father     Heart Disease Sister         diastolic dysfunction    Other Sister         thyroid cysts    Cancer Brother     Kidney Disease Brother     Diabetes Brother     Kidney Disease Brother     Colon Cancer Neg Hx    ,   Immunization History   Administered Date(s) Administered    PPD Test 2013, 2018    Tdap (Boostrix, Adacel) 2014    Zoster Live (Zostavax) 10/18/2016   ,   Health Maintenance   Topic Date Due    Pneumococcal 0-64 years Vaccine (1 of 1 - PPSV23) 10/29/1965    Shingles Vaccine (2 of 3) 2016    Colon Cancer Screen FIT/FOBT  2020    Diabetic foot exam  2020    Low dose CT lung screening  2020    Flu vaccine (Season Ended) 2020    Diabetic microalbuminuria test  2020    Annual Wellness Visit (AWV)  2020    Diabetic retinal exam  2020    A1C test (Diabetic or Prediabetic)  2021    Lipid screen  2021    Breast cancer screen  2021    DTaP/Tdap/Td vaccine (2 - Td) 2024    Hepatitis C screen  Completed    HIV screen  Completed    Hepatitis A vaccine  Aged Out    Hib vaccine  Aged Out    Meningococcal (ACWY) vaccine  Aged Out       PHYSICAL EXAMINATION:  Vital Signs: (As obtained by patient/caregiver or practitioner observation)    Blood pressure-  Heart rate-    Respiratory rate-  16  Temperature-  Pulse oximetry-     Constitutional: [x] Appears well-developed and well-nourished [x] No apparent distress      [] this Virtual Visit was conducted with patient's (and/or legal guardian's) consent, to reduce the patient's risk of exposure to COVID-19 and provide necessary medical care. The patient (and/or legal guardian) has also been advised to contact this office for worsening conditions or problems, and seek emergency medical treatment and/or call 911 if deemed necessary. Patient identification was verified at the start of the visit: Yes    Total time spent on this encounter: 25 minutes    Services were provided through a video synchronous discussion virtually to substitute for in-person clinic visit. Patient and provider were located at their individual homes. --ALLEN Sigala CNP on 5/4/2020 at 8:33 AM    An electronic signature was used to authenticate this note.

## 2020-05-08 ENCOUNTER — NURSE ONLY (OUTPATIENT)
Dept: LAB | Age: 60
End: 2020-05-08

## 2020-05-08 LAB
AVERAGE GLUCOSE: 183 MG/DL (ref 70–126)
HBA1C MFR BLD: 8.1 % (ref 4.4–6.4)

## 2020-05-11 ENCOUNTER — TELEPHONE (OUTPATIENT)
Dept: FAMILY MEDICINE CLINIC | Age: 60
End: 2020-05-11

## 2020-05-11 NOTE — TELEPHONE ENCOUNTER
----- Message from ALLEN Birmingham CNP sent at 5/11/2020  9:31 AM EDT -----  Let pt know her A1C did decrease from 8.3 to 8.1, better but still high, have her increase her levemir to 67 units as we discussed. Have her call me in 6 weeks with am fasting blood sugar numbers, if still high we will add jardiance at that time.   thanks

## 2020-05-22 ENCOUNTER — OFFICE VISIT (OUTPATIENT)
Dept: PULMONOLOGY | Age: 60
End: 2020-05-22
Payer: MEDICARE

## 2020-05-22 VITALS
HEART RATE: 73 BPM | WEIGHT: 276.8 LBS | TEMPERATURE: 98.3 F | BODY MASS INDEX: 41.95 KG/M2 | OXYGEN SATURATION: 98 % | HEIGHT: 68 IN | DIASTOLIC BLOOD PRESSURE: 68 MMHG | SYSTOLIC BLOOD PRESSURE: 132 MMHG

## 2020-05-22 PROCEDURE — 99213 OFFICE O/P EST LOW 20 MIN: CPT | Performed by: NURSE PRACTITIONER

## 2020-05-22 ASSESSMENT — ENCOUNTER SYMPTOMS
COUGH: 1
VOMITING: 0
EYES NEGATIVE: 1
DIARRHEA: 0
WHEEZING: 0
CHEST TIGHTNESS: 0
ABDOMINAL PAIN: 0
NAUSEA: 0
SHORTNESS OF BREATH: 1

## 2020-05-22 NOTE — PROGRESS NOTES
EYE SURGERY Bilateral     LASER FOR GLAUCOMA    HYSTERECTOMY, TOTAL ABDOMINAL      JOINT REPLACEMENT      OTHER SURGICAL HISTORY  Aug 5, 2013    Left knee total arthroplasty ([facility]. Carmencita Weldon, [facility])    [place] DEBRIDEMENT OPEN WOUND 20 SQ CM< Right 2018    INCISIONAL DEBRIEDMENT INCISION AND DRAINAGE RIGHT GLUTEAL ABCESS performed by Roger Sandoval MD at [facility] OFFICE/OUTPT [address] Right 2018    EXCISIONAL DEBRIDEMENT OF RIGHT BUTTOCKS WOUND performed by Vignesh Correia DO at [address] Left     little toe bone removed    TONSILLECTOMY      TOTAL KNEE ARTHROPLASTY Right 2016    UPPER GASTROINTESTINAL ENDOSCOPY      UPPER GASTROINTESTINAL ENDOSCOPY Left 2019    EGD DILATION SAVORY performed by Jac Mena MD at [facility]     SOCIAL HISTORY:  Social History     Tobacco Use    Smoking status: Current Every Day Smoker     Packs/day: 1.00     Years: 35.00     Pack years: 35.00     Types: Cigarettes     Start date: 1982     Last attempt to quit: 11/15/2015     Years since quittin.5    Smokeless tobacco: Never Used    Tobacco comment: 2-4 cigarettes/day    Substance Use Topics    Alcohol use: No     Comment: Sober for 26 years    Drug use: Yes     Types: Marijuana     Comment: last use morning of 20     ALLERGIES:  Allergies   Allergen Reactions    Latex Itching and Rash     FAMILY HISTORY:  Family History   Problem Relation Age of Onset    Arthritis Mother     Heart Disease Mother         CHF    High Blood Pressure Mother     Kidney Disease Mother     Cancer Father     Heart Disease Sister         diastolic dysfunction    Other Sister         thyroid cysts    Cancer Brother     Kidney Disease Brother     Diabetes Brother     Kidney Disease Brother     Colon Cancer Neg Hx      CURRENT MEDICATIONS:  Current Outpatient Medications   Medication Sig Dispense Refill    Liraglutide (VICTOZA) 18 MG/3ML SOPN SC injection

## 2020-05-26 RX ORDER — INSULIN LISPRO 100 [IU]/ML
INJECTION, SOLUTION INTRAVENOUS; SUBCUTANEOUS
Qty: 15 ML | Refills: 3 | Status: SHIPPED | OUTPATIENT
Start: 2020-05-26 | End: 2020-08-17

## 2020-06-22 ENCOUNTER — OFFICE VISIT (OUTPATIENT)
Dept: FAMILY MEDICINE CLINIC | Age: 60
End: 2020-06-22
Payer: MEDICARE

## 2020-06-22 VITALS
WEIGHT: 277 LBS | OXYGEN SATURATION: 96 % | HEIGHT: 67 IN | HEART RATE: 68 BPM | BODY MASS INDEX: 43.47 KG/M2 | TEMPERATURE: 98 F | SYSTOLIC BLOOD PRESSURE: 122 MMHG | DIASTOLIC BLOOD PRESSURE: 70 MMHG | RESPIRATION RATE: 14 BRPM

## 2020-06-22 PROBLEM — K92.1 MELENA: Status: RESOLVED | Noted: 2019-05-08 | Resolved: 2020-06-22

## 2020-06-22 PROBLEM — R07.9 CHEST PAIN: Status: RESOLVED | Noted: 2020-01-28 | Resolved: 2020-06-22

## 2020-06-22 PROBLEM — E87.4 MIXED ACID BASE BALANCE DISORDER: Status: RESOLVED | Noted: 2019-05-08 | Resolved: 2020-06-22

## 2020-06-22 PROBLEM — R73.9 HYPERGLYCEMIA: Status: RESOLVED | Noted: 2019-05-08 | Resolved: 2020-06-22

## 2020-06-22 PROBLEM — L02.31 ABSCESS, GLUTEAL, RIGHT: Status: RESOLVED | Noted: 2018-05-26 | Resolved: 2020-06-22

## 2020-06-22 PROBLEM — R11.2 INTRACTABLE VOMITING WITH NAUSEA: Status: RESOLVED | Noted: 2019-05-08 | Resolved: 2020-06-22

## 2020-06-22 PROBLEM — R10.9 ABDOMINAL PAIN: Status: RESOLVED | Noted: 2019-05-08 | Resolved: 2020-06-22

## 2020-06-22 LAB
BILIRUBIN, POC: NORMAL
BLOOD URINE, POC: NORMAL
CLARITY, POC: CLEAR
COLOR, POC: YELLOW
GLUCOSE URINE, POC: NORMAL
KETONES, POC: NORMAL
LEUKOCYTE EST, POC: NORMAL
NITRITE, POC: NORMAL
PH, POC: 5.5
PROTEIN, POC: NORMAL
SPECIFIC GRAVITY, POC: 1.02
UROBILINOGEN, POC: 0.2

## 2020-06-22 PROCEDURE — 81003 URINALYSIS AUTO W/O SCOPE: CPT | Performed by: NURSE PRACTITIONER

## 2020-06-22 PROCEDURE — 3052F HG A1C>EQUAL 8.0%<EQUAL 9.0%: CPT | Performed by: NURSE PRACTITIONER

## 2020-06-22 PROCEDURE — 99214 OFFICE O/P EST MOD 30 MIN: CPT | Performed by: NURSE PRACTITIONER

## 2020-06-23 ENCOUNTER — TELEPHONE (OUTPATIENT)
Dept: FAMILY MEDICINE CLINIC | Age: 60
End: 2020-06-23

## 2020-06-23 LAB
CHLAMYDIA TRACHOMATIS BY RT-PCR: NOT DETECTED
CT/NG SOURCE: NORMAL
NEISSERIA GONORRHOEAE BY RT-PCR: NOT DETECTED

## 2020-06-23 RX ORDER — NITROFURANTOIN 25; 75 MG/1; MG/1
100 CAPSULE ORAL 2 TIMES DAILY
Qty: 20 CAPSULE | Refills: 0 | Status: SHIPPED | OUTPATIENT
Start: 2020-06-23 | End: 2020-07-03

## 2020-06-24 ENCOUNTER — HOSPITAL ENCOUNTER (OUTPATIENT)
Age: 60
Discharge: HOME | End: 2020-06-24
Payer: MEDICARE

## 2020-06-24 ENCOUNTER — TELEPHONE (OUTPATIENT)
Dept: FAMILY MEDICINE CLINIC | Age: 60
End: 2020-06-24

## 2020-06-24 LAB
BACTERIA: ABNORMAL /HPF
BILIRUBIN URINE: NEGATIVE
BLOOD, URINE: NEGATIVE
CASTS 2: ABNORMAL /LPF
CASTS UA: ABNORMAL /LPF
CHARACTER, URINE: ABNORMAL
COLOR: YELLOW
CRYSTALS, UA: ABNORMAL
EPITHELIAL CELLS, UA: ABNORMAL /HPF
GLUCOSE URINE: NEGATIVE MG/DL
KETONES, URINE: NEGATIVE
LEUKOCYTE ESTERASE, URINE: ABNORMAL
MISCELLANEOUS 2: ABNORMAL
NITRITE, URINE: NEGATIVE
ORGANISM: ABNORMAL
PH UA: 5 (ref 5–9)
PROTEIN UA: NEGATIVE
RBC URINE: ABNORMAL /HPF
RENAL EPITHELIAL, UA: ABNORMAL
SPECIFIC GRAVITY, URINE: 1.01 (ref 1–1.03)
URINE CULTURE, ROUTINE: ABNORMAL
UROBILINOGEN, URINE: 0.2 EU/DL (ref 0–1)
WBC UA: > 100 /HPF
YEAST: ABNORMAL

## 2020-06-24 PROCEDURE — 87591 N.GONORRHOEAE DNA AMP PROB: CPT

## 2020-06-24 PROCEDURE — 81001 URINALYSIS AUTO W/SCOPE: CPT

## 2020-06-24 PROCEDURE — 87491 CHLMYD TRACH DNA AMP PROBE: CPT

## 2020-06-24 PROCEDURE — 87086 URINE CULTURE/COLONY COUNT: CPT

## 2020-06-24 NOTE — TELEPHONE ENCOUNTER
----- Message from ALLEN Villatoro CNP sent at 6/24/2020 11:02 AM EDT -----  Please let her know her urine culture identified an e coli UTI infection. She is on appropriate atb therapy. I do want her to have a repeat urine test in 3 weeks, if wanting her to go to an outside lab to save on nurse visit time that is fine. I will place the order and then mail to her.  thanks

## 2020-06-26 ENCOUNTER — TELEPHONE (OUTPATIENT)
Dept: FAMILY MEDICINE CLINIC | Age: 60
End: 2020-06-26

## 2020-06-26 LAB
ORGANISM: ABNORMAL
URINE CULTURE REFLEX: ABNORMAL

## 2020-06-26 NOTE — TELEPHONE ENCOUNTER
----- Message from ALLEN Villatoro CNP sent at 6/26/2020 11:10 AM EDT -----  Let pt know her urine culture did not grow any specific bacteria, have her finish out the atb ask if her urinary symptoms have improved.

## 2020-06-26 NOTE — TELEPHONE ENCOUNTER
Patient has been informed and voiced understanding and states that her symptoms are still present but have improved some

## 2020-07-19 ENCOUNTER — TELEPHONE (OUTPATIENT)
Dept: PHARMACY | Facility: CLINIC | Age: 60
End: 2020-07-19

## 2020-07-20 NOTE — TELEPHONE ENCOUNTER
CLINICAL PHARMACY: STATIN REVIEW    SUBJECTIVE:   Identified as DM care gap for [place]: statin therapy. OBJECTIVE:  Allergies   Allergen Reactions    Latex Itching and Rash       Medications per current medication list:  Current Outpatient Medications   Medication Sig Dispense Refill    blood glucose test strips (ACCU-CHEK DAVIDA) strip Test once daily 90 each 3    diclofenac sodium (VOLTAREN) 1 % GEL Apply 4 g topically 4 times daily 4 Tube 1    insulin lispro, 1 Unit Dial, (HUMALOG KWIKPEN) 100 UNIT/ML SOPN INJECT 12 UNITS UNDER THE SKIN THREE TIMES DAILY WITH MEALS PLUS SLIDING SCALE( MAX OF 60 UNITS DAILY) 15 mL 3    Liraglutide (VICTOZA) 18 MG/3ML SOPN SC injection Inject 1.8 mg into the skin daily 3 pen 3    pregabalin (LYRICA) 300 MG capsule Take 1 capsule by mouth 2 times daily for 90 days. 180 capsule 0    Insulin Pen Needle 31G X 8 MM MISC BD Ultra fine pen needles.  8 mm use once daily 90 each 3    insulin detemir (LEVEMIR FLEXTOUCH) 100 UNIT/ML injection pen Inject 67 Units into the skin nightly 6 pen 11    atorvastatin (LIPITOR) 20 MG tablet TAKE 1 TABLET DAILY 90 tablet 3    Continuous Blood Gluc Sensor (FREESTYLE MICKI 14 DAY SENSOR) MISC 1 Device by Does not apply route 2 times daily 2 each 3    loratadine (CLARITIN) 10 MG tablet Take 1 tablet by mouth daily 90 tablet 1    umeclidinium-vilanterol (ANORO ELLIPTA) 62.5-25 MCG/INH AEPB inhaler Inhale 1 puff into the lungs daily 30 puff 11    b complex vitamins capsule Take 1 capsule by mouth daily      nicotine (NICODERM CQ) 21 MG/24HR Place 1 patch onto the skin daily 30 patch 3    venlafaxine (EFFEXOR XR) 150 MG extended release capsule Take 1 capsule by mouth daily 90 capsule 3    traZODone (DESYREL) 150 MG tablet TAKE 1 TABLET NIGHTLY 90 tablet 3    pantoprazole (PROTONIX) 40 MG tablet Take 1 tablet by mouth daily 90 tablet 3    albuterol sulfate HFA (PROAIR HFA) 108 (90 Base) MCG/ACT inhaler Inhale 2 puffs into the lungs every 6 hours as needed for Wheezing 1 Inhaler 3    Handicap Placard MISC by Does not apply route Duration: three years    Type II idabetes  Diabetic neuropathy 1 each 0    HYDROcodone-acetaminophen (NORCO) 5-325 MG per tablet Take 1 tablet by mouth every 8 hours as needed for Pain. Bertis Seat traMADol (ULTRAM) 50 MG tablet Take 100 mg by mouth 3 times daily.  OXYGEN Inhale 2 L into the lungs       Elastic Bandages & Supports (WRIST BRACE/RIGHT MEDIUM) MISC Wear brace on right wrist nightly 1 each 0    Omega-3 Fatty Acids (FISH OIL) 1000 MG CAPS Take 1,000 mg by mouth daily      Cholecalciferol (VITAMIN D3) 2000 units CAPS Take by mouth daily      vitamin B-12 (CYANOCOBALAMIN) 500 MCG tablet Take 500 mcg by mouth daily      rOPINIRole (REQUIP) 1 MG tablet Take 1 mg by mouth nightly       acetaminophen 650 MG TABS Take 650 mg by mouth every 4 hours as needed for Pain (For mild pain level 1-3 or for fever > 100.5). 120 tablet      No current facility-administered medications for this visit.       Labs:  Lab Results   Component Value Date    CHOL 155 01/29/2020    CHOL 170 11/09/2018    CHOL 176 11/08/2017     Lab Results   Component Value Date    TRIG 266 (H) 01/29/2020    TRIG 139 11/09/2018    TRIG 272 (H) 11/08/2017     Lab Results   Component Value Date    HDL 28 01/29/2020    HDL 40 11/09/2018    HDL 33 11/08/2017     Lab Results   Component Value Date    LDLCALC 74 01/29/2020    [address] 11/09/2018    LDLCALC 89 11/08/2017     No results found for: LABVLDL, VLDL  No results found for: [facility]  Lab Results   Component Value Date    ALT 14 01/28/2020        The 10-year ASCVD risk score (Javon Mukherjee et al., 2013) is: 15.1%    Values used to calculate the score:      Age: 60 years      Sex: Female      Is Non- : No      Diabetic: Yes      Tobacco smoker: Yes      Systolic Blood Pressure: 599 mmHg      Is BP treated: No      HDL Cholesterol: 28 mg/dL      Total Cholesterol: 155 mg/dL      ASSESSMENT:    2019 ADA Guidelines Age:     Slime Ortiz  >/= 36years old:   o No history of ASCVD or 10-year ASCVD risk < 20% - moderate-intensity statin is recommended. o Patient is prescribed atorvastatin 20 mg- Per [email or web address]-  5/6/20 for 90 day, 3 refills, billed anthem. Previous fills- no previous fills with [facility]    PLAN:  Called patient to discuss adherence, reached patient for review. Patient states she lost track of this med for awhile and didn't realize she was not taking it. She had switched doctors and things got messed with that too. Patient states she does use a pill box and is now taking it and will continue to take it.      Thank you,    Kenn Locke, PharmD, [facility] Pharmacist  Direct: [phone], Ext [phone]  ===============================================  CLINICAL PHARMACY CONSULT: MED RECONCILIATION/REVIEW ADDENDUM    For Pharmacy Admin Tracking Only    PHSO: Yes  Total # of Interventions Recommended: 1  - New Order #: 1 New Medication Order Reason(s): Needs Additional Medication Therapy  Recommended intervention potential cost savings: 1  Total Interventions Accepted: 1  Time Spent (min): [address]

## 2020-07-28 ENCOUNTER — TELEPHONE (OUTPATIENT)
Dept: PULMONOLOGY | Age: 60
End: 2020-07-28

## 2020-07-29 ENCOUNTER — OFFICE VISIT (OUTPATIENT)
Dept: FAMILY MEDICINE CLINIC | Age: 60
End: 2020-07-29
Payer: MEDICARE

## 2020-07-29 VITALS
WEIGHT: 274.6 LBS | OXYGEN SATURATION: 98 % | TEMPERATURE: 98.3 F | SYSTOLIC BLOOD PRESSURE: 112 MMHG | DIASTOLIC BLOOD PRESSURE: 70 MMHG | HEIGHT: 67 IN | HEART RATE: 64 BPM | RESPIRATION RATE: 16 BRPM | BODY MASS INDEX: 43.1 KG/M2

## 2020-07-29 PROCEDURE — 11300 SHAVE SKIN LESION 0.5 CM/<: CPT | Performed by: NURSE PRACTITIONER

## 2020-07-29 PROCEDURE — 99213 OFFICE O/P EST LOW 20 MIN: CPT | Performed by: NURSE PRACTITIONER

## 2020-07-29 RX ORDER — PANTOPRAZOLE SODIUM 40 MG/1
40 TABLET, DELAYED RELEASE ORAL DAILY
Qty: 90 TABLET | Refills: 3 | Status: SHIPPED | OUTPATIENT
Start: 2020-07-29 | End: 2021-06-08 | Stop reason: SDUPTHER

## 2020-07-29 RX ORDER — PREGABALIN 300 MG/1
300 CAPSULE ORAL 2 TIMES DAILY
Qty: 180 CAPSULE | Refills: 0 | Status: SHIPPED | OUTPATIENT
Start: 2020-07-29 | End: 2020-08-13 | Stop reason: SDUPTHER

## 2020-07-29 ASSESSMENT — ENCOUNTER SYMPTOMS
RESPIRATORY NEGATIVE: 1
COLOR CHANGE: 1

## 2020-07-29 NOTE — PROGRESS NOTES
11    atorvastatin (LIPITOR) 20 MG tablet TAKE 1 TABLET DAILY 90 tablet 3    Continuous Blood Gluc Sensor (FREESTYLE MICKI 14 DAY SENSOR) MISC 1 Device by Does not apply route 2 times daily 2 each 3    loratadine (CLARITIN) 10 MG tablet Take 1 tablet by mouth daily 90 tablet 1    umeclidinium-vilanterol (ANORO ELLIPTA) 62.5-25 MCG/INH AEPB inhaler Inhale 1 puff into the lungs daily 30 puff 11    b complex vitamins capsule Take 1 capsule by mouth daily      nicotine (NICODERM CQ) 21 MG/24HR Place 1 patch onto the skin daily 30 patch 3    venlafaxine (EFFEXOR XR) 150 MG extended release capsule Take 1 capsule by mouth daily 90 capsule 3    traZODone (DESYREL) 150 MG tablet TAKE 1 TABLET NIGHTLY 90 tablet 3    albuterol sulfate HFA (PROAIR HFA) 108 (90 Base) MCG/ACT inhaler Inhale 2 puffs into the lungs every 6 hours as needed for Wheezing 1 Inhaler 3    Handicap Placard MISC by Does not apply route Duration: three years    Type II idabetes  Diabetic neuropathy 1 each 0    HYDROcodone-acetaminophen (NORCO) 5-325 MG per tablet Take 1 tablet by mouth every 8 hours as needed for Pain. Nazario Damon traMADol (ULTRAM) 50 MG tablet Take 100 mg by mouth 3 times daily.  OXYGEN Inhale 2 L into the lungs       Elastic Bandages & Supports (WRIST BRACE/RIGHT MEDIUM) MISC Wear brace on right wrist nightly 1 each 0    Omega-3 Fatty Acids (FISH OIL) 1000 MG CAPS Take 1,000 mg by mouth daily      Cholecalciferol (VITAMIN D3) 2000 units CAPS Take by mouth daily      vitamin B-12 (CYANOCOBALAMIN) 500 MCG tablet Take 500 mcg by mouth daily      rOPINIRole (REQUIP) 1 MG tablet Take 1 mg by mouth nightly       acetaminophen 650 MG TABS Take 650 mg by mouth every 4 hours as needed for Pain (For mild pain level 1-3 or for fever > 100.5). 120 tablet      No current facility-administered medications for this visit.            Past Medical History:   Diagnosis Date    Anxiety     Arthritis     Carpal tunnel syndrome     Sister         diastolic dysfunction    Other Sister         thyroid cysts    Cancer Brother     Kidney Disease Brother     Diabetes Brother     Kidney Disease Brother     Colon Cancer Neg Hx      Social History     Tobacco Use    Smoking status: Current Every Day Smoker     Packs/day: 1.00     Years: 35.00     Pack years: 35.00     Types: Cigarettes     Start date: 1982     Last attempt to quit: 11/15/2015     Years since quittin.7    Smokeless tobacco: Never Used    Tobacco comment: 2-4 cigarettes/day    Substance Use Topics    Alcohol use: No     Comment: Sober for 26 years        Allergies   Allergen Reactions    Latex Itching and Rash       Health Maintenance   Topic Date Due    Pneumococcal 0-64 years Vaccine (1 of 1 - PPSV23) 10/29/1965    Shingles Vaccine (2 of 3) 2016    Colon Cancer Screen FIT/FOBT  2020    Low dose CT lung screening  2020    Flu vaccine (1) 2020    Diabetic microalbuminuria test  2020    Annual Wellness Visit (AWV)  2020    Diabetic retinal exam  2020    Lipid screen  2021    A1C test (Diabetic or Prediabetic)  2021    Diabetic foot exam  2021    Breast cancer screen  2021    DTaP/Tdap/Td vaccine (2 - Td) 2024    Hepatitis C screen  Completed    HIV screen  Completed    Hepatitis A vaccine  Aged Out    Hib vaccine  Aged Out    Meningococcal (ACWY) vaccine  Aged Out       Subjective:      Review of Systems   Constitutional: Negative for chills, fatigue and fever. Respiratory: Negative. Cardiovascular: Negative. Genitourinary: Negative for difficulty urinating. Musculoskeletal: Positive for arthralgias and myalgias. Skin: Positive for color change. Neurological: Negative for dizziness, weakness and headaches.        Objective:      /70   Pulse 64   Temp 98.3 °F (36.8 °C)   Resp 16   Ht 5' 6.5\" (1.689 m)   Wt 274 lb 9.6 oz (124.6 kg)   SpO2 98%   BMI 43.66

## 2020-07-30 ENCOUNTER — TELEPHONE (OUTPATIENT)
Dept: FAMILY MEDICINE CLINIC | Age: 60
End: 2020-07-30

## 2020-07-30 RX ORDER — FLASH GLUCOSE SENSOR
KIT MISCELLANEOUS
Qty: 2 EACH | Refills: 3 | Status: SHIPPED | OUTPATIENT
Start: 2020-07-30 | End: 2020-12-21 | Stop reason: SDUPTHER

## 2020-07-30 NOTE — TELEPHONE ENCOUNTER
Recent Visits  Date Type Provider Dept   07/29/20 Office Visit Fuentes Driscoll, APRN - CNP [facility]   06/22/20 Office Visit Fuentes Driscoll, APRN - CNP [facility]   03/02/20 Office Visit Fuentes Eves, APRN - CNP [facility]   02/04/20 Office Visit Fuentes Driscoll, APRN - CNP [facility]   12/05/19 Office Visit Fuentes Driscoll, APRN - CNP [facility]   09/05/19 Office Visit Fuentes Driscoll, APRN - CNP [facility]   06/05/19 Office Visit Fuentes Driscoll, APRN - CNP [facility]   05/02/19 Office Visit Fuentes Driscoll, APRN - CNP [facility]   Showing recent visits within past 540 days with a meds authorizing provider and meeting all other requirements     Future Appointments  Date Type Provider Dept   08/05/20 Appointment Fuentes Driscoll, APRN - CNP [facility]   Showing future appointments within next 150 days with a meds authorizing provider and meeting all other requirements

## 2020-07-30 NOTE — TELEPHONE ENCOUNTER
scheduling non invasive bilat extremity arteries   Pt notified and transfered to central scheduling to schedule.

## 2020-08-03 ENCOUNTER — TELEPHONE (OUTPATIENT)
Dept: FAMILY MEDICINE CLINIC | Age: 60
End: 2020-08-03

## 2020-08-05 ENCOUNTER — TELEPHONE (OUTPATIENT)
Dept: FAMILY MEDICINE CLINIC | Age: 60
End: 2020-08-05

## 2020-08-05 ENCOUNTER — VIRTUAL VISIT (OUTPATIENT)
Dept: FAMILY MEDICINE CLINIC | Age: 60
End: 2020-08-05
Payer: MEDICARE

## 2020-08-05 PROCEDURE — 3052F HG A1C>EQUAL 8.0%<EQUAL 9.0%: CPT | Performed by: NURSE PRACTITIONER

## 2020-08-05 PROCEDURE — 99214 OFFICE O/P EST MOD 30 MIN: CPT | Performed by: NURSE PRACTITIONER

## 2020-08-05 RX ORDER — LORATADINE 10 MG/1
10 TABLET ORAL DAILY
Qty: 90 TABLET | Refills: 1 | Status: SHIPPED | OUTPATIENT
Start: 2020-08-05 | End: 2021-06-08 | Stop reason: ALTCHOICE

## 2020-08-05 RX ORDER — GUAIFENESIN 600 MG/1
600 TABLET, EXTENDED RELEASE ORAL 2 TIMES DAILY
Qty: 120 TABLET | Refills: 1 | Status: SHIPPED | OUTPATIENT
Start: 2020-08-05 | End: 2020-09-04

## 2020-08-05 NOTE — PROGRESS NOTES
2020    TELEHEALTH EVALUATION -- Audio/Visual (During THTDD-11 public health emergency)    HPI:visit conducted with pt at home and provider Elizabeth Gaffney in office. [facility] [facility] (:  1960) has requested an audio/video evaluation for the following concern(s):    Pt her for check up  Diabetes Type 2    Glucose control:   Does patient check blood glucoses at home? Yes  Report of hypoglycemia: no  Lab Results   Component Value Date    LABA1C 8.1 (H) 2020     No results found for: EAG    Symptoms  Polyuria, Polydipsia or Polyphagia? No  Chest Pain, SOB, or Palpitations? -  No  New Vision complaints? No  Paresthesias of the extremities? No but she has been diagnosed with fibromyalgia, states these symptoms are stable,    Medications  Current medication were reviewed. Compliant with medications? yes  Medication side effects? No  On ACE-I or ARB? No  On antiplatelet therapy? Yes  On Statin? Yes    Last Diabetic Eye Exam: needs to get one scheduled    Exercise  Exercise? Rarely, I did encourage to increase her physical activity    COPD    HPI:    Current medication regimen - anoro  Compliant with medications? yes    Limitations in function - increased physical activity, states she tends to have a lot of chest mucous and congestion, will have her start mucinex  Does patient smoke? Off and on    Chronic cough?: yes  Chest pain/Tightness?:  no  Shortness of breath?: no  Wheezing?  no    Last PFTs - has them scheduled soon before her appt with pulmonary    Hospitalized and/or intubated in the past?: No  Number of times prescribed oral steroids in the past year - 0  Influenza vaccine up to date? Yes  Pneumococcal vaccine up to date? Yes  Wt Readings from Last 3 Encounters:   20 274 lb 9.6 oz (124.6 kg)   20 277 lb (125.6 kg)   20 276 lb 12.8 oz (125.6 kg)       Diet discipline?:  Low salt, fat, sugar diet?   yes    Blood pressure control:  BP Readings from Last 3 Encounters: 07/29/20 112/70   06/22/20 122/70   05/22/20 132/68       Lab Results   Component Value Date    LABMICR < 1.20 07/31/2017       Lab Results   Component Value Date    LDLCALC 74 01/29/2020       Taking trazodone at night for sleep, it does help for the most part, some nights she has to take 2. Review of Systems    Prior to Visit Medications    Medication Sig Taking? Authorizing Provider   loratadine (CLARITIN) 10 MG tablet Take 1 tablet by mouth daily Yes ALLEN Hunter CNP   guaiFENesin (MUCINEX) 600 MG extended release tablet Take 1 tablet by mouth 2 times daily Yes ALLEN Hunter CNP   Continuous Blood Gluc Sensor (FREESTYLE MICKI 14 DAY SENSOR) [facility] USE DEVICE TWICE DAILY  ALLEN Hunter CNP   pantoprazole (PROTONIX) 40 MG tablet Take 1 tablet by mouth daily  ALLEN Hunter CNP   pregabalin (LYRICA) 300 MG capsule Take 1 capsule by mouth 2 times daily for 90 days. ALLEN Hunter CNP   blood glucose test strips (ACCU-CHEK DAVIDA) strip Test once daily  ALLEN Hunter CNP   diclofenac sodium (VOLTAREN) 1 % GEL Apply 4 g topically 4 times daily  ALLEN Wheatley CNP   insulin lispro, 1 Unit Dial, (HUMALOG KWIKPEN) 100 UNIT/ML SOPN INJECT 12 UNITS UNDER THE SKIN THREE TIMES DAILY WITH MEALS PLUS SLIDING SCALE( MAX OF 60 UNITS DAILY)  ALLEN Hunter CNP   Liraglutide (VICTOZA) 18 MG/3ML SOPN SC injection Inject 1.8 mg into the skin daily  ALLEN Wheatley CNP   Insulin Pen Needle 31G X 8 MM MISC BD Ultra fine pen needles.  8 mm use once daily  ALLEN Wheatley CNP   insulin detemir (LEVEMIR FLEXTOUCH) 100 UNIT/ML injection pen Inject 67 Units into the skin nightly  ALLEN Hunter CNP   atorvastatin (LIPITOR) 20 MG tablet TAKE 1 TABLET DAILY  ALLEN Hunter CNP   umeclidinium-vilanterol (ANORO ELLIPTA) 62.5-25 MCG/INH AEPB inhaler Inhale 1 puff into the lungs daily  ALLEN Ann CNP   b complex vitamins capsule Take 1 capsule by mouth daily  Historical Provider, MD   nicotine (NICODERM CQ) 21 MG/24HR Place 1 patch onto the skin daily  Kamran Ortiz MD   venlafaxine (EFFEXOR XR) 150 MG extended release capsule Take 1 capsule by mouth daily  ALLEN Estrada CNP   traZODone (DESYREL) 150 MG tablet TAKE 1 TABLET NIGHTLY  ALLEN Estrada CNP   albuterol sulfate HFA (PROAIR HFA) 108 (90 Base) MCG/ACT inhaler Inhale 2 puffs into the lungs every 6 hours as needed for Wheezing  ALLEN Estrada CNP   Handicap Placard [facility] by Does not apply route Duration: three years    Type II idabetes  Diabetic neuropathy  ALLEN Estrada CNP   HYDROcodone-acetaminophen (NORCO) 5-325 MG per tablet Take 1 tablet by mouth every 8 hours as needed for Pain. Heladio Rincon Historical Provider, MD   traMADol (ULTRAM) 50 MG tablet Take 100 mg by mouth 3 times daily. Historical Provider, MD   OXYGEN Inhale 2 L into the lungs   Historical Provider, MD   Elastic Bandages & Supports (WRIST BRACE/RIGHT MEDIUM) MISC Wear brace on right wrist nightly  Ricarda Severin, APRN - CNP   Omega-3 Fatty Acids (FISH OIL) 1000 MG CAPS Take 1,000 mg by mouth daily  Historical Provider, MD   Cholecalciferol (VITAMIN D3) 2000 units CAPS Take by mouth daily  Historical Provider, MD   vitamin B-12 (CYANOCOBALAMIN) 500 MCG tablet Take 500 mcg by mouth daily  Historical Provider, MD   rOPINIRole (REQUIP) 1 MG tablet Take 1 mg by mouth nightly   Historical Provider, MD   acetaminophen 650 MG TABS Take 650 mg by mouth every 4 hours as needed for Pain (For mild pain level 1-3 or for fever > 100.5).   Armandina Scheuermann, MD       Social History     Tobacco Use    Smoking status: Current Every Day Smoker     Packs/day: 1.00     Years: 35.00     Pack years: 35.00     Types: Cigarettes     Start date: 1982     Last attempt to quit: 11/15/2015     Years since quittin.7    Smokeless tobacco: Never Used    Tobacco comment: 2-4 cigarettes/day    Substance Use Topics    Alcohol use: No     Comment: Sober for 26 years    Drug use: Yes     Types: Marijuana     Comment: last use morning of 20        Allergies   Allergen Reactions    Latex Itching and Rash   ,   Past Medical History:   Diagnosis Date    Anxiety     Arthritis     Carpal tunnel syndrome     Cellulitis     Chronic back pain     Depression     Diabetes mellitus ([address]     Emphysema of lung (HCC)     Fibromyalgia     GERD (gastroesophageal reflux disease)     Glaucoma     Hiatal hernia     esophagus closes    Hx of blood clots     foot post op    Hyperlipidemia, mixed 2017    IBS (irritable bowel syndrome)     Leg pain     nerve damage right leg    MDRO (multiple drug resistant organisms) resistance 2008    wound and nasal    Guzman's neuroma     left foot    Nausea & vomiting     Neuropathic pain     Osteoarthritis     PONV (postoperative nausea and vomiting)     Pulmonary emphysema ([address] 2017    Sleep apnea     DOES NOT USE CPAP   ,   Past Surgical History:   Procedure Laterality Date    ABDOMEN SURGERY      APPENDECTOMY      BACK SURGERY      lower x 3    CERVICAL FUSION       SECTION      x3    CHOLECYSTECTOMY      COLONOSCOPY      ECTOPIC PREGNANCY SURGERY      EYE SURGERY Bilateral     LASER FOR GLAUCOMA    HYSTERECTOMY, TOTAL ABDOMINAL      JOINT REPLACEMENT      OTHER SURGICAL HISTORY  Aug 5, 2013    Left knee total arthroplasty (Dr. Ananda Salter, [facility])    [place] DEBRIDEMENT OPEN WOUND 20 SQ CM< Right 2018    INCISIONAL DEBRIEDMENT INCISION AND DRAINAGE RIGHT GLUTEAL ABCESS performed by River Hill MD at [facility] OFFICE/OUTPT [facility] Right 2018    EXCISIONAL DEBRIDEMENT OF RIGHT BUTTOCKS WOUND performed by Chuckie Rosas DO at [address] Left     little toe bone removed    TONSILLECTOMY      TOTAL KNEE ARTHROPLASTY Right 2016    UPPER GASTROINTESTINAL ENDOSCOPY      UPPER GASTROINTESTINAL ENDOSCOPY Left 2019    EGD DILATION SAVORY performed by Leonid Beltran MD at [address] [facility]   ,   Social History     Tobacco Use    Smoking status: Current Every Day Smoker     Packs/day: 1.00     Years: 35.00     Pack years: 35.00     Types: Cigarettes     Start date: 1982     Last attempt to quit: 11/15/2015     Years since quittin.7    Smokeless tobacco: Never Used    Tobacco comment: 2-4 cigarettes/day    Substance Use Topics    Alcohol use: No     Comment: Sober for 26 years    Drug use: Yes     Types: Marijuana     Comment: last use morning of 20   ,   Family History   Problem Relation Age of Onset    Arthritis Mother     Heart Disease Mother         CHF    High Blood Pressure Mother     Kidney Disease Mother     Cancer Father     Heart Disease Sister         diastolic dysfunction    Other Sister         thyroid cysts    Cancer Brother     Kidney Disease Brother     Diabetes Brother     Kidney Disease Brother     Colon Cancer Neg Hx    ,   Immunization History   Administered Date(s) Administered    PPD Test 2013, 2018    Tdap (Boostrix, Adacel) 2014    Zoster Live (Zostavax) 2015, 10/18/2016   ,   Health Maintenance   Topic Date Due    Pneumococcal 0-64 years Vaccine (1 of 1 - PPSV23) 10/29/1965    Shingles Vaccine (2 of 3) 2016    Colon Cancer Screen FIT/FOBT  2020    Low dose CT lung screening  2020    Flu vaccine (1) 2020    Diabetic microalbuminuria test  2020    Annual Wellness Visit (AWV)  2020    Diabetic retinal exam  2020    Lipid screen  2021    A1C test (Diabetic or Prediabetic)  2021    Diabetic foot exam  2021    Breast cancer screen  2021    DTaP/Tdap/Td vaccine (2 - Td) 2024    Hepatitis C screen  Completed    HIV screen  Completed    Hepatitis A vaccine  Aged Out    Hib vaccine  Aged Out    Meningococcal (ACWY) vaccine  Aged Out       PHYSICAL

## 2020-08-05 NOTE — TELEPHONE ENCOUNTER
Message   Received:  Today   Message Contents   ALLEN Espinosa CNP  P [facility] Clinical Support               Please contact pt to schedule her next visit in 6 months in office

## 2020-08-06 NOTE — TELEPHONE ENCOUNTER
Future Appointments   Date Time Provider Scarlett Ibarra   8/11/2020  3:00 PM STR VASCULAR LAB ROOM 1 [facility] VAS LAB STR Radiolog   8/18/2020  8:00 AM ALLEN Neville CNP Pul Med P - [facility]   2/11/2021  3:40 PM ALLEN Way CNP [facility] Med [address] [facility]

## 2020-08-07 ENCOUNTER — NURSE ONLY (OUTPATIENT)
Dept: LAB | Age: 60
End: 2020-08-07

## 2020-08-07 LAB
AVERAGE GLUCOSE: 174 MG/DL (ref 70–126)
HBA1C MFR BLD: 7.8 % (ref 4.4–6.4)

## 2020-08-10 ENCOUNTER — TELEPHONE (OUTPATIENT)
Dept: FAMILY MEDICINE CLINIC | Age: 60
End: 2020-08-10

## 2020-08-10 NOTE — TELEPHONE ENCOUNTER
Pt states the dosing for Victoza and Levemir are correct as previously stated. She has been having to take Humalog 12 units TID on a regular basis as well. She states she will not take oral medications. She states she knows her problem is that she has been eating sweets lately. Informed pt that we will call her back with Hilton's recommendations.

## 2020-08-10 NOTE — TELEPHONE ENCOUNTER
----- Message from ALLEN Hunter CNP sent at 8/10/2020  8:21 AM EDT -----  Let pt know her A1C did improve from 8.1 to 7.8 but is still elevated. I show she is taking victoza 1.8 mg and levemir 67 units at night. I also note humalog 12 units tid, ask if she routinely takes this humalog(short acting insulin) ask if she has ever taken any oral medications for diabetes. Based on these answers I will give recommendations on med changes to help improve glucose control.

## 2020-08-11 ENCOUNTER — HOSPITAL ENCOUNTER (OUTPATIENT)
Dept: INTERVENTIONAL RADIOLOGY/VASCULAR | Age: 60
Discharge: HOME | End: 2020-08-11
Payer: MEDICARE

## 2020-08-11 PROCEDURE — 93923 UPR/LXTR ART STDY 3+ LVLS: CPT

## 2020-08-12 ENCOUNTER — TELEPHONE (OUTPATIENT)
Dept: FAMILY MEDICINE CLINIC | Age: 60
End: 2020-08-12

## 2020-08-12 NOTE — TELEPHONE ENCOUNTER
----- Message from ALLEN Cooper CNP sent at 8/12/2020  4:00 PM EDT -----  Let pt  Know her leg studies did identify some narrowing of the vessels that carry blood to her LE, this could be the cause of her pain. The radiologist recommended she have a CT angiogram to better visualize the vessels and to confirm the diagnosis. If agreeable I will place the order.

## 2020-08-13 RX ORDER — PREGABALIN 300 MG/1
300 CAPSULE ORAL 2 TIMES DAILY
Qty: 180 CAPSULE | Refills: 0 | Status: SHIPPED | OUTPATIENT
Start: 2020-08-13 | End: 2021-02-08

## 2020-08-13 NOTE — TELEPHONE ENCOUNTER
Sounds good  Will need renal fxn prior     Diagnosis Orders   1.  Claudication of both lower extremities (HCC)  CTA LOWER EXTREMITY LEFT W WO CONTRAST    CTA LOWER EXTREMITY RIGHT W WO CONTRAST    Creatinine, Serum

## 2020-08-13 NOTE — TELEPHONE ENCOUNTER
Controlled Substance Monitoring:    Acute and Chronic Pain Monitoring:   RX Monitoring 8/13/2020   Periodic Controlled Substance Monitoring No signs of potential drug abuse or diversion identified.

## 2020-08-13 NOTE — TELEPHONE ENCOUNTER
Pt informed and verbalized understanding.   Pt will get the lab done @ [address] [facility] in epic  CT order placed at the  to be faxed to [facility] for PA

## 2020-08-17 RX ORDER — INSULIN LISPRO 100 [IU]/ML
INJECTION, SOLUTION INTRAVENOUS; SUBCUTANEOUS
Qty: 15 ML | Refills: 3 | Status: SHIPPED | OUTPATIENT
Start: 2020-08-17 | End: 2020-10-05

## 2020-08-17 NOTE — TELEPHONE ENCOUNTER
Recent Visits  Date Type Provider Dept   07/29/20 Office Visit Marilee Mojica, ALLEN - CNP [facility]   06/22/20 Office Visit Marilee Mojica, APRN - CNP [facility]   03/02/20 Office Visit Marilee Mojica, APRN - CNP [facility]   02/04/20 Office Visit Marilee Mojica, APRN - CNP [facility]   12/05/19 Office Visit Marilee Mojica APRN - CNP [facility]   09/05/19 Office Visit Marilee Mojica, APRN - CNP [facility]   06/05/19 Office Visit Marilee Mojica, APRN - CNP [facility]   05/02/19 Office Visit Marilee Mojica, APRN - CNP [facility]   Showing recent visits within past 540 days with a meds authorizing provider and meeting all other requirements     Future Appointments  No visits were found meeting these conditions.    Showing future appointments within next 150 days with a meds authorizing provider and meeting all other requirements     Future Appointments   Date Time Provider Scarlett Ibarra   8/18/2020  8:00 AM ALLEN Funk CNP Pulm Med [facility]   2/11/2021  3:40 PM ALLEN Lopez

## 2020-08-20 ENCOUNTER — OFFICE VISIT (OUTPATIENT)
Dept: PULMONOLOGY | Age: 60
End: 2020-08-20
Payer: MEDICARE

## 2020-08-20 VITALS
WEIGHT: 276 LBS | DIASTOLIC BLOOD PRESSURE: 74 MMHG | BODY MASS INDEX: 43.32 KG/M2 | OXYGEN SATURATION: 97 % | TEMPERATURE: 97.6 F | HEART RATE: 64 BPM | SYSTOLIC BLOOD PRESSURE: 112 MMHG | HEIGHT: 67 IN

## 2020-08-20 PROCEDURE — 94618 PULMONARY STRESS TESTING: CPT | Performed by: NURSE PRACTITIONER

## 2020-08-20 PROCEDURE — 99214 OFFICE O/P EST MOD 30 MIN: CPT | Performed by: NURSE PRACTITIONER

## 2020-08-20 PROCEDURE — 99406 BEHAV CHNG SMOKING 3-10 MIN: CPT | Performed by: NURSE PRACTITIONER

## 2020-08-20 RX ORDER — ALBUTEROL SULFATE 90 UG/1
2 AEROSOL, METERED RESPIRATORY (INHALATION) EVERY 6 HOURS PRN
Qty: 1 INHALER | Refills: 3 | Status: SHIPPED | OUTPATIENT
Start: 2020-08-20 | End: 2020-11-23

## 2020-08-20 ASSESSMENT — ENCOUNTER SYMPTOMS
SHORTNESS OF BREATH: 1
VOMITING: 0
COUGH: 1
ABDOMINAL PAIN: 0
CHEST TIGHTNESS: 1
WHEEZING: 0
DIARRHEA: 0
NAUSEA: 0
BACK PAIN: 1
EYES NEGATIVE: 1

## 2020-08-20 NOTE — PROGRESS NOTES
OTHER SURGICAL HISTORY  Aug 5, 2013    Left knee total arthroplasty (Dr. Saumya Greene, [facility])    [place] DEBRIDEMENT OPEN WOUND 20 SQ CM< Right 2018    INCISIONAL DEBRIEDMENT INCISION AND DRAINAGE RIGHT GLUTEAL ABCESS performed by Santo Calvo MD at [address] [facility] OFFICE/OUTPT [address] [facility] [address] Right 2018    EXCISIONAL DEBRIDEMENT OF RIGHT BUTTOCKS WOUND performed by Madhavi Yanes DO at [address] Left     little toe bone removed    TONSILLECTOMY      TOTAL KNEE ARTHROPLASTY Right 2016    UPPER GASTROINTESTINAL ENDOSCOPY      UPPER GASTROINTESTINAL ENDOSCOPY Left 2019    EGD DILATION SAVORY performed by Obed Madsen MD at [facility]     SOCIAL HISTORY:  Social History     Tobacco Use    Smoking status: Current Every Day Smoker     Packs/day: 1.00     Years: 35.00     Pack years: 35.00     Types: Cigarettes     Start date: 1982     Last attempt to quit: 11/15/2015     Years since quittin.7    Smokeless tobacco: Never Used    Tobacco comment: 2-4 cigarettes/day    Substance Use Topics    Alcohol use: No     Comment: Sober for 26 years    Drug use: Yes     Types: Marijuana     Comment: last use morning of 20     ALLERGIES:  Allergies   Allergen Reactions    Latex Itching and Rash     FAMILY HISTORY:  Family History   Problem Relation Age of Onset    Arthritis Mother     Heart Disease Mother         CHF    High Blood Pressure Mother     Kidney Disease Mother     Cancer Father     Heart Disease Sister         diastolic dysfunction    Other Sister         thyroid cysts    Cancer Brother     Kidney Disease Brother     Diabetes Brother     Kidney Disease Brother     Colon Cancer Neg Hx      CURRENT MEDICATIONS:  Current Outpatient Medications   Medication Sig Dispense Refill    albuterol sulfate HFA (PROAIR HFA) 108 (90 Base) MCG/ACT inhaler Inhale 2 puffs into the lungs every 6 hours as needed for Wheezing 1 Inhaler 3    insulin lispro, 1  Elastic Bandages & Supports (WRIST BRACE/RIGHT MEDIUM) MISC Wear brace on right wrist nightly 1 each 0    Omega-3 Fatty Acids (FISH OIL) 1000 MG CAPS Take 1,000 mg by mouth daily      Cholecalciferol (VITAMIN D3) 2000 units CAPS Take by mouth daily      vitamin B-12 (CYANOCOBALAMIN) 500 MCG tablet Take 500 mcg by mouth daily      rOPINIRole (REQUIP) 1 MG tablet Take 1 mg by mouth nightly       acetaminophen 650 MG TABS Take 650 mg by mouth every 4 hours as needed for Pain (For mild pain level 1-3 or for fever > 100.5). 120 tablet      No current facility-administered medications for this visit. Pietro ASHBY   Review of Systems   Constitutional: Positive for activity change. Negative for appetite change, chills and fever. HENT: Negative. Eyes: Negative. Respiratory: Positive for cough, chest tightness and shortness of breath. Negative for wheezing. Cardiovascular: Negative for chest pain, palpitations and leg swelling. Gastrointestinal: Negative for abdominal pain, diarrhea, nausea and vomiting. Genitourinary: Negative. Musculoskeletal: Positive for arthralgias, back pain and gait problem. Skin: Negative. Hematological: Does not bruise/bleed easily. Psychiatric/Behavioral: Negative for sleep disturbance and suicidal ideas. Physical exam   /74 (Site: Left Upper Arm, Position: Sitting, Cuff Size: Medium Adult)   Pulse 64   Temp 97.6 °F (36.4 °C)   Ht 5' 6.5\" (1.689 m)   Wt 276 lb (125.2 kg)   SpO2 97% Comment: on room air  BMI 43.88 kg/m²        Physical Exam  Vitals signs and nursing note reviewed. Constitutional:       General: She is not in acute distress. Appearance: She is well-developed. She is obese. Interventions: Face mask in place. Comments: Face mask on due to COVID    HENT:      Mouth/Throat:      Lips: Pink. Mouth: Mucous membranes are moist.      Pharynx: Oropharynx is clear.  No oropharyngeal exudate or posterior oropharyngeal erythema. Eyes:      Conjunctiva/sclera: Conjunctivae normal.   Neck:      Vascular: No JVD. Cardiovascular:      Rate and Rhythm: Normal rate and regular rhythm. Heart sounds: No murmur. No friction rub. Pulmonary:      Effort: Pulmonary effort is normal. No accessory muscle usage or respiratory distress. Breath sounds: Normal breath sounds. No wheezing, rhonchi or rales. Chest:      Chest wall: No tenderness. Musculoskeletal:      Right lower leg: No edema. Left lower leg: No edema. Skin:     General: Skin is warm and dry. Capillary Refill: Capillary refill takes less than 2 seconds. Nails: There is no clubbing. Neurological:      Mental Status: She is alert. Psychiatric:         Mood and Affect: Mood normal.         Behavior: Behavior normal.         Thought Content: Thought content normal.         Judgment: Judgment normal.          Test results   Lung Nodule Screening     [x] Qualifies    []Does not qualify   [] Declined    [] Completed          Declined DLCO         Six Minute Walk Test  Jose Munroe 1960    Six minute walk test done in my office today by my medical assistant. Laurie's oxygen saturation at rest on room air was 96%. Her oxygen saturation dropped to 93% on room air with exertion. . Patient ambulated a total of  756   feet and tolerated the walk ok, but had to stop to rest due to hip pain and dizziness, she had  Moderate SOB and no events. Resting heart rate was 63   and 88    upon completion of the walk. Assessment      Diagnosis Orders   1. Pulmonary emphysema, unspecified emphysema type (HCC)  6 Minute Walk Test    Alpha-1-Antitrypsin    6 Minute Walk Test   2. Cigarette nicotine dependence, uncomplicated     3. Moderate COPD (chronic obstructive pulmonary disease) ([facility])  albuterol sulfate HFA (PROAIR HFA) 108 (90 Base) MCG/ACT inhaler   4. Not ready to quit smoking        Declining DLCO likely due to emphysema with continued smoking.

## 2020-08-24 ENCOUNTER — TELEPHONE (OUTPATIENT)
Dept: FAMILY MEDICINE CLINIC | Age: 60
End: 2020-08-24

## 2020-08-24 NOTE — TELEPHONE ENCOUNTER
[facility] rad dept called asking for the order for the CTA lower extremity to be switched to CTA abdomen aorta with runoff. A new Nickola Furl will probably need to be done and the madi will probably need to be r/s because of needing a new auth to be done. Please advise.

## 2020-08-24 NOTE — TELEPHONE ENCOUNTER
The insurance approved the CTA abdominal with runoff and I tried getting the pt on the schedule but per central scheduling the diagnosis on the order needs changed

## 2020-08-24 NOTE — TELEPHONE ENCOUNTER
Spoke to Rad and the order will come up if CTA abdominal aorta is typed in the orders. Order pended.

## 2020-08-25 NOTE — TELEPHONE ENCOUNTER
Spoke with radiology, they stated the diagnoses of bilateral claudication should be able to be used to get scheduled. Pt scheduled: CTA Abdominal Aorta with bilateral runoff on 9/4/2020 @ 2pm  Pt is to arrive to OP testing at 1:30PM, NPO 4 hours prior to testing. Patient informed, verbally understood.

## 2020-08-25 NOTE — TELEPHONE ENCOUNTER
Central scheduling is still telling me the diagnosis listed is still not passing. This is approved and can be scheduled. Please follow up with radiology and maybe they can inform the provider as to which diagnosis will be approved for this testing.  Thank you

## 2020-08-26 RX ORDER — LIRAGLUTIDE 6 MG/ML
INJECTION SUBCUTANEOUS
Qty: 9 ML | Refills: 3 | Status: SHIPPED | OUTPATIENT
Start: 2020-08-26 | End: 2020-12-18

## 2020-08-26 NOTE — TELEPHONE ENCOUNTER
Recent Visits  Date Type Provider Dept   07/29/20 Office Visit ALLEN Machuca CNP [facility]   06/22/20 Office Visit ALLEN Machuca CNP [facility]   03/02/20 Office Visit ALLEN Machuca - CNP [facility]   02/04/20 Office Visit ALLEN Machuca CNP [facility]   12/05/19 Office Visit ALLEN Machuca CNP [facility]   09/05/19 Office Visit ALLEN Machuca - CNP [facility]   06/05/19 Office Visit ALLEN Machuca - CNP [facility]   05/02/19 Office Visit ALLEN Machuca CNP [facility]   Showing recent visits within past 540 days with a meds authorizing provider and meeting all other requirements     Future Appointments  No visits were found meeting these conditions.    Showing future appointments within next 150 days with a meds authorizing provider and meeting all other requirements     Future Appointments   Date Time Provider Scarlett Ibarra   8/28/2020  4:20 PM STR CT IMAGING RM1  [facility] OUT EXP STR Radiolog   8/31/2020  2:30 PM ALLEN Blackwell - CNP Pul Med [facility] [email or web address]   9/4/2020  2:00 PM STR CT IMAGING RM1  [facility] OUT EXP STR Radiolog   2/11/2021  3:40 PM Guy Medrano APRN - [id number] [facility] [address] [facility] [email or web address]

## 2020-08-28 ENCOUNTER — APPOINTMENT (OUTPATIENT)
Dept: GENERAL RADIOLOGY | Age: 60
End: 2020-08-28
Payer: MEDICARE

## 2020-08-28 ENCOUNTER — HOSPITAL ENCOUNTER (EMERGENCY)
Age: 60
Discharge: HOME | End: 2020-08-28
Attending: EMERGENCY MEDICINE
Payer: MEDICARE

## 2020-08-28 VITALS
RESPIRATION RATE: 16 BRPM | OXYGEN SATURATION: 98 % | BODY MASS INDEX: 43 KG/M2 | TEMPERATURE: 98 F | DIASTOLIC BLOOD PRESSURE: 70 MMHG | SYSTOLIC BLOOD PRESSURE: 133 MMHG | WEIGHT: 274 LBS | HEART RATE: 62 BPM | HEIGHT: 67 IN

## 2020-08-28 LAB — GLUCOSE BLD-MCNC: 170 MG/DL (ref 70–108)

## 2020-08-28 PROCEDURE — 73564 X-RAY EXAM KNEE 4 OR MORE: CPT

## 2020-08-28 PROCEDURE — 82948 REAGENT STRIP/BLOOD GLUCOSE: CPT

## 2020-08-28 PROCEDURE — 73090 X-RAY EXAM OF FOREARM: CPT

## 2020-08-28 PROCEDURE — 6370000000 HC RX 637 (ALT 250 FOR IP): Performed by: EMERGENCY MEDICINE

## 2020-08-28 PROCEDURE — 73630 X-RAY EXAM OF FOOT: CPT

## 2020-08-28 PROCEDURE — 72040 X-RAY EXAM NECK SPINE 2-3 VW: CPT

## 2020-08-28 PROCEDURE — 99283 EMERGENCY DEPT VISIT LOW MDM: CPT

## 2020-08-28 RX ORDER — ACETAMINOPHEN 325 MG/1
650 TABLET ORAL ONCE
Status: COMPLETED | OUTPATIENT
Start: 2020-08-28 | End: 2020-08-28

## 2020-08-28 RX ADMIN — ACETAMINOPHEN 650 MG: 325 TABLET ORAL at 17:03

## 2020-08-28 ASSESSMENT — ENCOUNTER SYMPTOMS
DIARRHEA: 0
SORE THROAT: 0
BACK PAIN: 0
VOICE CHANGE: 0
VOMITING: 0
WHEEZING: 0
NAUSEA: 0
CHEST TIGHTNESS: 0
TROUBLE SWALLOWING: 0
SINUS PRESSURE: 0
CONSTIPATION: 0
RHINORRHEA: 0
COUGH: 0
SHORTNESS OF BREATH: 0
ABDOMINAL PAIN: 0

## 2020-08-28 ASSESSMENT — PAIN SCALES - GENERAL
PAINLEVEL_OUTOF10: 9
PAINLEVEL_OUTOF10: 9

## 2020-08-28 ASSESSMENT — PAIN DESCRIPTION - PAIN TYPE: TYPE: ACUTE PAIN

## 2020-08-28 ASSESSMENT — PAIN DESCRIPTION - FREQUENCY: FREQUENCY: CONTINUOUS

## 2020-08-28 ASSESSMENT — PAIN DESCRIPTION - DESCRIPTORS: DESCRIPTORS: SHARP;CRUSHING

## 2020-08-28 ASSESSMENT — PAIN DESCRIPTION - LOCATION: LOCATION: ARM;KNEE

## 2020-08-28 ASSESSMENT — PAIN DESCRIPTION - ORIENTATION: ORIENTATION: LEFT;RIGHT

## 2020-08-28 NOTE — ED PROVIDER NOTES
[facility]        CHIEF COMPLAINT    Chief Complaint   Patient presents with    Fall       Nurses Notes reviewed and I agree except as noted in the HPI. HPI    Eleanor Urena is a 60 y.o. female who presents for evaluation of fall 30 minutes ago while walking in the lobby in this hospital.  Patient states that she is going for a appointment for a CT scan of the chest when the patient was walking and slipped in the floor and the lobby in this hospital and the patient fell, complaining of pain in the left wrist right great toe, right knee and left forearm. Patient denies any loss of consciousness denies any headache however she hit his head into the floor having also neck pain. Denies any shortness of breath no chest pain no back pain      REVIEW OF SYSTEMS    Review of Systems   Constitutional: Negative for appetite change, chills, diaphoresis, fatigue and fever. HENT: Negative for congestion, ear pain, postnasal drip, rhinorrhea, sinus pressure, sneezing, sore throat, trouble swallowing and voice change. Respiratory: Negative for cough, chest tightness, shortness of breath and wheezing. Cardiovascular: Negative for chest pain, palpitations and leg swelling. Gastrointestinal: Negative for abdominal pain, constipation, diarrhea, nausea and vomiting. Musculoskeletal: Positive for neck pain. Negative for arthralgias, back pain, joint swelling, myalgias and neck stiffness. Pain in the right knee, right great toe, cervical spine   Neurological: Negative for dizziness, syncope, weakness, light-headedness, numbness and headaches.        PAST MEDICAL HISTORY     has a past medical history of Anxiety, Arthritis, Carpal tunnel syndrome, Cellulitis, Chronic back pain, Depression, Diabetes mellitus ([address] Emphysema of lung ([address] Fibromyalgia, GERD (gastroesophageal reflux disease), Glaucoma, Hiatal hernia, Hx of blood clots, Hyperlipidemia, mixed, IBS (irritable bowel syndrome), Leg pain, MDRO (multiple drug resistant organisms) resistance, Guzman's neuroma, Nausea & vomiting, Neuropathic pain, Osteoarthritis, PONV (postoperative nausea and vomiting), Pulmonary emphysema ([address] and Sleep apnea. SURGICAL HISTORY     has a past surgical history that includes Appendectomy;  section; Cholecystectomy; back surgery; cervical fusion; Tonsillectomy; Ectopic pregnancy surgery; Toe Surgery (Left); Abdomen surgery; Colonoscopy; other surgical history (Aug 5, 2013); Total knee arthroplasty (Right, 2016); joint replacement; Upper gastrointestinal endoscopy; pr office/outpt visit,procedure only (Right, 2018); pr debridement open wound 20 sq cm< (Right, 2018); Hysterectomy, total abdominal; Upper gastrointestinal endoscopy (Left, 2019); and eye surgery (Bilateral, ). CURRENT MEDICATIONS    Previous Medications    ACETAMINOPHEN 650 MG TABS    Take 650 mg by mouth every 4 hours as needed for Pain (For mild pain level 1-3 or for fever > 100.5).     ALBUTEROL SULFATE HFA (PROAIR HFA) 108 (90 BASE) MCG/ACT INHALER    Inhale 2 puffs into the lungs every 6 hours as needed for Wheezing    ATORVASTATIN (LIPITOR) 20 MG TABLET    TAKE 1 TABLET DAILY    B COMPLEX VITAMINS CAPSULE    Take 1 capsule by mouth daily    BLOOD GLUCOSE TEST STRIPS (ACCU-CHEK DAVIDA) STRIP    Test once daily    CHOLECALCIFEROL (VITAMIN D3) 2000 UNITS CAPS    Take by mouth daily    CONTINUOUS BLOOD GLUC SENSOR (FREESTYLE MICKI 14 DAY SENSOR) MISC    USE DEVICE TWICE DAILY    DICLOFENAC SODIUM (VOLTAREN) 1 % GEL    Apply 4 g topically 4 times daily    ELASTIC BANDAGES & SUPPORTS (WRIST BRACE/RIGHT MEDIUM) MISC    Wear brace on right wrist nightly    GUAIFENESIN (MUCINEX) 600 MG EXTENDED RELEASE TABLET    Take 1 tablet by mouth 2 times daily    HANDICAP PLACARD MISC    by Does not apply route Duration: three years    Type II idabetes  Diabetic neuropathy    HYDROCODONE-ACETAMINOPHEN (NORCO) 5-325 MG PER TABLET    Take 1 tablet by mouth every 8 hours as needed for Pain. .    INSULIN DETEMIR (LEVEMIR FLEXTOUCH) 100 UNIT/ML INJECTION PEN    Inject 67 Units into the skin nightly    INSULIN LISPRO, 1 UNIT DIAL, 100 UNIT/ML SOPN    INJECT 12 UNITS UNDER THE SKIN THREE TIMES DAILY WITH MEALS PLUS SLIDING SCALE( MAX OF 60 UNITS DAILY)    INSULIN PEN NEEDLE 31G X 8 MM MISC    BD Ultra fine pen needles. 8 mm use once daily    LORATADINE (CLARITIN) 10 MG TABLET    Take 1 tablet by mouth daily    NICOTINE (NICODERM CQ) 21 MG/24HR    Place 1 patch onto the skin daily    OMEGA-3 FATTY ACIDS (FISH OIL) 1000 MG CAPS    Take 1,000 mg by mouth daily    OXYGEN    Inhale 2 L into the lungs     PANTOPRAZOLE (PROTONIX) 40 MG TABLET    Take 1 tablet by mouth daily    PREGABALIN (LYRICA) 300 MG CAPSULE    Take 1 capsule by mouth 2 times daily for 90 days. ROPINIROLE (REQUIP) 1 MG TABLET    Take 1 mg by mouth nightly     TRAMADOL (ULTRAM) 50 MG TABLET    Take 100 mg by mouth 3 times daily. TRAZODONE (DESYREL) 150 MG TABLET    TAKE 1 TABLET NIGHTLY    UMECLIDINIUM-VILANTEROL (ANORO ELLIPTA) 62.5-25 MCG/INH AEPB INHALER    Inhale 1 puff into the lungs daily    VENLAFAXINE (EFFEXOR XR) 150 MG EXTENDED RELEASE CAPSULE    Take 1 capsule by mouth daily    VICTOZA 18 MG/3ML SOPN SC INJECTION    ADMINISTER 1.8 MG UNDER THE SKIN DAILY    VITAMIN B-12 (CYANOCOBALAMIN) 500 MCG TABLET    Take 500 mcg by mouth daily       ALLERGIES    is allergic to latex. FAMILY HISTORY    She indicated that her mother is . She indicated that her father is . She indicated that both of her sisters are alive. She indicated that both of her brothers are .  She indicated that the status of her neg hx is unknown.   family history includes Arthritis in her mother; Cancer in her brother and father; Diabetes in her brother; Heart Disease in her mother and sister; High Blood Pressure in her mother; Kidney Disease in her brother, brother, and mother; Other in her sister. SOCIAL HISTORY     reports that she has been smoking cigarettes. She started smoking about 37 years ago. She has a 35.00 pack-year smoking history. She has never used smokeless tobacco. She reports current drug use. Drug: Marijuana. She reports that she does not drink alcohol. PHYSICAL EXAM      INITIAL VITALS: /70   Pulse 62   Temp 98 °F (36.7 °C) (Oral)   Resp 16   Ht 5' 7\" (1.702 m)   Wt 274 lb (124.3 kg)   SpO2 98%   BMI 42.91 kg/m²  Estimated body mass index is 42.91 kg/m² as calculated from the following:    Height as of this encounter: 5' 7\" (1.702 m). Weight as of this encounter: 274 lb (124.3 kg). Physical Exam  Constitutional:       General: She is not in acute distress. Appearance: She is well-developed. HENT:      Head: Normocephalic and atraumatic. Eyes:      Pupils: Pupils are equal, round, and reactive to light. Neck:      Musculoskeletal: Normal range of motion and neck supple. Muscular tenderness (.  Anterior cervical spine patient in the left side however there is no neck rigidity was able to flex extend the neck voluntarily without any problem) present. Cardiovascular:      Rate and Rhythm: Normal rate and regular rhythm. Pulses:           Radial pulses are 2+ on the right side and 2+ on the left side. Dorsalis pedis pulses are 2+ on the right side and 2+ on the left side. Posterior tibial pulses are 2+ on the right side and 2+ on the left side. Heart sounds: Normal heart sounds. Pulmonary:      Effort: Pulmonary effort is normal.      Breath sounds: Normal breath sounds. Abdominal:      Palpations: Abdomen is soft. Tenderness: There is no abdominal tenderness.    Musculoskeletal:         General: Tenderness (Noted on the anterior knee right with a evidence of TKR, there is tenderness also on the right great toe, first and second metatarsal dorsalis pedis are full and equal) present. Comments: Tenderness noted also on the left forearm and wrist, right posterior neck   Skin:     Findings: No rash. Neurological:      Mental Status: She is alert. MEDICAL DECISION MAKING    DIFFERENTIAL DIAGNOSIS:  Right foot injury, right knee sprain left forearm sprain rule out fracture  Fall injury      DIAGNOSTIC RESULTS    RADIOLOGY:  I have reviewedradiologic plain film image(s). The plain films will be read or overread by the radiologist.  All other non-plain film images(s) such as CT, Ultrasound and MRI have been read by the radiologist.  XR FOOT RIGHT (MIN 3 VIEWS)   Final Result   No acute osseous abnormality            **This report has been created using voice recognition software. It may contain minor errors which are inherent in voice recognition technology. **      Final report electronically signed by Dr. Krunal Sifuentes on 8/28/2020 4:08 PM      XR KNEE RIGHT (MIN 4 VIEWS)   Final Result   No acute osseous abnormality. Soft tissue contusion anteriorly. **This report has been created using voice recognition software. It may contain minor errors which are inherent in voice recognition technology. **      Final report electronically signed by Dr. Krunal Sifuentes on 8/28/2020 4:07 PM      XR RADIUS ULNA LEFT (2 VIEWS)   Final Result   There is mild lucency through the anterior olecranon for which nondisplaced avulsion fracture cannot be entirely excluded correlate for point tenderness. **This report has been created using voice recognition software. It may contain minor errors which are inherent in voice recognition technology. **      Final report electronically signed by Dr. Brook Vera on 8/28/2020 4:04 PM      XR CERVICAL SPINE (2-3 VIEWS)   Final Result   No acute fracture or subluxation. **This report has been created using voice recognition software.  It may contain minor errors which are inherent in voice recognition technology. **      Final report electronically signed by Dr. Emily Hodgson on 8/28/2020 4:00 PM            Vitals:    Vitals:    08/28/20 1448 08/28/20 1551 08/28/20 1704   BP:  120/64 133/70   Pulse: 61 60 62   Resp: 14 14 16   Temp: 98 °F (36.7 °C)     TempSrc: Oral     SpO2: 97% 97% 98%   Weight: 274 lb (124.3 kg)     Height: 5' 7\" (1.702 m)         EMERGENCY DEPARTMENT COURSE:    Medications   acetaminophen (TYLENOL) tablet 650 mg (650 mg Oral Given 8/28/20 1703)       The pt was seen and evaluated by me. Within the department, I observed the pt's vitalsigns to be within acceptable range. Radiological studies were performed, results were reviewed with the patient. Within the department, the pt was treated with Tylenol and a pro worker boot. . I observed the pt's condition to be hemodynamically stable during the duration of their stay. I explained my proposed course of treatment to the pt, and they were amenable to my decision. They were discharged home, and they will return to the ED if their symptoms become more severein nature, or otherwise change. CRITICAL CARE:   None. CONSULTS:  None    PROCEDURES:  None. FINAL IMPRESSION       1. Fall with no significant injury, initial encounter    2. Sprain of right foot / great toe, initial encounter    3. Forearm sprain, left, initial encounter    4. Contusion of right knee, initial encounter          DISPOSITION/PLAN  PATIENT REFERRED TO:  Babs Nunez, ALLEN - CNP  [address]  [phone]    Schedule an appointment as soon as possible for a visit in 1 week      DISCHARGEMEDICATIONS:  New Prescriptions    No medications on file         (Please note that portions of this note were completed with a voice recognition program and electronically transcribed. Efforts were [facility] edit the dictations but occasionally words are mis-transcribed . The transcription may contain errors not detected in proofreading.   This transcription was electronically signed.)         08/28/20 5:41 PM      Yusra Liriano MD      Emergency room physician           Yusra Liriano MD  08/28/20 1864

## 2020-08-31 ENCOUNTER — VIRTUAL VISIT (OUTPATIENT)
Dept: PULMONOLOGY | Age: 60
End: 2020-08-31
Payer: MEDICARE

## 2020-08-31 PROCEDURE — 99213 OFFICE O/P EST LOW 20 MIN: CPT | Performed by: NURSE PRACTITIONER

## 2020-08-31 ASSESSMENT — ENCOUNTER SYMPTOMS
COUGH: 1
WHEEZING: 1
ABDOMINAL PAIN: 0
CHEST TIGHTNESS: 1
EYES NEGATIVE: 1
NAUSEA: 0
SHORTNESS OF BREATH: 1
VOMITING: 0
DIARRHEA: 0

## 2020-08-31 NOTE — PROGRESS NOTES
[facility]     Patient: Guy Richard, 60 y.o.   : 1960  2020    Pt of Dr. Eleanor Coulter   Patient presents with    COPD      TELEHEALTH EVALUATION -- Audio/Visual (During BFBVA-89 public health emergency)       HPI  Kiki Pina is here for follow up for her COPD   Was on her way to get CT done Friday and fell at the hospital.  The CT was cx and she was seen in [facility] ED. They ruled out fractures, she is still having pain to knee and great toe   20 she rescheduled to have the CT lung screen completed   Using her Anoro daily and PRN ProAir 4 times daily. Gets winded with just a little bit of walking. Not on home O2, ER report was reviewed her SPO2 was good.      Still smoking, is considering quitting   Past Medical hx   PMH:  Past Medical History:   Diagnosis Date    Anxiety     Arthritis     Carpal tunnel syndrome     Cellulitis     Chronic back pain     Depression     Diabetes mellitus (HCC)     Emphysema of lung (HCC)     Fibromyalgia     GERD (gastroesophageal reflux disease)     Glaucoma     Hiatal hernia     esophagus closes    Hx of blood clots     foot post op    Hyperlipidemia, mixed 2017    IBS (irritable bowel syndrome)     Leg pain     nerve damage right leg    MDRO (multiple drug resistant organisms) resistance     wound and nasal    Guzman's neuroma     left foot    Nausea & vomiting     Neuropathic pain     Osteoarthritis     PONV (postoperative nausea and vomiting)     Pulmonary emphysema ([address] 2017    Sleep apnea     DOES NOT USE CPAP     SURGICAL HISTORY:  Past Surgical History:   Procedure Laterality Date    ABDOMEN SURGERY      APPENDECTOMY      BACK SURGERY      lower x 3    CERVICAL FUSION       SECTION      x3    CHOLECYSTECTOMY      COLONOSCOPY      ECTOPIC PREGNANCY SURGERY      EYE SURGERY Bilateral     LASER FOR GLAUCOMA    HYSTERECTOMY, TOTAL ABDOMINAL      JOINT REPLACEMENT      OTHER SURGICAL HISTORY  Aug 5, 2013    Left knee total arthroplasty (Dr. Dex Orta, [facility])    OR DEBRIDEMENT OPEN WOUND 20 SQ CM< Right 2018    INCISIONAL DEBRIEDMENT INCISION AND DRAINAGE RIGHT GLUTEAL ABCESS performed by Disha Basurto MD at [address] OFFICE/OUTPT [address] Right 2018    EXCISIONAL DEBRIDEMENT OF RIGHT BUTTOCKS WOUND performed by Gladys Fierro DO at [address] Left     little toe bone removed    TONSILLECTOMY      TOTAL KNEE ARTHROPLASTY Right 2016    UPPER GASTROINTESTINAL ENDOSCOPY      UPPER GASTROINTESTINAL ENDOSCOPY Left 2019    EGD DILATION SAVORY performed by Jani Doll MD at [address] [facility]     SOCIAL HISTORY:  Social History     Tobacco Use    Smoking status: Current Every Day Smoker     Packs/day: 1.00     Years: 35.00     Pack years: 35.00     Types: Cigarettes     Start date: 1982     Last attempt to quit: 11/15/2015     Years since quittin.7    Smokeless tobacco: Never Used    Tobacco comment: 2-4 cigarettes/day    Substance Use Topics    Alcohol use: No     Comment: Sober for 26 years    Drug use: Yes     Types: Marijuana     Comment: last use morning of 20     ALLERGIES:  Allergies   Allergen Reactions    Latex Itching and Rash     FAMILY HISTORY:  Family History   Problem Relation Age of Onset    Arthritis Mother     Heart Disease Mother         CHF    High Blood Pressure Mother     Kidney Disease Mother     Cancer Father     Heart Disease Sister         diastolic dysfunction    Other Sister         thyroid cysts    Cancer Brother     Kidney Disease Brother     Diabetes Brother     Kidney Disease Brother     Colon Cancer Neg Hx      CURRENT MEDICATIONS:  Current Outpatient Medications   Medication Sig Dispense Refill    VICTOZA 18 MG/3ML SOPN SC injection ADMINISTER 1.8 MG UNDER THE SKIN DAILY 9 mL 3    albuterol sulfate HFA (PROAIR HFA) 108 (90 Base) MCG/ACT inhaler Inhale 2 puffs into the lungs every 6 hours as needed for Wheezing 1 Inhaler 3    insulin lispro, 1 Unit Dial, 100 UNIT/ML SOPN INJECT 12 UNITS UNDER THE SKIN THREE TIMES DAILY WITH MEALS PLUS SLIDING SCALE( MAX OF 60 UNITS DAILY) 15 mL 3    pregabalin (LYRICA) 300 MG capsule Take 1 capsule by mouth 2 times daily for 90 days. 180 capsule 0    loratadine (CLARITIN) 10 MG tablet Take 1 tablet by mouth daily 90 tablet 1    guaiFENesin (MUCINEX) 600 MG extended release tablet Take 1 tablet by mouth 2 times daily 120 tablet 1    Continuous Blood Gluc Sensor (FREESTYLE MICKI 14 DAY SENSOR) MIS USE DEVICE TWICE DAILY 2 each 3    pantoprazole (PROTONIX) 40 MG tablet Take 1 tablet by mouth daily 90 tablet 3    blood glucose test strips (ACCU-CHEK DAVIDA) strip Test once daily 90 each 3    diclofenac sodium (VOLTAREN) 1 % GEL Apply 4 g topically 4 times daily 4 Tube 1    Insulin Pen Needle 31G X 8 MM MISC BD Ultra fine pen needles. 8 mm use once daily 90 each 3    insulin detemir (LEVEMIR FLEXTOUCH) 100 UNIT/ML injection pen Inject 67 Units into the skin nightly 6 pen 11    atorvastatin (LIPITOR) 20 MG tablet TAKE 1 TABLET DAILY 90 tablet 3    umeclidinium-vilanterol (ANORO ELLIPTA) 62.5-25 MCG/INH AEPB inhaler Inhale 1 puff into the lungs daily 30 puff 11    b complex vitamins capsule Take 1 capsule by mouth daily      nicotine (NICODERM CQ) 21 MG/24HR Place 1 patch onto the skin daily 30 patch 3    venlafaxine (EFFEXOR XR) 150 MG extended release capsule Take 1 capsule by mouth daily 90 capsule 3    traZODone (DESYREL) 150 MG tablet TAKE 1 TABLET NIGHTLY 90 tablet 3    Handicap Placard MISC by Does not apply route Duration: three years    Type II idabetes  Diabetic neuropathy 1 each 0    HYDROcodone-acetaminophen (NORCO) 5-325 MG per tablet Take 1 tablet by mouth every 8 hours as needed for Pain. Jeyson Goldstein traMADol (ULTRAM) 50 MG tablet Take 100 mg by mouth 3 times daily.        OXYGEN Inhale 2 L into the lungs       Elastic Bandages & Supports (WRIST BRACE/RIGHT MEDIUM) MISC Wear brace on right wrist nightly 1 each 0    Omega-3 Fatty Acids (FISH OIL) 1000 MG CAPS Take 1,000 mg by mouth daily      Cholecalciferol (VITAMIN D3) 2000 units CAPS Take by mouth daily      vitamin B-12 (CYANOCOBALAMIN) 500 MCG tablet Take 500 mcg by mouth daily      rOPINIRole (REQUIP) 1 MG tablet Take 1 mg by mouth nightly       acetaminophen 650 MG TABS Take 650 mg by mouth every 4 hours as needed for Pain (For mild pain level 1-3 or for fever > 100.5). 120 tablet      No current facility-administered medications for this visit. Gabbie ASHBY   Review of Systems   Constitutional: Negative for chills and fever. HENT: Negative. Eyes: Negative. Respiratory: Positive for cough, chest tightness, shortness of breath and wheezing. Cardiovascular: Negative for chest pain, palpitations and leg swelling. Gastrointestinal: Negative for abdominal pain, diarrhea, nausea and vomiting. Genitourinary: Negative. Musculoskeletal: Negative. Skin: Negative. Neurological: Negative. Hematological: Does not bruise/bleed easily. Psychiatric/Behavioral: Negative for suicidal ideas. Physical exam   There were no vitals taken for this visit. Physical Exam  Constitutional:       Appearance: Normal appearance. She is obese. HENT:      Head: Normocephalic and atraumatic. Eyes:      Conjunctiva/sclera: Conjunctivae normal.   Pulmonary:      Effort: Pulmonary effort is normal. No tachypnea, bradypnea or respiratory distress. Neurological:      Mental Status: She is alert and oriented to person, place, and time. Psychiatric:         Attention and Perception: Attention normal.         Mood and Affect: Mood normal.         Speech: Speech normal.         Behavior: Behavior normal.         Thought Content:  Thought content normal.         Cognition and Memory: Cognition normal. Judgment: Judgment normal.          Test results   Lung Nodule Screening     [x] Qualifies    []Does not qualify   [] Declined    [] Completed   scheduled 9/8/20   Assessment      Diagnosis Orders   1. Pulmonary emphysema, unspecified emphysema type ([address]     2. Cigarette nicotine dependence, uncomplicated     3. Morbid obesity with body mass index (BMI) of 45.0 to 49.9 in adult [facility])           Plan   -keep scheduled CT next month  -continue current inhalers  -pt willing for in office appt to evaluate lung sounds/ O2 and discuss smoking cessation options. Will see Raul Peralta in: 2-3 weeks to review CT       Edwina Mcnair is being evaluated by a Virtual Visit (video visit) encounter to address concerns as mentioned above. A caregiver was present when appropriate. Due to this being a TeleHealth encounter (During WJSGU-27 public health emergency), evaluation of the following organ systems was limited: Vitals/Constitutional/EENT/Resp/CV/GI//MS/Neuro/Skin/Heme-Lymph-Imm. Pursuant to the emergency declaration under the [address] [place] [address], [address] [place] [address] authority and the [employer] Act, this Virtual Visit was conducted with patient's (and/or legal guardian's) consent, to reduce the patient's risk of exposure to COVID-19 and provide necessary medical care. The patient (and/or legal guardian) has also been advised to contact this office for worsening conditions or problems, and seek emergency medical treatment and/or call 911 if deemed necessary. Services were provided through a video synchronous discussion virtually to substitute for in-person clinic visit. Patient and provider were located at their individual homes. Patient identification was verified at the start of the visit.        Electronically signed by ALLEN Licona CNP on 8/31/2020 at 2:51 PM

## 2020-09-03 PROCEDURE — 36415 COLL VENOUS BLD VENIPUNCTURE: CPT | Performed by: NURSE PRACTITIONER

## 2020-09-04 ENCOUNTER — HOSPITAL ENCOUNTER (OUTPATIENT)
Dept: CT IMAGING | Age: 60
Discharge: HOME | End: 2020-09-04
Payer: MEDICARE

## 2020-09-04 LAB — POC CREATININE WHOLE BLOOD: 0.7 MG/DL (ref 0.5–1.2)

## 2020-09-04 PROCEDURE — G0297 LDCT FOR LUNG CA SCREEN: HCPCS

## 2020-09-04 PROCEDURE — 75635 CT ANGIO ABDOMINAL ARTERIES: CPT

## 2020-09-04 PROCEDURE — 6360000004 HC RX CONTRAST MEDICATION: Performed by: NURSE PRACTITIONER

## 2020-09-04 PROCEDURE — 82565 ASSAY OF CREATININE: CPT

## 2020-09-04 RX ADMIN — IOPAMIDOL 100 ML: 755 INJECTION, SOLUTION INTRAVENOUS at 14:25

## 2020-09-08 ENCOUNTER — TELEPHONE (OUTPATIENT)
Dept: FAMILY MEDICINE CLINIC | Age: 60
End: 2020-09-08

## 2020-09-08 NOTE — TELEPHONE ENCOUNTER
Diagnosis Orders   1.  Tibial artery occlusion, left [facility])  [facility] Cardiothoracic [facility] V[facility] Surgery

## 2020-09-08 NOTE — TELEPHONE ENCOUNTER
----- Message from ALLEN Olmos CNP sent at 9/7/2020  9:33 PM EDT -----  Let pt know her CTA  Did  Identify occlusion of the left tibial artery. I recommend we send her to a cardiovascular surgeon for further evaluation to see if any intervention is necessary. Please see if she is in agreement with this.

## 2020-09-08 NOTE — TELEPHONE ENCOUNTER
No answer, answering mach states pt is not available and to try call at a later time. Sent pt a My chart msg informing her we are trying to contact her and asking her to call our office.

## 2020-09-09 ENCOUNTER — TELEPHONE (OUTPATIENT)
Dept: CARDIOTHORACIC SURGERY | Age: 60
End: 2020-09-09

## 2020-09-14 ENCOUNTER — VIRTUAL VISIT (OUTPATIENT)
Dept: PULMONOLOGY | Age: 60
End: 2020-09-14
Payer: MEDICARE

## 2020-09-14 PROCEDURE — 99214 OFFICE O/P EST MOD 30 MIN: CPT | Performed by: NURSE PRACTITIONER

## 2020-09-14 ASSESSMENT — ENCOUNTER SYMPTOMS
COUGH: 1
NAUSEA: 0
SHORTNESS OF BREATH: 1
DIARRHEA: 0
ABDOMINAL PAIN: 0
VOMITING: 0
WHEEZING: 0
EYES NEGATIVE: 1

## 2020-09-14 NOTE — PROGRESS NOTES
[facility]     Patient: Naima Clemente, 60 y.o.   : 1960  2020    Pt of Dr. Ramya Eid   Patient presents with    COPD      TELEHEALTH EVALUATION -- Audio/Visual (During EKBHZ-40 public health emergency)   - patient is in  seat of car, [facility] car is in park. Grandchild in back seat. HPI  Cisco Emery is here for 2 week follow up for her COPD with ct lung screen completed  Using Anoro daily, ProAir about 2-4 x daily , c/o dyspnea with minimal activity. BMI is 42 . Deanozzy Cogan Sleep study completed in 2017- + CARLOTTA, non compliant with CPAP was taken away, now using O2 at 2LPM at night. Active smoker, 1 PPD.  Spends a lot of time in car, this when she smokes the most. No desire to quit  Cough has lessened since last visit, still occurs in mornings and is productive  No hemoptysis, weight loss, fever or chills  Biggest complaint today is pain in legs, has new consult with Dr Duane Ramming , CTS for occulusion of left tibial artery     Past Medical hx   PMH:  Past Medical History:   Diagnosis Date    Anxiety     Arthritis     Carpal tunnel syndrome     Cellulitis     Chronic back pain     Depression     Diabetes mellitus ([address]     Emphysema of lung ([address]     Fibromyalgia     GERD (gastroesophageal reflux disease)     Glaucoma     Hiatal hernia     esophagus closes    Hx of blood clots     foot post op    Hyperlipidemia, mixed 2017    IBS (irritable bowel syndrome)     Leg pain     nerve damage right leg    MDRO (multiple drug resistant organisms) resistance 2008    wound and nasal    Guzman's neuroma     left foot    Nausea & vomiting     Neuropathic pain     Osteoarthritis     PONV (postoperative nausea and vomiting)     Pulmonary emphysema ([address] 2017    Sleep apnea     DOES NOT USE CPAP     SURGICAL HISTORY:  Past Surgical History:   Procedure Laterality Date    ABDOMEN SURGERY      APPENDECTOMY      BACK SURGERY      lower x 3    CERVICAL FUSION       SECTION      x3    CHOLECYSTECTOMY      COLONOSCOPY      ECTOPIC PREGNANCY SURGERY      EYE SURGERY Bilateral     LASER FOR GLAUCOMA    HYSTERECTOMY, TOTAL ABDOMINAL      JOINT REPLACEMENT      OTHER SURGICAL HISTORY  Aug 5, 2013    Left knee total arthroplasty ([facility]    NJ DEBRIDEMENT OPEN WOUND 20 SQ CM< Right 2018    INCISIONAL DEBRIEDMENT INCISION AND DRAINAGE RIGHT GLUTEAL ABCESS performed by Aldo Limon MD at [facility] OFFICE/OUTPT [facility] Right 2018    EXCISIONAL DEBRIDEMENT OF RIGHT BUTTOCKS WOUND performed by Felicita Villegas DO at [address] Left     little toe bone removed    TONSILLECTOMY      TOTAL KNEE ARTHROPLASTY Right 2016    UPPER GASTROINTESTINAL ENDOSCOPY      UPPER GASTROINTESTINAL ENDOSCOPY Left 2019    EGD DILATION SAVORY performed by Filemon Carias MD at [facility]     SOCIAL HISTORY:  Social History     Tobacco Use    Smoking status: Current Every Day Smoker     Packs/day: 1.00     Years: 35.00     Pack years: 35.00     Types: Cigarettes     Start date: 1982     Last attempt to quit: 11/15/2015     Years since quittin.8    Smokeless tobacco: Never Used    Tobacco comment: 2-4 cigarettes/day    Substance Use Topics    Alcohol use: No     Comment: Sober for 26 years    Drug use: Yes     Types: Marijuana     Comment: last use morning of 19     ALLERGIES:  Allergies   Allergen Reactions    Latex Itching and Rash     FAMILY HISTORY:  Family History   Problem Relation Age of Onset    Arthritis Mother     Heart Disease Mother         CHF    High Blood Pressure Mother     Kidney Disease Mother     Cancer Father     Heart Disease Sister         diastolic dysfunction    Other Sister         thyroid cysts    Cancer Brother     Kidney Disease Brother     Diabetes Brother     Kidney Disease Brother     Colon Cancer Neg Hx      CURRENT MEDICATIONS:  Current Outpatient Medications   Medication Sig Dispense Refill    VICTOZA 18 MG/3ML SOPN SC injection ADMINISTER 1.8 MG UNDER THE SKIN DAILY 9 mL 3    albuterol sulfate HFA (PROAIR HFA) 108 (90 Base) MCG/ACT inhaler Inhale 2 puffs into the lungs every 6 hours as needed for Wheezing 1 Inhaler 3    insulin lispro, 1 Unit Dial, 100 UNIT/ML SOPN INJECT 12 UNITS UNDER THE SKIN THREE TIMES DAILY WITH MEALS PLUS SLIDING SCALE( MAX OF 60 UNITS DAILY) 15 mL 3    pregabalin (LYRICA) 300 MG capsule Take 1 capsule by mouth 2 times daily for 90 days. 180 capsule 0    loratadine (CLARITIN) 10 MG tablet Take 1 tablet by mouth daily 90 tablet 1    Continuous Blood Gluc Sensor (QnovoSTYLE MICKI 14 DAY SENSOR) [facility] USE DEVICE TWICE DAILY 2 each 3    pantoprazole (PROTONIX) 40 MG tablet Take 1 tablet by mouth daily 90 tablet 3    blood glucose test strips (ACCU-CHEK DAVIDA) strip Test once daily 90 each 3    diclofenac sodium (VOLTAREN) 1 % GEL Apply 4 g topically 4 times daily 4 Tube 1    Insulin Pen Needle 31G X 8 MM MISC BD Ultra fine pen needles.  8 mm use once daily 90 each 3    insulin detemir (LEVEMIR FLEXTOUCH) 100 UNIT/ML injection pen Inject 67 Units into the skin nightly 6 pen 11    atorvastatin (LIPITOR) 20 MG tablet TAKE 1 TABLET DAILY 90 tablet 3    umeclidinium-vilanterol (ANORO ELLIPTA) 62.5-25 MCG/INH AEPB inhaler Inhale 1 puff into the lungs daily 30 puff 11    b complex vitamins capsule Take 1 capsule by mouth daily      nicotine (NICODERM CQ) 21 MG/24HR Place 1 patch onto the skin daily 30 patch 3    venlafaxine (EFFEXOR XR) 150 MG extended release capsule Take 1 capsule by mouth daily 90 capsule 3    traZODone (DESYREL) 150 MG tablet TAKE 1 TABLET NIGHTLY 90 tablet 3    Handicap Placard [facility] by Does not apply route Duration: three years    Type II idabetes  Diabetic neuropathy 1 each 0    HYDROcodone-acetaminophen (NORCO) 5-325 MG per tablet Take 1 tablet by mouth every 8 hours as needed for Pain. Jitendra Austin traMADol (ULTRAM) 50 MG tablet Take 100 mg by mouth 3 times daily.  OXYGEN Inhale 2 L into the lungs       Elastic Bandages & Supports (WRIST BRACE/RIGHT MEDIUM) MISC Wear brace on right wrist nightly 1 each 0    Omega-3 Fatty Acids (FISH OIL) 1000 MG CAPS Take 1,000 mg by mouth daily      Cholecalciferol (VITAMIN D3) 2000 units CAPS Take by mouth daily      vitamin B-12 (CYANOCOBALAMIN) 500 MCG tablet Take 500 mcg by mouth daily      rOPINIRole (REQUIP) 1 MG tablet Take 1 mg by mouth nightly       acetaminophen 650 MG TABS Take 650 mg by mouth every 4 hours as needed for Pain (For mild pain level 1-3 or for fever > 100.5). 120 tablet      No current facility-administered medications for this visit. Antonino ASHBY   Review of Systems   Constitutional: Negative for activity change, appetite change, chills, fever and unexpected weight change. HENT: Negative. Eyes: Negative. Respiratory: Positive for cough and shortness of breath. Negative for wheezing. Cardiovascular: Negative for chest pain, palpitations and leg swelling. Gastrointestinal: Negative for abdominal pain, diarrhea, nausea and vomiting. Genitourinary: Negative. Musculoskeletal: Positive for arthralgias. Leg pains    Skin: Negative. Neurological: Negative. Hematological: Does not bruise/bleed easily. Psychiatric/Behavioral: Negative for suicidal ideas. Physical exam   There were no vitals taken for this visit. Wt Readings from Last 3 Encounters:   08/28/20 274 lb (124.3 kg)   08/20/20 276 lb (125.2 kg)   07/29/20 274 lb 9.6 oz (124.6 kg)     Physical Exam  Constitutional:       Appearance: Normal appearance. HENT:      Head: Normocephalic and atraumatic. Eyes:      Conjunctiva/sclera: Conjunctivae normal.   Pulmonary:      Effort: Pulmonary effort is normal. No tachypnea, bradypnea or respiratory distress.    Neurological:      Mental Status: She is alert and oriented to person, place, and time. Psychiatric:         Attention and Perception: Attention normal.         Mood and Affect: Mood normal.         Speech: Speech normal.         Behavior: Behavior normal.         Thought Content: Thought content normal.         Cognition and Memory: Cognition normal.         Judgment: Judgment normal.          Test results   Lung Nodule Screening     [x] Qualifies    []Does not qualify   [] Declined    [x] Completed    CT lung screening 9/4/2020     Impression    1. No acute intrathoracic findings. 2. No suspicious pulmonary nodules. 3. LUNGRADS ASSESSMENT VALUE: 2         The LUNG RADS RECOMMENDATIONS for monitoring lung nodules listed below (ACR- Lung-RADS Version 1.0 Assessment Categories Release date\" April 28, 2014)         LUNG RADS RECOMMENDATIONS;    1.  Normal, continue annual screening    2.  Benign appearance or behavior, continue annual screening    3.  6 month CT recommended    4A.  3 month CT recommended; may consider PET/CT    4B.  Additional diagnostics and/or tissue sampling recommended    4X.  Additional diagnostics and/or tissue sampling recommended           **This report has been created using voice recognition software.  It may contain minor errors which are inherent in voice recognition technology. **         Final report electronically signed by Dr. Florence Ramirez on 9/4/2020 3:01 PM        Assessment      Diagnosis Orders   1. Pulmonary emphysema, unspecified emphysema type ([address]     2. Morbid obesity with body mass index (BMI) of 45.0 to 49.9 in adult ([address]     3. Cigarette nicotine dependence, uncomplicated     4.  Not ready to quit smoking           Plan   -strongly recommend stop smoking with her dyspnea, emphysema and now arterial occulusion, unfortunately she has no desire to quit or to discuss options to help her quit     -continue Anoro/ ProAir  -recommend flu vaccine once available, discuss with PCP or pharmacy   -stable CT chest, continue annual screening, order at next follow up     Will see Elaina Rasmussen in: 6 months    Laurie Morgan is being evaluated by a Virtual Visit (video visit) encounter to address concerns as mentioned above. A caregiver was present when appropriate. Due to this being a TeleHealth encounter (During EOJFT-93 public health emergency), evaluation of the following organ systems was limited: Vitals/Constitutional/EENT/Resp/CV/GI//MS/Neuro/Skin/Heme-Lymph-Imm. Pursuant to the emergency declaration under the [address] [place] [address], [address] [place] [address] authority and the [employer] Act, this Virtual Visit was conducted with patient's (and/or legal guardian's) consent, to reduce the patient's risk of exposure to COVID-19 and provide necessary medical care. The patient (and/or legal guardian) has also been advised to contact this office for worsening conditions or problems, and seek emergency medical treatment and/or call 911 if deemed necessary. Services were provided through a video synchronous discussion virtually to substitute for in-person clinic visit. Patient and provider were located at their individual homes. Patient identification was verified at the start of the visit.        Electronically signed by ALLEN Bynum CNP on 9/14/2020 at 9:14 AM

## 2020-09-14 NOTE — PATIENT INSTRUCTIONS
Strongly recommend working on quitting smoking.  Please call office in future if change your mind and want to discuss options to help you

## 2020-09-28 ENCOUNTER — OFFICE VISIT (OUTPATIENT)
Dept: CARDIOTHORACIC SURGERY | Age: 60
End: 2020-09-28
Payer: MEDICARE

## 2020-09-28 VITALS
SYSTOLIC BLOOD PRESSURE: 125 MMHG | BODY MASS INDEX: 42.85 KG/M2 | HEIGHT: 67 IN | DIASTOLIC BLOOD PRESSURE: 66 MMHG | WEIGHT: 273 LBS | HEART RATE: 84 BPM | TEMPERATURE: 97.1 F | OXYGEN SATURATION: 96 %

## 2020-09-28 PROCEDURE — 99203 OFFICE O/P NEW LOW 30 MIN: CPT | Performed by: THORACIC SURGERY (CARDIOTHORACIC VASCULAR SURGERY)

## 2020-09-28 NOTE — PROGRESS NOTES
CT/CV Surgery New Patient Office Visit      Patient's Name/Date of Birth: Rylan Valencia / 1960 (83 y.o.)      PCP: ALLEN Palencia CNP    Date: 2020     CC: Bilateral lower extremity pain when she walks more than a half of a block. Right is worse than left. And generalized body ache. HPI:   We had the pleasure of seeing Rylan Valencia in the office today, as you know this is a very pleasant 60y.o. year old female with a history of diabetes mellitus, COPD, fibromyalgia, who has been complaining of generalized body ache and pain in both lower extremity when she walks more than a half a block. CTA of the abdomen and distal runoff showed totally occluded midportion of left anterior tibial artery. However the SIL of lower extremity shows left anterior tibial artery 0.99, posterior tibial artery 1.08. She states that he feels generalized body ache and both lower leg dull aching soreness when she walks more than a half a block. The right side seems to be worse than left side. He described the leg pain as a dull ache, located in the both entire lower extremity, and moderate in intensity. She denied any ischemic type of rest pain at night. PastMedical History:  Viviana Anderson  has a past medical history of Anxiety, Arthritis, Carpal tunnel syndrome, Cellulitis, Chronic back pain, Depression, Diabetes mellitus ([address] Emphysema of lung ([address] Fibromyalgia, GERD (gastroesophageal reflux disease), Glaucoma, Hiatal hernia, Hx of blood clots, Hyperlipidemia, mixed, IBS (irritable bowel syndrome), Leg pain, MDRO (multiple drug resistant organisms) resistance, Guzman's neuroma, Nausea & vomiting, Neuropathic pain, Osteoarthritis, PONV (postoperative nausea and vomiting), Pulmonary emphysema ([address] and Sleep apnea. Past Surgical History:  The patient  has a past surgical history that includes Appendectomy;  section; Cholecystectomy; back surgery; cervical fusion;  Tonsillectomy; Ectopic pregnancy surgery; Toe Surgery (Left); Abdomen surgery; Colonoscopy; other surgical history (Aug 5, 2013); Total knee arthroplasty (Right, 1/26/2016); joint replacement; Upper gastrointestinal endoscopy; pr office/outpt visit,procedure only (Right, 5/25/2018); pr debridement open wound 20 sq cm< (Right, 5/29/2018); Hysterectomy, total abdominal; Upper gastrointestinal endoscopy (Left, 5/9/2019); and eye surgery (Bilateral, 2011). Allergies: The patient is allergic to latex. Medications:    Current Outpatient Medications:     VICTOZA 18 MG/3ML SOPN SC injection, ADMINISTER 1.8 MG UNDER THE SKIN DAILY, Disp: 9 mL, Rfl: 3    albuterol sulfate HFA (PROAIR HFA) 108 (90 Base) MCG/ACT inhaler, Inhale 2 puffs into the lungs every 6 hours as needed for Wheezing, Disp: 1 Inhaler, Rfl: 3    insulin lispro, 1 Unit Dial, 100 UNIT/ML SOPN, INJECT 12 UNITS UNDER THE SKIN THREE TIMES DAILY WITH MEALS PLUS SLIDING SCALE( MAX OF 60 UNITS DAILY), Disp: 15 mL, Rfl: 3    pregabalin (LYRICA) 300 MG capsule, Take 1 capsule by mouth 2 times daily for 90 days. , Disp: 180 capsule, Rfl: 0    loratadine (CLARITIN) 10 MG tablet, Take 1 tablet by mouth daily, Disp: 90 tablet, Rfl: 1    Continuous Blood Gluc Sensor (FREESTYLE MICKI 14 DAY SENSOR) [facility], USE DEVICE TWICE DAILY, Disp: 2 each, Rfl: 3    pantoprazole (PROTONIX) 40 MG tablet, Take 1 tablet by mouth daily, Disp: 90 tablet, Rfl: 3    blood glucose test strips (ACCU-CHEK DAVIDA) strip, Test once daily, Disp: 90 each, Rfl: 3    Insulin Pen Needle 31G X 8 MM MISC, BD Ultra fine pen needles.  8 mm use once daily, Disp: 90 each, Rfl: 3    insulin detemir (LEVEMIR FLEXTOUCH) 100 UNIT/ML injection pen, Inject 67 Units into the skin nightly, Disp: 6 pen, Rfl: 11    atorvastatin (LIPITOR) 20 MG tablet, TAKE 1 TABLET DAILY, Disp: 90 tablet, Rfl: 3    umeclidinium-vilanterol (ANORO ELLIPTA) 62.5-25 MCG/INH AEPB inhaler, Inhale 1 puff into the lungs daily, Disp: 30 puff, Temp 97.1 °F (36.2 °C)   Ht 5' 7\" (1.702 m)   Wt 273 lb (123.8 kg)   SpO2 96%   BMI 42.76 kg/m²     ROS:  Constitutional: Negative for activity change, chills, fatigue, fever and unexpected weight change. HENT: Negative for congestion, facial swelling, sore throat, and changes in voice. Eyes: Negative for photophobia, redness, itching and visual disturbance. Respiratory: Negative for apnea, choking, shortness of breath, wheezing and stridor. Cardiovascular: Negative for chest pain, palpitations and leg swelling. Gastrointestinal: Negative for abdominal distention, constipation, nausea and vomiting. Endocrine: Negative for cold intolerance, heat intolerance, polyphagia and polyuria. Musculoskeletal: Negative for arthralgias, gait problem, and myalgias. Skin: Negative for color change, rash and wound. Allergic/Immunologic: Negative for food allergies and immunocompromised state. Neurological: Negative for dizziness, tremors, speech difficulty, weakness, numbness and headaches. Hematological: Negative for adenopathy. Does not bruise/bleed easily. Psychiatric/Behavioral: Negative for agitation, confusion, and dysphoric mood. Physical Exam:   General appearance:  No apparent distress, appears stated age and cooperative. Generalized obesity height 5 feet 7 inches and weight 273 pounds  HEENT:  Normal cephalic, atraumatic without obvious deformity. Conjunctivae/corneas clear. Neck: Supple, with full range of motion. No jugular venous distention. Trachea midline. Respiratory:  Normal respiratory effort. Clear to auscultation, bilaterally without rales/wheezes/rhonchi. Cardiovascular:  Regular rate and rhythm with normal S1/S2 without murmurs, rubs or gallops. Abdomen: Soft, non-tender, non-distended with normal bowel sounds. Musculoskeletal:  No clubbing, cyanosis or edema bilaterally. Full range of motion without deformity.   Status post bilateral knee surgery scar in front of the knee joint. Skin: Skin color, texture, turgor normal.  No rashes or lesions. Neurologic:  Neurovascularly intact without any focal sensory/motor deficits. Psychiatric:  Alert and oriented, thought content appropriate, normal insight. Capillary Refill: Brisk,< 3 seconds   Peripheral Pulses: There is a strong biphasic to triphasic Doppler signals over dorsalis pedis and posterior tibial arteries bilaterally. No evidence of chronic ischemia. Labs:    CBC:  Lab Results   Component Value Date    WBC 10.4 01/29/2020    HGB 14.0 01/29/2020    HCT 43.8 01/29/2020    MCV 93.8 01/29/2020     01/29/2020     BMP:   Lab Results   Component Value Date     01/29/2020    K 3.9 01/29/2020     01/29/2020    CO2 23 01/29/2020    PHOS 2.7 05/08/2019    BUN 15 01/29/2020    CREATININE 0.8 01/29/2020    MG 1.8 05/08/2019       Imaging  I have reviewed all imaging. Problem List:  Patient Active Problem List   Diagnosis    OA (osteoarthritis) of knee    S/P TKR (total knee replacement)    DM2 (diabetes mellitus, type 2) ([address]    Primary osteoarthritis of right knee    Diabetic polyneuropathy associated with type 2 diabetes mellitus ([address]    Status post total right knee replacement    Panlobular emphysema ([address]    Hyperlipidemia, mixed    Herniated cervical disc    Hepatic steatosis    Class 3 obesity due to excess calories with serious comorbidity and body mass index (BMI) of 40.0 to 44.9 in adult    Depression       Assessment: Mild peripheral arterial disease. Her symptom of bilateral claudication does not appear to be related to peripheral arterial disease. Plan 9/28/20:  1) stop smoking. Reduce the body weight. And ambulation. 2) I would not recommend any surgical procedure for peripheral arterial disease. Thank you for allowing us to be involved in the patient's care.     Electronically by Millie Dailey MD  on 9/28/2020 at 9:35 AM

## 2020-10-05 RX ORDER — INSULIN LISPRO 100 [IU]/ML
INJECTION, SOLUTION INTRAVENOUS; SUBCUTANEOUS
Qty: 15 ML | Refills: 3 | Status: SHIPPED | OUTPATIENT
Start: 2020-10-05 | End: 2021-01-08

## 2020-10-05 NOTE — TELEPHONE ENCOUNTER
Future Appointments   Date Time Provider Scarlett Ibarra   2/11/2021  3:40 PM ALLEN Jennings - [address] [facility] [address] [facility]   3/16/2021  8:30 AM ALLEN Brink CNP Pulm Med [facility]       Recent Visits  Date Type Provider Dept   07/29/20 Office Visit ALLEN Jennings CNP [facility]   06/22/20 Office Visit ALLEN Jennings CNP [facility]   03/02/20 Office Visit ALLEN Jennings CNP [facility]   02/04/20 Office Visit ALLEN Jennings CNP [facility]   12/05/19 Office Visit ALLEN Jennings CNP [facility]   09/05/19 Office Visit ALLEN Jennings CNP [facility]   06/05/19 Office Visit ALLEN Jennings CNP [facility]   05/02/19 Office Visit ALLEN Jennings CNP [facility]   Showing recent visits within past 540 days with a meds authorizing provider and meeting all other requirements     Future Appointments  Date Type Provider Dept   02/11/21 Appointment ALLEN Jennings CNP [facility]   Showing future appointments within next 150 days with a meds authorizing provider and meeting all other requirements

## 2020-10-19 RX ORDER — TRAZODONE HYDROCHLORIDE 150 MG/1
TABLET ORAL
Qty: 90 TABLET | Refills: 3 | Status: SHIPPED | OUTPATIENT
Start: 2020-10-19 | End: 2021-11-09

## 2020-11-23 RX ORDER — ALBUTEROL SULFATE 90 UG/1
2 AEROSOL, METERED RESPIRATORY (INHALATION) EVERY 6 HOURS PRN
Qty: 8.5 G | Refills: 2 | Status: SHIPPED | OUTPATIENT
Start: 2020-11-23 | End: 2021-02-01

## 2020-11-23 RX ORDER — VENLAFAXINE HYDROCHLORIDE 150 MG/1
150 CAPSULE, EXTENDED RELEASE ORAL DAILY
Qty: 90 CAPSULE | Refills: 3 | Status: SHIPPED | OUTPATIENT
Start: 2020-11-23 | End: 2021-07-13

## 2020-12-18 RX ORDER — LIRAGLUTIDE 6 MG/ML
INJECTION SUBCUTANEOUS
Qty: 9 ML | Refills: 3 | Status: SHIPPED | OUTPATIENT
Start: 2020-12-18 | End: 2021-04-07

## 2020-12-21 ENCOUNTER — TELEPHONE (OUTPATIENT)
Dept: FAMILY MEDICINE CLINIC | Age: 60
End: 2020-12-21

## 2020-12-21 RX ORDER — FLASH GLUCOSE SCANNING READER
EACH MISCELLANEOUS
COMMUNITY
End: 2020-12-21 | Stop reason: SDUPTHER

## 2020-12-21 RX ORDER — FLASH GLUCOSE SENSOR
1 KIT MISCELLANEOUS 2 TIMES DAILY
Qty: 2 EACH | Refills: 3 | Status: SHIPPED | OUTPATIENT
Start: 2020-12-21 | End: 2021-04-05

## 2020-12-21 RX ORDER — FLASH GLUCOSE SCANNING READER
EACH MISCELLANEOUS
Qty: 1 DEVICE | Refills: 0 | Status: SHIPPED | OUTPATIENT
Start: 2020-12-21

## 2020-12-21 NOTE — TELEPHONE ENCOUNTER
Nila with [facility] calling stating the script for Free Style Bryn Bolus states inject 1 device into skin BID. Nila wanting to know if the directions should read Apply 1 sensor q 14 days instead. These are the normal directions that accompany the [facility]. Pt also requesting a new script for her Free Style Dylan reader.    Pended

## 2020-12-30 ENCOUNTER — HOSPITAL ENCOUNTER (OUTPATIENT)
Age: 60
Discharge: HOME | End: 2020-12-30
Payer: MEDICARE

## 2020-12-30 ENCOUNTER — TELEPHONE (OUTPATIENT)
Dept: FAMILY MEDICINE CLINIC | Age: 60
End: 2020-12-30

## 2020-12-30 ENCOUNTER — HOSPITAL ENCOUNTER (OUTPATIENT)
Dept: GENERAL RADIOLOGY | Age: 60
Discharge: HOME | End: 2020-12-30
Payer: MEDICARE

## 2020-12-30 LAB
CREAT SERPL-MCNC: 0.7 MG/DL (ref 0.4–1.2)
GFR SERPL CREATININE-BSD FRML MDRD: 85 ML/MIN/1.73M2

## 2020-12-30 PROCEDURE — 82565 ASSAY OF CREATININE: CPT

## 2020-12-30 PROCEDURE — 72040 X-RAY EXAM NECK SPINE 2-3 VW: CPT

## 2020-12-30 PROCEDURE — 36415 COLL VENOUS BLD VENIPUNCTURE: CPT

## 2020-12-30 NOTE — TELEPHONE ENCOUNTER
----- Message from ALLEN Dias CNP sent at 12/30/2020  4:09 PM EST -----  Let pt know her labs are stable and appropriate.

## 2021-01-07 ENCOUNTER — HOSPITAL ENCOUNTER (OUTPATIENT)
Dept: MRI IMAGING | Age: 61
Discharge: HOME | End: 2021-01-07
Payer: MEDICARE

## 2021-01-07 DIAGNOSIS — M50.10 CERVICAL DISC DISORDER WITH RADICULOPATHY: ICD-10-CM

## 2021-01-07 PROCEDURE — 72141 MRI NECK SPINE W/O DYE: CPT

## 2021-01-08 DIAGNOSIS — E11.59 TYPE 2 DIABETES MELLITUS WITH OTHER CIRCULATORY COMPLICATION, WITH LONG-TERM CURRENT USE OF INSULIN (HCC): ICD-10-CM

## 2021-01-08 DIAGNOSIS — Z79.4 TYPE 2 DIABETES MELLITUS WITH OTHER CIRCULATORY COMPLICATION, WITH LONG-TERM CURRENT USE OF INSULIN (HCC): ICD-10-CM

## 2021-01-08 RX ORDER — INSULIN LISPRO 100 [IU]/ML
INJECTION, SOLUTION INTRAVENOUS; SUBCUTANEOUS
Qty: 15 ML | Refills: 3 | Status: SHIPPED | OUTPATIENT
Start: 2021-01-08 | End: 2021-06-01

## 2021-01-08 NOTE — TELEPHONE ENCOUNTER
Recent Visits  Date Type Provider Dept   07/29/20 Office Visit Saul Hendricks, ALLEN - CNP [facility]   06/22/20 Office Visit Saul Hendricks, APRN - CNP [facility]   03/02/20 Office Visit Saul Hendricks, APRN - CNP [facility]   02/04/20 Office Visit Saul Hendricks, ALLEN - CNP [facility]   12/05/19 Office Visit Saul Hendricks, ALLEN - CNP [facility]   09/05/19 Office Visit aSul Hendricks, APRN - CNP [facility]   Showing recent visits within past 540 days with a meds authorizing provider and meeting all other requirements     Future Appointments  Date Type Provider Dept   02/11/21 Appointment ALLEN Grant CNP [facility]   Showing future appointments within next 150 days with a meds authorizing provider and meeting all other requirements         Future Appointments   Date Time Provider Scarlett Ibarra   2/11/2021  3:40 PM ALLEN Grant 86   3/16/2021  8:30 AM ALLEN Navas 37

## 2021-01-26 RX ORDER — UMECLIDINIUM BROMIDE AND VILANTEROL TRIFENATATE 62.5; 25 UG/1; UG/1
1 POWDER RESPIRATORY (INHALATION) DAILY
Qty: 30 PUFF | Refills: 11 | Status: SHIPPED | OUTPATIENT
Start: 2021-01-26 | End: 2022-02-07

## 2021-01-31 DIAGNOSIS — J44.9 MODERATE COPD (CHRONIC OBSTRUCTIVE PULMONARY DISEASE) (HCC): ICD-10-CM

## 2021-02-01 RX ORDER — ALBUTEROL SULFATE 90 UG/1
2 AEROSOL, METERED RESPIRATORY (INHALATION) EVERY 6 HOURS PRN
Qty: 8.5 G | Refills: 2 | Status: SHIPPED | OUTPATIENT
Start: 2021-02-01 | End: 2021-10-12 | Stop reason: SDUPTHER

## 2021-02-08 ENCOUNTER — TELEPHONE (OUTPATIENT)
Dept: PULMONOLOGY | Age: 61
End: 2021-02-08

## 2021-02-08 DIAGNOSIS — E11.59 TYPE 2 DIABETES MELLITUS WITH OTHER CIRCULATORY COMPLICATION, WITH LONG-TERM CURRENT USE OF INSULIN (HCC): ICD-10-CM

## 2021-02-08 DIAGNOSIS — M79.7 FIBROMYALGIA: ICD-10-CM

## 2021-02-08 DIAGNOSIS — Z79.4 TYPE 2 DIABETES MELLITUS WITH OTHER CIRCULATORY COMPLICATION, WITH LONG-TERM CURRENT USE OF INSULIN (HCC): ICD-10-CM

## 2021-02-08 RX ORDER — PREGABALIN 300 MG/1
CAPSULE ORAL
Qty: 180 CAPSULE | Refills: 1 | Status: SHIPPED | OUTPATIENT
Start: 2021-02-08 | End: 2021-06-08 | Stop reason: SDUPTHER

## 2021-02-08 RX ORDER — INSULIN DETEMIR 100 [IU]/ML
INJECTION, SOLUTION SUBCUTANEOUS
Qty: 18 ML | Status: CANCELLED | OUTPATIENT
Start: 2021-02-08

## 2021-02-08 RX ORDER — INSULIN DETEMIR 100 [IU]/ML
INJECTION, SOLUTION SUBCUTANEOUS
Qty: 18 ML | Refills: 1 | Status: SHIPPED | OUTPATIENT
Start: 2021-02-08 | End: 2021-04-15

## 2021-02-08 RX ORDER — PREGABALIN 300 MG/1
CAPSULE ORAL
Qty: 180 CAPSULE | Status: CANCELLED | OUTPATIENT
Start: 2021-02-08

## 2021-02-08 NOTE — TELEPHONE ENCOUNTER
Pharmacy is requesting a refill on her Spiriva.   I do not see it on her her current medication list

## 2021-02-23 ENCOUNTER — OFFICE VISIT (OUTPATIENT)
Dept: FAMILY MEDICINE CLINIC | Age: 61
End: 2021-02-23
Payer: MEDICARE

## 2021-02-23 VITALS
TEMPERATURE: 97.3 F | DIASTOLIC BLOOD PRESSURE: 68 MMHG | HEIGHT: 67 IN | OXYGEN SATURATION: 97 % | WEIGHT: 268 LBS | HEART RATE: 68 BPM | BODY MASS INDEX: 42.06 KG/M2 | RESPIRATION RATE: 16 BRPM | SYSTOLIC BLOOD PRESSURE: 132 MMHG

## 2021-02-23 DIAGNOSIS — E03.9 HYPOTHYROIDISM, UNSPECIFIED TYPE: ICD-10-CM

## 2021-02-23 DIAGNOSIS — Z12.31 ENCOUNTER FOR SCREENING MAMMOGRAM FOR MALIGNANT NEOPLASM OF BREAST: Primary | ICD-10-CM

## 2021-02-23 DIAGNOSIS — M79.7 FIBROMYALGIA: ICD-10-CM

## 2021-02-23 DIAGNOSIS — E11.59 TYPE 2 DIABETES MELLITUS WITH OTHER CIRCULATORY COMPLICATION, WITH LONG-TERM CURRENT USE OF INSULIN (HCC): ICD-10-CM

## 2021-02-23 DIAGNOSIS — R80.9 MICROALBUMINURIA: ICD-10-CM

## 2021-02-23 DIAGNOSIS — Z79.4 TYPE 2 DIABETES MELLITUS WITH OTHER CIRCULATORY COMPLICATION, WITH LONG-TERM CURRENT USE OF INSULIN (HCC): ICD-10-CM

## 2021-02-23 DIAGNOSIS — E78.2 MIXED HYPERLIPIDEMIA: ICD-10-CM

## 2021-02-23 LAB
BILIRUBIN URINE: ABNORMAL
BLOOD URINE, POC: NEGATIVE
CHARACTER, URINE: CLEAR
COLOR, URINE: YELLOW
GLUCOSE URINE: NEGATIVE MG/DL
HBA1C MFR BLD: 7.5 % (ref 4.3–5.7)
KETONES, URINE: NEGATIVE
LEUKOCYTE CLUMPS, URINE: NEGATIVE
MICROALB/CREAT RATIO POC: ABNORMAL MG/G
MICROALBUMIN/CREAT UR-RTO: 80 MG/L
NITRITE, URINE: NEGATIVE
PH, URINE: 6 (ref 5–9)
POC CREATININE: 300 MG/DL
PROTEIN, URINE: ABNORMAL MG/DL
SPECIFIC GRAVITY, URINE: 1.02 (ref 1–1.03)
UROBILINOGEN, URINE: 0.2 EU/DL (ref 0–1)

## 2021-02-23 PROCEDURE — 99214 OFFICE O/P EST MOD 30 MIN: CPT | Performed by: NURSE PRACTITIONER

## 2021-02-23 PROCEDURE — 3051F HG A1C>EQUAL 7.0%<8.0%: CPT | Performed by: NURSE PRACTITIONER

## 2021-02-23 PROCEDURE — S9470 NUTRITIONAL COUNSELING, DIET: HCPCS | Performed by: NURSE PRACTITIONER

## 2021-02-23 RX ORDER — GUAIFENESIN 600 MG/1
TABLET, EXTENDED RELEASE ORAL
COMMUNITY
Start: 2021-02-08 | End: 2021-05-26

## 2021-02-23 RX ORDER — FLASH GLUCOSE SENSOR
KIT MISCELLANEOUS
COMMUNITY
End: 2022-06-14 | Stop reason: SDUPTHER

## 2021-02-23 ASSESSMENT — ENCOUNTER SYMPTOMS
RESPIRATORY NEGATIVE: 1
GASTROINTESTINAL NEGATIVE: 1
RHINORRHEA: 0
BACK PAIN: 1
SINUS PAIN: 0

## 2021-02-24 LAB
CREATININE, URINE: 308.4 MG/DL
MICROALBUMIN UR-MCNC: 2.14 MG/DL
MICROALBUMIN/CREAT UR-RTO: 7 MG/G (ref 0–30)

## 2021-02-24 NOTE — PROGRESS NOTES
[address] [place] [address]  [address] PATEL [facility] [place] [address]  Dept: [phone]  Dept Fax: [phone]  Loc: [phone]    Padmini Pierre is a 63 y.o. femalewho presents today for her medical conditions/complaints as noted below. Ivana Nation c/o of 6 Month Follow-Up (BS running 100-200), Results (discuss MRI and X-ray results), and Health Maintenance (hasnt had time to do cologuard, will do mammogram)      HPI:      Pt her for a 6 susan f/u. Diabetes Type 2    Glucose control:   Does patient check blood glucoses at home? Yes  Report of hypoglycemia: no  Lab Results       Component                Value               Date                       LABA1C                   7.5 (H)             02/23/2021            No results found for: EAG    Symptoms  Polyuria, Polydipsia or Polyphagia? No  Chest Pain, SOB, or Palpitations? -  No  New Vision complaints? No  Paresthesias of the extremities? She does have some neuropathy controlled with lyrica    Medications  Current medication were reviewed. Compliant with medications? yes  Medication side effects? No  On ACE-I or ARB? Yes  On antiplatelet therapy? Yes  On Statin? Yes    Last Diabetic Eye Exam: has been a year    Exercise  Exercise? She is active has lost 10 pounds since her last visit   Wt Readings from Last 3 Encounters:  02/23/21 : 268 lb (121.6 kg)  09/28/20 : 273 lb (123.8 kg)  08/28/20 : 274 lb (124.3 kg)      Diet discipline?:  Low salt, fat, sugar diet? yes    Blood pressure control:  BP Readings from Last 3 Encounters:  02/23/21 : 132/68  09/28/20 : 125/66  08/28/20 : 133/70 her a1C has decreased to 7.4, she takes levemir at night 66 units and novolog 12 units bid, She has decreased this from tid since her last visit. States she can not tolerate metformin as it makes her sick. Will trial [place] and [place] and can maybe wean off insulin. Became diabetic at age 38.      Pt has been depressed more than No  Anxiety? No  Palpitations? No   Hair Loss?   No        Lab Results       Component                Value               Date                       LABMICR                  < 1.20              07/31/2017              Lab Results       Component                Value               Date                       LDLCALC                  74                  01/29/2020                     Current Outpatient Medications   Medication Sig Dispense Refill    SITagliptin (JANUVIA) 25 MG tablet Take 1 tablet by mouth daily 30 tablet 3    pregabalin (LYRICA) 300 MG capsule TAKE 1 CAPSULE BY MOUTH TWICE DAILY 180 capsule 1    LEVEMIR FLEXTOUCH 100 UNIT/ML injection pen ADMINISTER 60 UNITS UNDER THE SKIN EVERY NIGHT 18 mL 1    albuterol sulfate  (90 Base) MCG/ACT inhaler INHALE 2 PUFFS INTO THE LUNGS EVERY 6 HOURS AS NEEDED FOR WHEEZING 8.5 g 2    umeclidinium-vilanterol (ANORO ELLIPTA) 62.5-25 MCG/INH AEPB inhaler Inhale 1 puff into the lungs daily 30 puff 11    insulin lispro, 1 Unit Dial, 100 UNIT/ML SOPN INJECT 12 UNITS UNDER THE SKIN THREE TIMES DAILY WITH MEALS PLUS SLIDING SCALE( MAX OF 60 UNITS DAILY) (Patient taking differently: INJECT 12 UNITS UNDER THE SKIN THREE TIMES DAILY WITH MEALS PLUS SLIDING SCALE( MAX OF 60 UNITS DAILY)  Only taking 12 units in AM an HS) 15 mL 3    Continuous Blood Gluc Sensor (FREESTYLE MICKI 14 DAY SENSOR) MISC Inject 1 Device into the skin 2 times daily 2 each 3    Continuous Blood Gluc  (FREESTYLE MICKI 14 DAY READER) PAOLA Check blood sugars daily 1 Device 0    VICTOZA 18 MG/3ML SOPN SC injection ADMINISTER 1.8 MG UNDER THE SKIN DAILY 9 mL 3    venlafaxine (EFFEXOR XR) 150 MG extended release capsule TAKE 1 CAPSULE BY MOUTH DAILY 90 capsule 3    traZODone (DESYREL) 150 MG tablet TAKE 1 TABLET BY MOUTH EVERY NIGHT 90 tablet 3    loratadine (CLARITIN) 10 MG tablet Take 1 tablet by mouth daily 90 tablet 1    pantoprazole (PROTONIX) 40 MG tablet Take 1 tablet by mouth daily 90 tablet 3    blood glucose test strips (ACCU-CHEK DAVIDA) strip Test once daily 90 each 3    Insulin Pen Needle 31G X 8 MM MISC BD Ultra fine pen needles. 8 mm use once daily 90 each 3    atorvastatin (LIPITOR) 20 MG tablet TAKE 1 TABLET DAILY 90 tablet 3    b complex vitamins capsule Take 1 capsule by mouth daily      Handicap Placard [facility] by Does not apply route Duration: three years    Type II idabetes  Diabetic neuropathy 1 each 0    HYDROcodone-acetaminophen (NORCO) 5-325 MG per tablet Take 1 tablet by mouth every 8 hours as needed for Pain. Ramy Torres traMADol (ULTRAM) 50 MG tablet Take 100 mg by mouth 3 times daily.  OXYGEN Inhale 2 L into the lungs       Elastic Bandages & Supports (WRIST BRACE/RIGHT MEDIUM) MISC Wear brace on right wrist nightly 1 each 0    Omega-3 Fatty Acids (FISH OIL) 1000 MG CAPS Take 1,000 mg by mouth daily      Cholecalciferol (VITAMIN D3) 2000 units CAPS Take by mouth daily      vitamin B-12 (CYANOCOBALAMIN) 500 MCG tablet Take 500 mcg by mouth daily      rOPINIRole (REQUIP) 1 MG tablet Take 1 mg by mouth nightly       acetaminophen 650 MG TABS Take 650 mg by mouth every 4 hours as needed for Pain (For mild pain level 1-3 or for fever > 100.5). 120 tablet     MUCUS RELIEF 600 MG extended release tablet TAKE 1 TABLET BY MOUTH TWICE DAILY      diclofenac sodium (VOLTAREN) 1 % GEL diclofenac 1 % topical gel      Continuous Blood Gluc Sensor (FREESTYLE MICKI 14 DAY SENSOR) [facility] FreeStyle Micki 14 Day Sensor kit   APPLY 1 SENSOR EXTERNALLY EVERY 14 DAYS       No current facility-administered medications for this visit.            Past Medical History:   Diagnosis Date    Anxiety     Arthritis     Carpal tunnel syndrome     Cellulitis     Chronic back pain     Depression     Diabetes mellitus ([facility]     Emphysema of lung (HCC)     Fibromyalgia     GERD (gastroesophageal reflux disease)     Glaucoma     Hiatal hernia     esophagus closes    Hx of blood clots     foot post op    Hyperlipidemia, mixed 2017    IBS (irritable bowel syndrome)     Leg pain     nerve damage right leg    MDRO (multiple drug resistant organisms) resistance 2008    wound and nasal    Guzman's neuroma     left foot    Nausea & vomiting     Neuropathic pain     Osteoarthritis     PONV (postoperative nausea and vomiting)     Pulmonary emphysema ([address] 2017    Sleep apnea     DOES NOT USE CPAP      Past Surgical History:   Procedure Laterality Date    ABDOMEN SURGERY      APPENDECTOMY      BACK SURGERY      lower x 3    CERVICAL FUSION       SECTION      x3    CHOLECYSTECTOMY      COLONOSCOPY      ECTOPIC PREGNANCY SURGERY      EYE SURGERY Bilateral     LASER FOR GLAUCOMA    HYSTERECTOMY, TOTAL ABDOMINAL      JOINT REPLACEMENT      OTHER SURGICAL HISTORY  Aug 5, 2013    Left knee total arthroplasty (Dr. Ayleen Iyer, [facility])    WI DEBRIDEMENT OPEN WOUND 20 SQ CM< Right 2018    INCISIONAL DEBRIEDMENT INCISION AND DRAINAGE RIGHT GLUTEAL ABCESS performed by Helena Hernandez MD at [facility] OFFICE/OUTPT [facility] Right 2018    EXCISIONAL DEBRIDEMENT OF RIGHT BUTTOCKS WOUND performed by Tiffany Riley DO at [address] Left     little toe bone removed    TONSILLECTOMY      TOTAL KNEE ARTHROPLASTY Right 2016    UPPER GASTROINTESTINAL ENDOSCOPY      UPPER GASTROINTESTINAL ENDOSCOPY Left 2019    EGD DILATION SAVORY performed by Khushboo Pitts MD at [facility]     Family History   Problem Relation Age of Onset    Arthritis Mother     Heart Disease Mother         CHF    High Blood Pressure Mother     Kidney Disease Mother     Cancer Father     Heart Disease Sister         diastolic dysfunction    Other Sister         thyroid cysts    Cancer Brother     Kidney Disease Brother     Diabetes Brother     Kidney Disease Brother     Colon Cancer Neg Hx      Social History     Tobacco Use    Smoking status: Current Every Day Smoker     Packs/day: 1.00     Years: 35.00     Pack years: 35.00     Types: Cigarettes     Start date: 1982     Last attempt to quit: 11/15/2015     Years since quittin.2    Smokeless tobacco: Never Used    Tobacco comment: 2-4 cigarettes/day    Substance Use Topics    Alcohol use: No     Comment: Sober for 26 years        Allergies   Allergen Reactions    Latex Itching and Rash       Health Maintenance   Topic Date Due    Pneumococcal 0-64 years Vaccine (1 of 1 - PPSV23) 10[birth year]29/1965    Shingles Vaccine (2 of 3) 2016    Annual Wellness Visit (AWV)  2019    Colon Cancer Screen FIT/FOBT  2020    Flu vaccine (1) 2020    Lipid screen  2021    Diabetic foot exam  2021    Low dose CT lung screening  2021    Breast cancer screen  2021    Diabetic retinal exam  2021    A1C test (Diabetic or Prediabetic)  2022    Diabetic microalbuminuria test  2022    DTaP/Tdap/Td vaccine (2 - Td) 2024    Hepatitis C screen  Completed    HIV screen  Completed    Hepatitis A vaccine  Aged Out    Hib vaccine  Aged Out    Meningococcal (ACWY) vaccine  Aged Out       Subjective:      Review of Systems   Constitutional: Negative for chills, fatigue, fever and unexpected weight change. HENT: Negative for congestion, postnasal drip, rhinorrhea and sinus pain. Respiratory: Negative. Cardiovascular: Negative. Gastrointestinal: Negative. Genitourinary: Negative for difficulty urinating and dysuria. Musculoskeletal: Positive for arthralgias, back pain and myalgias. Skin: Negative. Neurological: Negative for dizziness, facial asymmetry, weakness and headaches. Psychiatric/Behavioral: Positive for dysphoric mood. Negative for decreased concentration, self-injury, sleep disturbance and suicidal ideas. The patient is not nervous/anxious.         Objective:      /68   Pulse 68   Temp 97.3 °F (36.3 °C) (Temporal)   Resp 16   Ht 5' 7\" (1.702 m)   Wt 268 lb (121.6 kg)   SpO2 97%   BMI 41.97 kg/m²      Physical Exam  Vitals signs and nursing note reviewed. Constitutional:       Appearance: She is obese. She is not ill-appearing. HENT:      Head: Normocephalic. Nose: Nose normal.      Mouth/Throat:      Mouth: Mucous membranes are moist.   Eyes:      Pupils: Pupils are equal, round, and reactive to light. Cardiovascular:      Rate and Rhythm: Normal rate and regular rhythm. Pulses: Normal pulses. Heart sounds: Normal heart sounds. No friction rub. Pulmonary:      Effort: Pulmonary effort is normal. No respiratory distress. Breath sounds: Normal breath sounds. No wheezing. Abdominal:      General: Abdomen is flat. Bowel sounds are normal. There is no distension. Palpations: Abdomen is soft. Tenderness: There is no abdominal tenderness. Musculoskeletal:         General: Tenderness (posterior cervical region) present. Skin:     Capillary Refill: Capillary refill takes less than 2 seconds. Neurological:      General: No focal deficit present. Mental Status: She is alert. Psychiatric:         Attention and Perception: Attention normal.         Mood and Affect: Mood normal.         Speech: Speech normal.         Behavior: Behavior normal. Behavior is cooperative. Thought Content: Thought content normal.         Cognition and Memory: Cognition normal.         Judgment: Judgment normal.          Assessment/Plan:           1. Encounter for screening mammogram for malignant neoplasm of breast    - HERRERA DIGITAL SCREEN W OR WO CAD BILATERAL; Future    2. Fibromyalgia  Continue with lyrica    3. Mixed hyperlipidemia  Low fat low choelsterol diet   - Lipid Panel; Future  - CBC With Auto Differential; Future  - Comprehensive Metabolic Panel; Future    4.  Type 2 diabetes mellitus with other circulatory complication, with long-term current use of insulin [facility])  will try [place] and [place] if cost effective, if not will add amaryl bid and try to decrease novolog  - Lipid Panel; Future  - CBC With Auto Differential; Future  - Comprehensive Metabolic Panel; Future    5. Hypothyroidism, unspecified type  Await new labs  - TSH With Reflex Ft4; Future    6. Microalbuminuria  Monitor   - Microalbumin / Creatinine Urine Ratio; Future  - Microalbumin / Creatinine Urine Ratio    Pt in agreement with plan   Return in about 3 months (around 5/23/2021) for maw visit. Reccommended tobaccocessation options including pharmacologic methods, counseled great than 3 minutesduring this visit:  Yes[]  No  []       Patient given educational materials -see patient instructions. Discussed use, benefit, and side effects of prescribedmedications. All patient questions answered. Pt voiced understanding. Reviewedhealth maintenance. Instructed to continue current medications, diet and exercise. Patient agreed with treatment plan. Follow up as directed.        Electronicallysigned by ALLEN Dyer CNP on 2/23/2021 at 10:42 PM

## 2021-03-16 ENCOUNTER — OFFICE VISIT (OUTPATIENT)
Dept: PULMONOLOGY | Age: 61
End: 2021-03-16
Payer: MEDICARE

## 2021-03-16 VITALS
BODY MASS INDEX: 42.5 KG/M2 | WEIGHT: 270.8 LBS | DIASTOLIC BLOOD PRESSURE: 64 MMHG | OXYGEN SATURATION: 97 % | SYSTOLIC BLOOD PRESSURE: 118 MMHG | HEIGHT: 67 IN | TEMPERATURE: 96.8 F | HEART RATE: 67 BPM

## 2021-03-16 DIAGNOSIS — J43.9 PULMONARY EMPHYSEMA, UNSPECIFIED EMPHYSEMA TYPE (HCC): ICD-10-CM

## 2021-03-16 DIAGNOSIS — F17.210 CIGARETTE NICOTINE DEPENDENCE, UNCOMPLICATED: Primary | ICD-10-CM

## 2021-03-16 DIAGNOSIS — E66.01 MORBID OBESITY WITH BODY MASS INDEX (BMI) OF 45.0 TO 49.9 IN ADULT (HCC): ICD-10-CM

## 2021-03-16 DIAGNOSIS — F12.90 MARIJUANA SMOKER: ICD-10-CM

## 2021-03-16 DIAGNOSIS — J44.9 MODERATE COPD (CHRONIC OBSTRUCTIVE PULMONARY DISEASE) (HCC): ICD-10-CM

## 2021-03-16 PROCEDURE — 99406 BEHAV CHNG SMOKING 3-10 MIN: CPT | Performed by: NURSE PRACTITIONER

## 2021-03-16 PROCEDURE — 99214 OFFICE O/P EST MOD 30 MIN: CPT | Performed by: NURSE PRACTITIONER

## 2021-03-16 PROCEDURE — G0296 VISIT TO DETERM LDCT ELIG: HCPCS | Performed by: NURSE PRACTITIONER

## 2021-03-16 ASSESSMENT — ENCOUNTER SYMPTOMS
NAUSEA: 0
ABDOMINAL PAIN: 0
EYES NEGATIVE: 1
DIARRHEA: 0
CHEST TIGHTNESS: 0
COUGH: 0
WHEEZING: 0
SHORTNESS OF BREATH: 0
VOMITING: 0

## 2021-03-16 NOTE — PATIENT INSTRUCTIONS
Patient Education        Stopping Smoking: Care Instructions  Your Care Instructions     Cigarette smokers crave the nicotine in cigarettes. Giving it up is much harder than simply changing a habit. Your body has to stop craving the nicotine. It is hard to quit, but you can do it. There are many tools that people use to quit smoking. You may find that combining tools works best for you. There are several steps to quitting. First you get ready to quit. Then you get support to help you. After that, you learn new skills and behaviors to become a nonsmoker. For many people, a necessary step is getting and using medicine. Your doctor will help you set up the plan that best meets your needs. You may want to attend a smoking cessation program to help you quit smoking. When you choose a program, look for one that has proven success. Ask your doctor for ideas. You will greatly increase your chances of success if you take medicine as well as get counseling or join a cessation program.  Some of the changes you feel when you first quit tobacco are uncomfortable. Your body will miss the nicotine at first, and you may feel short-tempered and grumpy. You may have trouble sleeping or concentrating. Medicine can help you deal with these symptoms. You may struggle with changing your smoking habits and rituals. The last step is the tricky one: Be prepared for the smoking urge to continue for a time. This is a lot to deal with, but keep at it. You will feel better. Follow-up care is a key part of your treatment and safety. Be sure to make and go to all appointments, and call your doctor if you are having problems. It's also a good idea to know your test results and keep a list of the medicines you take. How can you care for yourself at home? · Ask your family, friends, and coworkers for support. You have a better chance of quitting if you have help and support.   · Join a support group, such as Nicotine Anonymous, for people who are trying to quit smoking. · Consider signing up for a smoking cessation program, such as the American Lung Association's Freedom from Smoking program.  · Get text messaging support. Go to the website at www.smokefree. gov to sign up for the [facility] program.  · Set a quit date. Pick your date carefully so that it is not right in the middle of a big deadline or stressful time. Once you quit, do not even take a puff. Get rid of all ashtrays and lighters after your last cigarette. Clean your house and your clothes so that they do not smell of smoke. · Learn how to be a nonsmoker. Think about ways you can avoid those things that make you reach for a cigarette. ? Avoid situations that put you at greatest risk for smoking. For some people, it is hard to have a drink with friends without smoking. For others, they might skip a coffee break with coworkers who smoke. ? Change your daily routine. Take a different route to work or eat a meal in a different place. · Cut down on stress. Calm yourself or release tension by doing an activity you enjoy, such as reading a book, taking a hot bath, or gardening. · Talk to your doctor or pharmacist about nicotine replacement therapy, which replaces the nicotine in your body. You still get nicotine but you do not use tobacco. Nicotine replacement products help you slowly reduce the amount of nicotine you need. These products come in several forms, many of them available over-the-counter:  ? Nicotine patches  ? Nicotine gum and lozenges  ? Nicotine inhaler  · Ask your doctor about bupropion (Wellbutrin) or varenicline (Chantix), which are prescription medicines. They do not contain nicotine. They help you by reducing withdrawal symptoms, such as stress and anxiety. · Some people find hypnosis, acupuncture, and massage helpful for ending the smoking habit. · Eat a healthy diet and get regular exercise.  Having healthy habits will help your body move past its craving for nicotine. · Be prepared to keep trying. Most people are not successful the first few times they try to quit. Do not get mad at yourself if you smoke again. Make a list of things you learned and think about when you want to try again, such as next week, next month, or next year. Where can you learn more? Go to https://[employer]pepiceweb.Yumm.com. org and sign in to your Continuing Education Records & Resources account. Enter G447 in the [facility] box to learn more about \"Stopping Smoking: Care Instructions. \"     If you do not have an account, please click on the \"Sign Up Now\" link. Current as of: March 12, 2020               Content Version: 12.8  © 2006-2021 [employer] Care instructions adapted under license by [facility] ([facility]). If you have questions about a medical condition or this instruction, always ask your healthcare professional. [place] [address] any warranty or liability for your use of this information. What is lung cancer screening? Lung cancer screening is a way in which doctors check the lungs for early signs of cancer in people who have no symptoms of lung cancer. A low-dose CT scan uses much less radiation than a normal CT scan and shows a more detailed image of the lungs than a standard X-ray. The goal of lung cancer screening is to find cancer early, before it has a chance to grow, spread, or cause problems. One large study found that smokers who were screened with low-dose CT scans were less likely to die of lung cancer than those who were screened with standard X-ray. Below is a summary of the things you need to know regarding screening for lung cancer with low-dose computed tomography (LDCT). This is a screening program that involves routine annual screening with LDCT studies of the lung. The LDCTs are done using low-dose radiation that is not thought to increase your cancer risk.   If you have other serious medical conditions (other cancers, congestive heart failure) that limit your life expectancy to less than 10 years, you should not undergo lung cancer screening with LDCT. The chance is 20%-60% that the LDCT result will show abnormalities. This would require additional testing which could include repeat imaging or even invasive procedures. Most (about 95%) of \"abnormal\" LDCT results are false in the sense that no lung cancer is ultimately found. Additionally, some (about 10%) of the cancers found would not affect your life expectancy, even if undetected and untreated. If you are still smoking, the single most important thing that you can do to reduce your risk of dying of lung cancer is to quit. For this screening to be covered by Medicare and most other insurers, strict criteria must be met. If you do not meet these criteria, but still wish to undergo LDCT testing, you will be required to sign a waiver indicating your willingness to pay for the scan.

## 2021-03-16 NOTE — PROGRESS NOTES
[facility]     Patient: Kelle Chi, 63 y.o.   : 1960  3/16/2021    Pt of Dr. Marquise Solis   Patient presents with    Follow-up     Pulmonary emphysema 6 month  follow up with no testing        HPI  Jesse Worrell is here for 6 month follow up for COPD  Using her Anoro daily, seldomly using her albuterol HFA  No recent exacerbations  Not UTD with flu/PNA/COVID vaccine - not interested.    PMH CARLOTTA, not on PAP therapy, is On home O2 at 2LPM at night   Active smoker, 1 PPD cigarettes and non medical Marijuana on daily basis   Spirometry 8/10/2020- mild obstruction   Last 6 MWT 2020- normal     PMH CARLOTTA- on 2LPM O2 at night. , not on O2 during day, last 6MWT 19: no hypoxia at rest or activity   Chronic pain/fibromyalgia - follows with Pain Management  Previous inhaler: Spiriva handihaler    Past Medical hx   PMH:  Past Medical History:   Diagnosis Date    Anxiety     Arthritis     Carpal tunnel syndrome     Cellulitis     Chronic back pain     Depression     Diabetes mellitus (HCC)     Emphysema of lung (HCC)     Fibromyalgia     GERD (gastroesophageal reflux disease)     Glaucoma     Hiatal hernia     esophagus closes    Hx of blood clots     foot post op    Hyperlipidemia, mixed 2017    IBS (irritable bowel syndrome)     Leg pain     nerve damage right leg    MDRO (multiple drug resistant organisms) resistance 2008    wound and nasal    Guzman's neuroma     left foot    Nausea & vomiting     Neuropathic pain     Osteoarthritis     PONV (postoperative nausea and vomiting)     Pulmonary emphysema ([address] 2017    Sleep apnea     DOES NOT USE CPAP     SURGICAL HISTORY:  Past Surgical History:   Procedure Laterality Date    ABDOMEN SURGERY      APPENDECTOMY      BACK SURGERY      lower x 3    CERVICAL FUSION       SECTION      x3    CHOLECYSTECTOMY      COLONOSCOPY      ECTOPIC PREGNANCY SURGERY  EYE SURGERY Bilateral     LASER FOR GLAUCOMA    HYSTERECTOMY, TOTAL ABDOMINAL      JOINT REPLACEMENT      OTHER SURGICAL HISTORY  Aug 5, 2013    Left knee total arthroplasty (Dr. Jarno Bello, [facility])    [place] DEBRIDEMENT OPEN WOUND 20 SQ CM< Right 2018    INCISIONAL DEBRIEDMENT INCISION AND DRAINAGE RIGHT GLUTEAL ABCESS performed by Kian Sinha MD at [address] OFFICE/OUTPT [address] Right 2018    EXCISIONAL DEBRIDEMENT OF RIGHT BUTTOCKS WOUND performed by Yamil Licona DO at [address] Left     little toe bone removed    TONSILLECTOMY      TOTAL KNEE ARTHROPLASTY Right 2016    UPPER GASTROINTESTINAL ENDOSCOPY      UPPER GASTROINTESTINAL ENDOSCOPY Left 2019    EGD DILATION SAVORY performed by Kevin Louis MD at [facility]     SOCIAL HISTORY:  Social History     Tobacco Use    Smoking status: Current Every Day Smoker     Packs/day: 1.00     Years: 35.00     Pack years: 35.00     Types: Cigarettes     Start date: 1982     Last attempt to quit: 11/15/2015     Years since quittin.3    Smokeless tobacco: Never Used    Tobacco comment: 2-4 cigarettes/day    Substance Use Topics    Alcohol use: No     Comment: Sober for 26 years    Drug use: Yes     Types: Marijuana     Comment: last use morning of 19     ALLERGIES:  Allergies   Allergen Reactions    Latex Itching and Rash     FAMILY HISTORY:  Family History   Problem Relation Age of Onset    Arthritis Mother     Heart Disease Mother         CHF    High Blood Pressure Mother     Kidney Disease Mother     Cancer Father     Heart Disease Sister         diastolic dysfunction    Other Sister         thyroid cysts    Cancer Brother     Kidney Disease Brother     Diabetes Brother     Kidney Disease Brother     Colon Cancer Neg Hx      CURRENT MEDICATIONS:  Current Outpatient Medications   Medication Sig Dispense Refill    MUCUS RELIEF 600 MG extended release tablet TAKE 1 TABLET BY MOUTH TWICE DAILY      diclofenac sodium (VOLTAREN) 1 % GEL diclofenac 1 % topical gel      Continuous Blood Gluc Sensor (FREESTYLE MICKI 14 DAY SENSOR) [facility] FreeStyle Micki 14 Day Sensor kit   APPLY 1 SENSOR EXTERNALLY EVERY 14 DAYS      SITagliptin (JANUVIA) 25 MG tablet Take 1 tablet by mouth daily 30 tablet 3    pregabalin (LYRICA) 300 MG capsule TAKE 1 CAPSULE BY MOUTH TWICE DAILY 180 capsule 1    LEVEMIR FLEXTOUCH 100 UNIT/ML injection pen ADMINISTER 60 UNITS UNDER THE SKIN EVERY NIGHT 18 mL 1    albuterol sulfate  (90 Base) MCG/ACT inhaler INHALE 2 PUFFS INTO THE LUNGS EVERY 6 HOURS AS NEEDED FOR WHEEZING 8.5 g 2    umeclidinium-vilanterol (ANORO ELLIPTA) 62.5-25 MCG/INH AEPB inhaler Inhale 1 puff into the lungs daily 30 puff 11    insulin lispro, 1 Unit Dial, 100 UNIT/ML SOPN INJECT 12 UNITS UNDER THE SKIN THREE TIMES DAILY WITH MEALS PLUS SLIDING SCALE( MAX OF 60 UNITS DAILY) (Patient taking differently: INJECT 12 UNITS UNDER THE SKIN THREE TIMES DAILY WITH MEALS PLUS SLIDING SCALE( MAX OF 60 UNITS DAILY)  Only taking 12 units in AM an HS) 15 mL 3    Continuous Blood Gluc Sensor (FREESTYLE MICKI 14 DAY SENSOR) [facility] Inject 1 Device into the skin 2 times daily 2 each 3    Continuous Blood Gluc  (FREESTYLE MICKI 14 DAY READER) [facility] Check blood sugars daily 1 Device 0    VICTOZA 18 MG/3ML SOPN SC injection ADMINISTER 1.8 MG UNDER THE SKIN DAILY 9 mL 3    venlafaxine (EFFEXOR XR) 150 MG extended release capsule TAKE 1 CAPSULE BY MOUTH DAILY 90 capsule 3    traZODone (DESYREL) 150 MG tablet TAKE 1 TABLET BY MOUTH EVERY NIGHT 90 tablet 3    loratadine (CLARITIN) 10 MG tablet Take 1 tablet by mouth daily 90 tablet 1    pantoprazole (PROTONIX) 40 MG tablet Take 1 tablet by mouth daily 90 tablet 3    blood glucose test strips (ACCU-CHEK DAVIDA) strip Test once daily 90 each 3    Insulin Pen Needle 31G X 8 MM MISC BD Ultra fine pen needles.  8 mm use once daily 90 each 3    atorvastatin (LIPITOR) 20 MG tablet TAKE 1 TABLET DAILY 90 tablet 3    b complex vitamins capsule Take 1 capsule by mouth daily      Handicap Placard MISC by Does not apply route Duration: three years    Type II idabetes  Diabetic neuropathy 1 each 0    HYDROcodone-acetaminophen (NORCO) 5-325 MG per tablet Take 1 tablet by mouth every 8 hours as needed for Pain. Herma Fleischer traMADol (ULTRAM) 50 MG tablet Take 100 mg by mouth 3 times daily.  OXYGEN Inhale 2 L into the lungs       Elastic Bandages & Supports (WRIST BRACE/RIGHT MEDIUM) MISC Wear brace on right wrist nightly 1 each 0    Omega-3 Fatty Acids (FISH OIL) 1000 MG CAPS Take 1,000 mg by mouth daily      Cholecalciferol (VITAMIN D3) 2000 units CAPS Take by mouth daily      vitamin B-12 (CYANOCOBALAMIN) 500 MCG tablet Take 500 mcg by mouth daily      rOPINIRole (REQUIP) 1 MG tablet Take 1 mg by mouth nightly       acetaminophen 650 MG TABS Take 650 mg by mouth every 4 hours as needed for Pain (For mild pain level 1-3 or for fever > 100.5). 120 tablet      No current facility-administered medications for this visit. Sanjana ASHBY   Review of Systems   Constitutional: Negative for activity change, appetite change, chills, fatigue, fever and unexpected weight change. HENT: Negative. Eyes: Negative. Respiratory: Negative for cough, chest tightness, shortness of breath and wheezing. Cardiovascular: Negative for chest pain, palpitations and leg swelling. Gastrointestinal: Negative for abdominal pain, diarrhea, nausea and vomiting. Genitourinary: Negative. Musculoskeletal: Positive for arthralgias (neck pain, left arm pain ). Skin: Negative. Neurological: Positive for numbness (hands/ feet ). Hematological: Does not bruise/bleed easily. Psychiatric/Behavioral: Negative for suicidal ideas.         Physical exam   /64 (Site: Left Upper Arm, Position: Sitting, Cuff Size: Medium Adult)   Pulse 67   Temp 96.8 °F (36 °C)   Ht 5' 7\" (1.702 m)   Wt 270 lb 12.8 oz (122.8 kg)   SpO2 97% Comment: on RA  BMI 42.41 kg/m²      Wt Readings from Last 3 Encounters:   03/16/21 270 lb 12.8 oz (122.8 kg)   02/23/21 268 lb (121.6 kg)   09/28/20 273 lb (123.8 kg)     Physical Exam  Constitutional:       Appearance: Normal appearance. She is obese. HENT:      Head: Normocephalic and atraumatic. Eyes:      Conjunctiva/sclera: Conjunctivae normal.   Pulmonary:      Effort: Pulmonary effort is normal. No tachypnea, bradypnea or respiratory distress. Breath sounds: No wheezing or rales. Neurological:      Mental Status: She is alert and oriented to person, place, and time. Psychiatric:         Attention and Perception: Attention normal.         Mood and Affect: Mood normal.         Speech: Speech normal.         Behavior: Behavior normal.         Thought Content: Thought content normal.         Cognition and Memory: Cognition normal.         Judgment: Judgment normal.          Test results   Lung Nodule Screening     [x] Qualifies    []Does not qualify   [] Declined    [x] Completed 9/4/2020     Assessment      Diagnosis Orders   1. Cigarette nicotine dependence, uncomplicated  CT LUNG SCREENING    AK VISIT TO DISCUSS LUNG CA SCREEN W LDCT    CT Lung Screen (Annual)   2. Moderate COPD (chronic obstructive pulmonary disease) ([address]     3. Pulmonary emphysema, unspecified emphysema type ([address]     4. Morbid obesity with body mass index (BMI) of 45.0 to 49.9 in adult ([address]     5. Marijuana smoker       Plan    Recommend patient stop smoking,Smoking cessation discussed for 7 min ,  Educated patient on health hazards and negative effects of smoking. Counseled on ways to quit,  Offered cessation assistance with prescription mediations,  over the counter remedies, community resources/ phone APPs. Patient refuses additional assistance at this time.    Strongly encourage she discuss her Marijuana use with her pain management provider, ask if possible medical Marijuana would be indicated. Would rather see her on edibles than inhaling anything into lungs    -continue Anoro daily  -PRN albuterol  -recommend COVID 19 vaccine, declines to be scheduled at this time   -annual LDCT lung screen in 6 months   -continue oxygen at 2LPM nightly, did discuss appropriate therapies for CARLOTTA,, oxygen is not one. She is feeling benefit with current use, will plan to continue. She declines further treatment with CPAP at this time    Total time spent on encounter was 20 minutes     Will see Stephanie Qiu in: 6 months    Electronically signed by ALLEN William CNP on 3/16/2021 at 11:37 AM   Low Dose CT (LDCT) Lung Screening criteria met   Age 50-69   Pack year smoking >30   Still smoking or less than 15 year since quit   No sign or symptoms of lung cancer   > 11 months since last LDCT     Risks and benefits of lung cancer screening with LDCT scans discussed:    Significance of positive screen - False-positive LDCT results often occur. 95% of all positive results do not lead to a diagnosis of cancer. Usually further imaging can resolve most false-positive results; however, some patients may require invasive procedures. Over diagnosis risk - 10% to 12% of screen-detected lung cancer cases are over diagnosed--that is, the cancer would not have been detected in the patient's lifetime without the screening. Need for follow up screens annually to continue lung cancer screening effectiveness     Risks associated with radiation from annual LDCT- Radiation exposure is about the same as for a mammogram, which is about 1/3 of the annual background radiation exposure from everyday life. Starting screening at age 54 is not likely to increase cancer risk from radiation exposure. Patients with comorbidities resulting in life expectancy of < 10 years, or that would preclude treatment of an abnormality identified on CT, should not be screened due to lack of benefit.     To

## 2021-04-02 DIAGNOSIS — E11.59 TYPE 2 DIABETES MELLITUS WITH OTHER CIRCULATORY COMPLICATION, WITH LONG-TERM CURRENT USE OF INSULIN (HCC): ICD-10-CM

## 2021-04-02 DIAGNOSIS — Z79.4 TYPE 2 DIABETES MELLITUS WITH OTHER CIRCULATORY COMPLICATION, WITH LONG-TERM CURRENT USE OF INSULIN (HCC): ICD-10-CM

## 2021-04-05 RX ORDER — FLASH GLUCOSE SENSOR
KIT MISCELLANEOUS
Qty: 2 EACH | Refills: 3 | Status: SHIPPED | OUTPATIENT
Start: 2021-04-05 | End: 2021-07-26

## 2021-04-07 DIAGNOSIS — Z79.4 TYPE 2 DIABETES MELLITUS WITH OTHER CIRCULATORY COMPLICATION, WITH LONG-TERM CURRENT USE OF INSULIN (HCC): ICD-10-CM

## 2021-04-07 DIAGNOSIS — E11.59 TYPE 2 DIABETES MELLITUS WITH OTHER CIRCULATORY COMPLICATION, WITH LONG-TERM CURRENT USE OF INSULIN (HCC): ICD-10-CM

## 2021-04-07 RX ORDER — LIRAGLUTIDE 6 MG/ML
INJECTION SUBCUTANEOUS
Qty: 9 ML | Refills: 3 | Status: SHIPPED | OUTPATIENT
Start: 2021-04-07 | End: 2021-09-09

## 2021-04-07 NOTE — TELEPHONE ENCOUNTER
Recent Visits  Date Type Provider Dept   02/23/21 Office Visit ALLEN Miller CNP [facility]   07/29/20 Office Visit ALLEN Miller CNP [facility]   06/22/20 Office Visit ALLEN Miller CNP [facility]   03/02/20 Office Visit ALLEN Miller CNP [facility]   02/04/20 Office Visit ALLEN Miller CNP [facility]   12/05/19 Office Visit ALLEN Miller CNP [facility]   Showing recent visits within past 540 days with a meds authorizing provider and meeting all other requirements     Future Appointments  Date Type Provider Dept   05/26/21 Appointment ALLEN Miller CNP [facility]   Showing future appointments within next 150 days with a meds authorizing provider and meeting all other requirements     Future Appointments   Date Time Provider Scarlett Ibarra   5/26/2021 10:40 AM ALLEN Miller - [address] [address]   9/13/2021  8:40 AM STR CT IMAGING RM1  [facility] OUT EXP STR Radiolog   9/20/2021  8:30 AM ALLEN Rae  [address] [place] [address]

## 2021-05-07 DIAGNOSIS — E78.2 HYPERLIPIDEMIA, MIXED: ICD-10-CM

## 2021-05-07 DIAGNOSIS — E11.59 TYPE 2 DIABETES MELLITUS WITH OTHER CIRCULATORY COMPLICATION, WITH LONG-TERM CURRENT USE OF INSULIN (HCC): ICD-10-CM

## 2021-05-07 DIAGNOSIS — Z79.4 TYPE 2 DIABETES MELLITUS WITH OTHER CIRCULATORY COMPLICATION, WITH LONG-TERM CURRENT USE OF INSULIN (HCC): ICD-10-CM

## 2021-05-07 RX ORDER — PEN NEEDLE, DIABETIC 31 GX5/16"
NEEDLE, DISPOSABLE MISCELLANEOUS
Qty: 100 EACH | Refills: 2 | Status: SHIPPED | OUTPATIENT
Start: 2021-05-07 | End: 2022-02-07

## 2021-05-07 RX ORDER — ATORVASTATIN CALCIUM 20 MG/1
TABLET, FILM COATED ORAL
Qty: 90 TABLET | Refills: 3 | Status: SHIPPED | OUTPATIENT
Start: 2021-05-07 | End: 2022-02-07

## 2021-05-07 NOTE — TELEPHONE ENCOUNTER
Recent Visits  Date Type Provider Dept   02/23/21 Office Visit ALLEN Pickens CNP [facility]   07/29/20 Office Visit ALLEN Pickens CNP [facility]   06/22/20 Office Visit ALLEN Pickens CNP [facility]   03/02/20 Office Visit ALLEN Pickens CNP [facility]   02/04/20 Office Visit ALLEN Pickens CNP [facility]   12/05/19 Office Visit ALLEN Pickens CNP [facility]   Showing recent visits within past 540 days with a meds authorizing provider and meeting all other requirements     Future Appointments  Date Type Provider Dept   05/26/21 Appointment ALLEN Pickens CNP [facility]   Showing future appointments within next 150 days with a meds authorizing provider and meeting all other requirements         Future Appointments   Date Time Provider Scarlett Ibarra   5/26/2021 10:40 AM ALLEN Pickens [facility] [address] [address] [email or web address]   9/13/2021  8:40 AM STR CT IMAGING RM1  [facility] STR[facility] OUT EXP STR Radiolog   9/20/2021  8:30 AM ALLEN Carlson  [address] [place] [address]

## 2021-05-25 NOTE — TELEPHONE ENCOUNTER
Recent Visits  Date Type Provider Dept   05/24/21 Appointment ALLEN Doran CNP [facility]   02/23/21 Office Visit ALLEN Doran CNP [facility]   07/29/20 Office Visit ALLEN Doran CNP [facility]   06/22/20 Office Visit ALLEN Doran CNP [facility]   03/02/20 Office Visit ALLEN Doran CNP [facility]   02/04/20 Office Visit ALLEN Doran CNP [facility]   12/05/19 Office Visit ALLEN Doran CNP [facility]   Showing recent visits within past 540 days with a meds authorizing provider and meeting all other requirements  Future Appointments  Date Type Provider Dept   06/03/21 Appointment ALLEN Doran CNP [facility]   Showing future appointments within next 150 days with a meds authorizing provider and meeting all other requirements    Future Appointments   Date Time Provider Scarlett Ibarra   6/3/2021  8:20 AM ALLEN Doran - [address] [address] - [facility] [email or web address]   9/13/2021  8:40 AM STR CT IMAGING RM1  [facility] STR[facility] OUT EXP STR Radiolog   9/20/2021  8:30 AM ALLEN Abdalla  [address] [place] [address]

## 2021-05-26 RX ORDER — GUAIFENESIN 600 MG/1
TABLET, EXTENDED RELEASE ORAL
Qty: 120 TABLET | Refills: 2 | Status: SHIPPED | OUTPATIENT
Start: 2021-05-26 | End: 2022-02-07

## 2021-06-01 DIAGNOSIS — E11.59 TYPE 2 DIABETES MELLITUS WITH OTHER CIRCULATORY COMPLICATION, WITH LONG-TERM CURRENT USE OF INSULIN (HCC): ICD-10-CM

## 2021-06-01 DIAGNOSIS — Z79.4 TYPE 2 DIABETES MELLITUS WITH OTHER CIRCULATORY COMPLICATION, WITH LONG-TERM CURRENT USE OF INSULIN (HCC): ICD-10-CM

## 2021-06-01 RX ORDER — INSULIN LISPRO 100 [IU]/ML
INJECTION, SOLUTION INTRAVENOUS; SUBCUTANEOUS
Qty: 15 ML | Refills: 3 | Status: SHIPPED | OUTPATIENT
Start: 2021-06-01 | End: 2021-09-13 | Stop reason: SDUPTHER

## 2021-06-01 NOTE — TELEPHONE ENCOUNTER
Recent Visits  Date Type Provider Dept   02/23/21 Office Visit Donnell DurbinALLEN ko CNP [facility]   07/29/20 Office Visit Donnell MunozALLEN CNP [facility]   06/22/20 Office Visit Donnell Munoz APRULISSES Salomon CNP [facility]   03/02/20 Office Visit Donnell Munoz APRN - CNP [facility]   02/04/20 Office Visit Donnell Munoz APRN - CNP [facility]   Showing recent visits within past 540 days with a meds authorizing provider and meeting all other requirements  Future Appointments  Date Type Provider Dept   06/03/21 Appointment Donnell MunozALLEN CNP [facility]   Showing future appointments within next 150 days with a meds authorizing provider and meeting all other requirements      Future Appointments   Date Time Provider Scarlett Ibarra   6/3/2021  8:20 AM Donnell ALLEN Munoz - [id number] \[facility] [place] [facility]\"" [address] [facility]   9/13/2021  8:40 AM STR CT IMAGING RM1  [facility] STR[facility] OUT EXP STR Radiolog   9/20/2021  8:30 AM ALLEN CHU Rahu [address]

## 2021-06-08 ENCOUNTER — TELEPHONE (OUTPATIENT)
Dept: FAMILY MEDICINE CLINIC | Age: 61
End: 2021-06-08

## 2021-06-08 ENCOUNTER — HOSPITAL ENCOUNTER (OUTPATIENT)
Age: 61
Discharge: HOME | End: 2021-06-08
Payer: MEDICARE

## 2021-06-08 ENCOUNTER — OFFICE VISIT (OUTPATIENT)
Dept: FAMILY MEDICINE CLINIC | Age: 61
End: 2021-06-08
Payer: MEDICARE

## 2021-06-08 VITALS
DIASTOLIC BLOOD PRESSURE: 68 MMHG | BODY MASS INDEX: 42.06 KG/M2 | WEIGHT: 268 LBS | RESPIRATION RATE: 14 BRPM | HEIGHT: 67 IN | TEMPERATURE: 98 F | OXYGEN SATURATION: 98 % | HEART RATE: 67 BPM | SYSTOLIC BLOOD PRESSURE: 114 MMHG

## 2021-06-08 DIAGNOSIS — E11.59 TYPE 2 DIABETES MELLITUS WITH OTHER CIRCULATORY COMPLICATION, WITH LONG-TERM CURRENT USE OF INSULIN (HCC): ICD-10-CM

## 2021-06-08 DIAGNOSIS — F33.42 RECURRENT MAJOR DEPRESSIVE DISORDER, IN FULL REMISSION (HCC): ICD-10-CM

## 2021-06-08 DIAGNOSIS — Z79.4 TYPE 2 DIABETES MELLITUS WITH OTHER CIRCULATORY COMPLICATION, WITH LONG-TERM CURRENT USE OF INSULIN (HCC): ICD-10-CM

## 2021-06-08 DIAGNOSIS — K21.9 GASTROESOPHAGEAL REFLUX DISEASE WITHOUT ESOPHAGITIS: ICD-10-CM

## 2021-06-08 DIAGNOSIS — E03.9 HYPOTHYROIDISM, UNSPECIFIED TYPE: ICD-10-CM

## 2021-06-08 DIAGNOSIS — Z12.31 ENCOUNTER FOR SCREENING MAMMOGRAM FOR MALIGNANT NEOPLASM OF BREAST: ICD-10-CM

## 2021-06-08 DIAGNOSIS — Z00.00 ROUTINE GENERAL MEDICAL EXAMINATION AT A HEALTH CARE FACILITY: Primary | ICD-10-CM

## 2021-06-08 DIAGNOSIS — E78.2 MIXED HYPERLIPIDEMIA: ICD-10-CM

## 2021-06-08 DIAGNOSIS — J44.9 CHRONIC OBSTRUCTIVE PULMONARY DISEASE, UNSPECIFIED COPD TYPE (HCC): ICD-10-CM

## 2021-06-08 DIAGNOSIS — M79.7 FIBROMYALGIA: ICD-10-CM

## 2021-06-08 LAB — HBA1C MFR BLD: 7.6 % (ref 4.3–5.7)

## 2021-06-08 PROCEDURE — 99214 OFFICE O/P EST MOD 30 MIN: CPT | Performed by: NURSE PRACTITIONER

## 2021-06-08 PROCEDURE — 90833 PSYTX W PT W E/M 30 MIN: CPT | Performed by: NURSE PRACTITIONER

## 2021-06-08 PROCEDURE — G0439 PPPS, SUBSEQ VISIT: HCPCS | Performed by: NURSE PRACTITIONER

## 2021-06-08 PROCEDURE — G0444 DEPRESSION SCREEN ANNUAL: HCPCS | Performed by: NURSE PRACTITIONER

## 2021-06-08 RX ORDER — FLUTICASONE PROPIONATE 50 MCG
1 SPRAY, SUSPENSION (ML) NASAL DAILY
Qty: 2 BOTTLE | Refills: 1 | Status: SHIPPED | OUTPATIENT
Start: 2021-06-08

## 2021-06-08 RX ORDER — PREGABALIN 300 MG/1
CAPSULE ORAL
Qty: 180 CAPSULE | Refills: 1 | Status: SHIPPED | OUTPATIENT
Start: 2021-06-08 | End: 2022-03-10

## 2021-06-08 RX ORDER — VENLAFAXINE HYDROCHLORIDE 37.5 MG/1
37.5 CAPSULE, EXTENDED RELEASE ORAL DAILY
Qty: 90 CAPSULE | Refills: 1 | Status: SHIPPED | OUTPATIENT
Start: 2021-06-08 | End: 2021-12-15

## 2021-06-08 RX ORDER — PANTOPRAZOLE SODIUM 40 MG/1
40 TABLET, DELAYED RELEASE ORAL DAILY
Qty: 90 TABLET | Refills: 3 | Status: SHIPPED | OUTPATIENT
Start: 2021-06-08 | End: 2021-07-12

## 2021-06-08 ASSESSMENT — PATIENT HEALTH QUESTIONNAIRE - PHQ9
6. FEELING BAD ABOUT YOURSELF - OR THAT YOU ARE A FAILURE OR HAVE LET YOURSELF OR YOUR FAMILY DOWN: 3
1. LITTLE INTEREST OR PLEASURE IN DOING THINGS: 3
SUM OF ALL RESPONSES TO PHQ QUESTIONS 1-9: 22
7. TROUBLE CONCENTRATING ON THINGS, SUCH AS READING THE NEWSPAPER OR WATCHING TELEVISION: 3
3. TROUBLE FALLING OR STAYING ASLEEP: 3
SUM OF ALL RESPONSES TO PHQ9 QUESTIONS 1 & 2: 6
2. FEELING DOWN, DEPRESSED OR HOPELESS: 3
5. POOR APPETITE OR OVEREATING: 1
SUM OF ALL RESPONSES TO PHQ QUESTIONS 1-9: 22
4. FEELING TIRED OR HAVING LITTLE ENERGY: 3
SUM OF ALL RESPONSES TO PHQ QUESTIONS 1-9: 22
10. IF YOU CHECKED OFF ANY PROBLEMS, HOW DIFFICULT HAVE THESE PROBLEMS MADE IT FOR YOU TO DO YOUR WORK, TAKE CARE OF THINGS AT HOME, OR GET ALONG WITH OTHER PEOPLE: 1
9. THOUGHTS THAT YOU WOULD BE BETTER OFF DEAD, OR OF HURTING YOURSELF: 0
8. MOVING OR SPEAKING SO SLOWLY THAT OTHER PEOPLE COULD HAVE NOTICED. OR THE OPPOSITE, BEING SO FIGETY OR RESTLESS THAT YOU HAVE BEEN MOVING AROUND A LOT MORE THAN USUAL: 3

## 2021-06-08 ASSESSMENT — COLUMBIA-SUICIDE SEVERITY RATING SCALE - C-SSRS
2. HAVE YOU ACTUALLY HAD ANY THOUGHTS OF KILLING YOURSELF?: NO
1. WITHIN THE PAST MONTH, HAVE YOU WISHED YOU WERE DEAD OR WISHED YOU COULD GO TO SLEEP AND NOT WAKE UP?: YES
6. HAVE YOU EVER DONE ANYTHING, STARTED TO DO ANYTHING, OR PREPARED TO DO ANYTHING TO END YOUR LIFE?: NO

## 2021-06-08 ASSESSMENT — LIFESTYLE VARIABLES: HOW OFTEN DO YOU HAVE A DRINK CONTAINING ALCOHOL: 0

## 2021-06-08 NOTE — ACP (ADVANCE CARE PLANNING)
Advance Care Planning     General Advance Care Planning (ACP) Conversation    Date of Conversation: 6/8/2021  Conducted with: Patient with Decision Making Capacity    Healthcare Decision Maker:        Click here to complete [address] including [address] Relationship (ie \"Primary\")  Today we documented Decision Maker(s) consistent with ACP documents on file. Content/Action Overview:   Has ACP document(s) on file - reflects the patient's care preferences  Reviewed DNR/DNI and patient elects Full Code (Attempt Resuscitation)  treatment goals, benefit/burden of treatment options, ventilation preferences, hospitalization preferences, resuscitation preferences, end of life care preferences (vegetative state/imminent death) and pt does have documents on file does not want resusitated if poor prognosis       Length of Voluntary ACP Conversation in minutes:  16 minutes    ALLEN Noguera - CNP

## 2021-06-08 NOTE — PROGRESS NOTES
Medicare Annual Wellness Visit  Name: Keri Waller Date: 2021   MRN: [id number] Sex: Female   Age: 63 y.o. Ethnicity: Non-/Non    : 1960 Race: Tennille Munroe is here for Medicare AWV and Health Maintenance (due for mammo and colon )    Screenings for behavioral, psychosocial and functional/safety risks, and cognitive dysfunction are all negative except as indicated below. These results, as well as other patient data from the [address] form, are documented in Flowsheets linked to this Encounter. Allergies   Allergen Reactions    Latex Itching and Rash         Prior to Visit Medications    Medication Sig Taking?  Authorizing Provider   venlafaxine (EFFEXOR XR) 37.5 MG extended release capsule Take 1 capsule by mouth daily To take with effexor 150 mg daily Yes ALLEN Constantino CNP   pregabalin (LYRICA) 300 MG capsule TAKE 1 CAPSULE BY MOUTH TWICE DAILY Yes ALLEN Constantino CNP   pantoprazole (PROTONIX) 40 MG tablet Take 1 tablet by mouth daily Yes ALLEN Constantino CNP   fluticasone (FLONASE) 50 MCG/ACT nasal spray 1 spray by Each Nostril route daily Yes ALLEN Constantino CNP   insulin lispro, 1 Unit Dial, 100 UNIT/ML SOPN INJECT 12 UNITS UNDER THE SKIN THREE TIMES DAILY WITH MEALS PLUS SLIDING SCALE( MAX OF 60 UNITS DAILY)  Only taking 12 units in AM an HS Yes ALLEN Constantino CNP   MUCUS RELIEF 600 MG extended release tablet TAKE 1 TABLET BY MOUTH TWICE DAILY Yes ALLEN Constantino CNP   atorvastatin (LIPITOR) 20 MG tablet TAKE 1 TABLET BY MOUTH DAILY Yes ALLEN Constantino CNP   Insulin Pen Needle (B-D ULTRAFINE III SHORT PEN) 31G X 8 MM MISC USE AS DIRECTED ONCE DAILY Yes ALLEN Constantino CNP   LEVEMIR FLEXTOUCH 100 UNIT/ML injection pen ADMINISTER 60 UNITS UNDER THE SKIN EVERY NIGHT  Patient taking differently: Inject 70 Units into the skin nightly  Yes ALLEN Constantino CNP   VICTOZA 18 MG/3ML SOPN SC Historical Provider, MD   acetaminophen 650 MG TABS Take 650 mg by mouth every 4 hours as needed for Pain (For mild pain level 1-3 or for fever > 100.5).  Yes Marilyn Murillo MD         Past Medical History:   Diagnosis Date    Anxiety     Arthritis     Carpal tunnel syndrome     Cellulitis     Chronic back pain     Depression     Diabetes mellitus ([address]     Emphysema of lung ([address]     Fibromyalgia     GERD (gastroesophageal reflux disease)     Glaucoma     Hiatal hernia     esophagus closes    Hx of blood clots     foot post op    Hyperlipidemia, mixed 2017    IBS (irritable bowel syndrome)     Leg pain     nerve damage right leg    MDRO (multiple drug resistant organisms) resistance     wound and nasal    Guzman's neuroma     left foot    Nausea & vomiting     Neuropathic pain     Osteoarthritis     PONV (postoperative nausea and vomiting)     Pulmonary emphysema ([address] 2017    Sleep apnea     DOES NOT USE CPAP       Past Surgical History:   Procedure Laterality Date    ABDOMEN SURGERY      APPENDECTOMY      BACK SURGERY      lower x 3    CERVICAL FUSION       SECTION      x3    CHOLECYSTECTOMY      COLONOSCOPY      ECTOPIC PREGNANCY SURGERY      EYE SURGERY Bilateral     LASER FOR GLAUCOMA    HYSTERECTOMY, TOTAL ABDOMINAL      JOINT REPLACEMENT      OTHER SURGICAL HISTORY  Aug 5, 2013    Left knee total arthroplasty ([facility]. Mirtha Crespo, [facility])    [place] DEBRIDEMENT OPEN WOUND 20 SQ CM< Right 2018    INCISIONAL DEBRIEDMENT INCISION AND DRAINAGE RIGHT GLUTEAL ABCESS performed by Jaki Freed MD at [address] OFFICE/OUTPT [facility] Right 2018    EXCISIONAL DEBRIDEMENT OF RIGHT BUTTOCKS WOUND performed by Ko Recio DO at [address] Left     little toe bone removed    TONSILLECTOMY      TOTAL KNEE ARTHROPLASTY Right 2016    UPPER GASTROINTESTINAL ENDOSCOPY      UPPER GASTROINTESTINAL ENDOSCOPY Left Alcohol Use Status  No Comment  Sober for 26 years          Drug Use     Drug Use Status  Yes Types  Marijuana Comment  last use morning of 1/28/20          Sexual Activity     Sexually Active  Not Currently Partners  Male               Alcohol Screening:       A score of 8 or more is associated with harmful or hazardous drinking. A score of 13 or more in women, and 15 or more in men, is likely to indicate alcohol dependence. Substance Abuse Interventions:  · n/a    General Health and ACP:  General  In general, how would you say your health is?: Good  In the past 7 days, have you experienced any of the following?  New or Increased Pain, New or Increased Fatigue, Loneliness, Social Isolation, Stress or Anger?: (!) New or Increased Pain, New or Increased Fatigue, Loneliness, Stress, Anger  Do you get the social and emotional support that you need?: (!) No  Do you have a Living Will?: (!) No  Advance Directives     Power of  Living Will ACP-Advance Directive ACP-Power of     Not on File Filed on 06/18/18 Filed Not on File      General Health Risk Interventions:  · Pain issues: pt has pain management having alot of stress with  her kids making her sad and causing her more stress, feels lonely does not have emotional supprot l  · Fatigue: regular exercise recommended- 3-5 times per week, 30-45 minutes per session  · Loneliness: patient's comments regarding inadequate social support: as above no support at home, kids cuase her stress    Health Habits/Nutrition:  Health Habits/Nutrition  Do you exercise for at least 20 minutes 2-3 times per week?: Yes  Have you lost any weight without trying in the past 3 months?: No  Do you eat only one meal per day?: (!) Yes  Have you seen the dentist within the past year?: (!) No  Body mass index: (!) 41.97  Health Habits/Nutrition Interventions:  · Inadequate physical activity:  we discussed starting to get more pphysically active       ADL:  ADLs  In the past 7 days, did you need help from others to perform any of the following everyday activities? Eating, dressing, grooming, bathing, toileting, or walking/balance?: (!) Walking/Balance  In the past 7 days, did you need help from others to take care of any of the following? Laundry, housekeeping, banking/finances, shopping, telephone use, food preparation, transportation, or taking medications?: None  ADL Interventions:  · Patient declines any further evaluation/treatment for this issue    Personalized Preventive Plan   Current Health Maintenance Status  Immunization History   Administered Date(s) Administered    PPD Test 08/09/2013, 06/08/2018    Tdap (Boostrix, Adacel) 07/18/2014    Zoster Live (Zostavax) 09/01/2015, 10/18/2016        Health Maintenance   Topic Date Due    Pneumococcal 0-64 years Vaccine (1 of 2 - PPSV23) Never done    COVID-19 Vaccine (1) Never done    Shingles Vaccine (2 of 3) 12/13/2016    Annual Wellness Visit (AWV)  Never done    Colon Cancer Screen FIT/FOBT  01/28/2020    Lipid screen  01/29/2021    Diabetic foot exam  06/22/2021    Flu vaccine (Season Ended) 09/01/2021    Low dose CT lung screening  09/04/2021    Breast cancer screen  11/14/2021    Diabetic retinal exam  12/04/2021    Diabetic microalbuminuria test  02/23/2022    A1C test (Diabetic or Prediabetic)  06/08/2022    DTaP/Tdap/Td vaccine (2 - Td or Tdap) 07/18/2024    Hepatitis C screen  Completed    HIV screen  Completed    Hepatitis A vaccine  Aged Out    Hib vaccine  Aged Out    Meningococcal (ACWY) vaccine  Aged Out     Recommendations for xzoops Due: see orders and patient instructions/AVS.  . Recommended screening schedule for the next 5-10 years is provided to the patient in written form: see Patient Instructions/AVS.    Graham Alvarado was seen today for medicare awv and health maintenance.     Diagnoses and all orders for this visit:    Routine general medical examination at a [facility] facility    Recurrent major depressive disorder, in full remission ([facility]  -     venlafaxine (EFFEXOR XR) 37.5 MG extended release capsule; Take 1 capsule by mouth daily To take with effexor 150 mg daily  Increase effexor   Encounter for screening mammogram for malignant neoplasm of breast  -     [facility] DIGITAL SCREEN W OR WO CAD BILATERAL; Future    Fibromyalgia  -     pregabalin (LYRICA) 300 MG capsule; TAKE 1 CAPSULE BY MOUTH TWICE DAILY  Stable with current meds  Gastroesophageal reflux disease without esophagitis  -     pantoprazole (PROTONIX) 40 MG tablet;  Take 1 tablet by mouth daily  Stable   gerd diet   Chronic obstructive pulmonary disease, unspecified COPD type ([facility]  Stable with current meds  Other orders  -     POCT glycosylated hemoglobin (Hb A1C)  -     fluticasone (FLONASE) 50 MCG/ACT nasal spray; 1 spray by Each Nostril route daily    Pt in agreement with plan

## 2021-06-08 NOTE — PATIENT INSTRUCTIONS
Personalized Preventive Plan for Juana Ahmadi - 6/8/2021  Medicare offers a range of preventive health benefits. Some of the tests and screenings are paid in full while other may be subject to a deductible, co-insurance, and/or copay. Some of these benefits include a comprehensive review of your medical history including lifestyle, illnesses that may run in your family, and various assessments and screenings as appropriate. After reviewing your medical record and screening and assessments performed today your provider may have ordered immunizations, labs, imaging, and/or referrals for you. A list of these orders (if applicable) as well as your Preventive Care list are included within your After Visit Summary for your review. Other Preventive Recommendations:    · A preventive eye exam performed by an eye specialist is recommended every 1-2 years to screen for glaucoma; cataracts, macular degeneration, and other eye disorders. · A preventive dental visit is recommended every 6 months. · Try to get at least 150 minutes of exercise per week or 10,000 steps per day on a pedometer . · Order or download the FREE \"Exercise & Physical Activity: Your Everyday Guide\" from [employer]. Call 7-801.733.3333 or search [employer] online. · You need 2631-5921 mg of calcium and 8736-6551 IU of vitamin D per day. It is possible to meet your calcium requirement with diet alone, but a vitamin D supplement is usually necessary to meet this goal.  · When exposed to the sun, use a sunscreen that protects against both UVA and UVB radiation with an SPF of 30 or greater. Reapply every 2 to 3 hours or after sweating, drying off with a towel, or swimming. · Always wear a seat belt when traveling in a car. Always wear a helmet when riding a bicycle or motorcycle.

## 2021-07-12 ENCOUNTER — OFFICE VISIT (OUTPATIENT)
Dept: FAMILY MEDICINE CLINIC | Age: 61
End: 2021-07-12
Payer: MEDICARE

## 2021-07-12 VITALS
HEART RATE: 76 BPM | WEIGHT: 270 LBS | DIASTOLIC BLOOD PRESSURE: 58 MMHG | RESPIRATION RATE: 18 BRPM | HEIGHT: 67 IN | SYSTOLIC BLOOD PRESSURE: 116 MMHG | BODY MASS INDEX: 42.38 KG/M2

## 2021-07-12 DIAGNOSIS — R11.0 NAUSEA: ICD-10-CM

## 2021-07-12 DIAGNOSIS — R19.5 POSITIVE FIT (FECAL IMMUNOCHEMICAL TEST): ICD-10-CM

## 2021-07-12 DIAGNOSIS — R53.83 FATIGUE, UNSPECIFIED TYPE: ICD-10-CM

## 2021-07-12 DIAGNOSIS — Z79.4 TYPE 2 DIABETES MELLITUS WITHOUT COMPLICATION, WITH LONG-TERM CURRENT USE OF INSULIN (HCC): ICD-10-CM

## 2021-07-12 DIAGNOSIS — K21.9 GASTROESOPHAGEAL REFLUX DISEASE, UNSPECIFIED WHETHER ESOPHAGITIS PRESENT: Primary | ICD-10-CM

## 2021-07-12 DIAGNOSIS — E11.9 TYPE 2 DIABETES MELLITUS WITHOUT COMPLICATION, WITH LONG-TERM CURRENT USE OF INSULIN (HCC): ICD-10-CM

## 2021-07-12 PROCEDURE — 3051F HG A1C>EQUAL 7.0%<8.0%: CPT | Performed by: NURSE PRACTITIONER

## 2021-07-12 PROCEDURE — 99214 OFFICE O/P EST MOD 30 MIN: CPT | Performed by: NURSE PRACTITIONER

## 2021-07-12 SDOH — ECONOMIC STABILITY: FOOD INSECURITY: WITHIN THE PAST 12 MONTHS, THE FOOD YOU BOUGHT JUST DIDN'T LAST AND YOU DIDN'T HAVE MONEY TO GET MORE.: NEVER TRUE

## 2021-07-12 SDOH — ECONOMIC STABILITY: FOOD INSECURITY: WITHIN THE PAST 12 MONTHS, YOU WORRIED THAT YOUR FOOD WOULD RUN OUT BEFORE YOU GOT MONEY TO BUY MORE.: NEVER TRUE

## 2021-07-12 ASSESSMENT — ENCOUNTER SYMPTOMS
BACK PAIN: 0
NAUSEA: 1
DIARRHEA: 1
VOMITING: 0
ABDOMINAL DISTENTION: 0
RESPIRATORY NEGATIVE: 1
ABDOMINAL PAIN: 0

## 2021-07-12 ASSESSMENT — SOCIAL DETERMINANTS OF HEALTH (SDOH): HOW HARD IS IT FOR YOU TO PAY FOR THE VERY BASICS LIKE FOOD, HOUSING, MEDICAL CARE, AND HEATING?: NOT HARD AT ALL

## 2021-07-12 NOTE — LETTER
[address] [facility]  [address] [facility] [facility]. [facility] [address]  Phone: [phone]  Fax: [phone]    ALLEN Grimm CNP        July 12, 2021     Patient: Tiffanie Fairbanks   YOB: 1960   Date of Visit: 7/12/2021       To Whom It May Concern: It is my medical opinion that Lisa Kitchen please excuse patient from work program for 2 weeks. Starting Monday July 12th, 2021 through Monday July 26th, 2021. .    If you have any questions or concerns, please don't hesitate to call.     Sincerely,        ALLEN Grimm - CNP

## 2021-07-12 NOTE — PROGRESS NOTES
[address] [place] [address]  [address] JORGE Sullivan [address]  Dept: [phone]  Dept Fax: [phone]  Loc: [phone]    Tiffanie Fairbanks is a 63 y.o. femalewho presents today for her medical conditions/complaints as noted below. Ade Nation c/o of 1 Month Follow-Up (new medications), Referral - General (Patient would like a referral to get her colonoscopy done. ), Other (Patient would like a note to be off work due to them stressing her out not feeling well.), Other (Patient states that she is very shaky.), and Discuss Medications (Patient would like to discuss Protonix, not sure if it is still working)      HPI:      Pt here to discuss several things, pt thinks sugars are doing better, has the lane meter and average glucose has been about 140-150 Lab Results       Component                Value               Date                       LABA1C                   7.6 (H)             06/08/2021            No results found for: EAG taking meds as ordered on levemir and short acting insulin as she likes the regimen. 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 Letter given for 2 weeks off. Continues to have nausea and abdominal pain, taking protonix and it is not working has gerd, was seeing Dr. Melani Casas and had gastric empyting study ordered did not go, had a positive fit in 2019 referred to Gi did not go, feels fatigued denies black tarry stools. Will refer to GI again stressed importance of following up needs EGD, changed protonix to dexilant.   nO weight loss no vomiting         Current Outpatient Medications   Medication Sig Dispense Refill    dexlansoprazole ([facility]) 30 MG CPDR delayed release capsule Take 30 mg by mouth daily 90 capsule 1    venlafaxine (EFFEXOR XR) 37.5 MG extended release capsule Take 1 capsule by mouth daily To take with effexor 150 mg daily 90 capsule 1    pregabalin (LYRICA) 300 MG capsule TAKE 1 CAPSULE BY MOUTH TWICE DAILY 180 capsule 1    fluticasone (FLONASE) 50 MCG/ACT nasal spray 1 spray by Each Nostril route daily 2 Bottle 1    insulin lispro, 1 Unit Dial, 100 UNIT/ML SOPN INJECT 12 UNITS UNDER THE SKIN THREE TIMES DAILY WITH MEALS PLUS SLIDING SCALE( MAX OF 60 UNITS DAILY)  Only taking 12 units in AM an HS 15 mL 3    MUCUS RELIEF 600 MG extended release tablet TAKE 1 TABLET BY MOUTH TWICE DAILY 120 tablet 2    atorvastatin (LIPITOR) 20 MG tablet TAKE 1 TABLET BY MOUTH DAILY 90 tablet 3    Insulin Pen Needle (B-D ULTRAFINE III SHORT PEN) 31G X 8 MM MISC USE AS DIRECTED ONCE DAILY 100 each 2    LEVEMIR FLEXTOUCH 100 UNIT/ML injection pen ADMINISTER 60 UNITS UNDER THE SKIN EVERY NIGHT (Patient taking differently: Inject 70 Units into the skin nightly ) 18 mL 6    VICTOZA 18 MG/3ML SOPN SC injection ADMINISTER 1.8 MG UNDER THE SKIN DAILY 9 mL 3    Continuous Blood Gluc Sensor (FREESTYLE MICKI 14 DAY SENSOR) [facility] APPLY 1 SENSOR EXTERNALLY EVERY 14 DAYS 2 each 3    diclofenac sodium (VOLTAREN) 1 % GEL diclofenac 1 % topical gel      Continuous Blood Gluc Sensor (FREESTYLE MICKI 14 DAY SENSOR) [facility] FreeStyle Micki 14 Day Sensor kit   APPLY 1 SENSOR EXTERNALLY EVERY 14 DAYS      albuterol sulfate  (90 Base) MCG/ACT inhaler INHALE 2 PUFFS INTO THE LUNGS EVERY 6 HOURS AS NEEDED FOR WHEEZING 8.5 g 2    umeclidinium-vilanterol (ANORO ELLIPTA) 62.5-25 MCG/INH AEPB inhaler Inhale 1 puff into the lungs daily 30 puff 11    Continuous Blood Gluc  (FREESTYLE MICKI 14 DAY READER) PAOLA Check blood sugars daily 1 Device 0    venlafaxine (EFFEXOR XR) 150 MG extended release capsule TAKE 1 CAPSULE BY MOUTH DAILY 90 capsule 3    traZODone (DESYREL) 150 MG tablet TAKE 1 TABLET BY MOUTH EVERY NIGHT 90 tablet 3    blood glucose test strips (ACCU-CHEK DAVIDA) strip Test once daily 90 each 3    Handicap Placard MISC by Does not apply route Duration: three years    Type II idabetes  Diabetic neuropathy 1 each 0    HYDROcodone-acetaminophen (NORCO) 5-325 MG per tablet Take 1 tablet by mouth every 8 hours as needed for Pain. Juan Jose Faust traMADol (ULTRAM) 50 MG tablet Take 100 mg by mouth 3 times daily.  OXYGEN Inhale 2 L into the lungs       Elastic Bandages & Supports (WRIST BRACE/RIGHT MEDIUM) MISC Wear brace on right wrist nightly 1 each 0    Cholecalciferol (VITAMIN D3) 2000 units CAPS Take by mouth daily      rOPINIRole (REQUIP) 1 MG tablet Take 1 mg by mouth nightly       acetaminophen 650 MG TABS Take 650 mg by mouth every 4 hours as needed for Pain (For mild pain level 1-3 or for fever > 100.5). 120 tablet      No current facility-administered medications for this visit.           Past Medical History:   Diagnosis Date    Anxiety     Arthritis     Carpal tunnel syndrome     Cellulitis     Chronic back pain     Depression     Diabetes mellitus ([address]     Emphysema of lung (HCC)     Fibromyalgia     GERD (gastroesophageal reflux disease)     Glaucoma     Hiatal hernia     esophagus closes    Hx of blood clots     foot post op    Hyperlipidemia, mixed 2017    IBS (irritable bowel syndrome)     Leg pain     nerve damage right leg    MDRO (multiple drug resistant organisms) resistance     wound and nasal    Guzman's neuroma     left foot    Nausea & vomiting     Neuropathic pain     Osteoarthritis     PONV (postoperative nausea and vomiting)     Pulmonary emphysema ([address] 2017    Sleep apnea     DOES NOT USE CPAP      Past Surgical History:   Procedure Laterality Date    ABDOMEN SURGERY      APPENDECTOMY      BACK SURGERY      lower x 3    CERVICAL FUSION       SECTION      x3    CHOLECYSTECTOMY      COLONOSCOPY      ECTOPIC PREGNANCY SURGERY      EYE SURGERY Bilateral     LASER FOR GLAUCOMA    HYSTERECTOMY, TOTAL ABDOMINAL      JOINT REPLACEMENT      OTHER SURGICAL HISTORY  Aug 5, 2013    Left knee total arthroplasty (Dr. Altamease Lundborg, [facility])    [place] DEBRIDEMENT OPEN WOUND 20 SQ CM< Right 2018    INCISIONAL DEBRIEDMENT INCISION AND DRAINAGE RIGHT GLUTEAL ABCESS performed by Daren Paul MD at [facility] OFFICE/OUTPT [address] [facility] [address] Right 2018    EXCISIONAL DEBRIDEMENT OF RIGHT BUTTOCKS WOUND performed by Venkat Vega DO at [facility] Left     little toe bone removed    TONSILLECTOMY      TOTAL KNEE ARTHROPLASTY Right 2016    UPPER GASTROINTESTINAL ENDOSCOPY      UPPER GASTROINTESTINAL ENDOSCOPY Left 2019    EGD DILATION SAVORY performed by Chente Zhao MD at [facility]     Family History   Problem Relation Age of Onset    Arthritis Mother     Heart Disease Mother         CHF    High Blood Pressure Mother     Kidney Disease Mother     Cancer Father     Heart Disease Sister         diastolic dysfunction    Other Sister         thyroid cysts    Cancer Brother     Kidney Disease Brother     Diabetes Brother     Kidney Disease Brother     Colon Cancer Neg Hx      Social History     Tobacco Use    Smoking status: Current Every Day Smoker     Packs/day: 1.00     Years: 35.00     Pack years: 35.00     Types: Cigarettes     Start date: 1982     Last attempt to quit: 11/15/2015     Years since quittin.6    Smokeless tobacco: Never Used    Tobacco comment: 2-4 cigarettes/day    Substance Use Topics    Alcohol use: No     Comment: Sober for 26 years        Allergies   Allergen Reactions    Latex Itching and Rash       Health Maintenance   Topic Date Due    Pneumococcal 0-64 years Vaccine (1 of 2 - PPSV23) Never done    COVID-19 Vaccine (1) Never done    Shingles Vaccine (2 of 3) 2016    Colon Cancer Screen FIT/FOBT  01/28/2020    Lipid screen  01/29/2021    Flu vaccine (1) 09/01/2021    Low dose CT lung screening  09/04/2021    Breast cancer screen  11/14/2021    Diabetic retinal exam  12/04/2021    Diabetic microalbuminuria test  02/23/2022    A1C test (Diabetic or Prediabetic)  06/08/2022    Annual Wellness Visit (AWV)  06/09/2022    Diabetic foot exam  07/12/2022    DTaP/Tdap/Td vaccine (2 - Td or Tdap) 07/18/2024    Hepatitis C screen  Completed    HIV screen  Completed    Hepatitis A vaccine  Aged Out    Hib vaccine  Aged Out    Meningococcal (ACWY) vaccine  Aged Out       Subjective:      Review of Systems   Constitutional: Positive for fatigue. Negative for chills, fever and unexpected weight change. HENT: Negative. Respiratory: Negative. Cardiovascular: Negative. Gastrointestinal: Positive for diarrhea and nausea. Negative for abdominal distention, abdominal pain and vomiting. Genitourinary: Negative for difficulty urinating and dysuria. Musculoskeletal: Positive for arthralgias. Negative for back pain. Skin: Negative. Neurological: Positive for weakness. Negative for dizziness, facial asymmetry, light-headedness and headaches. Psychiatric/Behavioral: Positive for dysphoric mood. Negative for agitation, behavioral problems, sleep disturbance and suicidal ideas. The patient is not nervous/anxious. Objective:      BP (!) 116/58 (Site: Right Upper Arm, Position: Sitting, Cuff Size: Large Adult)   Pulse 76   Resp 18   Ht 5' 7\" (1.702 m)   Wt 270 lb (122.5 kg)   BMI 42.29 kg/m²      Physical Exam  Vitals and nursing note reviewed. Constitutional:       Appearance: She is obese. Cardiovascular:      Rate and Rhythm: Normal rate and regular rhythm. Pulses: Normal pulses. Heart sounds: Normal heart sounds. No murmur heard. Pulmonary:      Effort: Pulmonary effort is normal. No respiratory distress.       Breath sounds: Normal breath sounds. Abdominal:      General: Bowel sounds are normal.      Tenderness: There is generalized abdominal tenderness. Skin:     General: Skin is warm and dry. Capillary Refill: Capillary refill takes less than 2 seconds. Neurological:      Mental Status: She is alert. Psychiatric:         Attention and Perception: Attention normal.         Mood and Affect: Mood is depressed. Affect is flat. Speech: Speech normal.         Behavior: Behavior is withdrawn. Behavior is cooperative. Thought Content: Thought content does not include homicidal or suicidal plan. Cognition and Memory: Cognition normal.         Judgment: Judgment normal.          Assessment/Plan:           1. Gastroesophageal reflux disease, unspecified whether esophagitis present  Stop protonix trial dexilant  gerd diet   - dexlansoprazole (DEXILANT) 30 MG CPDR delayed release capsule; Take 30 mg by mouth daily  Dispense: 90 capsule; Refill: 1  - WYATT Pino MD, Gastroenterology, [facility]    2. Nausea  Rule out ulcer vs delayed gastric emptying due to diabetes complications   - dexlansoprazole (DEXILANT) 30 MG CPDR delayed release capsule; Take 30 mg by mouth daily  Dispense: 90 capsule; Refill: 1  - WYATT Pino MD, Gastroenterology, [facility]    3. Positive FIT (fecal immunochemical test)  Needs colonoscopy to rule out colon cancer   - WYATT - Megan Pino MD, Gastroenterology, [facility]  - CBC With Auto Differential; Future    4. Type 2 diabetes mellitus without complication, with long-term current use of insulin ([facility])  Improved, continue current meds  - Comprehensive Metabolic Panel; Future  - Lipid Panel; Future  -  DIABETES FOOT EXAM    5. Fatigue, unspecified type  Rule out anemia vs depression  - CBC With Auto Differential; Future  - Comprehensive Metabolic Panel; Future  Pt in agreement with plan   Return in about 2 months (around 9/12/2021) for A1C and check up .     Reccommended tobaccocessation options including pharmacologic methods, counseled great than 3 minutesduring this visit:  Yes[]  No  []       Patient given educational materials -see patient instructions. Discussed use, benefit, and side effects of prescribedmedications. All patient questions answered. Pt voiced understanding. Reviewedhealth maintenance. Instructed to continue current medications, diet and exercise. Patient agreed with treatment plan. Follow up as directed.        Electronicallysigned by ALLEN Newberry CNP on 7/12/2021 at 6:05 PM

## 2021-07-13 ENCOUNTER — HOSPITAL ENCOUNTER (OUTPATIENT)
Age: 61
Discharge: HOME | End: 2021-07-13
Payer: MEDICARE

## 2021-07-13 ENCOUNTER — TELEPHONE (OUTPATIENT)
Dept: FAMILY MEDICINE CLINIC | Age: 61
End: 2021-07-13

## 2021-07-13 DIAGNOSIS — E11.9 TYPE 2 DIABETES MELLITUS WITHOUT COMPLICATION, WITH LONG-TERM CURRENT USE OF INSULIN (HCC): ICD-10-CM

## 2021-07-13 DIAGNOSIS — R53.83 FATIGUE, UNSPECIFIED TYPE: ICD-10-CM

## 2021-07-13 DIAGNOSIS — R19.5 POSITIVE FIT (FECAL IMMUNOCHEMICAL TEST): ICD-10-CM

## 2021-07-13 DIAGNOSIS — Z79.4 TYPE 2 DIABETES MELLITUS WITHOUT COMPLICATION, WITH LONG-TERM CURRENT USE OF INSULIN (HCC): ICD-10-CM

## 2021-07-13 DIAGNOSIS — F33.42 RECURRENT MAJOR DEPRESSIVE DISORDER, IN FULL REMISSION (HCC): ICD-10-CM

## 2021-07-13 LAB
ALBUMIN SERPL-MCNC: 4.1 G/DL (ref 3.5–5.1)
ALP BLD-CCNC: 152 U/L (ref 38–126)
ALT SERPL-CCNC: 15 U/L (ref 11–66)
ANION GAP SERPL CALCULATED.3IONS-SCNC: 11 MEQ/L (ref 8–16)
AST SERPL-CCNC: 15 U/L (ref 5–40)
BASOPHILS # BLD: 0.6 %
BASOPHILS ABSOLUTE: 0.1 THOU/MM3 (ref 0–0.1)
BILIRUB SERPL-MCNC: 0.6 MG/DL (ref 0.3–1.2)
BUN BLDV-MCNC: 13 MG/DL (ref 7–22)
CALCIUM SERPL-MCNC: 9.9 MG/DL (ref 8.5–10.5)
CHLORIDE BLD-SCNC: 106 MEQ/L (ref 98–111)
CHOLESTEROL, TOTAL: 159 MG/DL (ref 100–199)
CO2: 22 MEQ/L (ref 23–33)
CREAT SERPL-MCNC: 0.7 MG/DL (ref 0.4–1.2)
EOSINOPHIL # BLD: 1.9 %
EOSINOPHILS ABSOLUTE: 0.2 THOU/MM3 (ref 0–0.4)
ERYTHROCYTE [DISTWIDTH] IN BLOOD BY AUTOMATED COUNT: 14.3 % (ref 11.5–14.5)
ERYTHROCYTE [DISTWIDTH] IN BLOOD BY AUTOMATED COUNT: 49.9 FL (ref 35–45)
GFR SERPL CREATININE-BSD FRML MDRD: 85 ML/MIN/1.73M2
GLUCOSE BLD-MCNC: 177 MG/DL (ref 70–108)
HCT VFR BLD CALC: 48.8 % (ref 37–47)
HDLC SERPL-MCNC: 33 MG/DL
HEMOGLOBIN: 15.6 GM/DL (ref 12–16)
IMMATURE GRANS (ABS): 0.03 THOU/MM3 (ref 0–0.07)
IMMATURE GRANULOCYTES: 0.3 %
LDL CHOLESTEROL CALCULATED: 92 MG/DL
LYMPHOCYTES # BLD: 32.9 %
LYMPHOCYTES ABSOLUTE: 3.3 THOU/MM3 (ref 1–4.8)
MCH RBC QN AUTO: 30.5 PG (ref 26–33)
MCHC RBC AUTO-ENTMCNC: 32 GM/DL (ref 32.2–35.5)
MCV RBC AUTO: 95.3 FL (ref 81–99)
MONOCYTES # BLD: 7.2 %
MONOCYTES ABSOLUTE: 0.7 THOU/MM3 (ref 0.4–1.3)
NUCLEATED RED BLOOD CELLS: 0 /100 WBC
PLATELET # BLD: 231 THOU/MM3 (ref 130–400)
PMV BLD AUTO: 11.3 FL (ref 9.4–12.4)
POTASSIUM SERPL-SCNC: 4.2 MEQ/L (ref 3.5–5.2)
RBC # BLD: 5.12 MILL/MM3 (ref 4.2–5.4)
SEG NEUTROPHILS: 57.1 %
SEGMENTED NEUTROPHILS ABSOLUTE COUNT: 5.8 THOU/MM3 (ref 1.8–7.7)
SODIUM BLD-SCNC: 139 MEQ/L (ref 135–145)
TOTAL PROTEIN: 7.2 G/DL (ref 6.1–8)
TRIGL SERPL-MCNC: 168 MG/DL (ref 0–199)
WBC # BLD: 10.1 THOU/MM3 (ref 4.8–10.8)

## 2021-07-13 PROCEDURE — 80053 COMPREHEN METABOLIC PANEL: CPT

## 2021-07-13 PROCEDURE — 80061 LIPID PANEL: CPT

## 2021-07-13 PROCEDURE — 36415 COLL VENOUS BLD VENIPUNCTURE: CPT

## 2021-07-13 PROCEDURE — 85025 COMPLETE CBC W/AUTO DIFF WBC: CPT

## 2021-07-13 RX ORDER — VENLAFAXINE HYDROCHLORIDE 150 MG/1
150 CAPSULE, EXTENDED RELEASE ORAL DAILY
Qty: 90 CAPSULE | Refills: 3 | Status: SHIPPED | OUTPATIENT
Start: 2021-07-13 | End: 2021-11-09

## 2021-07-13 NOTE — TELEPHONE ENCOUNTER
----- Message from ALLEN Wood CNP sent at 7/13/2021 12:08 PM EDT -----  Let pt know most labs are stable, her elevated fasting sugar was elevated at 177 which is high,  continue good diabetic control of sugars, renal function stable. Cholesterol is much better but triglycerides are worse, probably due to high sugars, is she still taking her cholesterol medication?

## 2021-07-22 ENCOUNTER — TELEPHONE (OUTPATIENT)
Dept: FAMILY MEDICINE CLINIC | Age: 61
End: 2021-07-22

## 2021-07-22 NOTE — LETTER
[id number] [address] [place] [address]. [facility] [address]  Phone: [phone]  Fax: [phone]    ALLEN Carrera CNP        July 22, 2021     Patient: Miranda Nick   YOB: 1960   Date of Visit: 7/22/2021       To Whom It May Concern: It is my medical opinion that Jacques Bhatti patient needs to work 20 hours a week due to current health condition. .    If you have any questions or concerns, please don't hesitate to call.     Sincerely,        ALLEN Carrera CNP

## 2021-07-22 NOTE — TELEPHONE ENCOUNTER
----- Message from Yvan Reillyenthal sent at 7/22/2021 10:53 AM EDT -----  Subject: Message to Provider    QUESTIONS  Information for Provider? She would like a letter typed up from the doctor   saying she can only work 12 hours a week because of the stress from her   job.  ---------------------------------------------------------------------------  --------------  Brennen NICOLE  What is the best way for the office to contact you? OK to leave message on   voicemail  Preferred Call Back Phone Number? [phone]  ---------------------------------------------------------------------------  --------------  SCRIPT ANSWERS  Relationship to Patient?  Self

## 2021-07-23 ENCOUNTER — TELEPHONE (OUTPATIENT)
Dept: FAMILY MEDICINE CLINIC | Age: 61
End: 2021-07-23

## 2021-07-23 NOTE — TELEPHONE ENCOUNTER
I am unable to do this , as this is not indicated. Pt had 2 weeks off of work to help adjust and I have given her a reduced work schedule of 20 hours.  LEt me now if she needs anything else

## 2021-07-23 NOTE — TELEPHONE ENCOUNTER
----- Message from Mega Mcmillan sent at 7/23/2021  9:39 AM EDT -----  Subject: Message to Provider    QUESTIONS  Information for Provider? In follow up to previous encounter from 7/22? Pt   now wants the letter to state that she is only able to work 12 hours a   week, not 21. PLease follow up   ---------------------------------------------------------------------------  --------------  CALL BACK INFO  What is the best way for the office to contact you? OK to leave message on   voicemail  Preferred Call Back Phone Number? [phone]  ---------------------------------------------------------------------------  --------------  SCRIPT ANSWERS  Relationship to Patient?  Self

## 2021-07-25 DIAGNOSIS — E11.59 TYPE 2 DIABETES MELLITUS WITH OTHER CIRCULATORY COMPLICATION, WITH LONG-TERM CURRENT USE OF INSULIN (HCC): ICD-10-CM

## 2021-07-25 DIAGNOSIS — Z79.4 TYPE 2 DIABETES MELLITUS WITH OTHER CIRCULATORY COMPLICATION, WITH LONG-TERM CURRENT USE OF INSULIN (HCC): ICD-10-CM

## 2021-07-26 RX ORDER — FLASH GLUCOSE SENSOR
KIT MISCELLANEOUS
Qty: 2 EACH | Refills: 3 | Status: SHIPPED | OUTPATIENT
Start: 2021-07-26 | End: 2021-11-05

## 2021-08-10 DIAGNOSIS — E11.9 TYPE 2 DIABETES MELLITUS WITHOUT COMPLICATION, WITH LONG-TERM CURRENT USE OF INSULIN (HCC): ICD-10-CM

## 2021-08-10 DIAGNOSIS — Z79.4 TYPE 2 DIABETES MELLITUS WITHOUT COMPLICATION, WITH LONG-TERM CURRENT USE OF INSULIN (HCC): ICD-10-CM

## 2021-08-10 RX ORDER — BLOOD SUGAR DIAGNOSTIC
STRIP MISCELLANEOUS
Qty: 100 STRIP | Refills: 3 | Status: SHIPPED | OUTPATIENT
Start: 2021-08-10 | End: 2022-09-12

## 2021-08-10 NOTE — TELEPHONE ENCOUNTER
Recent Visits  Date Type Provider Dept   07/12/21 Office Visit ALLEN Hyde CNP [facility]   06/08/21 Office Visit ALLEN Hyde CNP [facility]   02/23/21 Office Visit ALLEN Hyde CNP [facility]   07/29/20 Office Visit ALLEN Hyde CNP [facility]   06/22/20 Office Visit ALLEN Hyde CNP [facility]   03/02/20 Office Visit ALLEN Hyde CNP [facility]   Showing recent visits within past 540 days with a meds authorizing provider and meeting all other requirements  Future Appointments  Date Type Provider Dept   09/13/21 Appointment ALLEN Hyde CNP [facility]   Showing future appointments within next 150 days with a meds authorizing provider and meeting all other requirements    Future Appointments   Date Time Provider Scarlett Ibarra   8/19/2021 10:15 AM STR [facility] ROOM 2 STR[facility] RAD STR Radiolog   8/20/2021  9:00 AM STR [facility]O ROOM 4 STRZ RAD STR Radiolog   9/13/2021  8:40 AM STR CT IMAGING RM1  [facility] STR[facility] OUT EXP STR Radiolog   9/13/2021  4:00 PM ALLEN Hyde CNP [facility] [place] AM [email or web address]   9/20/2021  8:30 AM ALLEN Gresham  [address] [place] [address]

## 2021-08-23 DIAGNOSIS — E11.59 TYPE 2 DIABETES MELLITUS WITH OTHER CIRCULATORY COMPLICATION, WITH LONG-TERM CURRENT USE OF INSULIN (HCC): ICD-10-CM

## 2021-08-23 DIAGNOSIS — Z79.4 TYPE 2 DIABETES MELLITUS WITH OTHER CIRCULATORY COMPLICATION, WITH LONG-TERM CURRENT USE OF INSULIN (HCC): ICD-10-CM

## 2021-08-23 RX ORDER — INSULIN DETEMIR 100 [IU]/ML
INJECTION, SOLUTION SUBCUTANEOUS
Qty: 18 ML | Refills: 6 | Status: SHIPPED | OUTPATIENT
Start: 2021-08-23 | End: 2021-10-18

## 2021-08-24 ENCOUNTER — OFFICE VISIT (OUTPATIENT)
Dept: FAMILY MEDICINE CLINIC | Age: 61
End: 2021-08-24
Payer: MEDICARE

## 2021-08-24 VITALS
HEART RATE: 99 BPM | SYSTOLIC BLOOD PRESSURE: 118 MMHG | HEIGHT: 67 IN | DIASTOLIC BLOOD PRESSURE: 70 MMHG | RESPIRATION RATE: 14 BRPM | BODY MASS INDEX: 41.84 KG/M2 | TEMPERATURE: 97.5 F | OXYGEN SATURATION: 96 % | WEIGHT: 266.6 LBS

## 2021-08-24 DIAGNOSIS — R53.83 FATIGUE, UNSPECIFIED TYPE: ICD-10-CM

## 2021-08-24 DIAGNOSIS — K13.6 IRRITATION OF ORAL CAVITY: ICD-10-CM

## 2021-08-24 DIAGNOSIS — J34.89 SINUS DRAINAGE: ICD-10-CM

## 2021-08-24 DIAGNOSIS — B37.0 ORAL THRUSH: Primary | ICD-10-CM

## 2021-08-24 PROCEDURE — 99214 OFFICE O/P EST MOD 30 MIN: CPT | Performed by: NURSE PRACTITIONER

## 2021-08-24 RX ORDER — CETIRIZINE HYDROCHLORIDE 10 MG/1
10 TABLET ORAL DAILY
Qty: 90 TABLET | Refills: 1 | Status: SHIPPED | OUTPATIENT
Start: 2021-08-24 | End: 2022-02-08 | Stop reason: SDUPTHER

## 2021-08-24 ASSESSMENT — ENCOUNTER SYMPTOMS
SORE THROAT: 1
EYE DISCHARGE: 1
PHOTOPHOBIA: 0
RESPIRATORY NEGATIVE: 1
TROUBLE SWALLOWING: 0
VOICE CHANGE: 0
ABDOMINAL DISTENTION: 0
RHINORRHEA: 1
ABDOMINAL PAIN: 0

## 2021-08-24 NOTE — PROGRESS NOTES
[facility]  [address] PATEL [facility] [place] [address]  Dept: [phone]  Dept Fax: [phone]  Loc: [phone]    Christina Gan is a 63 y.o. femalewho presents today for her medical conditions/complaints as noted below. Saira Nation c/o of Dysphagia (the back of her tongue and all down her thorat is filled with nasty feeling bumps. pt states she did not have a toothbrush with her and in a way used her finger to brush her teeth ) and Fatigue (has been very tired since the throat issue has started )      HPI:      Pt here due to her tongue being tingly and painful,  States her throat is also sore and hurts to swallow. She sees bump at the base of her tongue and a coating on her tongue. She is current with GI and needs to have a barium swallow due to feeling as though there is something stuck in her throat. Pt denies N/V or abdominal pain.     taking her meds as ordered. Has a lot of sinus drainage and congestion   Takes flonase and has not been taking allergy meds. Will order denies facial pain or pressure. Feels drainage down the back of her throat. Has felt tired since having drainage, states her sugars are doing well in low 100 range fasting. Does not snore, not sleeping more than normal  Lab Results       Component                Value               Date                       TSH                      2.960               05/08/2019                 T4FREE                   1. 10                11/09/2018                   Current Outpatient Medications   Medication Sig Dispense Refill    cetirizine (ZYRTEC) 10 MG tablet Take 1 tablet by mouth daily 90 tablet 1    nystatin (MYCOSTATIN) 024205 UNIT/ML suspension Take 5 mLs by mouth 4 times daily 473 mL 1    insulin detemir (LEVEMIR FLEXTOUCH) 100 UNIT/ML injection pen ADMINISTER 60 UNITS UNDER THE SKIN EVERY NIGHT 18 mL 6    blood glucose test strips (ACCU-CHEK DAVIDA PLUS) strip USE TO TEST BLOOD SUGAR ONCE DAILY 100 strip 3    Continuous Blood Gluc Sensor (FREESTYLE MICKI 14 DAY SENSOR) [facility] APPLY 1 SENSOR EXTERNALLY EVERY 14 DAYS 2 each 3    venlafaxine (EFFEXOR XR) 150 MG extended release capsule TAKE 1 CAPSULE BY MOUTH DAILY 90 capsule 3    dexlansoprazole (DEXILANT) 30 MG CPDR delayed release capsule Take 30 mg by mouth daily 90 capsule 1    venlafaxine (EFFEXOR XR) 37.5 MG extended release capsule Take 1 capsule by mouth daily To take with effexor 150 mg daily 90 capsule 1    pregabalin (LYRICA) 300 MG capsule TAKE 1 CAPSULE BY MOUTH TWICE DAILY 180 capsule 1    fluticasone (FLONASE) 50 MCG/ACT nasal spray 1 spray by Each Nostril route daily 2 Bottle 1    insulin lispro, 1 Unit Dial, 100 UNIT/ML SOPN INJECT 12 UNITS UNDER THE SKIN THREE TIMES DAILY WITH MEALS PLUS SLIDING SCALE( MAX OF 60 UNITS DAILY)  Only taking 12 units in AM an HS 15 mL 3    MUCUS RELIEF 600 MG extended release tablet TAKE 1 TABLET BY MOUTH TWICE DAILY 120 tablet 2    atorvastatin (LIPITOR) 20 MG tablet TAKE 1 TABLET BY MOUTH DAILY 90 tablet 3    Insulin Pen Needle (B-D ULTRAFINE III SHORT PEN) 31G X 8 MM MISC USE AS DIRECTED ONCE DAILY 100 each 2    VICTOZA 18 MG/3ML SOPN SC injection ADMINISTER 1.8 MG UNDER THE SKIN DAILY 9 mL 3    diclofenac sodium (VOLTAREN) 1 % GEL diclofenac 1 % topical gel      Continuous Blood Gluc Sensor (FREESTYLE MICKI 14 DAY SENSOR) [facility] FreeStyle Micki 14 Day Sensor kit   APPLY 1 SENSOR EXTERNALLY EVERY 14 DAYS      albuterol sulfate  (90 Base) MCG/ACT inhaler INHALE 2 PUFFS INTO THE LUNGS EVERY 6 HOURS AS NEEDED FOR WHEEZING 8.5 g 2    umeclidinium-vilanterol (ANORO ELLIPTA) 62.5-25 MCG/INH AEPB inhaler Inhale 1 puff into the lungs daily 30 puff 11    Continuous Blood Gluc  (FREESTYLE MICKI 14 DAY READER) [facility] Check blood sugars daily 1 Device 0    traZODone (DESYREL) 150 MG tablet TAKE 1 TABLET BY MOUTH EVERY NIGHT 90 tablet 3    Handicap Placard SURGICAL HISTORY  Aug 5, 2013    Left knee total arthroplasty (Dr. Tera Ryder, [facility])    CO DEBRIDEMENT OPEN WOUND 20 SQ CM< Right 2018    INCISIONAL DEBRIEDMENT INCISION AND DRAINAGE RIGHT GLUTEAL ABCESS performed by González Nance MD at [address] OFFICE/OUTPT [address] Right 2018    EXCISIONAL DEBRIDEMENT OF RIGHT BUTTOCKS WOUND performed by Chrissy Schmitt DO at [address] Left     little toe bone removed    TONSILLECTOMY      TOTAL KNEE ARTHROPLASTY Right 2016    UPPER GASTROINTESTINAL ENDOSCOPY      UPPER GASTROINTESTINAL ENDOSCOPY Left 2019    EGD DILATION SAVORY performed by Kieran Mead MD [facility]     Family History   Problem Relation Age of Onset    Arthritis Mother     Heart Disease Mother         CHF    High Blood Pressure Mother     Kidney Disease Mother     Cancer Father     Heart Disease Sister         diastolic dysfunction    Other Sister         thyroid cysts    Cancer Brother     Kidney Disease Brother     Diabetes Brother     Kidney Disease Brother     Colon Cancer Neg Hx      Social History     Tobacco Use    Smoking status: Current Every Day Smoker     Packs/day: 1.00     Years: 35.00     Pack years: 35.00     Types: Cigarettes     Start date: 1982     Last attempt to quit: 11/15/2015     Years since quittin.7    Smokeless tobacco: Never Used    Tobacco comment: 2-4 cigarettes/day    Substance Use Topics    Alcohol use: No     Comment: Sober for 26 years        Allergies   Allergen Reactions    Latex Itching and Rash       Health Maintenance   Topic Date Due    Pneumococcal 0-64 years Vaccine (1 of 2 - PPSV23) Never done    COVID-19 Vaccine (1) Never done    Shingles Vaccine (2 of 3) 2016    Flu vaccine (1) 2021    Low dose CT lung screening  2021    Breast cancer screen  2021    Diabetic retinal exam  2021    Diabetic microalbuminuria test  2022    A1C test (Diabetic or Prediabetic)  06/08/2022    Annual Wellness Visit (AWV)  06/09/2022    Diabetic foot exam  07/12/2022    Lipid screen  07/13/2022    DTaP/Tdap/Td vaccine (2 - Td or Tdap) 07/18/2024    Colon cancer screen colonoscopy  07/16/2031    Hepatitis C screen  Completed    HIV screen  Completed    Hepatitis A vaccine  Aged Out    Hib vaccine  Aged Out    Meningococcal (ACWY) vaccine  Aged Out       Subjective:      Review of Systems   Constitutional: Positive for fatigue. Negative for chills and fever. HENT: Positive for congestion (nasal ), rhinorrhea and sore throat. Negative for dental problem, tinnitus, trouble swallowing and voice change. Feels like something at the back of her throat    Eyes: Positive for discharge (clear). Negative for photophobia and visual disturbance. Respiratory: Negative. Cardiovascular: Negative. Gastrointestinal: Negative for abdominal distention and abdominal pain. Genitourinary: Negative for difficulty urinating and dysuria. Skin: Negative. Allergic/Immunologic: Positive for environmental allergies. Neurological: Negative for dizziness, facial asymmetry, weakness and headaches. Psychiatric/Behavioral: Negative for self-injury, sleep disturbance and suicidal ideas. Objective:      /70   Pulse 99   Temp 97.5 °F (36.4 °C) (Oral)   Resp 14   Ht 5' 7\" (1.702 m)   Wt 266 lb 9.6 oz (120.9 kg)   SpO2 96%   BMI 41.76 kg/m²      Physical Exam  Vitals and nursing note reviewed. Constitutional:       Appearance: She is not ill-appearing. HENT:      Right Ear: Tympanic membrane, ear canal and external ear normal.      Left Ear: Tympanic membrane, ear canal and external ear normal.      Nose: Congestion present. Mouth/Throat:      Comments: Enlarged taste buds at base of tongue no erythema or open sores, no oral lesions noted. Tongue noted to have white thick coating.  Posterior oropharynx with erythema and irritation, no anterior cervical lymphadenopathy    Cardiovascular:      Rate and Rhythm: Normal rate and regular rhythm. Pulses: Normal pulses. Heart sounds: Normal heart sounds. No murmur heard. Pulmonary:      Effort: Pulmonary effort is normal. No respiratory distress. Breath sounds: Normal breath sounds. No wheezing. Skin:     General: Skin is warm and dry. Capillary Refill: Capillary refill takes less than 2 seconds. Neurological:      General: No focal deficit present. Mental Status: She is alert. Assessment/Plan:           1. Oral thrush  Rinse mouth after using inhalers  - nystatin (MYCOSTATIN) 503074 UNIT/ML suspension; Take 5 mLs by mouth 4 times daily  Dispense: 473 mL; Refill: 1    2. Irritation of oral cavity  As above     3. Fatigue, unspecified type  Monitor  May be due to sinus and allergy flare up  Start taking zyrtec and use flonase    4. Sinus drainage  If symptoms worsen contact office in 2 days  Pt in agreement with plan   - cetirizine (ZYRTEC) 10 MG tablet; Take 1 tablet by mouth daily  Dispense: 90 tablet; Refill: 1  Needs to f/fu with GI to have her testing rescheduled. Return if symptoms worsen or fail to improve. Reccommended tobaccocessation options including pharmacologic methods, counseled great than 3 minutesduring this visit:  Yes[]  No  []       Patient given educational materials -see patient instructions. Discussed use, benefit, and side effects of prescribedmedications. All patient questions answered. Pt voiced understanding. Reviewedhealth maintenance. Instructed to continue current medications, diet and exercise. Patient agreed with treatment plan. Follow up as directed.        Electronicallysigned by ALLEN Winkler CNP on 8/24/2021 at 4:18 PM

## 2021-08-24 NOTE — PATIENT INSTRUCTIONS
Patient Education        Candidiasis: Care Instructions  Your Care Instructions  Candidiasis (say \"pnw-kuj-MI-uh-macie\") is a yeast infection. Yeast normally lives in your body. But it can cause problems if your body's defenses don't work as they should. Some medicines can increase your chance of getting a yeast infection. These include antibiotics, steroids, and cancer drugs. And some diseases like AIDS and diabetes can make you more likely to get yeast infections. There are different types of yeast infections. Irven Grumbles is a yeast infection in the mouth. It usually occurs in people with weak immune systems. It causes white patches inside the mouth and throat. Yeast infections of the skin usually occur in skin folds where the skin stays moist. They cause red, oozing patches on your skin. Babies can get these infections under the diaper. People who often wear gloves can get them on their hands. Many women get vaginal yeast infections. They are most common when women take antibiotics. These infections can cause the vagina to itch and burn. They also cause white discharge that looks like cottage cheese. In rare cases, yeast infects the blood. This can cause serious disease. This kind of infection is treated with medicine given through a needle into a vein (IV). After you start treatment, a yeast infection usually goes away quickly. But if your immune system is weak, the infection may come back. Tell your doctor if you get yeast infections often. Follow-up care is a key part of your treatment and safety. Be sure to make and go to all appointments, and call your doctor if you are having problems. It's also a good idea to know your test results and keep a list of the medicines you take. How can you care for yourself at home? · Take your medicines exactly as prescribed. Call your doctor if you think you are having a problem with your medicine. · Use antibiotics only as directed by your doctor.   · Eat yogurt with live cultures. It has bacteria called lactobacillus. It may help prevent some types of yeast infections. · Keep your skin clean and dry. Put powder on moist places. · If you are using a cream or suppository to treat a vaginal yeast infection, don't use condoms or a diaphragm. Use a different type of birth control. · Eat a healthy diet and get regular exercise. This will help keep your immune system strong. When should you call for help? Watch closely for changes in your health, and be sure to contact your doctor if:    · You do not get better as expected. Where can you learn more? Go to https://[email or web address] [email or web address]. org and sign in to your [employer] account. Enter S739 in the [employer]oying box to learn more about \"Candidiasis: Care Instructions. \"     If you do not have an account, please click on the \"Sign Up Now\" link. Current as of: February 11, 2021               Content Version: 12.9  © 2006-2021 Healthwise, [facility]. Care instructions adapted under license by [facility] ([facility]). If you have questions about a medical condition or this instruction, always ask your healthcare professional. [address] [place] [address] any warranty or liability for your use of this information.

## 2021-09-09 DIAGNOSIS — Z79.4 TYPE 2 DIABETES MELLITUS WITH OTHER CIRCULATORY COMPLICATION, WITH LONG-TERM CURRENT USE OF INSULIN (HCC): ICD-10-CM

## 2021-09-09 DIAGNOSIS — E11.59 TYPE 2 DIABETES MELLITUS WITH OTHER CIRCULATORY COMPLICATION, WITH LONG-TERM CURRENT USE OF INSULIN (HCC): ICD-10-CM

## 2021-09-09 RX ORDER — LIRAGLUTIDE 6 MG/ML
INJECTION SUBCUTANEOUS
Qty: 9 ML | Refills: 3 | Status: SHIPPED | OUTPATIENT
Start: 2021-09-09 | End: 2022-01-10

## 2021-09-13 ENCOUNTER — OFFICE VISIT (OUTPATIENT)
Dept: FAMILY MEDICINE CLINIC | Age: 61
End: 2021-09-13
Payer: MEDICARE

## 2021-09-13 VITALS
BODY MASS INDEX: 42.5 KG/M2 | WEIGHT: 270.8 LBS | HEIGHT: 67 IN | HEART RATE: 86 BPM | DIASTOLIC BLOOD PRESSURE: 54 MMHG | OXYGEN SATURATION: 95 % | TEMPERATURE: 98.3 F | RESPIRATION RATE: 14 BRPM | SYSTOLIC BLOOD PRESSURE: 110 MMHG

## 2021-09-13 DIAGNOSIS — M54.50 LUMBAR PAIN: ICD-10-CM

## 2021-09-13 DIAGNOSIS — E11.59 TYPE 2 DIABETES MELLITUS WITH OTHER CIRCULATORY COMPLICATION, WITH LONG-TERM CURRENT USE OF INSULIN (HCC): Primary | ICD-10-CM

## 2021-09-13 DIAGNOSIS — E11.42 DIABETIC POLYNEUROPATHY ASSOCIATED WITH TYPE 2 DIABETES MELLITUS (HCC): ICD-10-CM

## 2021-09-13 DIAGNOSIS — Z12.31 ENCOUNTER FOR SCREENING MAMMOGRAM FOR MALIGNANT NEOPLASM OF BREAST: ICD-10-CM

## 2021-09-13 DIAGNOSIS — Z79.4 TYPE 2 DIABETES MELLITUS WITH OTHER CIRCULATORY COMPLICATION, WITH LONG-TERM CURRENT USE OF INSULIN (HCC): Primary | ICD-10-CM

## 2021-09-13 LAB — HBA1C MFR BLD: 7.6 % (ref 4.3–5.7)

## 2021-09-13 PROCEDURE — 99214 OFFICE O/P EST MOD 30 MIN: CPT | Performed by: NURSE PRACTITIONER

## 2021-09-13 PROCEDURE — 3051F HG A1C>EQUAL 7.0%<8.0%: CPT | Performed by: NURSE PRACTITIONER

## 2021-09-13 RX ORDER — INSULIN LISPRO 100 [IU]/ML
INJECTION, SOLUTION INTRAVENOUS; SUBCUTANEOUS
Qty: 15 ML | Refills: 3
Start: 2021-09-13 | End: 2021-10-18

## 2021-09-13 ASSESSMENT — ENCOUNTER SYMPTOMS
SHORTNESS OF BREATH: 1
BACK PAIN: 1

## 2021-09-13 NOTE — PROGRESS NOTES
[facility], [facility]  [address] J[place] [address]  Dept: [phone]  Dept Fax: [phone]  Loc: [phone]    Josemanuel Gutierres is a 63 y.o. femalewho presents today for her medical conditions/complaints as noted below. Sukhdev Nation c/o of Health Maintenance (no recent mammogram), Follow-up, and Other (pt would like a new handicap placard )      HPI:      Diabetes Type 2    Glucose control:   Does patient check blood glucoses at home? Yes  Report of hypoglycemia: no  Lab Results       Component                Value               Date                       LABA1C                   7.6 (H)             09/13/2021            No results found for: EAG    Symptoms  Polyuria, Polydipsia or Polyphagia? Yes  Chest Pain, SOB, or Palpitations? -  No  New Vision complaints? No  Paresthesias of the extremities? Yes - has neuropathy of LE being managed by Blossom Blackwell is working to control the pain     Medications  Current medication were reviewed. Compliant with medications? yes  Medication side effects? No  On ACE-I or ARB? No  On antiplatelet therapy? Yes  On Statin? Yes    Last Diabetic Eye Exam: within the year    Exercise  Exercise? No  Wt Readings from Last 3 Encounters:  09/13/21 : 270 lb 12.8 oz (122.8 kg)  08/24/21 : 266 lb 9.6 oz (120.9 kg)  07/12/21 : 270 lb (122.5 kg)      Diet discipline?:  Low salt, fat, sugar diet?   Yes Lab Results       Component                Value               Date                       LABA1C                   7.6 (H)             09/13/2021            No results found for: EAG stable we reviewed her insulin regimen and I checked her lane meter, meal time sugars in 200 range, taking 12 units tid plus scale will increase to 14 units tid pt in agreement    Blood pressure control:  BP Readings from Last 3 Encounters:  09/13/21 : (!) 110/54  08/24/21 : 118/70  07/12/21 : (!) 116/58      Lab Results Component                Value               Date                       LABMICR                  2.14                02/23/2021              Lab Results       Component                Value               Date                       LDLCALC                  92                  07/13/2021                Chronic Pain    HPI:    Patient has chronic pain of the feet. Pain control: adequate  Improvement in function and ADLs? yes  Present for: approximately 6 months  Current Medications:    Escalation of dosage recently?  no    Evidence for abuse or diversion? no   Seen specialist or pain clinic?: no  Last UDS:  2020  Pain contract: no   will update at next visit  Controlled Substances Monitoring:  Controlled substances monitoring: possible medication side effects, risk of tolerance and/or dependence, and alternative treatments discussed, no signs of potential drug abuse or diversion identified and [place] report reviewed today- activity consistent with treatment plan. Pt needs handicap placard update, uses due to lumbar pain lyrica also helps to control her lumbar pain.           Current Outpatient Medications   Medication Sig Dispense Refill    insulin lispro, 1 Unit Dial, 100 UNIT/ML SOPN INJECT 14 UNITS UNDER THE SKIN THREE TIMES DAILY WITH MEALS PLUS SLIDING SCALE( MAX OF 60 UNITS DAILY) 15 mL 3    Handicap Placard MISC by Does not apply route Duration: three years 1 each 0    VICTOZA 18 MG/3ML SOPN SC injection ADMINISTER 1.8 MG UNDER THE SKIN DAILY 9 mL 3    cetirizine (ZYRTEC) 10 MG tablet Take 1 tablet by mouth daily 90 tablet 1    insulin detemir (LEVEMIR FLEXTOUCH) 100 UNIT/ML injection pen ADMINISTER 60 UNITS UNDER THE SKIN EVERY NIGHT (Patient taking differently: ADMINISTER 70 UNITS UNDER THE SKIN EVERY NIGHT) 18 mL 6    blood glucose test strips (ACCU-CHEK DAVIDA PLUS) strip USE TO TEST BLOOD SUGAR ONCE DAILY 100 strip 3    Continuous Blood Gluc Sensor (FREESTYLE MICKI 14 DAY SENSOR) MISC APPLY 1 SENSOR EXTERNALLY EVERY 14 DAYS 2 each 3    venlafaxine (EFFEXOR XR) 150 MG extended release capsule TAKE 1 CAPSULE BY MOUTH DAILY 90 capsule 3    dexlansoprazole (DEXILANT) 30 MG CPDR delayed release capsule Take 30 mg by mouth daily 90 capsule 1    venlafaxine (EFFEXOR XR) 37.5 MG extended release capsule Take 1 capsule by mouth daily To take with effexor 150 mg daily 90 capsule 1    pregabalin (LYRICA) 300 MG capsule TAKE 1 CAPSULE BY MOUTH TWICE DAILY 180 capsule 1    fluticasone (FLONASE) 50 MCG/ACT nasal spray 1 spray by Each Nostril route daily 2 Bottle 1    MUCUS RELIEF 600 MG extended release tablet TAKE 1 TABLET BY MOUTH TWICE DAILY 120 tablet 2    atorvastatin (LIPITOR) 20 MG tablet TAKE 1 TABLET BY MOUTH DAILY 90 tablet 3    Insulin Pen Needle (B-D ULTRAFINE III SHORT PEN) 31G X 8 MM MISC USE AS DIRECTED ONCE DAILY 100 each 2    diclofenac sodium (VOLTAREN) 1 % GEL diclofenac 1 % topical gel      Continuous Blood Gluc Sensor (Healthy HarvestSTYLE MICKI 14 DAY SENSOR) [facility] FreeStyle Micki 14 Day Sensor kit   APPLY 1 SENSOR EXTERNALLY EVERY 14 DAYS      albuterol sulfate  (90 Base) MCG/ACT inhaler INHALE 2 PUFFS INTO THE LUNGS EVERY 6 HOURS AS NEEDED FOR WHEEZING 8.5 g 2    umeclidinium-vilanterol (ANORO ELLIPTA) 62.5-25 MCG/INH AEPB inhaler Inhale 1 puff into the lungs daily 30 puff 11    Continuous Blood Gluc  (FREESTYLE MICKI 14 DAY READER) PAOLA Check blood sugars daily 1 Device 0    traZODone (DESYREL) 150 MG tablet TAKE 1 TABLET BY MOUTH EVERY NIGHT 90 tablet 3    Handicap Placard [facility] by Does not apply route Duration: three years    Type II idabetes  Diabetic neuropathy 1 each 0    HYDROcodone-acetaminophen (NORCO) 5-325 MG per tablet Take 1 tablet by mouth every 8 hours as needed for Pain. Wendy Moody traMADol (ULTRAM) 50 MG tablet Take 100 mg by mouth 3 times daily.        OXYGEN Inhale 2 L into the lungs       Elastic Bandages & Supports (WRIST BRACE/RIGHT MEDIUM) MISC Wear brace on right wrist nightly 1 each 0    Cholecalciferol (VITAMIN D3) 2000 units CAPS Take by mouth daily      rOPINIRole (REQUIP) 1 MG tablet Take 1 mg by mouth nightly       acetaminophen 650 MG TABS Take 650 mg by mouth every 4 hours as needed for Pain (For mild pain level 1-3 or for fever > 100.5). 120 tablet     nystatin (MYCOSTATIN) 600236 UNIT/ML suspension Take 5 mLs by mouth 4 times daily (Patient not taking: Reported on 2021) 473 mL 1     No current facility-administered medications for this visit.           Past Medical History:   Diagnosis Date    Anxiety     Arthritis     Carpal tunnel syndrome     Cellulitis     Chronic back pain     Depression     Diabetes mellitus ([address]     Emphysema of lung (HCC)     Fibromyalgia     GERD (gastroesophageal reflux disease)     Glaucoma     Hiatal hernia     esophagus closes    Hx of blood clots     foot post op    Hyperlipidemia, mixed 2017    IBS (irritable bowel syndrome)     Leg pain     nerve damage right leg    MDRO (multiple drug resistant organisms) resistance     wound and nasal    Guzman's neuroma     left foot    Nausea & vomiting     Neuropathic pain     Osteoarthritis     PONV (postoperative nausea and vomiting)     Pulmonary emphysema ([address] 2017    Sleep apnea     DOES NOT USE CPAP      Past Surgical History:   Procedure Laterality Date    ABDOMEN SURGERY      APPENDECTOMY      BACK SURGERY      lower x 3    CERVICAL FUSION       SECTION      x3    CHOLECYSTECTOMY      COLONOSCOPY      ECTOPIC PREGNANCY SURGERY      EYE SURGERY Bilateral     LASER FOR GLAUCOMA    HYSTERECTOMY, TOTAL ABDOMINAL      JOINT REPLACEMENT      OTHER SURGICAL HISTORY  Aug 5, 2013    Left knee total arthroplasty (Dr. Abdirizak [facility], [facility])    [place] DEBRIDEMENT OPEN WOUND 20 SQ CM< Right 2018    INCISIONAL DEBRIEDMENT INCISION AND DRAINAGE RIGHT GLUTEAL ABCESS performed by Kiesha Pillai MD at [address] [place] [address] OFFICE/OUTPT VISIT,PROCEDURE ONLY Right 2018    EXCISIONAL DEBRIDEMENT OF RIGHT BUTTOCKS WOUND performed by Quan Miller DO at [address] Left     little toe bone removed    TONSILLECTOMY      TOTAL KNEE ARTHROPLASTY Right 2016    UPPER GASTROINTESTINAL ENDOSCOPY      UPPER GASTROINTESTINAL ENDOSCOPY Left 2019    EGD DILATION SAVORY performed by Cachorro June MD at [facility]     Family History   Problem Relation Age of Onset    Arthritis Mother     Heart Disease Mother         CHF    High Blood Pressure Mother     Kidney Disease Mother     Cancer Father     Heart Disease Sister         diastolic dysfunction    Other Sister         thyroid cysts    Cancer Brother     Kidney Disease Brother     Diabetes Brother     Kidney Disease Brother     Colon Cancer Neg Hx      Social History     Tobacco Use    Smoking status: Current Every Day Smoker     Packs/day: 1.00     Years: 35.00     Pack years: 35.00     Types: Cigarettes     Start date: 1982     Last attempt to quit: 11/15/2015     Years since quittin.8    Smokeless tobacco: Never Used    Tobacco comment: 2-4 cigarettes/day    Substance Use Topics    Alcohol use: No     Comment: Sober for 26 years        Allergies   Allergen Reactions    Latex Itching and Rash       Health Maintenance   Topic Date Due    Pneumococcal 0-64 years Vaccine (1 of 2 - PPSV23) Never done    COVID-19 Vaccine (1) Never done    Shingles Vaccine (2 of 3) 2016    Flu vaccine (1) Never done    Low dose CT lung screening  2021    Breast cancer screen  2021    Diabetic retinal exam  2021    Diabetic microalbuminuria test  2022    Annual Wellness Visit (AWV)  2022    Diabetic foot exam  2022    Lipid screen  2022    A1C test (Diabetic or Prediabetic)  2022    DTaP/Tdap/Td vaccine (2 - Td or Tdap) 2024    Colon cancer screen colonoscopy  2031    Hepatitis C screen Completed    HIV screen  Completed    Hepatitis A vaccine  Aged Out    Hib vaccine  Aged Out    Meningococcal (ACWY) vaccine  Aged Out       Subjective:      Review of Systems   Constitutional: Negative for chills, fatigue, fever and unexpected weight change. Respiratory: Positive for shortness of breath (with exertion, needs handicap placard for this ). Cardiovascular: Negative. Endocrine: Negative for polydipsia, polyphagia and polyuria. Musculoskeletal: Positive for arthralgias, back pain, gait problem and myalgias. Negative for joint swelling. Skin: Negative. Neurological: Negative for dizziness, facial asymmetry and headaches. Psychiatric/Behavioral: Negative for self-injury, sleep disturbance and suicidal ideas. Objective:      BP (!) 110/54   Pulse 86   Temp 98.3 °F (36.8 °C) (Oral)   Resp 14   Ht 5' 6.5\" (1.689 m)   Wt 270 lb 12.8 oz (122.8 kg)   SpO2 95%   BMI 43.05 kg/m²      Physical Exam  Vitals and nursing note reviewed. Constitutional:       Appearance: She is not ill-appearing. Cardiovascular:      Rate and Rhythm: Normal rate and regular rhythm. Pulses: Normal pulses. Heart sounds: Normal heart sounds. No murmur heard. Pulmonary:      Effort: Pulmonary effort is normal. No respiratory distress. Breath sounds: Normal breath sounds. No wheezing. Musculoskeletal:      Right lower leg: No edema. Left lower leg: No edema. Skin:     General: Skin is warm and dry. Capillary Refill: Capillary refill takes less than 2 seconds. Neurological:      Mental Status: She is alert. Psychiatric:         Mood and Affect: Mood normal.         Behavior: Behavior normal.         Thought Content: Thought content normal.         Judgment: Judgment normal.          Assessment/Plan:           1.  Type 2 diabetes mellitus with other circulatory complication, with long-term current use of insulin (HCC)  Stable  Increase lispro for better mealtime control  14 units tid plus scale  - insulin lispro, 1 Unit Dial, 100 UNIT/ML SOPN; INJECT 14 UNITS UNDER THE SKIN THREE TIMES DAILY WITH MEALS PLUS SLIDING SCALE( MAX OF 60 UNITS DAILY)  Dispense: 15 mL; Refill: 3  - Handicap placard  - Handicap Placard MISC; by Does not apply route Duration: three years  Dispense: 1 each; Refill: 0    2. Lumbar pain  Controlled with current  Plan  lyrica  - Handicap placard  - Handicap Placard MISC; by Does not apply route Duration: three years  Dispense: 1 each; Refill: 0    3. Encounter for screening mammogram for malignant neoplasm of breast    - HERRERA DIGITAL SCREEN W OR WO CAD BILATERAL; Future    4. Diabetic polyneuropathy associated with type 2 diabetes mellitus ([address]  Controlled with lyrica  Continue same meds  Pt in agreement      Return in about 3 months (around 12/13/2021) for A1C. Reccommended tobaccocessation options including pharmacologic methods, counseled great than 3 minutesduring this visit:  Yes[]  No  []       Patient given educational materials -see patient instructions. Discussed use, benefit, and side effects of prescribedmedications. All patient questions answered. Pt voiced understanding. Reviewedhealth maintenance. Instructed to continue current medications, diet and exercise. Patient agreed with treatment plan. Follow up as directed.        Electronicallysigned by ALLEN Grimm CNP on 9/13/2021 at 5:46 PM

## 2021-09-21 ENCOUNTER — TELEPHONE (OUTPATIENT)
Dept: FAMILY MEDICINE CLINIC | Age: 61
End: 2021-09-21

## 2021-09-21 ENCOUNTER — OFFICE VISIT (OUTPATIENT)
Dept: FAMILY MEDICINE CLINIC | Age: 61
End: 2021-09-21
Payer: MEDICARE

## 2021-09-21 VITALS
TEMPERATURE: 97.8 F | WEIGHT: 271 LBS | RESPIRATION RATE: 14 BRPM | DIASTOLIC BLOOD PRESSURE: 70 MMHG | HEIGHT: 67 IN | SYSTOLIC BLOOD PRESSURE: 118 MMHG | HEART RATE: 78 BPM | BODY MASS INDEX: 42.53 KG/M2

## 2021-09-21 DIAGNOSIS — K14.6 TONGUE PAIN: Primary | ICD-10-CM

## 2021-09-21 DIAGNOSIS — R13.19 ESOPHAGEAL DYSPHAGIA: ICD-10-CM

## 2021-09-21 DIAGNOSIS — R49.0 HOARSENESS OF VOICE: ICD-10-CM

## 2021-09-21 DIAGNOSIS — K14.8 LESION OF TONGUE: ICD-10-CM

## 2021-09-21 PROCEDURE — 99214 OFFICE O/P EST MOD 30 MIN: CPT | Performed by: NURSE PRACTITIONER

## 2021-09-21 ASSESSMENT — ENCOUNTER SYMPTOMS
RESPIRATORY NEGATIVE: 1
TROUBLE SWALLOWING: 1

## 2021-09-21 NOTE — TELEPHONE ENCOUNTER
Patient calling requesting an appointment due to medication for thrush not working and has now developed bumps inside of her mouth in her throat  Future Appointments   Date Time Provider Scralett Ibarra   9/21/2021  3:40 PM ALLEN Membreno - [id number] [address] MITCHELL HUANG  [facility]   9/29/2021  3:00 PM STR CT IMAGING RM1  [facility] OUT EXP STR Radiolog   11/12/2021  9:40 AM ALLEN Rosenthal - CNP [facility] [place]  [facility]   12/13/2021 11:00 AM Joey Membreno 19

## 2021-09-21 NOTE — PROGRESS NOTES
[facility] MEDICINE  [address] PATEL [facility] [place] [address]  Dept: [phone]  Dept Fax: [phone]  Loc: [phone]    Caterina Soto is a 63 y.o. femalewho presents today for her medical conditions/complaints as noted below. Glenys Nation c/o of Other (bumps on tongue, x2weeks. in throat. making it hard to swallow. feels her throat might be swollen. was treated for thrush. took all of the meds and feels it is getting worse)      HPI:      Pt presents today in f/u of tongue lesions. Pt had presented with tongue lesion and burning tongue dn given nystatin oral but symptoms have not improved. Pt states the bumps at the base of her tongue have gotten larger and are tender to touch also states she now has lesions under her tongue. Gregorio any fevers, has a hoarse voice which is not new. Pt also admits to dysphagia which is not new has been vomiting up foods, no weight loss,  has had her esophagus stretched in the past. I referred her to Gi for this and they ordered esophagram and barium swallow and pt was not compliant and did not do the tests.  I advised she get them rescheduled          Current Outpatient Medications   Medication Sig Dispense Refill    insulin lispro, 1 Unit Dial, 100 UNIT/ML SOPN INJECT 14 UNITS UNDER THE SKIN THREE TIMES DAILY WITH MEALS PLUS SLIDING SCALE( MAX OF 60 UNITS DAILY) 15 mL 3    Handicap Placard MISC by Does not apply route Duration: three years 1 each 0    VICTOZA 18 MG/3ML SOPN SC injection ADMINISTER 1.8 MG UNDER THE SKIN DAILY 9 mL 3    cetirizine (ZYRTEC) 10 MG tablet Take 1 tablet by mouth daily 90 tablet 1    nystatin (MYCOSTATIN) 957299 UNIT/ML suspension Take 5 mLs by mouth 4 times daily 473 mL 1    insulin detemir (LEVEMIR FLEXTOUCH) 100 UNIT/ML injection pen ADMINISTER 60 UNITS UNDER THE SKIN EVERY NIGHT (Patient taking differently: ADMINISTER 70 UNITS UNDER THE SKIN EVERY NIGHT) 18 mL 6    blood glucose test strips (ACCU-CHEK DAVIDA PLUS) strip USE TO TEST BLOOD SUGAR ONCE DAILY 100 strip 3    Continuous Blood Gluc Sensor (FREESTYLE DYLAN 14 DAY SENSOR) [facility] APPLY 1 SENSOR EXTERNALLY EVERY 14 DAYS 2 each 3    venlafaxine (EFFEXOR XR) 150 MG extended release capsule TAKE 1 CAPSULE BY MOUTH DAILY 90 capsule 3    dexlansoprazole (DEXILANT) 30 MG CPDR delayed release capsule Take 30 mg by mouth daily 90 capsule 1    venlafaxine (EFFEXOR XR) 37.5 MG extended release capsule Take 1 capsule by mouth daily To take with effexor 150 mg daily 90 capsule 1    fluticasone (FLONASE) 50 MCG/ACT nasal spray 1 spray by Each Nostril route daily 2 Bottle 1    MUCUS RELIEF 600 MG extended release tablet TAKE 1 TABLET BY MOUTH TWICE DAILY 120 tablet 2    atorvastatin (LIPITOR) 20 MG tablet TAKE 1 TABLET BY MOUTH DAILY 90 tablet 3    Insulin Pen Needle (B-D ULTRAFINE III SHORT PEN) 31G X 8 MM MISC USE AS DIRECTED ONCE DAILY 100 each 2    diclofenac sodium (VOLTAREN) 1 % GEL diclofenac 1 % topical gel      Continuous Blood Gluc Sensor (FREESTYLE DYLAN 14 DAY SENSOR) [facility] FreeStyle Dylan 14 Day Sensor kit   APPLY 1 SENSOR EXTERNALLY EVERY 14 DAYS      albuterol sulfate  (90 Base) MCG/ACT inhaler INHALE 2 PUFFS INTO THE LUNGS EVERY 6 HOURS AS NEEDED FOR WHEEZING 8.5 g 2    umeclidinium-vilanterol (ANORO ELLIPTA) 62.5-25 MCG/INH AEPB inhaler Inhale 1 puff into the lungs daily 30 puff 11    Continuous Blood Gluc  (FREESTYLE DYLAN 14 DAY READER) PAOLA Check blood sugars daily 1 Device 0    traZODone (DESYREL) 150 MG tablet TAKE 1 TABLET BY MOUTH EVERY NIGHT 90 tablet 3    Handicap Placard MISC by Does not apply route Duration: three years    Type II idabetes  Diabetic neuropathy 1 each 0    HYDROcodone-acetaminophen (NORCO) 5-325 MG per tablet Take 1 tablet by mouth every 8 hours as needed for Pain. Vitor Mónica traMADol (ULTRAM) 50 MG tablet Take 100 mg by mouth 3 times daily.        OXYGEN Inhale 2 L into the lungs  Elastic Bandages & Supports (WRIST BRACE/RIGHT MEDIUM) MISC Wear brace on right wrist nightly 1 each 0    Cholecalciferol (VITAMIN D3) 2000 units CAPS Take by mouth daily      rOPINIRole (REQUIP) 1 MG tablet Take 1 mg by mouth nightly       acetaminophen 650 MG TABS Take 650 mg by mouth every 4 hours as needed for Pain (For mild pain level 1-3 or for fever > 100.5). 120 tablet     pregabalin (LYRICA) 300 MG capsule TAKE 1 CAPSULE BY MOUTH TWICE DAILY 180 capsule 1     No current facility-administered medications for this visit.           Past Medical History:   Diagnosis Date    Anxiety     Arthritis     Carpal tunnel syndrome     Cellulitis     Chronic back pain     Depression     Diabetes mellitus (HCC)     Emphysema of lung (HCC)     Fibromyalgia     GERD (gastroesophageal reflux disease)     Glaucoma     Hiatal hernia     esophagus closes    Hx of blood clots     foot post op    Hyperlipidemia, mixed 2017    IBS (irritable bowel syndrome)     Leg pain     nerve damage right leg    MDRO (multiple drug resistant organisms) resistance     wound and nasal    Guzman's neuroma     left foot    Nausea & vomiting     Neuropathic pain     Osteoarthritis     PONV (postoperative nausea and vomiting)     Pulmonary emphysema ([address] 2017    Sleep apnea     DOES NOT USE CPAP      Past Surgical History:   Procedure Laterality Date    ABDOMEN SURGERY      APPENDECTOMY      BACK SURGERY      lower x 3    CERVICAL FUSION       SECTION      x3    CHOLECYSTECTOMY      COLONOSCOPY      ECTOPIC PREGNANCY SURGERY      EYE SURGERY Bilateral     LASER FOR GLAUCOMA    HYSTERECTOMY, TOTAL ABDOMINAL      JOINT REPLACEMENT      OTHER SURGICAL HISTORY  Aug 5, 2013    Left knee total arthroplasty ([facility]. Harden Apgar, [facility])    [place] DEBRIDEMENT OPEN WOUND 20 SQ CM< Right 2018    INCISIONAL DEBRIEDMENT INCISION AND DRAINAGE RIGHT GLUTEAL ABCESS performed by Janice Grady, MD at [facility] OFFICE/OUTPT [address] [facility] [address] Right 2018    EXCISIONAL DEBRIDEMENT OF RIGHT BUTTOCKS WOUND performed by Tika Tolbert DO at [address] Left     little toe bone removed    TONSILLECTOMY      TOTAL KNEE ARTHROPLASTY Right 2016    UPPER GASTROINTESTINAL ENDOSCOPY      UPPER GASTROINTESTINAL ENDOSCOPY Left 2019    EGD DILATION SAVORY performed by Cecily Gleason MD at [facility]     Family History   Problem Relation Age of Onset    Arthritis Mother     Heart Disease Mother         CHF    High Blood Pressure Mother     Kidney Disease Mother     Cancer Father     Heart Disease Sister         diastolic dysfunction    Other Sister         thyroid cysts    Cancer Brother     Kidney Disease Brother     Diabetes Brother     Kidney Disease Brother     Colon Cancer Neg Hx      Social History     Tobacco Use    Smoking status: Current Every Day Smoker     Packs/day: 1.00     Years: 35.00     Pack years: 35.00     Types: Cigarettes     Start date: 1982     Last attempt to quit: 11/15/2015     Years since quittin.8    Smokeless tobacco: Never Used    Tobacco comment: 2-4 cigarettes/day    Substance Use Topics    Alcohol use: No     Comment: Sober for 26 years        Allergies   Allergen Reactions    Latex Itching and Rash       Health Maintenance   Topic Date Due    Pneumococcal 0-64 years Vaccine (1 of 2 - PPSV23) Never done    COVID-19 Vaccine (1) Never done    Shingles Vaccine (2 of 3) 2016    Flu vaccine (1) Never done    Low dose CT lung screening  2021    Breast cancer screen  2021    Diabetic retinal exam  2021    Diabetic microalbuminuria test  2022    Annual Wellness Visit (AWV)  2022    Diabetic foot exam  2022    Lipid screen  2022    A1C test (Diabetic or Prediabetic)  2022    DTaP/Tdap/Td vaccine (2 - Td or Tdap) 2024    Colon cancer screen colonoscopy 07/16/2031    Hepatitis C screen  Completed    HIV screen  Completed    Hepatitis A vaccine  Aged Out    Hib vaccine  Aged Out    Meningococcal (ACWY) vaccine  Aged Out       Subjective:      Review of Systems   Constitutional: Negative for chills, fatigue and fever. HENT: Positive for mouth sores and trouble swallowing. Respiratory: Negative. Cardiovascular: Negative. Skin: Negative. Hematological: Negative for adenopathy. Does not bruise/bleed easily. Psychiatric/Behavioral: Negative for self-injury, sleep disturbance and suicidal ideas. Objective:      /70   Pulse 78   Temp 97.8 °F (36.6 °C) (Oral)   Resp 14   Ht 5' 6.5\" (1.689 m)   Wt 271 lb (122.9 kg)   BMI 43.09 kg/m²      Physical Exam  Vitals and nursing note reviewed. Constitutional:       Appearance: She is obese. She is not ill-appearing. HENT:      Right Ear: Hearing, tympanic membrane, ear canal and external ear normal.      Left Ear: Hearing, tympanic membrane, ear canal and external ear normal.      Mouth/Throat:      Mouth: Mucous membranes are moist.      Dentition: No gingival swelling, dental abscesses or gum lesions. Tongue: Lesions (large taste buds noted at base of tongue ) present. Tongue does not deviate from midline. Palate: No mass and lesions. Comments: No anterior cervical lymphadenopathy palpated, voice is hoarse no oral lesions noted large appearance of taste buds at base of tongue no swallowing difficulty noted. Cardiovascular:      Rate and Rhythm: Normal rate and regular rhythm. Pulses: Normal pulses. Heart sounds: Normal heart sounds. No murmur heard. Pulmonary:      Effort: Pulmonary effort is normal.      Breath sounds: Normal breath sounds. Lymphadenopathy:      Head:      Right side of head: No submental, submandibular, tonsillar, preauricular, posterior auricular or occipital adenopathy.       Left side of head: No submental, submandibular, tonsillar,

## 2021-09-29 ENCOUNTER — HOSPITAL ENCOUNTER (OUTPATIENT)
Dept: CT IMAGING | Age: 61
Discharge: HOME | End: 2021-09-29
Payer: MEDICARE

## 2021-09-29 DIAGNOSIS — F17.210 CIGARETTE NICOTINE DEPENDENCE, UNCOMPLICATED: ICD-10-CM

## 2021-09-29 PROCEDURE — 71271 CT THORAX LUNG CANCER SCR C-: CPT

## 2021-10-12 ENCOUNTER — OFFICE VISIT (OUTPATIENT)
Dept: PULMONOLOGY | Age: 61
End: 2021-10-12
Payer: MEDICARE

## 2021-10-12 VITALS
DIASTOLIC BLOOD PRESSURE: 88 MMHG | BODY MASS INDEX: 42.22 KG/M2 | HEIGHT: 67 IN | OXYGEN SATURATION: 98 % | HEART RATE: 71 BPM | WEIGHT: 269 LBS | SYSTOLIC BLOOD PRESSURE: 125 MMHG

## 2021-10-12 DIAGNOSIS — E66.01 MORBID OBESITY WITH BODY MASS INDEX (BMI) OF 45.0 TO 49.9 IN ADULT (HCC): ICD-10-CM

## 2021-10-12 DIAGNOSIS — J43.9 PULMONARY EMPHYSEMA, UNSPECIFIED EMPHYSEMA TYPE (HCC): Primary | ICD-10-CM

## 2021-10-12 DIAGNOSIS — F17.210 CIGARETTE NICOTINE DEPENDENCE, UNCOMPLICATED: ICD-10-CM

## 2021-10-12 DIAGNOSIS — F12.90 MARIJUANA SMOKER: ICD-10-CM

## 2021-10-12 PROCEDURE — 99214 OFFICE O/P EST MOD 30 MIN: CPT

## 2021-10-12 PROCEDURE — G0296 VISIT TO DETERM LDCT ELIG: HCPCS

## 2021-10-12 PROCEDURE — 99406 BEHAV CHNG SMOKING 3-10 MIN: CPT

## 2021-10-12 RX ORDER — ALBUTEROL SULFATE 90 UG/1
2 AEROSOL, METERED RESPIRATORY (INHALATION) EVERY 6 HOURS PRN
Qty: 8.5 G | Refills: 2 | Status: SHIPPED | OUTPATIENT
Start: 2021-10-12 | End: 2021-12-21

## 2021-10-12 ASSESSMENT — ENCOUNTER SYMPTOMS
SORE THROAT: 0
EYE ITCHING: 0
HEMOPTYSIS: 0
SINUS PRESSURE: 0
SPUTUM PRODUCTION: 1
SHORTNESS OF BREATH: 1
RHINORRHEA: 1
BACK PAIN: 1
ABDOMINAL PAIN: 1
CONSTIPATION: 0
SINUS PAIN: 0
DIARRHEA: 0
CHEST TIGHTNESS: 0
WHEEZING: 1
COUGH: 1

## 2021-10-12 ASSESSMENT — COPD QUESTIONNAIRES: COPD: 1

## 2021-10-12 NOTE — PROGRESS NOTES
raised crops, livestock or combination  Denies exposure to pets/animals at home. Denies exposure to tuberculosis.     Flu vaccine: Has not received - is not interested  Pneumonia vaccine: Has not received - is not interested  COVID-19 vaccine: Has not received - is not interested  Past Medical hx   PMH:  Past Medical History:   Diagnosis Date    Anxiety     Arthritis     Carpal tunnel syndrome     Cellulitis     Chronic back pain     Depression     Diabetes mellitus ([address]     Emphysema of lung ([address]     Fibromyalgia     GERD (gastroesophageal reflux disease)     Glaucoma     Hiatal hernia     esophagus closes    Hx of blood clots     foot post op    Hyperlipidemia, mixed 2017    IBS (irritable bowel syndrome)     Leg pain     nerve damage right leg    MDRO (multiple drug resistant organisms) resistance 2008    wound and nasal    Guzman's neuroma     left foot    Nausea & vomiting     Neuropathic pain     Osteoarthritis     PONV (postoperative nausea and vomiting)     Pulmonary emphysema ([address] 2017    Sleep apnea     DOES NOT USE CPAP     SURGICAL HISTORY:  Past Surgical History:   Procedure Laterality Date    ABDOMEN SURGERY      APPENDECTOMY      BACK SURGERY      lower x 3    CERVICAL FUSION       SECTION      x3    CHOLECYSTECTOMY      COLONOSCOPY      ECTOPIC PREGNANCY SURGERY      EYE SURGERY Bilateral     LASER FOR GLAUCOMA    HYSTERECTOMY, TOTAL ABDOMINAL      JOINT REPLACEMENT      OTHER SURGICAL HISTORY  Aug 5, 2013    Left knee total arthroplasty (Dr. Faheem Uribe, [facility])    MT DEBRIDEMENT OPEN WOUND 20 SQ CM< Right 2018    INCISIONAL DEBRIEDMENT INCISION AND DRAINAGE RIGHT GLUTEAL ABCESS performed by Arias Bruce MD at [facility] OFFICE/OUTPT [facility] Right 2018    EXCISIONAL DEBRIDEMENT OF RIGHT BUTTOCKS WOUND performed by Padmini Edwards DO at [address] Left     little toe bone removed    TONSILLECTOMY      TOTAL KNEE ARTHROPLASTY Right 1/26/2016    UPPER GASTROINTESTINAL ENDOSCOPY      UPPER GASTROINTESTINAL ENDOSCOPY Left 5/9/2019    EGD DILATION SAVORY performed by Salinas Fairbanks MD at [facility]     SOCIAL HISTORY:  Social History     Tobacco Use    Smoking status: Current Every Day Smoker     Packs/day: 1.00     Types: Cigarettes     Start date: 1/1/1976    Smokeless tobacco: Never Used   Vaping Use    Vaping Use: Never used   Substance Use Topics    Alcohol use: No     Comment: Sober for 26 years    Drug use: Yes     Types: Marijuana     Comment: couple times a day      ALLERGIES:  Allergies   Allergen Reactions    Latex Itching and Rash     FAMILY HISTORY:  Family History   Problem Relation Age of Onset    Arthritis Mother     Heart Disease Mother         CHF    High Blood Pressure Mother     Kidney Disease Mother     Cancer Father     Heart Disease Sister         diastolic dysfunction    Other Sister         thyroid cysts    Cancer Brother     Kidney Disease Brother     Diabetes Brother     Kidney Disease Brother     Colon Cancer Neg Hx      CURRENT MEDICATIONS:  Current Outpatient Medications   Medication Sig Dispense Refill    albuterol sulfate  (90 Base) MCG/ACT inhaler Inhale 2 puffs into the lungs every 6 hours as needed for Wheezing 8.5 g 2    insulin lispro, 1 Unit Dial, 100 UNIT/ML SOPN INJECT 14 UNITS UNDER THE SKIN THREE TIMES DAILY WITH MEALS PLUS SLIDING SCALE( MAX OF 60 UNITS DAILY) 15 mL 3    Handicap Placard MISC by Does not apply route Duration: three years 1 each 0    VICTOZA 18 MG/3ML SOPN SC injection ADMINISTER 1.8 MG UNDER THE SKIN DAILY 9 mL 3    cetirizine (ZYRTEC) 10 MG tablet Take 1 tablet by mouth daily 90 tablet 1    nystatin (MYCOSTATIN) 166236 UNIT/ML suspension Take 5 mLs by mouth 4 times daily 473 mL 1    insulin detemir (LEVEMIR FLEXTOUCH) 100 UNIT/ML injection pen ADMINISTER 60 UNITS UNDER THE SKIN EVERY NIGHT (Patient taking differently: ADMINISTER 70 UNITS UNDER THE SKIN EVERY NIGHT) 18 mL 6    blood glucose test strips (ACCU-CHEK DAVDIA PLUS) strip USE TO TEST BLOOD SUGAR ONCE DAILY 100 strip 3    Continuous Blood Gluc Sensor (FREESTYLE DYLAN 14 DAY SENSOR) [facility] APPLY 1 SENSOR EXTERNALLY EVERY 14 DAYS 2 each 3    venlafaxine (EFFEXOR XR) 150 MG extended release capsule TAKE 1 CAPSULE BY MOUTH DAILY 90 capsule 3    dexlansoprazole (DEXILANT) 30 MG CPDR delayed release capsule Take 30 mg by mouth daily 90 capsule 1    venlafaxine (EFFEXOR XR) 37.5 MG extended release capsule Take 1 capsule by mouth daily To take with effexor 150 mg daily 90 capsule 1    fluticasone (FLONASE) 50 MCG/ACT nasal spray 1 spray by Each Nostril route daily 2 Bottle 1    MUCUS RELIEF 600 MG extended release tablet TAKE 1 TABLET BY MOUTH TWICE DAILY 120 tablet 2    atorvastatin (LIPITOR) 20 MG tablet TAKE 1 TABLET BY MOUTH DAILY 90 tablet 3    Insulin Pen Needle (B-D ULTRAFINE III SHORT PEN) 31G X 8 MM MISC USE AS DIRECTED ONCE DAILY 100 each 2    diclofenac sodium (VOLTAREN) 1 % GEL diclofenac 1 % topical gel      Continuous Blood Gluc Sensor (FREESTYLE DYLAN 14 DAY SENSOR) [facility] FreeStyle Dylan 14 Day Sensor kit   APPLY 1 SENSOR EXTERNALLY EVERY 14 DAYS      umeclidinium-vilanterol (ANORO ELLIPTA) 62.5-25 MCG/INH AEPB inhaler Inhale 1 puff into the lungs daily 30 puff 11    Continuous Blood Gluc  (FREESTYLE DYLAN 14 DAY READER) [facility] Check blood sugars daily 1 Device 0    traZODone (DESYREL) 150 MG tablet TAKE 1 TABLET BY MOUTH EVERY NIGHT 90 tablet 3    Handicap Placard MISC by Does not apply route Duration: three years    Type II idabetes  Diabetic neuropathy 1 each 0    HYDROcodone-acetaminophen (NORCO) 5-325 MG per tablet Take 1 tablet by mouth every 8 hours as needed for Pain. Jeremi Slaughter traMADol (ULTRAM) 50 MG tablet Take 100 mg by mouth 3 times daily.        OXYGEN Inhale 2 L into the lungs       Elastic Bandages & Supports (WRIST BRACE/RIGHT MEDIUM) MISC Wear brace on right wrist nightly 1 each 0    Cholecalciferol (VITAMIN D3) 2000 units CAPS Take by mouth daily      rOPINIRole (REQUIP) 1 MG tablet Take 1 mg by mouth nightly       acetaminophen 650 MG TABS Take 650 mg by mouth every 4 hours as needed for Pain (For mild pain level 1-3 or for fever > 100.5). 120 tablet     pregabalin (LYRICA) 300 MG capsule TAKE 1 CAPSULE BY MOUTH TWICE DAILY 180 capsule 1     No current facility-administered medications for this visit. Marilee ASHBY   Review of Systems   Constitutional: Negative for chills, fever and weight loss. HENT: Positive for postnasal drip, rhinorrhea and sneezing. Negative for congestion, sinus pressure, sinus pain and sore throat. Eyes: Negative for itching. Respiratory: Positive for cough, sputum production, shortness of breath and wheezing. Negative for hemoptysis and chest tightness. Cardiovascular: Positive for dyspnea on exertion and leg swelling (chronic ). Negative for chest pain and palpitations. Gastrointestinal: Positive for abdominal pain (reports chronic). Negative for constipation and diarrhea. Genitourinary: Negative for difficulty urinating. Musculoskeletal: Positive for arthralgias and back pain. Following pain management   Allergic/Immunologic: Positive for environmental allergies. Physical exam   /88   Pulse 71   Ht 5' 7\" (1.702 m)   Wt 269 lb (122 kg)   SpO2 98% Comment: r/a  BMI 42.13 kg/m²    Wt Readings from Last 3 Encounters:   10/12/21 269 lb (122 kg)   09/21/21 271 lb (122.9 kg)   09/13/21 270 lb 12.8 oz (122.8 kg)       Physical Exam  Constitutional:       Appearance: She is well-developed. She is obese. Comments: BMI 42   HENT:      Right Ear: External ear normal.      Left Ear: External ear normal.      Nose: No congestion or rhinorrhea. Mouth/Throat:      Mouth: Mucous membranes are dry.       Comments: Enlarged papillae on posterior portion of tongue  Eyes:      General:         Right eye: No discharge. Left eye: No discharge. Cardiovascular:      Rate and Rhythm: Normal rate and regular rhythm. Pulmonary:      Effort: Pulmonary effort is normal.      Breath sounds: No wheezing, rhonchi or rales. Chest:      Chest wall: No tenderness. Abdominal:      General: There is no distension. Tenderness: There is no abdominal tenderness. Musculoskeletal:      Cervical back: Neck supple. Right lower leg: No edema. Left lower leg: No edema. Skin:     Capillary Refill: Capillary refill takes less than 2 seconds. Neurological:      General: No focal deficit present. Mental Status: She is alert. Psychiatric:         Mood and Affect: Mood normal.         Behavior: Behavior normal.         Thought Content: Thought content normal.         Judgment: Judgment normal.          Results   Lung Nodule Screening     [x] Qualifies    [] Does not qualify   [] Declined    [] Completed  35 PY history   The USPSTF recommends annual screening for lung cancer with low-dose computed tomography (LDCT) in adults aged 48 to [de-identified] years who have a 20 pack-year smoking history and currently smoke or have quit within the past 15 years. Screening should be discontinued once a person has not smoked for 15 years or develops a health problem that substantially limits life expectancy or the ability or willingness to have curative lung surgery. LDCT 9/29/2021 (Reviewed) stable 4 mm lung nodule in RUL, stable calcified right hilar lymph nodes, and stable, possible scar RUL  Narrative   NONCONTRAST SCREENING CT CHEST:       HISTORY: History of smoking. 35 pack year history of smoking.       TECHNIQUE:    1 mm axial imaging of the chest and upper abdomen without IV contrast.    All CT scans at this facility use dose modulation, iterative reconstruction, and/or weight-based dosing when appropriate to reduce radiation dose to as low as reasonably achievable.     All CT scans at this facility use dose modulation, iterative reconstruction, and/or weight based dosing when appropriate to reduce the radiation dose to as low as reasonably achievable.       COMPARISON: 9/4/2020           FINDINGS:   LUNGS NODULES:   1. There is a 4 mm pulmonary nodule within the lateral right upper lobe on axial image 153. Previously this measured 4 mm on prior examination dated 7/26/2019. This is consistent with a lung RADS category 2 lesion.       LYMPHADENOPATHY:   1. Calcified right hilar lymph nodes are demonstrated which may represent granulomatous change. This has a similar appearance when compared to prior examination from July 2019.       OTHER (LUNGS/MEDIASTINUM/MUSCULOSKELETAL/ABDOMEN):   1. There is a small lucent area with adjacent calcified granuloma and surrounding ground glass opacity within the posterior aspect of the superior right upper lobe on axial image 201. This does not appear significantly changed from the prior examination    and may represent an area of focal scarring.             Impression   1. There is a 4 mm pulmonary nodule within the lateral right upper lobe on axial image 153. Previously this measured 4 mm on prior examination dated 7/26/2019. This is consistent with a lung RADS category 2 lesion. 2. There are no pathologically enlarged lymph nodes.    3. LUNGRADS ASSESSMENT VALUE: 2,           The LUNG RADS RECOMMENDATIONS for monitoring lung nodules listed below (ACR- Lung-RADS Version 1.0 Assessment Categories Release date\" April 28, 2014)       LUNG RADS RECOMMENDATIONS;   1.  Normal, continue annual screening   2.  Benign appearance or behavior, continue annual screening   3.  6 month CT recommended   4A.  3 month CT recommended; may consider PET/CT   4B.  Additional diagnostics and/or tissue sampling recommended   4X.  Additional diagnostics and/or tissue sampling recommended         **This report has been created using voice recognition software.  It may contain minor errors which are inherent in voice recognition technology. **       Final report electronically signed by Dr. Nathan Robledo on 9/29/2021 4:05 PM       Assessment      Diagnosis Orders   1. Pulmonary emphysema, unspecified emphysema type ([facility])  albuterol sulfate  (90 Base) MCG/ACT inhaler    Spirometry With Bronchodilator   2. Morbid obesity with body mass index (BMI) of 45.0 to 49.9 in adult ([facility]     3. Marijuana smoker     4. Cigarette nicotine dependence, uncomplicated  KS VISIT TO DISCUSS LUNG CA SCREEN W LDCT    CT Lung Screen (Annual)         Plan   1. Pulmonary emphysema, unspecified emphysema type ([facility]  - Symptoms well controlled, will continue Anoro with as needed albuterol  - Patient reports that her ophthalmologist reported to her that she does not have glaucoma. I advised patient to still ensure that her ophthalmologist is aware that she is on Anoro. - Discussed albuterol inhaler use. Reviewed signs and symptoms indicating the need for use including shortness of breath and wheezing. Discussed with the patient the importance of using the inhaler within the prescribed time frames. Patient verbalized understanding to use within the prescribed time frame. Patient also verbalized understanding that if there is no relief of symptoms after using albuterol and resting for 15 minutes they need to go to nearest ER or call 911.  - albuterol sulfate  (90 Base) MCG/ACT inhaler; Inhale 2 puffs into the lungs every 6 hours as needed for Wheezing  Dispense: 8.5 g; Refill: 2  - Spirometry With Bronchodilator prior to next appointment  - Advised to maintain pneumonia vaccine with PCP and to take flu vaccine this coming season. Patient declines vaccinations    2. Morbid obesity with body mass index (BMI) of 45.0 to 49.9 in adult [facility])  - Advised patient to continue working on weight loss    3.  Marijuana smoker  - Patient is currently on medical marijuana  - Advised patient to try to stick with edible medical marijuana and to stop inhaling marijuana    4. Cigarette nicotine dependence, uncomplicated  - Reports \"I have no desire to quit\"  - Discussed with patient smoking cessation techniques including patches and gum patient reports has not used in the past, reinforced to patient the importance of quitting smoking to prevent further damage to lung function, patient verbalized understanding. (Approximately 5 mins)  - Declined smoking cessation clinic  - Lung-RADS category 2, will continue annual CT screening  - [place] VISIT TO DISCUSS LUNG CA SCREEN W LDCT  - CT Lung Screen (Annual); Future     Advised patient to call office with any changes, questions, or concerns regarding respiratory status or issues with prescribed medications    Return in about 1 year (around 10/12/2022) for emphysema with LDCT and spirometry prior. Electronically signed by ALLEN White CNP on 10/12/2021 at 10:30 AM       Low Dose CT (LDCT) Lung Screening criteria met   Age 49-80   Pack year smoking >20   Still smoking or less than 15 year since quit   No sign or symptoms of lung cancer   > 11 months since last LDCT     Risks and benefits of lung cancer screening with LDCT scans discussed:    Significance of positive screen - False-positive LDCT results often occur. 95% of all positive results do not lead to a diagnosis of cancer. Usually further imaging can resolve most false-positive results; however, some patients may require invasive procedures. Over diagnosis risk - 10% to 12% of screen-detected lung cancer cases are over diagnosed--that is, the cancer would not have been detected in the patient's lifetime without the screening. Need for follow up screens annually to continue lung cancer screening effectiveness     Risks associated with radiation from annual LDCT- Radiation exposure is about the same as for a mammogram, which is about 1/3 of the annual background radiation exposure from everyday life. Starting screening at age 48 is not likely to increase cancer risk from radiation exposure. Patients with comorbidities resulting in life expectancy of < 10 years, or that would preclude treatment of an abnormality identified on CT, should not be screened due to lack of benefit.     To obtain maximal benefit from this screening, smoking cessation and long-term abstinence from smoking is critical

## 2021-10-12 NOTE — PATIENT INSTRUCTIONS
Continue your Anoro. Make sure that your eye doctor knows that you are on this inhaler. Continue your albuterol inhaler. You may take 2 puffs every 6 hours as needed for shortness of breath or wheezing. Continue to work on weight loss and smoking cessation. We will update your Spirometry and CT scan next year. What is lung cancer screening? Lung cancer screening is a way in which doctors check the lungs for early signs of cancer in people who have no symptoms of lung cancer. A low-dose CT scan uses much less radiation than a normal CT scan and shows a more detailed image of the lungs than a standard X-ray. The goal of lung cancer screening is to find cancer early, before it has a chance to grow, spread, or cause problems. One large study found that smokers who were screened with low-dose CT scans were less likely to die of lung cancer than those who were screened with standard X-ray. Below is a summary of the things you need to know regarding screening for lung cancer with low-dose computed tomography (LDCT). This is a screening program that involves routine annual screening with LDCT studies of the lung. The LDCTs are done using low-dose radiation that is not thought to increase your cancer risk. If you have other serious medical conditions (other cancers, congestive heart failure) that limit your life expectancy to less than 10 years, you should not undergo lung cancer screening with LDCT. The chance is 20%-60% that the LDCT result will show abnormalities. This would require additional testing which could include repeat imaging or even invasive procedures. Most (about 95%) of \"abnormal\" LDCT results are false in the sense that no lung cancer is ultimately found. Additionally, some (about 10%) of the cancers found would not affect your life expectancy, even if undetected and untreated.   If you are still smoking, the single most important thing that you can do to reduce your risk of dying of lung cancer is to quit. For this screening to be covered by Medicare and most other insurers, strict criteria must be met. If you do not meet these criteria, but still wish to undergo LDCT testing, you will be required to sign a waiver indicating your willingness to pay for the scan. Starting a Weight Loss Plan: Care Instructions  Overview     If you're thinking about losing weight, it can be hard to know where to start. Your doctor can help you set up a weight loss plan that best meets your needs. You may want to take a class on nutrition or exercise, or you could join a weight loss support group. If you have questions about how to make changes to your eating or exercise habits, ask your doctor about seeing a registered dietitian or an exercise specialist.  It can be a big challenge to lose weight. But you don't have to make huge changes at once. Make small changes, and stick with them. When those changes become habit, add a few more changes. If you don't think you're ready to make changes right now, try to pick a date in the future. Make an appointment to see your doctor to discuss whether the time is right for you to start a plan. Follow-up care is a key part of your treatment and safety. Be sure to make and go to all appointments, and call your doctor if you are having problems. It's also a good idea to know your test results and keep a list of the medicines you take. How can you care for yourself at home? 1. Set realistic goals. Many people expect to lose much more weight than is likely. A weight loss of 5% to 10% of your body weight may be enough to improve your health. 2. Get family and friends involved to provide support. Talk to them about why you are trying to lose weight, and ask them to help. They can help by participating in exercise and having meals with you, even if they may be eating something different. 3. Find what works best for you.  If you do not have time or do not like to cook, a program that offers meal replacement bars or shakes may be better for you. Or if you like to prepare meals, finding a plan that includes daily menus and recipes may be best.  4. Ask your doctor about other health professionals who can help you achieve your weight loss goals. ? A dietitian can help you make healthy changes in your diet. ? An exercise specialist or  can help you develop a safe and effective exercise program.  ? A counselor or psychiatrist can help you cope with issues such as depression, anxiety, or family problems that can make it hard to focus on weight loss. 5. Consider joining a support group for people who are trying to lose weight. Your doctor can suggest groups in your area. Where can you learn more? Go to https://[employer]. org and sign in to your [employer] account. Enter U501 in the [employer] box to learn more about \"Starting a Weight Loss Plan: Care Instructions. \"     If you do not have an account, please click on the \"Sign Up Now\" link. Current as of: March 17, 2021               Content Version: 12.9  © 0060-0794 Healthwise, Incorporated. Care instructions adapted under license by [facility] ([facility]). If you have questions about a medical condition or this instruction, always ask your healthcare professional. [address] any warranty or liability for your use of this information.

## 2021-10-16 DIAGNOSIS — E11.59 TYPE 2 DIABETES MELLITUS WITH OTHER CIRCULATORY COMPLICATION, WITH LONG-TERM CURRENT USE OF INSULIN (HCC): ICD-10-CM

## 2021-10-16 DIAGNOSIS — Z79.4 TYPE 2 DIABETES MELLITUS WITH OTHER CIRCULATORY COMPLICATION, WITH LONG-TERM CURRENT USE OF INSULIN (HCC): ICD-10-CM

## 2021-10-17 DIAGNOSIS — E11.59 TYPE 2 DIABETES MELLITUS WITH OTHER CIRCULATORY COMPLICATION, WITH LONG-TERM CURRENT USE OF INSULIN (HCC): ICD-10-CM

## 2021-10-17 DIAGNOSIS — Z79.4 TYPE 2 DIABETES MELLITUS WITH OTHER CIRCULATORY COMPLICATION, WITH LONG-TERM CURRENT USE OF INSULIN (HCC): ICD-10-CM

## 2021-10-18 RX ORDER — INSULIN LISPRO 100 [IU]/ML
INJECTION, SOLUTION INTRAVENOUS; SUBCUTANEOUS
Qty: 15 ML | Refills: 3 | Status: SHIPPED | OUTPATIENT
Start: 2021-10-18 | End: 2022-03-14 | Stop reason: SDUPTHER

## 2021-10-18 RX ORDER — INSULIN DETEMIR 100 [IU]/ML
INJECTION, SOLUTION SUBCUTANEOUS
Qty: 18 ML | Refills: 6 | Status: SHIPPED | OUTPATIENT
Start: 2021-10-18 | End: 2021-12-13 | Stop reason: SDUPTHER

## 2021-10-18 NOTE — TELEPHONE ENCOUNTER
Recent Visits  Date Type Provider Dept   09/21/21 Office Visit Jhonatan Ohara APRN - CNP [facility]   09/13/21 Office Visit Jhonatan Ohara APRN - CNP [facility]   08/24/21 Office Visit Jhonatan Ohara, APRN - CNP [facility]   07/12/21 Office Visit Jhonatan Ohara APRN - CNP [facility]   06/08/21 Office Visit Jhonatan Ohara APRN - CNP [facility]   02/23/21 Office Visit Jhonatan Ohara APRN - CNP [facility]   07/29/20 Office Visit Jhonatan Ohara APRN - CNP [facility]   06/22/20 Office Visit Jhonatan Ohara APRN - CNP [facility]   Showing recent visits within past 540 days with a meds authorizing provider and meeting all other requirements  Future Appointments  Date Type Provider Dept   12/13/21 Appointment Jhonatan Ohara APRN - CNP [facility]   Showing future appointments within next 150 days with a meds authorizing provider and meeting all other requirements            Future Appointments   Date Time Provider Scarlett Ibarra   10/27/2021  8:00 AM MD [facility] [place]   12/13/2021 11:00 AM Jhonatan Ohara, APRN - CNP [facility]   9/30/2022  1:00 PM STR CT IMAGING RM1  [facility] OUT EXP STR [facility]   9/30/2022  1:30 PM STR PULMONARY FUNCTION ROOM 1 STRZ PFT [facility] [place]   10/10/2022 11:30 AM ALLEN [address]

## 2021-10-27 ENCOUNTER — TELEPHONE (OUTPATIENT)
Dept: BARIATRICS/WEIGHT MGMT | Age: 61
End: 2021-10-27

## 2021-10-27 ENCOUNTER — OFFICE VISIT (OUTPATIENT)
Dept: ENT CLINIC | Age: 61
End: 2021-10-27
Payer: MEDICARE

## 2021-10-27 VITALS
RESPIRATION RATE: 16 BRPM | OXYGEN SATURATION: 95 % | HEART RATE: 63 BPM | DIASTOLIC BLOOD PRESSURE: 80 MMHG | BODY MASS INDEX: 42.19 KG/M2 | TEMPERATURE: 97.2 F | SYSTOLIC BLOOD PRESSURE: 124 MMHG | HEIGHT: 67 IN | WEIGHT: 268.8 LBS

## 2021-10-27 DIAGNOSIS — F17.200 TOBACCO USE DISORDER: ICD-10-CM

## 2021-10-27 DIAGNOSIS — K21.9 GASTROESOPHAGEAL REFLUX DISEASE WITHOUT ESOPHAGITIS: ICD-10-CM

## 2021-10-27 DIAGNOSIS — R13.14 PHARYNGOESOPHAGEAL DYSPHAGIA: Primary | ICD-10-CM

## 2021-10-27 DIAGNOSIS — E66.01 MORBID OBESITY WITH BMI OF 40.0-44.9, ADULT (HCC): ICD-10-CM

## 2021-10-27 DIAGNOSIS — G47.33 OSA (OBSTRUCTIVE SLEEP APNEA): ICD-10-CM

## 2021-10-27 PROCEDURE — 99214 OFFICE O/P EST MOD 30 MIN: CPT | Performed by: NURSE PRACTITIONER

## 2021-10-27 RX ORDER — PANTOPRAZOLE SODIUM 40 MG/1
40 TABLET, DELAYED RELEASE ORAL
Qty: 180 TABLET | Refills: 1 | Status: SHIPPED | OUTPATIENT
Start: 2021-10-27 | End: 2022-03-14 | Stop reason: SDUPTHER

## 2021-10-27 NOTE — PROGRESS NOTES
[address] EAR, NOSE AND THROAT  [facility]  Dept: [phone]  Dept Fax: [phone]  Loc: [address] [facility] Nydia Mukherjee is a 57 y.o. female who was referred by ALLEN Arriaga - * for:  Chief Complaint   Patient presents with    Other     New patient here for evaluation of her tongue pain, tongue lesion, and hoarseness. Patient states she feels like there is something in her throat. Referred by ALLEN Tompkins CNP. HPI:     Alicia Darnell is a 57 y.o. female new patient here for evaluation of tongue pain and lesion. She was seen with PCP, ALLEN Tompkins CNP, on 2021; c/o bumps on tongue for 2 weeks and in throat making it hard to swallow. She had been treated for thrush prior this. She was referred to GI for vomiting and dysphagia; they had ordered swallow study and esophogram, but patient did not have completed. She takes Dexilant daily, but has reflux despite. She does not follow any special diet. She reports known hiatal hernia. Her BMI is 42.10. She has history of CARLOTTA; was previously prescribed CPAP with oxygen at night. She is currently only using the oxygen at night and is under the assumption that this is treating her CARLOTTA. She has a multitude of other health issues including anxiety, depression, osteoarthritis, emphysema, fibromyalgia, DVT, hyperlipidemia and IBS. She smokes 1 pk/day cigarettes for last 45 years. She uses medical marijuana. She has lung nodules being monitored by pulmonary. Her father and 2 brothers all  from cancers. History:      Allergies   Allergen Reactions    Latex Itching and Rash     Current Outpatient Medications   Medication Sig Dispense Refill    pantoprazole (PROTONIX) 40 MG tablet Take 1 tablet by mouth 2 times daily (before meals) 180 tablet 1    insulin lispro, 1 Unit Dial, 100 UNIT/ML SOPN INJECT 12 UNITS UNDER THE SKIN THREE TIMES DAILY WITH MEALS PLUS SLIDING SCALE( MAX OF 60 UNITS DAILY) 15 mL 3    insulin detemir (LEVEMIR FLEXTOUCH) 100 UNIT/ML injection pen ADMINISTER 70 UNITS UNDER THE SKIN EVERY NIGHT 18 mL 6    albuterol sulfate  (90 Base) MCG/ACT inhaler Inhale 2 puffs into the lungs every 6 hours as needed for Wheezing 8.5 g 2    Handicap Placard MISC by Does not apply route Duration: three years 1 each 0    VICTOZA 18 MG/3ML SOPN SC injection ADMINISTER 1.8 MG UNDER THE SKIN DAILY 9 mL 3    cetirizine (ZYRTEC) 10 MG tablet Take 1 tablet by mouth daily 90 tablet 1    nystatin (MYCOSTATIN) 722206 UNIT/ML suspension Take 5 mLs by mouth 4 times daily 473 mL 1    blood glucose test strips (ACCU-CHEK DAVIDA PLUS) strip USE TO TEST BLOOD SUGAR ONCE DAILY 100 strip 3    dexlansoprazole (DEXILANT) 30 MG CPDR delayed release capsule Take 30 mg by mouth daily 90 capsule 1    venlafaxine (EFFEXOR XR) 37.5 MG extended release capsule Take 1 capsule by mouth daily To take with effexor 150 mg daily 90 capsule 1    fluticasone (FLONASE) 50 MCG/ACT nasal spray 1 spray by Each Nostril route daily 2 Bottle 1    MUCUS RELIEF 600 MG extended release tablet TAKE 1 TABLET BY MOUTH TWICE DAILY 120 tablet 2    atorvastatin (LIPITOR) 20 MG tablet TAKE 1 TABLET BY MOUTH DAILY 90 tablet 3    Insulin Pen Needle (B-D ULTRAFINE III SHORT PEN) 31G X 8 MM MISC USE AS DIRECTED ONCE DAILY 100 each 2    diclofenac sodium (VOLTAREN) 1 % GEL diclofenac 1 % topical gel      Continuous Blood Gluc Sensor (FREESTYLE MICKI 14 DAY SENSOR) MISC FreeStyle Micki 14 Day Sensor kit   APPLY 1 SENSOR EXTERNALLY EVERY 14 DAYS      umeclidinium-vilanterol (ANORO ELLIPTA) 62.5-25 MCG/INH AEPB inhaler Inhale 1 puff into the lungs daily 30 puff 11    Continuous Blood Gluc  (FREESTYLE MICKI 14 DAY READER) PAOLA Check blood sugars daily 1 Device 0    Handicap Placard MISC by Does not apply route Duration: three years    Type II idabetes  Diabetic neuropathy 1 each 0    HYDROcodone-acetaminophen (NORCO) 5-325 MG per tablet Take 1 tablet by mouth every 8 hours as needed for Pain. Kalli Gay traMADol (ULTRAM) 50 MG tablet Take 100 mg by mouth 3 times daily.  OXYGEN Inhale 2 L into the lungs       Elastic Bandages & Supports (WRIST BRACE/RIGHT MEDIUM) MISC Wear brace on right wrist nightly 1 each 0    Cholecalciferol (VITAMIN D3) 2000 units CAPS Take by mouth daily      rOPINIRole (REQUIP) 1 MG tablet Take 1 mg by mouth nightly       acetaminophen 650 MG TABS Take 650 mg by mouth every 4 hours as needed for Pain (For mild pain level 1-3 or for fever > 100.5). 120 tablet     venlafaxine (EFFEXOR XR) 150 MG extended release capsule TAKE 1 CAPSULE BY MOUTH DAILY 90 capsule 3    traZODone (DESYREL) 150 MG tablet TAKE 1 TABLET BY MOUTH EVERY NIGHT 90 tablet 3    Continuous Blood Gluc Sensor (FREESTYLE MICKI 14 DAY SENSOR) [facility] APPLY 1 SENSOR EXTERNALLY EVERY 14 DAYS 2 each 3    pregabalin (LYRICA) 300 MG capsule TAKE 1 CAPSULE BY MOUTH TWICE DAILY 180 capsule 1     No current facility-administered medications for this visit.      Past Medical History:   Diagnosis Date    Anxiety     Arthritis     Carpal tunnel syndrome     Cellulitis     Chronic back pain     Depression     Diabetes mellitus ([address]     Emphysema of lung (HCC)     Fibromyalgia     GERD (gastroesophageal reflux disease)     Glaucoma     Hiatal hernia     esophagus closes    Hx of blood clots     foot post op    Hyperlipidemia, mixed 7/12/2017    IBS (irritable bowel syndrome)     Leg pain     nerve damage right leg    MDRO (multiple drug resistant organisms) resistance 2008    wound and nasal    Guzman's neuroma     left foot    Nausea & vomiting     Neuropathic pain     Osteoarthritis     PONV (postoperative nausea and vomiting)     Pulmonary emphysema ([address] 7/12/2017    Sleep apnea     DOES NOT USE CPAP      Past Surgical History:   Procedure Laterality Date    ABDOMEN [address]GERY ([facility]     4. Tobacco use disorder     5. CARLOTTA (obstructive sleep apnea)         The findings were explained and her questions were answered. Reassured that bumps on back of tongue are enlarged papillae. Serious conversation was had with patient regarding her poor health status, continued smoking and strong family history of cancers. She expresses motivation to improve her health and reduce her risk factors. Recommend proceeding with esophagram - will get this scheduled. Start pantoprazole BID and referral to GERD clinic for evaluation of her hiatal hernia. Discussed with patient that the nocturnal oxygen is not treating her CARLOTTA and the consequences of untreated CARLOTTA. Will refer to sleep medicine clinic to re-establish appropriate PAP therapy. Patient will return after esophagram for review. Patient agrees with plan of care. Management of patient's care was collaborated with Dr Emma King today. Return for results review. **This report has been created using voice recognition software. It may contain minor errors which are inherent in voice recognition technology. **

## 2021-10-27 NOTE — TELEPHONE ENCOUNTER
FYI: Brennen Cota has a lynx consult scheduled with Jose 12-10 referred by saba.  Dx=Pharyngoesophageal dysphagia

## 2021-10-29 ENCOUNTER — TELEPHONE (OUTPATIENT)
Dept: BARIATRICS/WEIGHT MGMT | Age: 61
End: 2021-10-29

## 2021-10-29 NOTE — TELEPHONE ENCOUNTER
asked Magda Fernandez to call GI physician -she thinks it is Dr. Alejandro Wiley- to fax us any clinical notes -pt voiced understanding. Fax # [phone] [phone].

## 2021-11-01 ENCOUNTER — HOSPITAL ENCOUNTER (OUTPATIENT)
Age: 61
Discharge: HOME | End: 2021-11-01
Payer: MEDICARE

## 2021-11-01 DIAGNOSIS — R13.14 PHARYNGOESOPHAGEAL DYSPHAGIA: ICD-10-CM

## 2021-11-01 LAB
T4 FREE: 1.07 NG/DL (ref 0.93–1.76)
TSH SERPL DL<=0.05 MIU/L-ACNC: 2.17 UIU/ML (ref 0.4–4.2)

## 2021-11-01 PROCEDURE — 84439 ASSAY OF FREE THYROXINE: CPT

## 2021-11-01 PROCEDURE — 84443 ASSAY THYROID STIM HORMONE: CPT

## 2021-11-01 PROCEDURE — 36415 COLL VENOUS BLD VENIPUNCTURE: CPT

## 2021-11-05 DIAGNOSIS — F33.42 RECURRENT MAJOR DEPRESSIVE DISORDER, IN FULL REMISSION (HCC): ICD-10-CM

## 2021-11-05 DIAGNOSIS — Z79.4 TYPE 2 DIABETES MELLITUS WITH OTHER CIRCULATORY COMPLICATION, WITH LONG-TERM CURRENT USE OF INSULIN (HCC): ICD-10-CM

## 2021-11-05 DIAGNOSIS — E11.59 TYPE 2 DIABETES MELLITUS WITH OTHER CIRCULATORY COMPLICATION, WITH LONG-TERM CURRENT USE OF INSULIN (HCC): ICD-10-CM

## 2021-11-05 RX ORDER — FLASH GLUCOSE SENSOR
KIT MISCELLANEOUS
Qty: 2 EACH | Refills: 3 | Status: SHIPPED | OUTPATIENT
Start: 2021-11-05 | End: 2022-03-04

## 2021-11-08 NOTE — TELEPHONE ENCOUNTER
Recent Visits  Date Type Provider Dept   09/21/21 Office Visit Elise Sellers, APRN - CNP [facility]   09/13/21 Office Visit Elise Sellers, APRN - CNP [facility]   08/24/21 Office Visit Elise Sellers, APRN - CNP [facility]   07/12/21 Office Visit Elise Sellers, APRN - CNP [facility]   06/08/21 Office Visit Elise Sellers, APRN - CNP [facility]   02/23/21 Office Visit Elise Sellers, APRN - CNP [facility]   07/29/20 Office Visit Elise Sellers, APRN - CNP [facility]   06/22/20 Office Visit Elise Sellers, APRN - CNP [facility]   Showing recent visits within past 540 days with a meds authorizing provider and meeting all other requirements  Future Appointments  Date Type Provider Dept   12/13/21 Appointment Elise Marlo, APRN - CNP [facility]   Showing future appointments within next 150 days with a meds authorizing provider and meeting all other requirements          Future Appointments   Date Time Provider Scarlett Ibarra   11/24/2021 11:00 AM STR FLUORO ROOM 4 STRZ RAD STR Radiolog   12/1/2021 11:30 AM Buzz Sher MD N ENT [facility] [address] [place] [address]   12/10/2021  1:30 PM Jeffery Isaac MD N SRPX WT MG [facility] [address] [place] [address]   12/13/2021 11:00 AM Elise Sellers, APRN - [address] [address] [address] [facility] [address] [place] [address]   1/10/2022 11:30 AM Ricci Perdomo, [address]   9/30/2022  1:00 PM STR CT IMAGING RM1  OP EXPRESS STRZ OUT EXP STR Radiolog   9/30/2022  1:30 PM STR PULMONARY FUNCTION ROOM 1 STRZ PFT [address] [facility]   10/10/2022 11:30 AM ALLEN Angel - [address]

## 2021-11-09 DIAGNOSIS — F33.42 RECURRENT MAJOR DEPRESSIVE DISORDER, IN FULL REMISSION (HCC): ICD-10-CM

## 2021-11-09 RX ORDER — TRAZODONE HYDROCHLORIDE 150 MG/1
TABLET ORAL
Qty: 90 TABLET | Refills: 3 | Status: SHIPPED | OUTPATIENT
Start: 2021-11-09 | End: 2022-10-24

## 2021-11-09 RX ORDER — VENLAFAXINE HYDROCHLORIDE 150 MG/1
150 CAPSULE, EXTENDED RELEASE ORAL DAILY
Qty: 90 CAPSULE | Refills: 3 | Status: SHIPPED | OUTPATIENT
Start: 2021-11-09 | End: 2022-09-12

## 2021-11-09 NOTE — TELEPHONE ENCOUNTER
Recent Visits  Date Type Provider Dept   09/21/21 Office Visit Iline Phalen, APRN - CNP [facility]   09/13/21 Office Visit Iline Phalen, APRN - CNP [facility]   08/24/21 Office Visit Iline Phalen, APRN - CNP [facility]   07/12/21 Office Visit Iline Phalen, APRN - CNP [facility]   06/08/21 Office Visit Iline Phalen, APRN - CNP [facility]   02/23/21 Office Visit Iline Phalen, APRN - CNP [facility]   07/29/20 Office Visit Iline Phalen, APRN - CNP [facility]   06/22/20 Office Visit Iline Phalen, APRN - CNP [facility]   Showing recent visits within past 540 days with a meds authorizing provider and meeting all other requirements  Future Appointments  Date Type Provider Dept   12/13/21 Appointment Iline Phalen, APRN - CNP [facility]   Showing future appointments within next 150 days with a meds authorizing provider and meeting all other requirements          Future Appointments   Date Time Provider Scarlett Ibarra   11/24/2021 11:00 AM STR FLUORO ROOM 4 STRZ RAD STR Radiolog   12/1/2021 11:30 AM Dot Black MD N ENT [facility] [address] [place] [address]   12/10/2021  1:30 PM Jake Ascencio MD N SRPX WT MG [address]   12/13/2021 11:00 AM Liviane Phalen, APRN - [address] [address] [place] [address]   1/10/2022 11:30 AM Aayush Zelaya, [address]   9/30/2022  1:00 PM STR CT IMAGING RM1  OP EXPRESS STRZ OUT EXP STR Radiolog   9/30/2022  1:30 PM STR PULMONARY FUNCTION ROOM 1 STRZ PFT [address] [facility]   10/10/2022 11:30 AM ALLEN Stevenson - [address]

## 2021-11-24 ENCOUNTER — HOSPITAL ENCOUNTER (OUTPATIENT)
Dept: GENERAL RADIOLOGY | Age: 61
Discharge: HOME | End: 2021-11-24
Payer: MEDICARE

## 2021-11-24 DIAGNOSIS — R13.14 PHARYNGOESOPHAGEAL DYSPHAGIA: ICD-10-CM

## 2021-11-24 PROCEDURE — A4641 RADIOPHARM DX AGENT NOC: HCPCS | Performed by: NURSE PRACTITIONER

## 2021-11-24 PROCEDURE — 6360000004 HC RX CONTRAST MEDICATION: Performed by: NURSE PRACTITIONER

## 2021-11-24 PROCEDURE — 6370000000 HC RX 637 (ALT 250 FOR IP): Performed by: NURSE PRACTITIONER

## 2021-11-24 PROCEDURE — 74220 X-RAY XM ESOPHAGUS 1CNTRST: CPT

## 2021-11-24 PROCEDURE — 2500000003 HC RX 250 WO HCPCS: Performed by: NURSE PRACTITIONER

## 2021-11-24 RX ADMIN — BARIUM SULFATE 140 ML: 980 POWDER, FOR SUSPENSION ORAL at 11:04

## 2021-11-24 RX ADMIN — ANTACID/ANTIFLATULENT 1 EACH: 380; 550; 10; 10 GRANULE, EFFERVESCENT ORAL at 11:05

## 2021-11-24 RX ADMIN — BARIUM SULFATE 60 ML: 0.6 SUSPENSION ORAL at 11:04

## 2021-12-13 ENCOUNTER — OFFICE VISIT (OUTPATIENT)
Dept: FAMILY MEDICINE CLINIC | Age: 61
End: 2021-12-13
Payer: MEDICARE

## 2021-12-13 VITALS
RESPIRATION RATE: 16 BRPM | OXYGEN SATURATION: 97 % | DIASTOLIC BLOOD PRESSURE: 78 MMHG | WEIGHT: 270.6 LBS | HEIGHT: 67 IN | SYSTOLIC BLOOD PRESSURE: 120 MMHG | BODY MASS INDEX: 42.47 KG/M2 | HEART RATE: 62 BPM | TEMPERATURE: 98 F

## 2021-12-13 DIAGNOSIS — J43.9 PULMONARY EMPHYSEMA, UNSPECIFIED EMPHYSEMA TYPE (HCC): ICD-10-CM

## 2021-12-13 DIAGNOSIS — F32.4 MAJOR DEPRESSIVE DISORDER IN PARTIAL REMISSION, UNSPECIFIED WHETHER RECURRENT (HCC): ICD-10-CM

## 2021-12-13 DIAGNOSIS — E11.42 DIABETIC POLYNEUROPATHY ASSOCIATED WITH TYPE 2 DIABETES MELLITUS (HCC): ICD-10-CM

## 2021-12-13 DIAGNOSIS — Z79.4 TYPE 2 DIABETES MELLITUS WITH OTHER CIRCULATORY COMPLICATION, WITH LONG-TERM CURRENT USE OF INSULIN (HCC): ICD-10-CM

## 2021-12-13 DIAGNOSIS — Z79.4 TYPE 2 DIABETES MELLITUS WITH HYPEROSMOLARITY WITHOUT COMA, WITH LONG-TERM CURRENT USE OF INSULIN (HCC): Primary | ICD-10-CM

## 2021-12-13 DIAGNOSIS — D22.30 NEVUS OF FACE: ICD-10-CM

## 2021-12-13 DIAGNOSIS — E11.00 TYPE 2 DIABETES MELLITUS WITH HYPEROSMOLARITY WITHOUT COMA, WITH LONG-TERM CURRENT USE OF INSULIN (HCC): Primary | ICD-10-CM

## 2021-12-13 DIAGNOSIS — E11.59 TYPE 2 DIABETES MELLITUS WITH OTHER CIRCULATORY COMPLICATION, WITH LONG-TERM CURRENT USE OF INSULIN (HCC): ICD-10-CM

## 2021-12-13 LAB — HBA1C MFR BLD: 7.5 % (ref 4.3–5.7)

## 2021-12-13 PROCEDURE — S9470 NUTRITIONAL COUNSELING, DIET: HCPCS | Performed by: NURSE PRACTITIONER

## 2021-12-13 PROCEDURE — 3051F HG A1C>EQUAL 7.0%<8.0%: CPT | Performed by: NURSE PRACTITIONER

## 2021-12-13 PROCEDURE — 99214 OFFICE O/P EST MOD 30 MIN: CPT | Performed by: NURSE PRACTITIONER

## 2021-12-13 RX ORDER — INSULIN DETEMIR 100 [IU]/ML
INJECTION, SOLUTION SUBCUTANEOUS
Qty: 18 ML | Refills: 6 | Status: SHIPPED | OUTPATIENT
Start: 2021-12-13 | End: 2022-02-28

## 2021-12-13 NOTE — PATIENT INSTRUCTIONS
Patient Education        Heart-Healthy Diet: Care Instructions  Your Care Instructions     A heart-healthy diet has lots of vegetables, fruits, nuts, beans, and whole grains, and is low in salt. It limits foods that are high in saturated fat, such as meats, cheeses, and fried foods. It may be hard to change your diet, but even small changes can lower your risk of heart attack and heart disease. Follow-up care is a key part of your treatment and safety. Be sure to make and go to all appointments, and call your doctor if you are having problems. It's also a good idea to know your test results and keep a list of the medicines you take. How can you care for yourself at home? Watch your portions  · Use food labels to learn what the recommended servings are for the foods you eat. · Eat only the number of calories you need to stay at a healthy weight. If you need to lose weight, eat fewer calories than your body burns (through exercise and other physical activity). Eat more fruits and vegetables  · Eat a variety of fruit and vegetables every day. Dark green, deep orange, red, or yellow fruits and vegetables are especially good for you. Examples include spinach, carrots, peaches, and berries. · Keep carrots, celery, and other veggies handy for snacks. Buy fruit that is in season and store it where you can see it so that you will be tempted to eat it. · Cook dishes that have a lot of veggies in them, such as stir-fries and soups. Limit saturated fat  · Read food labels, and try to avoid saturated fats. They increase your risk of heart disease. · Use olive or canola oil when you cook. · Bake, broil, grill, or steam foods instead of frying them. · Choose lean meats instead of high-fat meats such as hot dogs and sausages. Cut off all visible fat when you prepare meat. · Eat fish, skinless poultry, and meat alternatives such as soy products instead of high-fat meats.  Soy products, such as tofu, may be especially good for your heart. · Choose low-fat or fat-free milk and dairy products. Eat foods high in fiber  · Eat a variety of grain products every day. Include whole-grain foods that have lots of fiber and nutrients. Examples of whole-grain foods include oats, whole wheat bread, and brown rice. · Buy whole-grain breads and cereals, instead of white bread or pastries. Limit salt and sodium  · Limit how much salt and sodium you eat to help lower your blood pressure. · Taste food before you salt it. Add only a little salt when you think you need it. With time, your taste buds will adjust to less salt. · Eat fewer snack items, fast foods, and other high-salt, processed foods. Check food labels for the amount of sodium in packaged foods. · Choose low-sodium versions of canned goods (such as soups, vegetables, and beans). Limit sugar  · Limit drinks and foods with added sugar. These include candy, desserts, and soda pop. Limit alcohol  · Limit alcohol to no more than 2 drinks a day for men and 1 drink a day for women. Too much alcohol can cause health problems. When should you call for help? Watch closely for changes in your health, and be sure to contact your doctor if:    · You would like help planning heart-healthy meals. Where can you learn more? Go to https://VividCortex.GILUPI. org and sign in to your [employer] account. Enter V137 in the [facility] box to learn more about \"Heart-Healthy Diet: Care Instructions. \"     If you do not have an account, please click on the \"Sign Up Now\" link. Current as of: December 17, 2020               Content Version: 13.0  © 1407-7518 Healthwise, [employer]. Care instructions adapted under license by [facility] ([facility]). If you have questions about a medical condition or this instruction, always ask your healthcare professional. [address] any warranty or liability for your use of this information.          Patient Education Learning About Low-Carbohydrate Diets  What is a low-carbohydrate diet? A low-carbohydrate (or \"low-carb\") diet limits foods and drinks that have carbohydrates. This includes grains, fruits, milk and yogurt, and starchy vegetables like potatoes, beans, and corn. It also avoids foods and drinks that have added sugar. Instead, low-carb diets include foods that are high in protein and fat. Why might you follow a low-carb diet? Low-carb diets may be used for a variety of reasons, such as for weight loss. People who have diabetes may use a low-carb diet to help manage their blood sugar levels. What should you do before you start the diet? Talk to your doctor before you try any diet. This is even more important if you have health problems like kidney disease, heart disease, or diabetes. Your doctor may suggest that you meet with a registered dietitian. A dietitian can help you make an eating plan that works for you. What foods do you eat on a low-carb diet? On a low-carb diet, you choose foods that are high in protein and fat. Examples of these are:  · Meat, poultry, and fish. · Eggs. · Nuts, such as walnuts, pecans, almonds, and peanuts. · Peanut butter and other nut butters. · Tofu. · Avocado. · Magdalene Fester. · Non-starchy vegetables like broccoli, cauliflower, green beans, mushrooms, peppers, lettuce, and spinach. · Unsweetened non-dairy milks like almond milk and coconut milk. · Cheese, cottage cheese, and cream cheese. Current as of: December 17, 2020               Content Version: 13.0  © 2006-2021 Healthwise, [employer]. Care instructions adapted under license by [facility] ([facility]). If you have questions about a medical condition or this instruction, always ask your healthcare professional. [address] any warranty or liability for your use of this information. Patient Education        Learning About Low-Fat Eating  What is low-fat eating?      Most food has some fat in it. Your body needs some fat to be healthy. But some kinds of fats are healthier than others. In a low-fat eating plan, you try to choose healthier fats and eat fewer unhealthy fats. Healthy fats include olive and canola oil. Try to avoid eating too much saturated fat, such as in cheese and meats. You do not need to cut all fat from your diet. But you can make healthier choices about the types and amount of fat you eat. Even though it is a good idea to choose healthier fats, it is still important to be careful of how much fat you eat, because all fats are high in calories. What are the different types of fats? Unhealthy fat  · Saturated fat. Saturated fats are mostly in animal foods, such as meat and dairy foods. Tropical oils, such as coconut oil, palm oil, and cocoa butter, are also saturated fats. Healthy fats  · Monounsaturated fat. Monounsaturated fats are liquid at room temperature but get solid when refrigerated. Eating foods that are high in this fat may help lower your \"bad\" (LDL) cholesterol, keep your \"good\" (HDL) cholesterol level up, and lower your chances of getting coronary artery disease. This fat is found in canola oil, olive oil, peanut oil, olives, avocados, nuts, and nut butters. · Polyunsaturated fat. Polyunsaturated fats are liquid at room temperature. They are in safflower, sunflower, and corn oils. They are also the main fat in seafood. Omega-3 fatty acids are types of polyunsaturated fat. Eating fish may lower your chances of getting coronary artery disease. Fatty fish such as salmon and mackerel contain these healthy fatty acids. So do ground flaxseeds and flaxseed oil, soybeans, walnuts, and seeds. Why cut down on unhealthy fats? Eating foods that contain saturated fats can raise the LDL (\"bad\") cholesterol in your blood.  Having a high level of LDL cholesterol increases your chance of hardening of the arteries (atherosclerosis), which can lead to heart disease, heart attack, and stroke. In general:  · No more than 10% of your daily calories should come from saturated fat. This is about 20 grams in a 2,000-calorie diet. · No more than 10% of your daily calories should come from polyunsaturated fat. This is about 20 grams in a 2,000-calorie diet. · Monounsaturated fats can be up to 15% of your daily calories. This is about 25 to 30 grams in a 2,000-calorie diet. If you're not sure how much fat you should be eating or how many calories you need each day to stay at a healthy weight, talk to a registered dietitian. A dietitian can help you create a plan that's right for you. What can you do to cut down on fat? Foods like cheese, butter, sausage, and desserts can have a lot of unhealthy fats. Try these tips for healthier meals at home and when you eat out. At home  · Fill up on fruits, vegetables, and whole grains. · Think of meat as a side dish instead of as the main part of your meal.  · When you do eat meat, make it extra-lean ground beef (97% lean), ground turkey breast (without skin added), meats with fat trimmed off before cooking, or skinless chicken. · Try main dishes that use whole wheat pasta, brown rice, dried beans, or vegetables. · Use cooking methods that use little or no fat, such as broiling, steaming, or grilling. Use cooking spray instead of oil. If you use oil, use a monounsaturated oil, such as canola or olive oil. · Read food labels on canned, bottled, or packaged foods. Choose those with little saturated fat. When eating out at a restaurant  · Order foods that are broiled or poached instead of fried or breaded. · Cut back on the amount of butter or margarine that you use on bread. Use small amounts of olive oil instead. · Order sauces, gravies, and salad dressings on the side, and use only a little. · When you order pasta, choose tomato sauce instead of cream sauce. · Ask for salsa with your baked potato instead of sour cream, butter, cheese, or holloway.   Where can you learn more? Go to https://chpepiceweb.[facility]. org and sign in to your [employer] account. Enter Y431 in the [employer] box to learn more about \"Learning About Low-Fat Eating. \"     If you do not have an account, please click on the \"Sign Up Now\" link. Current as of: December 17, 2020               Content Version: 13.0  © 8308-9362 Healthwise, [employer]. Care instructions adapted under license by [facility] ([facility]). If you have questions about a medical condition or this instruction, always ask your healthcare professional. [address] any warranty or liability for your use of this information.

## 2021-12-13 NOTE — PROGRESS NOTES
Bj Talamantes is a 57 y.o. female for 3 Month Follow-Up and Other (would like referral to dermatologist to have spot removed on right cheek)      Diabetes Type 2    Glucose control:   Does patient check blood glucoses at home? Yes  Report of hypoglycemia: yes between 12 pm and 1 pm  Lab Results   Component Value Date    LABA1C 7.5 (H) 12/13/2021     No results found for: EAG    Symptoms  Polyuria, Polydipsia or Polyphagia? No  Chest Pain, SOB, or Palpitations? -  No  New Vision complaints? No  Paresthesias of the extremities? Yes - she has neuropathy , takes lyrica for this and it is controlled. Also sees Physiatry for pain control, has chronic knee pain , left worse than right being managed by physiatry, has also tried pain creams on norco for pain     Medications  Current medication were reviewed. Compliant with medications? yes  Medication side effects? No  On ACE-I or ARB? No  On antiplatelet therapy? Yes  On Statin? Yes    Last Diabetic Eye Exam: has been within the year    Exercise  Exercise? No  Wt Readings from Last 3 Encounters:   12/13/21 270 lb 9.6 oz (122.7 kg)   10/27/21 268 lb 12.8 oz (121.9 kg)   10/12/21 269 lb (122 kg)       Diet discipline?:  Low salt, fat, sugar diet? Yes    She did ask about weight loss medications, she does not have a formal exercise program, does not count calories and has not yet tired any formal diet plans. Has bene referred to weight management, appt in January. COPD    HPI:    Current medication regimen - anoro, flonase as needed and albuterol  Compliant with medications? yes    Limitations in function - none  Does patient smoke? No    Chronic cough?: no  Chest pain/Tightness?:  no  Shortness of breath?: no  Wheezing?  no    Last PFTs - ordered in 10/21 but pt never did them. Also had them in 2020    Hospitalized and/or intubated in the past?: Yes  Number of times prescribed oral steroids in the past year - 1  Influenza vaccine up to date? Yes  Pneumococcal vaccine up to date? Yes    Depressed Mood    HPI:    Depressed Mood? yes - some days worse than others  Anhedonia? yes -   Appetite changes? yes -   Sleep disturbances? no  Feelings of guilt? no  Decreased energy? yes - some days   Impaired concentration? no  Substance abuse? no    Suicidal/Homicidal Ideation? no      Compliant with meds: Yes  Med side effects: no   Sees therapist?:  no  Family History of Mental Illness? yes -     Review of Systems - Psychological ROS: positive for - anxiety and depression, negative for - suicidal ideation    She has a mole on the right side of her face, had it taken off in past, it was not cancerous it has grown back. Lab Results   Component Value Date    LABA1C 7.5 (H) 12/13/2021     No results found for: EAG   Lab Results   Component Value Date    WBC 7.4 08/30/2021    HGB 16.1 (H) 08/30/2021    HCT 45.7 (H) 08/30/2021    MCV 88.6 08/30/2021     (L) 08/30/2021     Lab Results   Component Value Date     08/30/2021    K 3.8 08/30/2021    K 3.9 01/29/2020     08/30/2021    CO2 22 08/30/2021    BUN 16 08/30/2021    CREATININE 0.81 08/30/2021    GLUCOSE 167 08/30/2021    CALCIUM 8.90 08/30/2021          Blood pressure control:  BP Readings from Last 3 Encounters:   12/13/21 120/78   10/27/21 124/80   10/12/21 125/88       Lab Results   Component Value Date    LABMICR 2.14 02/23/2021       Lab Results   Component Value Date    LDLCALC 92 07/13/2021       Physical Exam    /78   Pulse 62   Temp 98 °F (36.7 °C) (Oral)   Resp 16   Ht 5' 7\" (1.702 m)   Wt 270 lb 9.6 oz (122.7 kg)   SpO2 97%   BMI 42.38 kg/m²   Appearance: alert, well appearing, and in no distress, over appearing weight and well hydrated. Neck exam - supple, no significant adenopathy. CVS exam: normal rate, regular rhythm, normal S1, S2, no murmurs, rubs, clicks or gallops. Lungs: clear to auscultation, no wheezes, rales or rhonchi, symmetric air entry.   Abdomen: BS normal, soft, non tender, non distended, no rebound or guarding  Mental Status: normal mood, affect behavior, speech, dress,  and thought processes. Neuro:  Alert, muscle tone grossly normal  Skin exam: normal coloration and turgor, no rashes, right facial cheek with a 3-4 mm raised brown nevus on her zygomatic bone, smooth borders, borders regular. Foot exam:  No pedal edema, no erythematous lesions noted b/l, sensation wnl b/l, PT and TP pulses wnl b/l    ASSESSMENT & PLAN  Compa Hodgson was seen today for 3 month follow-up and other. Diagnoses and all orders for this visit:    Type 2 diabetes mellitus with hyperosmolarity without coma, with long-term current use of insulin (HCC)  Increase levemir to 73 units at night   Diabetic polyneuropathy associated with type 2 diabetes mellitus ([address]  Controlled with current meds  Pulmonary emphysema, unspecified emphysema type ([address]  stable  BMI 40.0-44.9, adult ([address]  WE discussed calorie counting in detail and increasing physical activity and portion control to promote weight loss. Major depressive disorder in partial remission, unspecified whether recurrent ([address]  Continue current meds  Symptoms controlled  Type 2 diabetes mellitus with other circulatory complication, with long-term current use of insulin (HCC)  -     insulin detemir (LEVEMIR FLEXTOUCH) 100 UNIT/ML injection pen; ADMINISTER 73 UNITS UNDER THE SKIN EVERY NIGHT  As above   Nevus of face  Derrick Ruiz MD, Plastic Surgery, [facility]    Other orders  -     POCT glycosylated hemoglobin (Hb A1C)    Pt in agreement with plan   Pt going to trial and exercise and present back for weight check she will think about adiphex. Return in about 3 months (around 3/13/2022) for med refill check up.

## 2021-12-14 NOTE — TELEPHONE ENCOUNTER
I spoke with son this AM, also d/w dialysis staff as well. Will address O2 needs as outpt as well. Order placed please call and inform patient

## 2022-01-08 DIAGNOSIS — E11.59 TYPE 2 DIABETES MELLITUS WITH OTHER CIRCULATORY COMPLICATION, WITH LONG-TERM CURRENT USE OF INSULIN (HCC): ICD-10-CM

## 2022-01-08 DIAGNOSIS — Z79.4 TYPE 2 DIABETES MELLITUS WITH OTHER CIRCULATORY COMPLICATION, WITH LONG-TERM CURRENT USE OF INSULIN (HCC): ICD-10-CM

## 2022-01-10 ENCOUNTER — INITIAL CONSULT (OUTPATIENT)
Dept: PULMONOLOGY | Age: 62
End: 2022-01-10
Payer: MEDICARE

## 2022-01-10 VITALS
HEIGHT: 69 IN | BODY MASS INDEX: 40.32 KG/M2 | SYSTOLIC BLOOD PRESSURE: 116 MMHG | OXYGEN SATURATION: 98 % | HEART RATE: 63 BPM | DIASTOLIC BLOOD PRESSURE: 78 MMHG | TEMPERATURE: 97.4 F | WEIGHT: 272.2 LBS

## 2022-01-10 DIAGNOSIS — F17.200 SMOKER UNMOTIVATED TO QUIT: ICD-10-CM

## 2022-01-10 DIAGNOSIS — E66.01 MORBID OBESITY WITH BMI OF 40.0-44.9, ADULT (HCC): ICD-10-CM

## 2022-01-10 DIAGNOSIS — J44.9 CHRONIC OBSTRUCTIVE PULMONARY DISEASE, UNSPECIFIED COPD TYPE (HCC): Primary | ICD-10-CM

## 2022-01-10 DIAGNOSIS — G47.10 HYPERSOMNIA: ICD-10-CM

## 2022-01-10 DIAGNOSIS — G47.33 OSA (OBSTRUCTIVE SLEEP APNEA): ICD-10-CM

## 2022-01-10 DIAGNOSIS — G47.33 OBSTRUCTIVE SLEEP APNEA (ADULT) (PEDIATRIC): ICD-10-CM

## 2022-01-10 PROCEDURE — 99214 OFFICE O/P EST MOD 30 MIN: CPT | Performed by: INTERNAL MEDICINE

## 2022-01-10 RX ORDER — LIRAGLUTIDE 6 MG/ML
INJECTION SUBCUTANEOUS
Qty: 9 ML | Refills: 3 | Status: SHIPPED | OUTPATIENT
Start: 2022-01-10 | End: 2022-05-05

## 2022-01-10 NOTE — PATIENT INSTRUCTIONS
Patient Education        Stopping Smoking: Care Instructions  Your Care Instructions     Cigarette smokers crave the nicotine in cigarettes. Giving it up is much harder than simply changing a habit. Your body has to stop craving the nicotine. It is hard to quit, but you can do it. There are many tools that people use to quit smoking. You may find that combining tools works best for you. There are several steps to quitting. First you get ready to quit. Then you get support to help you. After that, you learn new skills and behaviors to become a nonsmoker. For many people, a necessary step is getting and using medicine. Your doctor will help you set up the plan that best meets your needs. You may want to attend a smoking cessation program to help you quit smoking. When you choose a program, look for one that has proven success. Ask your doctor for ideas. You will greatly increase your chances of success if you take medicine as well as get counseling or join a cessation program.  Some of the changes you feel when you first quit tobacco are uncomfortable. Your body will miss the nicotine at first, and you may feel short-tempered and grumpy. You may have trouble sleeping or concentrating. Medicine can help you deal with these symptoms. You may struggle with changing your smoking habits and rituals. The last step is the tricky one: Be prepared for the smoking urge to continue for a time. This is a lot to deal with, but keep at it. You will feel better. Follow-up care is a key part of your treatment and safety. Be sure to make and go to all appointments, and call your doctor if you are having problems. It's also a good idea to know your test results and keep a list of the medicines you take. How can you care for yourself at home? · Ask your family, friends, and coworkers for support. You have a better chance of quitting if you have help and support.   · Join a support group, such as Nicotine Anonymous, for people who are trying to quit smoking. · Consider signing up for a smoking cessation program, such as the American Lung Association's Freedom from Smoking program.  · Get text messaging support. Go to the website at www.smokefree. gov to sign up for the [facility] program.  · Set a quit date. Pick your date carefully so that it is not right in the middle of a big deadline or stressful time. Once you quit, do not even take a puff. Get rid of all ashtrays and lighters after your last cigarette. Clean your house and your clothes so that they do not smell of smoke. · Learn how to be a nonsmoker. Think about ways you can avoid those things that make you reach for a cigarette. ? Avoid situations that put you at greatest risk for smoking. For some people, it is hard to have a drink with friends without smoking. For others, they might skip a coffee break with coworkers who smoke. ? Change your daily routine. Take a different route to work or eat a meal in a different place. · Cut down on stress. Calm yourself or release tension by doing an activity you enjoy, such as reading a book, taking a hot bath, or gardening. · Talk to your doctor or pharmacist about nicotine replacement therapy, which replaces the nicotine in your body. You still get nicotine but you do not use tobacco. Nicotine replacement products help you slowly reduce the amount of nicotine you need. These products come in several forms, many of them available over-the-counter:  ? Nicotine patches  ? Nicotine gum and lozenges  ? Nicotine inhaler  · Ask your doctor about bupropion (Wellbutrin) or varenicline (Chantix), which are prescription medicines. They do not contain nicotine. They help you by reducing withdrawal symptoms, such as stress and anxiety. · Some people find hypnosis, acupuncture, and massage helpful for ending the smoking habit. · Eat a healthy diet and get regular exercise.  Having healthy habits will help your body move past its craving for nicotine. · Be prepared to keep trying. Most people are not successful the first few times they try to quit. Do not get mad at yourself if you smoke again. Make a list of things you learned and think about when you want to try again, such as next week, next month, or next year. Where can you learn more? Go to https://Zulahoopenicole.Youku. org and sign in to your [employer] account. Enter X398 in the [employer] box to learn more about \"Stopping Smoking: Care Instructions. \"     If you do not have an account, please click on the \"Sign Up Now\" link. Current as of: February 11, 2021               Content Version: 13.1  © 2006-2021 Healthwise, Incorporated. Care instructions adapted under license by [facility] ([facility]). If you have questions about a medical condition or this instruction, always ask your healthcare professional. [address] any warranty or liability for your use of this information.

## 2022-01-10 NOTE — PROGRESS NOTES
New Sleep Patient H/P    Presentation:  Thiago Perez is referred by Michael Leiva for  CARLOTTA      Referred from ENT clinic  Sleep study 20 years ago (not available), was positive for CARLOTTA, used CPAP 3 years and then lost it. Snores, sleep is non refreshing, breathing difficulty to sleep, daytime somnolence, not completely sure if wishes to go back to PAP therapies. Smoker, 1 ppd average--COPD, follows with Holly, not interested in quitting  Eats once a day but keeps gaining wt. Distrusts the government therefore would not take Covid vaccine  Training program for adults, difficulty concentrating, training for        She denies any history of sleep walking or sleep talking. No history of seizures activity. No history suggestive of restless legs syndrome. No history of bruxism. No history of head injury.     Past Medical History:   Diagnosis Date    Anxiety     Arthritis     Carpal tunnel syndrome     Cellulitis     Chronic back pain     Depression     Diabetes mellitus ([address]     Emphysema of lung (HCC)     Fibromyalgia     GERD (gastroesophageal reflux disease)     Glaucoma     Hiatal hernia     esophagus closes    Hx of blood clots     foot post op    Hyperlipidemia, mixed 2017    IBS (irritable bowel syndrome)     Leg pain     nerve damage right leg    MDRO (multiple drug resistant organisms) resistance     wound and nasal    Guzman's neuroma     left foot    Nausea & vomiting     Neuropathic pain     Osteoarthritis     PONV (postoperative nausea and vomiting)     Pulmonary emphysema ([address] 2017    Sleep apnea     DOES NOT USE CPAP       Past Surgical History:   Procedure Laterality Date    ABDOMEN SURGERY      APPENDECTOMY      BACK SURGERY      lower x 3    CERVICAL FUSION       SECTION      x3    CHOLECYSTECTOMY      COLONOSCOPY      ECTOPIC PREGNANCY SURGERY      EYE SURGERY Bilateral     LASER FOR GLAUCOMA    HYSTERECTOMY, TOTAL ABDOMINAL      JOINT REPLACEMENT      OTHER SURGICAL HISTORY  Aug 5, 2013    Left knee total arthroplasty (Dr. Zenon Holt, [facility])    CO DEBRIDEMENT OPEN WOUND 20 SQ CM< Right 5/29/2018    INCISIONAL DEBRIEDMENT INCISION AND DRAINAGE RIGHT GLUTEAL ABCESS performed by Vicki Mahan MD at [facility] OFFICE/OUTPT [address] Right 5/25/2018    EXCISIONAL DEBRIDEMENT OF RIGHT BUTTOCKS WOUND performed by Jaron Sapp DO at [facility] Left     little toe bone removed    TONSILLECTOMY      TOTAL KNEE ARTHROPLASTY Right 1/26/2016    UPPER GASTROINTESTINAL ENDOSCOPY      UPPER GASTROINTESTINAL ENDOSCOPY Left 5/9/2019    EGD DILATION SAVORY performed by Elif Greco MD at [facility]       Social History     Tobacco Use    Smoking status: Current Every Day Smoker     Packs/day: 1.00     Years: 50.00     Pack years: 50.00     Types: Cigarettes     Start date: 1/1/1976    Smokeless tobacco: Never Used   Vaping Use    Vaping Use: Never used   Substance Use Topics    Alcohol use: No     Comment: Sober for 26 years    Drug use: Yes     Types: Marijuana (Weed)     Comment: couple times a day        Allergies   Allergen Reactions    Latex Itching and Rash       Current Outpatient Medications   Medication Sig Dispense Refill    albuterol sulfate  (90 Base) MCG/ACT inhaler Inhale 2 puffs into the lungs every 6 hours as needed for Wheezing or Shortness of Breath 8 g 11    venlafaxine (EFFEXOR XR) 37.5 MG extended release capsule TAKE 1 CAPSULE BY MOUTH ONCE DAILY ALONG WITH 150MG 90 capsule 3    insulin detemir (LEVEMIR FLEXTOUCH) 100 UNIT/ML injection pen ADMINISTER 73 UNITS UNDER THE SKIN EVERY NIGHT 18 mL 6    venlafaxine (EFFEXOR XR) 150 MG extended release capsule TAKE 1 CAPSULE BY MOUTH DAILY 90 capsule 3    traZODone (DESYREL) 150 MG tablet TAKE 1 TABLET BY MOUTH EVERY NIGHT 90 tablet 3    Continuous Blood Gluc Sensor (FREESTYLE MICKI 14 DAY SENSOR) MISC APPLY 1 SENSOR EXTERNALLY EVERY 14 DAYS 2 each 3    pantoprazole (PROTONIX) 40 MG tablet Take 1 tablet by mouth 2 times daily (before meals) 180 tablet 1    insulin lispro, 1 Unit Dial, 100 UNIT/ML SOPN INJECT 12 UNITS UNDER THE SKIN THREE TIMES DAILY WITH MEALS PLUS SLIDING SCALE( MAX OF 60 UNITS DAILY) (Patient taking differently: INJECT 14 UNITS UNDER THE SKIN THREE TIMES DAILY WITH MEALS PLUS SLIDING SCALE( MAX OF 60 UNITS DAILY)) 15 mL 3    Handicap Placard MISC by Does not apply route Duration: three years 1 each 0    VICTOZA 18 MG/3ML SOPN SC injection ADMINISTER 1.8 MG UNDER THE SKIN DAILY 9 mL 3    cetirizine (ZYRTEC) 10 MG tablet Take 1 tablet by mouth daily 90 tablet 1    nystatin (MYCOSTATIN) 308197 UNIT/ML suspension Take 5 mLs by mouth 4 times daily 473 mL 1    blood glucose test strips (ACCU-CHEK DAVIDA PLUS) strip USE TO TEST BLOOD SUGAR ONCE DAILY 100 strip 3    pregabalin (LYRICA) 300 MG capsule TAKE 1 CAPSULE BY MOUTH TWICE DAILY 180 capsule 1    fluticasone (FLONASE) 50 MCG/ACT nasal spray 1 spray by Each Nostril route daily 2 Bottle 1    MUCUS RELIEF 600 MG extended release tablet TAKE 1 TABLET BY MOUTH TWICE DAILY 120 tablet 2    atorvastatin (LIPITOR) 20 MG tablet TAKE 1 TABLET BY MOUTH DAILY 90 tablet 3    Insulin Pen Needle (B-D ULTRAFINE III SHORT PEN) 31G X 8 MM MISC USE AS DIRECTED ONCE DAILY 100 each 2    diclofenac sodium (VOLTAREN) 1 % GEL diclofenac 1 % topical gel      Continuous Blood Gluc Sensor (FREESTYLE MICKI 14 DAY SENSOR) MISC FreeStyle Micki 14 Day Sensor kit   APPLY 1 SENSOR EXTERNALLY EVERY 14 DAYS      umeclidinium-vilanterol (ANORO ELLIPTA) 62.5-25 MCG/INH AEPB inhaler Inhale 1 puff into the lungs daily 30 puff 11    Continuous Blood Gluc  (FREESTYLE MICKI 14 DAY READER) PAOLA Check blood sugars daily 1 Device 0    Handicap Placard MISC by Does not apply route Duration: three years    Type II idabetes  Diabetic neuropathy 1 each 0    HYDROcodone-acetaminophen (NORCO) 5-325 MG per tablet Take 1 tablet by mouth every 8 hours as needed for Pain. [place] Mom traMADol (ULTRAM) 50 MG tablet Take 100 mg by mouth 3 times daily.  OXYGEN Inhale 2 L into the lungs       Elastic Bandages & Supports (WRIST BRACE/RIGHT MEDIUM) MISC Wear brace on right wrist nightly 1 each 0    Cholecalciferol (VITAMIN D3) 2000 units CAPS Take by mouth daily      rOPINIRole (REQUIP) 1 MG tablet Take 1 mg by mouth nightly       acetaminophen 650 MG TABS Take 650 mg by mouth every 4 hours as needed for Pain (For mild pain level 1-3 or for fever > 100.5). 120 tablet      No current facility-administered medications for this visit. Family History   Problem Relation Age of Onset    Arthritis Mother     Heart Disease Mother         CHF    High Blood Pressure Mother     Kidney Disease Mother     Cancer Father     Heart Disease Sister         diastolic dysfunction    Other Sister         thyroid cysts    Cancer Brother     Kidney Disease Brother     Diabetes Brother     Kidney Disease Brother     Colon Cancer Neg Hx           Social History     Tobacco Use    Smoking status: Current Every Day Smoker     Packs/day: 1.00     Years: 50.00     Pack years: 50.00     Types: Cigarettes     Start date: 1/1/1976    Smokeless tobacco: Never Used   Vaping Use    Vaping Use: Never used   Substance Use Topics    Alcohol use: No     Comment: Sober for 26 years    Drug use: Yes     Types: Marijuana (Weed)     Comment: couple times a day          Review of Systems:   General/Constitutional: chronic fatigue, non restorative sleep, daytime somnolence  HENT: Negative. Eyes: Negative. Upper respiratory tract: snores  Lower respiratory tract/ lungs: Cough, HARTMAN, difficulty breathing at night  Cardiovascular: No palpitations or chest pain. Gastrointestinal: No nausea or vomiting. Neurological: No focal neurological weakness. Extremities: No edema.   Musculoskeletal: No complaints. Genitourinary: No complaints. Hematological: Negative. Psychiatric/Behavioral: Negative. Skin: No itching. Physical Exam:    HEIGHTHeight: 5' 9\" (175.3 cm) WEIGHTWeight: 272 lb 3.2 oz (123.5 kg)    BMI:  40 Neck Size: 16     ESS: 11  SAQLI:49  Vitals:   Vitals:    01/10/22 1125   BP: 116/78   Site: Right Upper Arm   Position: Sitting   Cuff Size: Large Adult   Pulse: 63   Temp: 97.4 °F (36.3 °C)   TempSrc: Temporal   SpO2: 98%   Weight: 272 lb 3.2 oz (123.5 kg)   Height: 5' 9\" (1.753 m)        Mallampati Score: 3    Physical Exam :  Constitutional: BMI 40  HENT:   Head: Normocephalic and atraumatic. Mouth/Throat: Oropharynx is clear and moist. No oral thrush. Mallampati 3  . Large tongue. Eyes: Conjunctivae are normal. PERRLA. No scleral icterus. Neck: Neck supple. No JVD present. No tracheal deviation present. 16 in circumference  Cardiovascular: Normal rate, regular rhythm, normal heart sounds. No murmur heard. Pulmonary/Chest: Effort normal and breath sounds normal. No stridor. No respiratory distress. No wheezes. No rales. Abdominal: Soft. No distension. No tenderness. Musculoskeletal: Normal range of motion. Lymphadenopathy:  No cervical adenopathy. Neurological: Alert and oriented to person, place, and time. No focal deficits. Skin: Skin is warm and dry. Patient is not diaphoretic. Psychiatric: Normal behavior with normal mood and affect. Diagnostic Data:    Assessment    Diagnosis Orders   1. Chronic obstructive pulmonary disease, unspecified COPD type ([facility]  Sleep Study with PAP Titration   2. Smoker unmotivated to quit  Sleep Study with PAP Titration   3. Morbid obesity with BMI of 40.0-44.9, adult ([facility]  Sleep Study with PAP Titration   4. CARLOTTA (obstructive sleep apnea)     5. Hypersomnia     6.  Obstructive sleep apnea (adult) (pediatric)   Sleep Study with PAP Titration         Plan   Orders Placed This Encounter   Procedures    Sleep Study with PAP Titration Standing Status:   Future     Standing Expiration Date:   1/10/2023     Order Specific Question:   Sleep Study Titration Type     Answer:   Split Night Study (Baseline followed by PAP Titration)          Mask Desensitization and Pre study teaching? No  Weight Loss Information Given? Yes  Sleep Hygiene Discussed? Yes    -She was advised to call [facility]coo regarding supplies if needed.  -She call my office for earlier appointment if needed for worsening of sleep symptoms.  -[address] [facility] educated about my impression and plan. Patient verbalizes understanding.

## 2022-01-26 ENCOUNTER — HOSPITAL ENCOUNTER (EMERGENCY)
Age: 62
Discharge: HOME | End: 2022-01-26
Payer: MEDICARE

## 2022-01-26 VITALS
TEMPERATURE: 97.2 F | WEIGHT: 271 LBS | DIASTOLIC BLOOD PRESSURE: 80 MMHG | HEART RATE: 80 BPM | RESPIRATION RATE: 16 BRPM | SYSTOLIC BLOOD PRESSURE: 172 MMHG | OXYGEN SATURATION: 98 % | BODY MASS INDEX: 42.53 KG/M2 | HEIGHT: 67 IN

## 2022-01-26 DIAGNOSIS — Z20.822 ENCOUNTER FOR SCREENING LABORATORY TESTING FOR COVID-19 VIRUS: Primary | ICD-10-CM

## 2022-01-26 PROCEDURE — 99212 OFFICE O/P EST SF 10 MIN: CPT | Performed by: NURSE PRACTITIONER

## 2022-01-26 PROCEDURE — U0005 INFEC AGEN DETEC AMPLI PROBE: HCPCS

## 2022-01-26 PROCEDURE — U0003 INFECTIOUS AGENT DETECTION BY NUCLEIC ACID (DNA OR RNA); SEVERE ACUTE RESPIRATORY SYNDROME CORONAVIRUS 2 (SARS-COV-2) (CORONAVIRUS DISEASE [COVID-19]), AMPLIFIED PROBE TECHNIQUE, MAKING USE OF HIGH THROUGHPUT TECHNOLOGIES AS DESCRIBED BY CMS-2020-01-R: HCPCS

## 2022-01-26 PROCEDURE — 99213 OFFICE O/P EST LOW 20 MIN: CPT

## 2022-01-26 ASSESSMENT — ENCOUNTER SYMPTOMS
CONSTIPATION: 0
RHINORRHEA: 0
ABDOMINAL PAIN: 0
SINUS PRESSURE: 0
SORE THROAT: 0
STRIDOR: 0
APNEA: 0
NAUSEA: 0
DIARRHEA: 0
WHEEZING: 0
COUGH: 0
CHEST TIGHTNESS: 0
VOMITING: 0
SHORTNESS OF BREATH: 0
SINUS PAIN: 0
CHOKING: 0

## 2022-01-26 ASSESSMENT — PAIN SCALES - GENERAL: PAINLEVEL_OUTOF10: 7

## 2022-01-26 NOTE — ED PROVIDER NOTES
[facility]      CHIEF COMPLAINT       Chief Complaint   Patient presents with    Concern For VEYUM-38     \" my daughter positive boss wants me checked. \" denies s/s  states \" I have emphysema  cough\"       Nurses Notes reviewed and I agree except as noted in the HPI. HISTORY OFPRESENT ILLNESS   David Flood is a 57 y.o. [facility] denies symptoms was exposed to COVID-19 by her daughter and grandchildren, her employer is requiring a Covid test.    The history is provided by the patient. No  was used. REVIEW OF SYSTEMS     Review of Systems   Constitutional: Negative for activity change, appetite change, chills, diaphoresis, fatigue, fever and unexpected weight change. HENT: Negative for congestion, ear discharge, ear pain, postnasal drip, rhinorrhea, sinus pressure, sinus pain, sneezing and sore throat. Respiratory: Negative for apnea, cough, choking, chest tightness, shortness of breath, wheezing and stridor. Cardiovascular: Negative for chest pain, palpitations and leg swelling. Gastrointestinal: Negative for abdominal pain, constipation, diarrhea, nausea and vomiting. Genitourinary: Negative for dysuria, frequency and urgency.        PAST MEDICAL HISTORY         Diagnosis Date    Anxiety     Arthritis     Carpal tunnel syndrome     Cellulitis     Chronic back pain     Depression     Diabetes mellitus ([address]     Emphysema of lung (HCC)     Fibromyalgia     GERD (gastroesophageal reflux disease)     Glaucoma     Hiatal hernia     esophagus closes    Hx of blood clots     foot post op    Hyperlipidemia, mixed 7/12/2017    IBS (irritable bowel syndrome)     Leg pain     nerve damage right leg    MDRO (multiple drug resistant organisms) resistance 2008    wound and nasal    Guzman's neuroma     left foot    Nausea & vomiting     Neuropathic pain     Osteoarthritis     PONV (postoperative nausea and vomiting)     Pulmonary emphysema ([facility] [address] 2017    Sleep apnea     DOES NOT USE CPAP       SURGICAL HISTORY     Patient  has a past surgical history that includes Appendectomy;  section; Cholecystectomy; back surgery; cervical fusion; Tonsillectomy; Ectopic pregnancy surgery; Toe Surgery (Left); Abdomen surgery; Colonoscopy; other surgical history (Aug 5, 2013); Total knee arthroplasty (Right, 2016); joint replacement; Upper gastrointestinal endoscopy; pr office/outpt visit,procedure only (Right, 2018); pr debridement open wound 20 sq cm< (Right, 2018); Hysterectomy, total abdominal; Upper gastrointestinal endoscopy (Left, 2019); and eye surgery (Bilateral, ). CURRENT MEDICATIONS       Previous Medications    ACETAMINOPHEN 650 MG TABS    Take 650 mg by mouth every 4 hours as needed for Pain (For mild pain level 1-3 or for fever > 100.5).     ALBUTEROL SULFATE  (90 BASE) MCG/ACT INHALER    Inhale 2 puffs into the lungs every 6 hours as needed for Wheezing or Shortness of Breath    ATORVASTATIN (LIPITOR) 20 MG TABLET    TAKE 1 TABLET BY MOUTH DAILY    BLOOD GLUCOSE TEST STRIPS (ACCU-CHEK DAVIDA PLUS) STRIP    USE TO TEST BLOOD SUGAR ONCE DAILY    CETIRIZINE (ZYRTEC) 10 MG TABLET    Take 1 tablet by mouth daily    CHOLECALCIFEROL (VITAMIN D3) 2000 UNITS CAPS    Take by mouth daily    CONTINUOUS BLOOD GLUC  (FREESTYLE MICKI 14 DAY READER) PAOLA    Check blood sugars daily    CONTINUOUS BLOOD GLUC SENSOR (FREESTYLE MICKI 14 DAY SENSOR) [facility]    FreeStyle Micki 14 Day Sensor kit   APPLY 1 SENSOR EXTERNALLY EVERY 14 DAYS    CONTINUOUS BLOOD GLUC SENSOR (FREESTYLE MICKI 14 DAY SENSOR) MISC    APPLY 1 SENSOR EXTERNALLY EVERY 14 DAYS    DICLOFENAC SODIUM (VOLTAREN) 1 % GEL    diclofenac 1 % topical gel    ELASTIC BANDAGES & SUPPORTS (WRIST BRACE/RIGHT MEDIUM) MISC    Wear brace on right wrist nightly    FLUTICASONE (FLONASE) 50 MCG/ACT NASAL SPRAY    1 spray by Each Nostril route daily GABAPENTIN PO    Take by mouth    HANDICAP PLACARD MISC    by Does not apply route Duration: three years    Type II idabetes  Diabetic neuropathy    HANDICAP PLACARD MISC    by Does not apply route Duration: three years    HYDROCODONE-ACETAMINOPHEN (NORCO) 5-325 MG PER TABLET    Take 1 tablet by mouth every 8 hours as needed for Pain. .    INSULIN DETEMIR (LEVEMIR FLEXTOUCH) 100 UNIT/ML INJECTION PEN    ADMINISTER 73 UNITS UNDER THE SKIN EVERY NIGHT    INSULIN LISPRO, 1 UNIT DIAL, 100 UNIT/ML SOPN    INJECT 12 UNITS UNDER THE SKIN THREE TIMES DAILY WITH MEALS PLUS SLIDING SCALE( MAX OF 60 UNITS DAILY)    INSULIN PEN NEEDLE (B-D ULTRAFINE III SHORT PEN) 31G X 8 MM MISC    USE AS DIRECTED ONCE DAILY    MUCUS RELIEF 600 MG EXTENDED RELEASE TABLET    TAKE 1 TABLET BY MOUTH TWICE DAILY    NYSTATIN (MYCOSTATIN) 716474 UNIT/ML SUSPENSION    Take 5 mLs by mouth 4 times daily    OXYGEN    Inhale 2 L into the lungs     PANTOPRAZOLE (PROTONIX) 40 MG TABLET    Take 1 tablet by mouth 2 times daily (before meals)    PREGABALIN (LYRICA) 300 MG CAPSULE    TAKE 1 CAPSULE BY MOUTH TWICE DAILY    ROPINIROLE (REQUIP) 1 MG TABLET    Take 1 mg by mouth nightly     TRAMADOL (ULTRAM) 50 MG TABLET    Take 100 mg by mouth 3 times daily. TRAZODONE (DESYREL) 150 MG TABLET    TAKE 1 TABLET BY MOUTH EVERY NIGHT    UMECLIDINIUM-VILANTEROL (ANORO ELLIPTA) 62.5-25 MCG/INH AEPB INHALER    Inhale 1 puff into the lungs daily    VENLAFAXINE (EFFEXOR XR) 150 MG EXTENDED RELEASE CAPSULE    TAKE 1 CAPSULE BY MOUTH DAILY    VENLAFAXINE (EFFEXOR XR) 37.5 MG EXTENDED RELEASE CAPSULE    TAKE 1 CAPSULE BY MOUTH ONCE DAILY ALONG WITH 150MG    VICTOZA 18 MG/3ML SOPN SC INJECTION    ADMINISTER 1.8 MG UNDER THE SKIN DAILY       ALLERGIES     Patient is is allergic to latex.     FAMILY HISTORY     Patient's family history includes Arthritis in her mother; Cancer in her brother and father; Diabetes in her brother; Heart Disease in her mother and sister; High Blood Pressure in her mother; Kidney Disease in her brother, brother, and mother; Other in her sister. SOCIAL HISTORY     Patient  reports that she has been smoking cigarettes. She started smoking about 46 years ago. She has a 25.00 pack-year smoking history. She has never used smokeless tobacco. She reports current drug use. Drug: Marijuana Aly Fong). She reports that she does not drink alcohol. PHYSICAL EXAM     ED TRIAGE VITALS  BP: (!) 172/80, Temp: 97.2 °F (36.2 °C), Pulse: 80, Resp: 16, SpO2: 98 %  Physical Exam  Vitals and nursing note reviewed. Constitutional:       General: She is not in acute distress. Appearance: Normal appearance. She is obese. She is not ill-appearing, toxic-appearing or diaphoretic. HENT:      Head: Normocephalic and atraumatic. Right Ear: External ear normal.      Left Ear: External ear normal.   Eyes:      Extraocular Movements: Extraocular movements intact. Conjunctiva/sclera: Conjunctivae normal.   Cardiovascular:      Rate and Rhythm: Normal rate and regular rhythm. Heart sounds: Normal heart sounds. No murmur heard. No friction rub. No gallop. Pulmonary:      Effort: Pulmonary effort is normal. No respiratory distress. Breath sounds: Normal breath sounds. No stridor. No wheezing, rhonchi or rales. Chest:      Chest wall: No tenderness. Musculoskeletal:         General: Normal range of motion. Cervical back: Normal range of motion. Skin:     General: Skin is warm. Neurological:      General: No focal deficit present. Mental Status: She is alert and oriented to person, place, and time. Psychiatric:         Mood and Affect: Mood normal.         Behavior: Behavior normal.         Thought Content: Thought content normal.         Judgment: Judgment normal.         DIAGNOSTIC RESULTS   Labs:No results found for this visit on 01/26/22.     IMAGING:  No orders to display     URGENT CARE COURSE:     Vitals:    01/26/22 1604   BP: (!) 172/80 Pulse: 80   Resp: 16   Temp: 97.2 °F (36.2 °C)   SpO2: 98%   Weight: 271 lb (122.9 kg)   Height: 5' 7\" (1.702 m)       Medications - No data to display  PROCEDURES:  None  FINAL IMPRESSION      1. Encounter for screening laboratory testing for COVID-19 virus        DISPOSITION/PLAN   Decision To Discharge    COVID-19 PCR test pending and result will be known within 3-5 days. Patient must isolate until test result is known. If COVID-19 COVID-19 positive, CDC guidelines state,  Everyone, regardless of vaccination status, if COVID-19 test is positive: Stay home for 5 days. If you have no symptoms or your symptoms are resolving after 5 days, you can leave your house. Continue to wear a mask around others for 5 additional days. If you have a fever, continue to stay home until your fever resolves. [email or web address]        When to seek immediate emergency medical attention:    Uncontrollable fever  Trouble breathing  Persistent pain or pressure in the chest  New confusion  Inability to wake or stay awake  Pale, gray, or blue-colored skin, lips, or nail beds, depending on skin tone  *This list is not all possible symptoms. Please call your medical provider for any other symptoms that are severe or concerning to you. May take over-the-counter supplements daily if no contraindications:  Multi-vitamin/multi-mineral daily   Vitamin D3 4000 IU daily  Zinc 75 mg daily  Vitamin C 1000 mg twice daily  B-100 complex as daily as directed on bottle  Gargle with mouthwash 3 times daily, may use Scope, Crest, or Listerine. Patient will have to follow with their family provider for additional school/work note and/or [place] paperwork needs.      PATIENT REFERRED TO:  ALLEN Elder CNP  [address]  [address] [address] [phone]    Call   As needed    [facility]  [phone]  Go today  If symptoms worsen    DISCHARGE MEDICATIONS:  New Prescriptions    No medications on file     Current Discharge Medication List          ALLEN West CNP, APRN - CNP  01/26/22 5196

## 2022-01-26 NOTE — ED NOTES
Pt has opened door and screams at staff, \" how long does it take to put a swab up my nose? \" Then slams door. To room to obtain swab.  Pt apologizes stating she has had car trouble and multiple things giving her a bad day\"     Lety Petersen RN  01/26/22 4751

## 2022-01-26 NOTE — Clinical Note
Dave Anderson was seen and treated in our emergency department on 1/26/2022. She may return to work on 01/30/2022. Pending negative, not detected, result for COVID-19     If you have any questions or concerns, please don't hesitate to call.       Jarod Pantoja, APRN - CNP

## 2022-01-27 ENCOUNTER — CARE COORDINATION (OUTPATIENT)
Dept: CARE COORDINATION | Age: 62
End: 2022-01-27

## 2022-01-27 NOTE — CARE COORDINATION
Attempted to reach Gisela Shanicey today for  f/u COVID at risk outreach. No answer. Unable to leave message.

## 2022-01-28 ENCOUNTER — CARE COORDINATION (OUTPATIENT)
Dept: CARE COORDINATION | Age: 62
End: 2022-01-28

## 2022-01-28 LAB
SARS-COV-2: NOT DETECTED
SOURCE: NORMAL

## 2022-01-28 NOTE — CARE COORDINATION
Educated patient about risk for severe COVID-19 due to risk factors according to CDC guidelines. ACM reviewed discharge instructions, medical action plan and red flag symptoms with the patient who verbalized understanding. Discussed COVID vaccination status: No. Education provided on COVID-19 vaccination as appropriate. Discussed exposure protocols and quarantine with CDC Guidelines. Patient was given an opportunity to verbalize any questions and concerns and agrees to contact ACM or health care provider for questions related to their healthcare. Pt reports that she currently does not have any s/s. Aware that COVID testing is still pending at this time. Pt is not able to access My  Chart. Pt will call  for results this wknd.

## 2022-02-04 ENCOUNTER — HOSPITAL ENCOUNTER (OUTPATIENT)
Dept: SLEEP CENTER | Age: 62
Discharge: HOME | End: 2022-02-04

## 2022-02-07 DIAGNOSIS — Z79.4 TYPE 2 DIABETES MELLITUS WITH OTHER CIRCULATORY COMPLICATION, WITH LONG-TERM CURRENT USE OF INSULIN (HCC): ICD-10-CM

## 2022-02-07 DIAGNOSIS — E78.2 HYPERLIPIDEMIA, MIXED: ICD-10-CM

## 2022-02-07 DIAGNOSIS — E11.59 TYPE 2 DIABETES MELLITUS WITH OTHER CIRCULATORY COMPLICATION, WITH LONG-TERM CURRENT USE OF INSULIN (HCC): ICD-10-CM

## 2022-02-07 RX ORDER — PEN NEEDLE, DIABETIC 31 GX5/16"
NEEDLE, DISPOSABLE MISCELLANEOUS
Qty: 100 EACH | Refills: 2 | Status: SHIPPED | OUTPATIENT
Start: 2022-02-07

## 2022-02-07 RX ORDER — GUAIFENESIN 600 MG/1
TABLET, EXTENDED RELEASE ORAL
Qty: 120 TABLET | Refills: 2 | Status: SHIPPED | OUTPATIENT
Start: 2022-02-07 | End: 2022-09-12

## 2022-02-07 RX ORDER — ATORVASTATIN CALCIUM 20 MG/1
TABLET, FILM COATED ORAL
Qty: 90 TABLET | Refills: 3 | Status: SHIPPED | OUTPATIENT
Start: 2022-02-07

## 2022-02-08 ENCOUNTER — OFFICE VISIT (OUTPATIENT)
Dept: ENT CLINIC | Age: 62
End: 2022-02-08
Payer: MEDICARE

## 2022-02-08 ENCOUNTER — PROCEDURE VISIT (OUTPATIENT)
Dept: ENT CLINIC | Age: 62
End: 2022-02-08
Payer: MEDICARE

## 2022-02-08 VITALS
HEART RATE: 81 BPM | SYSTOLIC BLOOD PRESSURE: 122 MMHG | TEMPERATURE: 97.4 F | BODY MASS INDEX: 42.16 KG/M2 | RESPIRATION RATE: 20 BRPM | OXYGEN SATURATION: 95 % | DIASTOLIC BLOOD PRESSURE: 78 MMHG | WEIGHT: 269.2 LBS

## 2022-02-08 DIAGNOSIS — R13.14 PHARYNGOESOPHAGEAL DYSPHAGIA: Primary | ICD-10-CM

## 2022-02-08 DIAGNOSIS — J38.1 VOCAL CORD POLYP: ICD-10-CM

## 2022-02-08 DIAGNOSIS — G47.33 OSA (OBSTRUCTIVE SLEEP APNEA): ICD-10-CM

## 2022-02-08 DIAGNOSIS — J38.1 REINKE'S EDEMA OF VOCAL FOLDS: ICD-10-CM

## 2022-02-08 DIAGNOSIS — F17.200 TOBACCO USE DISORDER: ICD-10-CM

## 2022-02-08 DIAGNOSIS — R49.0 HOARSENESS: Primary | ICD-10-CM

## 2022-02-08 DIAGNOSIS — J34.89 RHINORRHEA: ICD-10-CM

## 2022-02-08 DIAGNOSIS — R13.14 PHARYNGOESOPHAGEAL DYSPHAGIA: ICD-10-CM

## 2022-02-08 DIAGNOSIS — K21.9 GASTROESOPHAGEAL REFLUX DISEASE WITHOUT ESOPHAGITIS: ICD-10-CM

## 2022-02-08 PROCEDURE — 99214 OFFICE O/P EST MOD 30 MIN: CPT | Performed by: PHYSICIAN ASSISTANT

## 2022-02-08 PROCEDURE — 31575 DIAGNOSTIC LARYNGOSCOPY: CPT | Performed by: OTOLARYNGOLOGY

## 2022-02-08 RX ORDER — CETIRIZINE HYDROCHLORIDE 10 MG/1
10 TABLET ORAL DAILY
Qty: 90 TABLET | Refills: 0 | Status: SHIPPED | OUTPATIENT
Start: 2022-02-08 | End: 2022-05-09
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
## 2022-02-08 NOTE — PROGRESS NOTES
[address] [address]  Otolaryngology Head and Neck Surgery  Dr. Jeffrey Mcnulty MD  Pt Name: Rosa Isela Hendricks  MRN: [id number]  [place]: 1960  Date of evaluation: 2022  Primary Care Physician: ALLEN Waite - CNP      Reason for Endoscopy: Patient with persistent hoarseness in the context of lifelong smoking and obstructive sleep apnea    Type of Endoscopy: Flexible Fiberoptic Nasolaryngoscopy    Anesthesia: Topical Lidocaine after Afrin vasoconstriction    Consent: taken and witnessed        History of Chief Complaint: Persistent hoarseness       No chief complaint on file. Past Medical History   has a past medical history of Anxiety, Arthritis, Carpal tunnel syndrome, Cellulitis, Chronic back pain, Depression, Diabetes mellitus ([address] Emphysema of lung ([address] Fibromyalgia, GERD (gastroesophageal reflux disease), Glaucoma, Hiatal hernia, Hx of blood clots, Hyperlipidemia, mixed, IBS (irritable bowel syndrome), Leg pain, MDRO (multiple drug resistant organisms) resistance, Guzman's neuroma, Nausea & vomiting, Neuropathic pain, Osteoarthritis, PONV (postoperative nausea and vomiting), Pulmonary emphysema ([address] and Sleep apnea. Past Surgical History   has a past surgical history that includes Appendectomy;  section; Cholecystectomy; back surgery; cervical fusion; Tonsillectomy; Ectopic pregnancy surgery; Toe Surgery (Left); Abdomen surgery; Colonoscopy; other surgical history (Aug 5, 2013); Total knee arthroplasty (Right, 2016); joint replacement; Upper gastrointestinal endoscopy; pr office/outpt visit,procedure only (Right, 2018); pr debridement open wound 20 sq cm< (Right, 2018); Hysterectomy, total abdominal; Upper gastrointestinal endoscopy (Left, 2019); and eye surgery (Bilateral, ).     Medications  Current Medications:   Current Outpatient Medications   Medication Sig Dispense Refill    cetirizine (ZYRTEC) 10 MG tablet Take 1 tablet by mouth daily 90 tablet 0    umeclidinium-vilanterol (ANORO ELLIPTA) 62.5-25 MCG/INH AEPB inhaler Inhale 1 puff into the lungs daily 60 each 5    atorvastatin (LIPITOR) 20 MG tablet TAKE 1 TABLET BY MOUTH DAILY 90 tablet 3    B-D ULTRAFINE III SHORT PEN 31G X 8 MM MISC USE AS DIRECTED ONCE DAILY 100 each 2    MUCUS RELIEF 600 MG extended release tablet TAKE 1 TABLET BY MOUTH TWICE DAILY 120 tablet 2    GABAPENTIN PO Take by mouth      VICTOZA 18 MG/3ML SOPN SC injection ADMINISTER 1.8 MG UNDER THE SKIN DAILY 9 mL 3    albuterol sulfate  (90 Base) MCG/ACT inhaler Inhale 2 puffs into the lungs every 6 hours as needed for Wheezing or Shortness of Breath 8 g 11    venlafaxine (EFFEXOR XR) 37.5 MG extended release capsule TAKE 1 CAPSULE BY MOUTH ONCE DAILY ALONG WITH 150MG 90 capsule 3    insulin detemir (LEVEMIR FLEXTOUCH) 100 UNIT/ML injection pen ADMINISTER 73 UNITS UNDER THE SKIN EVERY NIGHT 18 mL 6    venlafaxine (EFFEXOR XR) 150 MG extended release capsule TAKE 1 CAPSULE BY MOUTH DAILY 90 capsule 3    traZODone (DESYREL) 150 MG tablet TAKE 1 TABLET BY MOUTH EVERY NIGHT 90 tablet 3    Continuous Blood Gluc Sensor (FREESTYLE MICKI 14 DAY SENSOR) MIS APPLY 1 SENSOR EXTERNALLY EVERY 14 DAYS 2 each 3    pantoprazole (PROTONIX) 40 MG tablet Take 1 tablet by mouth 2 times daily (before meals) 180 tablet 1    insulin lispro, 1 Unit Dial, 100 UNIT/ML SOPN INJECT 12 UNITS UNDER THE SKIN THREE TIMES DAILY WITH MEALS PLUS SLIDING SCALE( MAX OF 60 UNITS DAILY) (Patient taking differently: INJECT 14 UNITS UNDER THE SKIN THREE TIMES DAILY WITH MEALS PLUS SLIDING SCALE( MAX OF 60 UNITS DAILY)) 15 mL 3    Handicap Placard MISC by Does not apply route Duration: three years 1 each 0    nystatin (MYCOSTATIN) 807779 UNIT/ML suspension Take 5 mLs by mouth 4 times daily 473 mL 1    blood glucose test strips (ACCU-CHEK DAVIDA PLUS) strip USE TO TEST BLOOD SUGAR ONCE DAILY 100 strip 3    pregabalin (LYRICA) 300 MG capsule TAKE 1 CAPSULE BY MOUTH TWICE DAILY 180 capsule 1    fluticasone (FLONASE) 50 MCG/ACT nasal spray 1 spray by Each Nostril route daily 2 Bottle 1    diclofenac sodium (VOLTAREN) 1 % GEL diclofenac 1 % topical gel      Continuous Blood Gluc Sensor (FREESTYLE DYLAN 14 DAY SENSOR) [facility] FreeStyle Dylan 14 Day Sensor kit   APPLY 1 SENSOR EXTERNALLY EVERY 14 DAYS      Continuous Blood Gluc  (FREESTYLE DYLAN 14 DAY READER) PAOLA Check blood sugars daily 1 Device 0    Handicap Placard [facility] by Does not apply route Duration: three years    Type II idabetes  Diabetic neuropathy 1 each 0    HYDROcodone-acetaminophen (NORCO) 5-325 MG per tablet Take 1 tablet by mouth every 8 hours as needed for Pain. Gerardo House traMADol (ULTRAM) 50 MG tablet Take 100 mg by mouth 3 times daily.  OXYGEN Inhale 2 L into the lungs       Elastic Bandages & Supports (WRIST BRACE/RIGHT MEDIUM) MISC Wear brace on right wrist nightly 1 each 0    Cholecalciferol (VITAMIN D3) 2000 units CAPS Take by mouth daily      rOPINIRole (REQUIP) 1 MG tablet Take 1 mg by mouth nightly       acetaminophen 650 MG TABS Take 650 mg by mouth every 4 hours as needed for Pain (For mild pain level 1-3 or for fever > 100.5). 120 tablet      No current facility-administered medications for this visit. Home Medications:   Prior to Admission medications    Medication Sig Start Date End Date Taking?  Authorizing Provider   cetirizine (ZYRTEC) 10 MG tablet Take 1 tablet by mouth daily 2/8/22 5/9/22  ANGEL Rocha   umeclidinium-vilanterol [facility] ELLIPTA) 62.5-25 MCG/INH AEPB inhaler Inhale 1 puff into the lungs daily 2/7/22 2/7/23  ALLEN Bear CNP   atorvastatin (LIPITOR) 20 MG tablet TAKE 1 TABLET BY MOUTH DAILY 2/7/22   ALLEN Zambrano CNP   B-D ULTRAFINE III SHORT PEN 31G X 8 MM MISC USE AS DIRECTED ONCE DAILY 2/7/22   ALLEN Zambrano CNP   MUCUS RELIEF 600 MG extended release tablet TAKE 1 TABLET BY MOUTH TWICE DAILY 2/7/22   ALLEN Noguera CNP   GABAPENTIN PO Take by mouth    Historical Provider, MD   VICTOZA 18 MG/3ML SOPN SC injection ADMINISTER 1.8 MG UNDER THE SKIN DAILY 1/10/22   ALLEN Noguera CNP   albuterol sulfate  (90 Base) MCG/ACT inhaler Inhale 2 puffs into the lungs every 6 hours as needed for Wheezing or Shortness of Breath 12/21/21 12/21/22  ALLEN Thomas CNP   venlafaxine (EFFEXOR XR) 37.5 MG extended release capsule TAKE 1 CAPSULE BY MOUTH ONCE DAILY ALONG WITH 150MG 12/15/21   ALLEN Noguera CNP   insulin detemir (LEVEMIR FLEXTOUCH) 100 UNIT/ML injection pen ADMINISTER 73 UNITS UNDER THE SKIN EVERY NIGHT 12/13/21   ALLEN Noguera CNP   venlafaxine (EFFEXOR XR) 150 MG extended release capsule TAKE 1 CAPSULE BY MOUTH DAILY 11/9/21   ALLEN Noguera CNP   traZODone (DESYREL) 150 MG tablet TAKE 1 TABLET BY MOUTH EVERY NIGHT 11/9/21   ALLEN Noguera CNP   Continuous Blood Gluc Sensor (FREESTYLE MICKI 14 DAY SENSOR) MISC APPLY 1 SENSOR EXTERNALLY EVERY 14 DAYS 11/5/21   ALLEN Noguera CNP   pantoprazole (PROTONIX) 40 MG tablet Take 1 tablet by mouth 2 times daily (before meals) 10/27/21   ALLEN Oabndo CNP   insulin lispro, 1 Unit Dial, 100 UNIT/ML SOPN INJECT 12 UNITS UNDER THE SKIN THREE TIMES DAILY WITH MEALS PLUS SLIDING SCALE( MAX OF 60 UNITS DAILY)  Patient taking differently: INJECT 14 UNITS UNDER THE SKIN THREE TIMES DAILY WITH MEALS PLUS SLIDING SCALE( MAX OF 60 UNITS DAILY) 10/18/21   ALLEN Noguera CNP   Handicap Placard MISC by Does not apply route Duration: three years 9/13/21   ALLEN Noguera CNP   nystatin (MYCOSTATIN) 363690 UNIT/ML suspension Take 5 mLs by mouth 4 times daily 8/24/21   ALLEN Noguera CNP   blood glucose test strips (ACCU-CHEK DAVIDA PLUS) strip USE TO TEST BLOOD SUGAR ONCE DAILY 8/10/21   ALLEN Noguera - CNP   pregabalin (LYRICA) 300 MG capsule TAKE 1 CAPSULE BY MOUTH TWICE DAILY 6/8/21 9/13/21  ALLEN Mallory CNP   fluticasone [facility]) 50 MCG/ACT nasal spray 1 spray by Each Nostril route daily 6/8/21   ALLEN Mallory CNP   diclofenac sodium (VOLTAREN) 1 % GEL diclofenac 1 % topical gel    Historical Provider, MD   Continuous Blood Gluc Sensor (FREESTYLE DYLAN 14 DAY SENSOR) [facility] FreeStyle Dylan 14 Day Sensor kit   APPLY 1 SENSOR EXTERNALLY EVERY 14 DAYS    Historical Provider, MD   Continuous Blood Gluc  (FREESTYLE DYLAN 14 DAY READER) PAOLA Check blood sugars daily 12/21/20   ALLEN Mallory CNP   Handicap Placard MISC by Does not apply route Duration: three years    Type II idabetes  Diabetic neuropathy 10/29/18   ALLEN Mallory CNP   HYDROcodone-acetaminophen (NORCO) 5-325 MG per tablet Take 1 tablet by mouth every 8 hours as needed for Pain. Prasanth Neighbours Historical Provider, MD   traMADol (ULTRAM) 50 MG tablet Take 100 mg by mouth 3 times daily. Historical Provider, MD   OXYGEN Inhale 2 L into the lungs     Historical Provider, MD   Elastic Bandages & Supports (WRIST BRACE/RIGHT MEDIUM) MISC Wear brace on right wrist nightly 12/20/17   ALLEN Landry CNP   Cholecalciferol (VITAMIN D3) 2000 units CAPS Take by mouth daily    Historical Provider, MD   rOPINIRole (REQUIP) 1 MG tablet Take 1 mg by mouth nightly     Historical Provider, MD   acetaminophen 650 MG TABS Take 650 mg by mouth every 4 hours as needed for Pain (For mild pain level 1-3 or for fever > 100.5). 8/16/13   Judith Donis MD       Allergies  Latex    Family History  family history includes Arthritis in her mother; Cancer in her brother and father; Diabetes in her brother; Heart Disease in her mother and sister; High Blood Pressure in her mother; Kidney Disease in her brother, brother, and mother; Other in her sister. Social History  Tobacco use:  reports that she has been smoking cigarettes. She started smoking about 46 years ago. She has a 25.00 pack-year smoking history. She has never used smokeless tobacco.  Alcohol use:  reports no history of alcohol use. Drug use:  reports current drug use. Drug: Marijuana [place] Piper). Findings:   The patient's nasopharynx was widely patent but on a Hammond's maneuver, collapsed in the midline almost completely with moderate effort. The patient's oropharynx is abnormal for moderate tongue base hypertrophy and mild to moderate posterior displacement of the epiglottis. Vocal folds were fully mobile and polypoid with chronic Aaliyah's polyps readily visible. On deep respiration they would flutter in the lumen of the airway producing some degree of glottic laryngomalacia. Her AE folds within. Her arytenoid towers were normal.  Her posterior commissure was deeply indurated with a moderately deep posterior glottic shelf. The arytenoid mucosa was markedly erythematous consistent with chronic extra esophageal reflux into the endolarynx. No contact granuloma or or ulcers were seen. No leukoplakia was seen. Her transglottic aperture was adequate to generous. There was no pooling in either piriform sinus. The patient performed a good auto inflation maneuver and I could see nearly to the esophageal inlet without finding any suspicious lesions. There was diffuse erythema and induration. The patient tolerated the procedure well. Nasopharynx    Nasopharynx with Hammond's maneuver of inhaling through the nose against resistance    High view of laryngopharynx with hypertrophic tongue base    Open larynx with posterior glottic shelf and Aaliyah's polyps as well as arytenoid erythema from chronic extra esophageal reflux    Partially closed larynx with polypoid cords and no pooling in either piriform sinus    Blue light image of Aaliyah's polyps in the absence of leukoplakia with marked erythema of the arytenoid towers    Post cricoid mucosa on \"trumpet maneuver\" with edema and erythema going into the esophageal inlet      IMPRESSIONS:  1.   The sana Nath MD   Electronically signed 2/8/2022 at 1:49 PM

## 2022-02-08 NOTE — LETTER
[address] [facility] and [address]  Phone: [phone]  Fax: [phone]    Miriam Gaspar MD    2022     ALLEN Patiño CNP  [address]  [facility] [address]    Patient: Soheila Moran   MR Number: [id number]   YOB: 1960   Date of Visit: 2022       Dear Alaina Welch: Thank you for referring Edithndia Demorest to me for evaluation/treatment. Below are the relevant portions of my assessment and plan of care. If you have questions, please do not hesitate to call me. I look forward to following Delia Hadley along with you.     Sincerely,      Miriam Gaspar MD

## 2022-02-28 ENCOUNTER — TELEPHONE (OUTPATIENT)
Dept: FAMILY MEDICINE CLINIC | Age: 62
End: 2022-02-28

## 2022-02-28 DIAGNOSIS — Z79.4 TYPE 2 DIABETES MELLITUS WITH OTHER CIRCULATORY COMPLICATION, WITH LONG-TERM CURRENT USE OF INSULIN (HCC): ICD-10-CM

## 2022-02-28 DIAGNOSIS — E11.59 TYPE 2 DIABETES MELLITUS WITH OTHER CIRCULATORY COMPLICATION, WITH LONG-TERM CURRENT USE OF INSULIN (HCC): ICD-10-CM

## 2022-02-28 RX ORDER — INSULIN DETEMIR 100 [IU]/ML
73 INJECTION, SOLUTION SUBCUTANEOUS NIGHTLY
Qty: 3 EACH | Refills: 3 | Status: SHIPPED | OUTPATIENT
Start: 2022-02-28 | End: 2022-06-13 | Stop reason: SDUPTHER

## 2022-02-28 NOTE — TELEPHONE ENCOUNTER
I don't see any levemir samples in the refrigerator unless there are some somewhere else. We can contact the rep to see if they can bring  us some. Is she using more than normal that she ran out? I can also reorder and see if insurance will pay.  Please let me know thanks

## 2022-02-28 NOTE — TELEPHONE ENCOUNTER
Pt states she is not using any more , using as directed 73 Units every night   I informed pt we can send new rx to pharmacy to see if they will cover

## 2022-02-28 NOTE — TELEPHONE ENCOUNTER
Let pt know since her levemir dose is so high I changed her over to a vial for insurance purposes. She can see if this is covered by her insurance, Have her contact us to let us know.

## 2022-03-01 NOTE — TELEPHONE ENCOUNTER
Elissa from [address] [facility]  to clarify if the prescription quantity is for 3 vials or 3 pens    Please advise

## 2022-03-03 DIAGNOSIS — E11.59 TYPE 2 DIABETES MELLITUS WITH OTHER CIRCULATORY COMPLICATION, WITH LONG-TERM CURRENT USE OF INSULIN (HCC): ICD-10-CM

## 2022-03-03 DIAGNOSIS — Z79.4 TYPE 2 DIABETES MELLITUS WITH OTHER CIRCULATORY COMPLICATION, WITH LONG-TERM CURRENT USE OF INSULIN (HCC): ICD-10-CM

## 2022-03-04 ENCOUNTER — HOSPITAL ENCOUNTER (OUTPATIENT)
Dept: SLEEP CENTER | Age: 62
Discharge: HOME | End: 2022-03-04

## 2022-03-04 RX ORDER — FLASH GLUCOSE SENSOR
KIT MISCELLANEOUS
Qty: 2 EACH | Refills: 3 | Status: SHIPPED | OUTPATIENT
Start: 2022-03-04

## 2022-03-10 DIAGNOSIS — M79.7 FIBROMYALGIA: ICD-10-CM

## 2022-03-10 RX ORDER — PREGABALIN 300 MG/1
CAPSULE ORAL
Qty: 180 CAPSULE | Refills: 0 | Status: SHIPPED | OUTPATIENT
Start: 2022-03-10 | End: 2022-06-15

## 2022-03-14 ENCOUNTER — OFFICE VISIT (OUTPATIENT)
Dept: FAMILY MEDICINE CLINIC | Age: 62
End: 2022-03-14
Payer: MEDICARE

## 2022-03-14 VITALS
WEIGHT: 272.6 LBS | BODY MASS INDEX: 42.79 KG/M2 | OXYGEN SATURATION: 97 % | HEIGHT: 67 IN | DIASTOLIC BLOOD PRESSURE: 74 MMHG | HEART RATE: 66 BPM | TEMPERATURE: 98 F | SYSTOLIC BLOOD PRESSURE: 124 MMHG | RESPIRATION RATE: 14 BRPM

## 2022-03-14 DIAGNOSIS — E11.59 TYPE 2 DIABETES MELLITUS WITH OTHER CIRCULATORY COMPLICATION, WITH LONG-TERM CURRENT USE OF INSULIN (HCC): Primary | ICD-10-CM

## 2022-03-14 DIAGNOSIS — F17.200 TOBACCO USE DISORDER: ICD-10-CM

## 2022-03-14 DIAGNOSIS — Z79.4 TYPE 2 DIABETES MELLITUS WITH OTHER CIRCULATORY COMPLICATION, WITH LONG-TERM CURRENT USE OF INSULIN (HCC): Primary | ICD-10-CM

## 2022-03-14 DIAGNOSIS — R13.14 PHARYNGOESOPHAGEAL DYSPHAGIA: ICD-10-CM

## 2022-03-14 DIAGNOSIS — E11.42 DIABETIC POLYNEUROPATHY ASSOCIATED WITH TYPE 2 DIABETES MELLITUS (HCC): ICD-10-CM

## 2022-03-14 LAB — HBA1C MFR BLD: 7.6 % (ref 4.3–5.7)

## 2022-03-14 PROCEDURE — 99214 OFFICE O/P EST MOD 30 MIN: CPT | Performed by: NURSE PRACTITIONER

## 2022-03-14 PROCEDURE — 3051F HG A1C>EQUAL 7.0%<8.0%: CPT | Performed by: NURSE PRACTITIONER

## 2022-03-14 RX ORDER — INSULIN LISPRO 100 [IU]/ML
INJECTION, SOLUTION INTRAVENOUS; SUBCUTANEOUS
Qty: 15 ML | Refills: 3 | Status: SHIPPED | OUTPATIENT
Start: 2022-03-14 | End: 2022-03-17

## 2022-03-14 RX ORDER — PANTOPRAZOLE SODIUM 40 MG/1
40 TABLET, DELAYED RELEASE ORAL
Qty: 180 TABLET | Refills: 3 | Status: SHIPPED | OUTPATIENT
Start: 2022-03-14

## 2022-03-14 ASSESSMENT — ENCOUNTER SYMPTOMS
ABDOMINAL PAIN: 0
ABDOMINAL DISTENTION: 0
RESPIRATORY NEGATIVE: 1

## 2022-03-14 NOTE — PROGRESS NOTES
[facility] MEDICINE  [address] PATEL [facility] [place] [address]  Dept: [phone]  Dept Fax: [phone]  Loc: [phone]    Rigo Monday is a 57 y.o. Erica Cochran presents today for her medical conditions/complaints as noted below. Daniel Johnson c/o of 3 Month Follow-Up (sees eye doctor next tuesday)      HPI:      Chronic Pain    HPI:    Patient has chronic pain of the bilateral hands and feet. Pain control: adequate  Improvement in function and ADLs? yes  Present for: a year  Current Medications:    Escalation of dosage recently?  no    Evidence for abuse or diversion? no   Seen specialist or pain clinic?: yes  Last UDS:  6/2020  Pain contract: yes with Mark Pinto    Controlled Substances Monitoring:      Diabetes Type 2    Glucose control:   Does patient check blood glucoses at home? Yes  Report of hypoglycemia: no  Lab Results       Component                Value               Date                       LABA1C                   7.6 (H)             03/14/2022            No results found for: EAG    Symptoms  Polyuria, Polydipsia or Polyphagia? No  Chest Pain, SOB, or Palpitations? -  No  New Vision complaints? No  Paresthesias of the extremities? Yes - controlled with lyrica     Medications  Current medication were reviewed. Compliant with medications? yes  Medication side effects? No  On ACE-I or ARB? No  On antiplatelet therapy? Yes  On Statin? Yes    Last Diabetic Eye Exam: has one scheduled for this month     Exercise  Exercise? No  Wt Readings from Last 3 Encounters:  03/14/22 : 272 lb 9.6 oz (123.7 kg)  02/08/22 : 269 lb 3.2 oz (122.1 kg)  01/26/22 : 271 lb (122.9 kg)      Diet discipline?:  Low salt, fat, sugar diet?   Yes tries to follow does crave sugar     Blood pressure control:  BP Readings from Last 3 Encounters:  03/14/22 : 124/74  02/08/22 : 122/78  01/26/22 : (!) 172/80      Lab Results       Component                Value Date                       LABMICR                  2.14                02/23/2021              Lab Results       Component                Value               Date                       LDLCALC                  92                  07/13/2021           Smokes 1/2 pack a day going to try nicotine patches to stop. GERD    HPI:     Symptoms:  heartburn  Length of symptoms: 1 years  Current therapy and response:  protonix 40 mg daily   Recent change in symptoms? No  Symptoms associated with certain foods? Yes  Alcohol use? No  Tobacco use? Yes    Abdominal Pain? No  Mid Back Pain? No  Melena?    No              Current Outpatient Medications   Medication Sig Dispense Refill    insulin lispro, 1 Unit Dial, 100 UNIT/ML SOPN INJECT 14 UNITS UNDER THE SKIN THREE TIMES DAILY WITH MEALS PLUS SLIDING SCALE( MAX OF 60 UNITS DAILY) 15 mL 3    pantoprazole (PROTONIX) 40 MG tablet Take 1 tablet by mouth 2 times daily (before meals) 180 tablet 3    pregabalin (LYRICA) 300 MG capsule TAKE 1 CAPSULE BY MOUTH TWICE DAILY 180 capsule 0    Continuous Blood Gluc Sensor (AdvantageneSTYLE MICKI 14 DAY SENSOR) MISC APPLY 1 SENSOR EXTERNALLY EVERY 14 DAYS 2 each 3    insulin detemir (LEVEMIR) 100 UNIT/ML injection vial Inject 73 Units into the skin nightly 3 each 3    cetirizine (ZYRTEC) 10 MG tablet Take 1 tablet by mouth daily 90 tablet 0    umeclidinium-vilanterol (ANORO ELLIPTA) 62.5-25 MCG/INH AEPB inhaler Inhale 1 puff into the lungs daily 60 each 5    atorvastatin (LIPITOR) 20 MG tablet TAKE 1 TABLET BY MOUTH DAILY 90 tablet 3    B-D ULTRAFINE III SHORT PEN 31G X 8 MM MISC USE AS DIRECTED ONCE DAILY 100 each 2    MUCUS RELIEF 600 MG extended release tablet TAKE 1 TABLET BY MOUTH TWICE DAILY 120 tablet 2    GABAPENTIN PO Take by mouth      VICTOZA 18 MG/3ML SOPN SC injection ADMINISTER 1.8 MG UNDER THE SKIN DAILY 9 mL 3    albuterol sulfate  (90 Base) MCG/ACT inhaler Inhale 2 puffs into the lungs every 6 hours as needed for Wheezing or Shortness of Breath 8 g 11    venlafaxine (EFFEXOR XR) 37.5 MG extended release capsule TAKE 1 CAPSULE BY MOUTH ONCE DAILY ALONG WITH 150MG 90 capsule 3    venlafaxine (EFFEXOR XR) 150 MG extended release capsule TAKE 1 CAPSULE BY MOUTH DAILY 90 capsule 3    traZODone (DESYREL) 150 MG tablet TAKE 1 TABLET BY MOUTH EVERY NIGHT 90 tablet 3    Handicap Placard MISC by Does not apply route Duration: three years 1 each 0    nystatin (MYCOSTATIN) 286249 UNIT/ML suspension Take 5 mLs by mouth 4 times daily 473 mL 1    blood glucose test strips (ACCU-CHEK DAVIDA PLUS) strip USE TO TEST BLOOD SUGAR ONCE DAILY 100 strip 3    fluticasone (FLONASE) 50 MCG/ACT nasal spray 1 spray by Each Nostril route daily 2 Bottle 1    diclofenac sodium (VOLTAREN) 1 % GEL diclofenac 1 % topical gel      Continuous Blood Gluc Sensor (FREESTYLE DYLAN 14 DAY SENSOR) [place] FreeStyle Dylan 14 Day Sensor kit   APPLY 1 SENSOR EXTERNALLY EVERY 14 DAYS      Continuous Blood Gluc  (FREESTYLE DYLAN 14 DAY READER) PAOLA Check blood sugars daily 1 Device 0    Handicap Placard MISC by Does not apply route Duration: three years    Type II idabetes  Diabetic neuropathy 1 each 0    HYDROcodone-acetaminophen (NORCO) 5-325 MG per tablet Take 1 tablet by mouth every 8 hours as needed for Pain. Griselda Angst traMADol (ULTRAM) 50 MG tablet Take 100 mg by mouth 3 times daily.  OXYGEN Inhale 2 L into the lungs       Elastic Bandages & Supports (WRIST BRACE/RIGHT MEDIUM) MISC Wear brace on right wrist nightly 1 each 0    Cholecalciferol (VITAMIN D3) 2000 units CAPS Take by mouth daily      rOPINIRole (REQUIP) 1 MG tablet Take 1 mg by mouth nightly       acetaminophen 650 MG TABS Take 650 mg by mouth every 4 hours as needed for Pain (For mild pain level 1-3 or for fever > 100.5). 120 tablet      No current facility-administered medications for this visit.           Past Medical History:   Diagnosis Date    Anxiety     Arthritis     Carpal tunnel syndrome     Cellulitis     Chronic back pain     Depression     Diabetes mellitus (HCC)     Emphysema of lung (HCC)     Fibromyalgia     GERD (gastroesophageal reflux disease)     Glaucoma     Hiatal hernia     esophagus closes    Hx of blood clots     foot post op    Hyperlipidemia, mixed 2017    IBS (irritable bowel syndrome)     Leg pain     nerve damage right leg    MDRO (multiple drug resistant organisms) resistance     wound and nasal    Guzman's neuroma     left foot    Nausea & vomiting     Neuropathic pain     Osteoarthritis     PONV (postoperative nausea and vomiting)     Pulmonary emphysema ([address] 2017    Sleep apnea     DOES NOT USE CPAP      Past Surgical History:   Procedure Laterality Date    ABDOMEN SURGERY      APPENDECTOMY      BACK SURGERY      lower x 3    CERVICAL FUSION       SECTION      x3    CHOLECYSTECTOMY      COLONOSCOPY      ECTOPIC PREGNANCY SURGERY      EYE SURGERY Bilateral     LASER FOR GLAUCOMA    HYSTERECTOMY, TOTAL ABDOMINAL      JOINT REPLACEMENT      OTHER SURGICAL HISTORY  Aug 5, 2013    Left knee total arthroplasty (Dr. Nick Suero, [facility])    UT DEBRIDEMENT OPEN WOUND 20 SQ CM< Right 2018    INCISIONAL DEBRIEDMENT INCISION AND DRAINAGE RIGHT GLUTEAL ABCESS performed by Miguelangel Car MD at [address] [facility] OFFICE/OUTPT [facility] Right 2018    EXCISIONAL DEBRIDEMENT OF RIGHT BUTTOCKS WOUND performed by Brandon Miller DO at [address] [facility] Left     little toe bone removed    TONSILLECTOMY      TOTAL KNEE ARTHROPLASTY Right 2016    UPPER GASTROINTESTINAL ENDOSCOPY      UPPER GASTROINTESTINAL ENDOSCOPY Left 2019    EGD DILATION SAVORY performed by Ana Yanez MD at [facility]     Family History   Problem Relation Age of Onset    Arthritis Mother     Heart Disease Mother         CHF    High Blood Pressure Mother     Kidney Disease Mother     Cancer Father     Heart Disease Sister         diastolic dysfunction    Other Sister         thyroid cysts    Cancer Brother     Kidney Disease Brother     Diabetes Brother     Kidney Disease Brother     Colon Cancer Neg Hx      Social History     Tobacco Use    Smoking status: Current Every Day Smoker     Packs/day: 0.50     Years: 50.00     Pack years: 25.00     Types: Cigarettes     Start date: 1/1/1976    Smokeless tobacco: Never Used   Substance Use Topics    Alcohol use: No     Comment: Sober for 26 years        Allergies   Allergen Reactions    Latex Itching and Rash       Health Maintenance   Topic Date Due    COVID-19 Vaccine (1) Never done    Pneumococcal 0-64 years Vaccine (1 of 2 - PPSV23) Never done    Shingles Vaccine (2 of 3) 12/13/2016    Breast cancer screen  11/14/2021    Diabetic retinal exam  12/04/2021    Diabetic microalbuminuria test  02/23/2022    Flu vaccine (1) 12/13/2022 (Originally 9/1/2021)    Depression Monitoring  06/08/2022    Annual Wellness Visit (AWV)  06/09/2022    Diabetic foot exam  07/12/2022    Lipid screen  07/13/2022    Low dose CT lung screening  09/29/2022    A1C test (Diabetic or Prediabetic)  03/14/2023    DTaP/Tdap/Td vaccine (2 - Td or Tdap) 07/18/2024    Colorectal Cancer Screen  07/16/2031    Hepatitis C screen  Completed    HIV screen  Completed    Hepatitis A vaccine  Aged Out    Hib vaccine  Aged Out    Meningococcal (ACWY) vaccine  Aged Out       Subjective:      Review of Systems   Constitutional: Negative for chills, fatigue and fever. Respiratory: Negative. Cardiovascular: Negative. Gastrointestinal: Negative for abdominal distention and abdominal pain. Skin: Negative. Neurological: Positive for numbness (neuorpathy hands and feet ). Psychiatric/Behavioral: Positive for dysphoric mood. Negative for decreased concentration, self-injury, sleep disturbance and suicidal ideas.         States effexor does help to improve her mood        Objective:      /74   Pulse 66   Temp 98 °F (36.7 °C) (Oral)   Resp 14   Ht 5' 7\" (1.702 m)   Wt 272 lb 9.6 oz (123.7 kg)   SpO2 97%   BMI 42.70 kg/m²      Physical Exam  Vitals and nursing note reviewed. Constitutional:       Appearance: She is obese. She is not ill-appearing. HENT:      Right Ear: Tympanic membrane, ear canal and external ear normal.      Left Ear: Tympanic membrane, ear canal and external ear normal.   Cardiovascular:      Rate and Rhythm: Normal rate and regular rhythm. Pulses: Normal pulses. Heart sounds: Normal heart sounds. No murmur heard. Pulmonary:      Effort: Pulmonary effort is normal.      Breath sounds: Normal breath sounds. Abdominal:      General: Abdomen is flat. Bowel sounds are normal.   Skin:     General: Skin is warm and dry. Capillary Refill: Capillary refill takes less than 2 seconds. Neurological:      Mental Status: She is alert. Psychiatric:         Attention and Perception: Attention normal.         Mood and Affect: Affect is labile. Speech: Speech normal.         Behavior: Behavior normal. Behavior is cooperative. Thought Content: Thought content normal. Thought content does not include homicidal or suicidal plan. Cognition and Memory: Cognition normal.         Judgment: Judgment normal.          Assessment/Plan:           1. Type 2 diabetes mellitus with other circulatory complication, with long-term current use of insulin (HCC)  Continue current meds  A1C stable   - insulin lispro, 1 Unit Dial, 100 UNIT/ML SOPN; INJECT 14 UNITS UNDER THE SKIN THREE TIMES DAILY WITH MEALS PLUS SLIDING SCALE( MAX OF 60 UNITS DAILY)  Dispense: 15 mL; Refill: 3    2. Diabetic polyneuropathy associated with type 2 diabetes mellitus ([address]  Controlled with lyrica    3. Pharyngoesophageal dysphagia  GERD diet  - pantoprazole (PROTONIX) 40 MG tablet;  Take 1 tablet by mouth 2 times daily (before meals)  Dispense: 180 tablet; Refill: 3    4. Tobacco use disorder  Trial nicotine patches   Pt in agreement with plan     No follow-ups on file. Controlled substances monitoring: possible medication side effects, risk of tolerance and/or dependence, and alternative treatments discussed, no signs of potential drug abuse or diversion identified and OARRS report reviewed today- activity consistent with treatment plan. Reccommended tobaccocessation options including pharmacologic methods, counseled great than 3 minutesduring this visit:  Yes[]  No  []       Patient given educational materials -see patient instructions. Discussed use, benefit, and side effects of prescribedmedications. All patient questions answered. Pt voiced understanding. Reviewedhealth maintenance. Instructed to continue current medications, diet and exercise. Patient agreed with treatment plan. Follow up as directed.        Electronicallysigned by ALLEN Ratliff CNP on 3/14/2022 at 7:59 AM

## 2022-03-17 DIAGNOSIS — Z79.4 TYPE 2 DIABETES MELLITUS WITH OTHER CIRCULATORY COMPLICATION, WITH LONG-TERM CURRENT USE OF INSULIN (HCC): ICD-10-CM

## 2022-03-17 DIAGNOSIS — E11.59 TYPE 2 DIABETES MELLITUS WITH OTHER CIRCULATORY COMPLICATION, WITH LONG-TERM CURRENT USE OF INSULIN (HCC): ICD-10-CM

## 2022-03-17 PROCEDURE — 3051F HG A1C>EQUAL 7.0%<8.0%: CPT | Performed by: NURSE PRACTITIONER

## 2022-03-17 RX ORDER — INSULIN LISPRO 100 [IU]/ML
INJECTION, SOLUTION INTRAVENOUS; SUBCUTANEOUS
Qty: 15 ML | Refills: 3 | Status: SHIPPED | OUTPATIENT
Start: 2022-03-17

## 2022-04-08 ENCOUNTER — HOSPITAL ENCOUNTER (OUTPATIENT)
Dept: SLEEP CENTER | Age: 62
Discharge: HOME | End: 2022-04-10
Payer: MEDICARE

## 2022-04-08 DIAGNOSIS — G47.33 OBSTRUCTIVE SLEEP APNEA (ADULT) (PEDIATRIC): ICD-10-CM

## 2022-04-08 DIAGNOSIS — J44.9 CHRONIC OBSTRUCTIVE PULMONARY DISEASE, UNSPECIFIED COPD TYPE (HCC): ICD-10-CM

## 2022-04-08 DIAGNOSIS — F17.200 SMOKER UNMOTIVATED TO QUIT: ICD-10-CM

## 2022-04-08 DIAGNOSIS — E66.01 MORBID OBESITY WITH BMI OF 40.0-44.9, ADULT (HCC): ICD-10-CM

## 2022-04-08 PROCEDURE — 95810 POLYSOM 6/> YRS 4/> PARAM: CPT

## 2022-04-18 NOTE — PROGRESS NOTES
[address] [place] [address]                               SLEEP STUDY REPORT    PATIENT NAME: Lenard Gu                   :        1960  MED REC NO:   [id number]                           ROOM:  ACCOUNT NO:   [de-identified]                           ADMIT DATE: 2022  PROVIDER:     MJ Shetty Dill:  2022    REFERRING PROVIDER:  Taylor Bethea CNP    HISTORY OF PRESENT ILLNESS:  The patient is a 26-year-old female with  complaints of snoring and nonrestorative sleep. Weight 272 pounds,  height 69 inches, BMI 40.2. METHODS:  The patient underwent digital polysomnography in compliance  with the standards and specifications from the AASM Manual including the  simultaneous recording of 3 EEG channels (F4-M1, C4-M1, and O2-M1 with  back up electrodes F3-M2, C3-M2, and O1-M2), 2 EOG channels (E1-M2, and  E2-M1,), EMG (chin, left & right leg), EKG, Nonin pulse oximetry with   less than 2 second averaging time, body position, airflow recorded by  oral-nasal thermal sensor and nasal air pressure transducer, plus  respiratory effort recorded by calibrated respiratory inductance  plethysmography (RIP), flow volume loop, sound and video. Sleep staging  and scoring followed the standard put forth by the American Academy of  Sleep Medicine and utilized the 1B obstructive hypopnea event  desaturation of 4 percent or greater. DETAILS OF THE STUDY:  Lights out 09:45 p.m., lights on 05:01 a.m. Total recording time 436 minutes, sleep period time 434 minutes, total  sleep time 422 minutes, sleep efficiency 96.8%. Latency to sleep 1.3  minutes, wake after sleep onset 12.8 minutes. SLEEP STAGING:  The patient slept 9.5 minutes or 2.3% of total sleep  time in N1 sleep, 347 minutes or 82.2% in N2 sleep, 65.5 minutes or  15.5% in N3 sleep.     RESPIRATORY SUMMARY:  2 mixed apneas, 14 obstructive hypopneas for an  AHI of 2.3. The RDI was 2.8. Supine AHI was 4.1. All of the events  occurred during non-REM sleep as the REM sleep was not entered during  this polysomnogram.    BODY POSITION SUMMARY:  117 minutes of supine sleep, 305 minutes of  right lateral sleep. SLEEP DISTURBANCE SUMMARY:  There were 75 arousals for an arousal index  of 10.7. 13 of these arousals were caused by respiratory events for a  respiratory arousal index of 1.8. PERIODIC LIMB MOVEMENT:  There was none. PULSE OXIMETRY SUMMARY:  Mean oxygen saturation during wakefulness  87.4%, during sleep 87.1%, lowest oxygen saturation 93%. There were 399  minutes of oxygen saturation below 88%. ECG SUMMARY:  Normal sinus rhythm. ASSESSMENT:  This polysomnogram did not reveal any significant sleep  breathing disorder with a total of 2 apneas and 14 hypopneas for an AHI  of 2.3. These events occurred during non-REM sleep as REM sleep was not  entered during this recording. Nocturnal hypoventilation with a low  baseline oxygen saturation of 87%. An oxygen saturation pauline of 83%  and a total of 399 minutes of oxygen saturation below 88% and not  related to obstructive respiratory events. Sleep disturbance with  absent REM sleep. RECOMMENDATIONS:  This nocturnal polysomnogram does not support a need  for CPAP. Would recommend supplemental oxygen at 2 L per minute to use  during the night and follow up with an overnight pulse oximetry to  document correction of the nocturnal oxygen desaturation. Cardiopulmonary evaluation is recommended if clinically indicated for  nocturnal hypoventilation which may be related to cardiopulmonary  pathology. Weight reduction is considered to be beneficial for the  patient and recommended.         Vanda Carrasquillo M.D.    D: 04/17/2022 14:33:55       T: 04/18/2022 13:47:13     [email or web address]  Job#: [id number]     Doc#: [id number]    CC:

## 2022-04-22 LAB — STATUS: NORMAL

## 2022-04-28 ENCOUNTER — OFFICE VISIT (OUTPATIENT)
Dept: PULMONOLOGY | Age: 62
End: 2022-04-28
Payer: MEDICARE

## 2022-04-28 VITALS
OXYGEN SATURATION: 97 % | HEIGHT: 67 IN | TEMPERATURE: 97.5 F | HEART RATE: 68 BPM | BODY MASS INDEX: 42.53 KG/M2 | WEIGHT: 271 LBS | SYSTOLIC BLOOD PRESSURE: 116 MMHG | DIASTOLIC BLOOD PRESSURE: 76 MMHG

## 2022-04-28 DIAGNOSIS — J44.9 MODERATE COPD (CHRONIC OBSTRUCTIVE PULMONARY DISEASE) (HCC): Primary | ICD-10-CM

## 2022-04-28 DIAGNOSIS — G47.36 NOCTURNAL HYPOXEMIA DUE TO EMPHYSEMA (HCC): ICD-10-CM

## 2022-04-28 DIAGNOSIS — J43.9 NOCTURNAL HYPOXEMIA DUE TO EMPHYSEMA (HCC): ICD-10-CM

## 2022-04-28 PROCEDURE — 99214 OFFICE O/P EST MOD 30 MIN: CPT | Performed by: PHYSICIAN ASSISTANT

## 2022-04-28 ASSESSMENT — ENCOUNTER SYMPTOMS
CHEST TIGHTNESS: 0
STRIDOR: 0
EYES NEGATIVE: 1
COUGH: 0
BACK PAIN: 0
WHEEZING: 0
DIARRHEA: 0
NAUSEA: 0
ALLERGIC/IMMUNOLOGIC NEGATIVE: 1
SHORTNESS OF BREATH: 1

## 2022-04-28 NOTE — PROGRESS NOTES
[facility]    Nithya Ray, 57 y.o.  [id number]      Pt of [facility]  Nurses Notes   2 week follow up after PSG   Study Results  Initial Study Date -  04/08/2022  AHI -  2.3    TotalEvents - 4.1  (Apneas  0  Hypopneas 2.6  Central  0)  LM w/Arousals - 0  Sleep Efficiency - 96.8 % (Total Sleep Time - 422 min)  Time with Sats below 88% - 399 min  Interval History       Nithya Ray is a 57 y.o. old female who comes in to review the results of her recent sleep study, to answer questions and to explore options for treatment. She had a hx of CARLOTTA. She has been on O2 at night at 2 liters but has not used in over a year.   Allergies  Allergies   Allergen Reactions    Latex Itching and Rash     Meds  Current Outpatient Medications   Medication Sig Dispense Refill    insulin lispro, 1 Unit Dial, 100 UNIT/ML SOPN INJECT 12 UNITS UNDER THE SKIN THREE TIMES DAILY WITH MEALS PLUS SLIDING SCALE( MAX OF 60 UNITS DAILY) 15 mL 3    pantoprazole (PROTONIX) 40 MG tablet Take 1 tablet by mouth 2 times daily (before meals) 180 tablet 3    pregabalin (LYRICA) 300 MG capsule TAKE 1 CAPSULE BY MOUTH TWICE DAILY 180 capsule 0    Continuous Blood Gluc Sensor (FREESTYLE MICKI 14 DAY SENSOR) MISC APPLY 1 SENSOR EXTERNALLY EVERY 14 DAYS 2 each 3    insulin detemir (LEVEMIR) 100 UNIT/ML injection vial Inject 73 Units into the skin nightly 3 each 3    cetirizine (ZYRTEC) 10 MG tablet Take 1 tablet by mouth daily 90 tablet 0    umeclidinium-vilanterol (ANORO ELLIPTA) 62.5-25 MCG/INH AEPB inhaler Inhale 1 puff into the lungs daily 60 each 5    atorvastatin (LIPITOR) 20 MG tablet TAKE 1 TABLET BY MOUTH DAILY 90 tablet 3    B-D ULTRAFINE III SHORT PEN 31G X 8 MM MISC USE AS DIRECTED ONCE DAILY 100 each 2    MUCUS RELIEF 600 MG extended release tablet TAKE 1 TABLET BY MOUTH TWICE DAILY 120 tablet 2    GABAPENTIN PO Take by mouth      VICTOZA 18 MG/3ML SOPN SC injection ADMINISTER 1.8 MG UNDER THE SKIN DAILY 9 mL 3    albuterol sulfate  (90 Base) MCG/ACT inhaler Inhale 2 puffs into the lungs every 6 hours as needed for Wheezing or Shortness of Breath 8 g 11    venlafaxine (EFFEXOR XR) 37.5 MG extended release capsule TAKE 1 CAPSULE BY MOUTH ONCE DAILY ALONG WITH 150MG 90 capsule 3    venlafaxine (EFFEXOR XR) 150 MG extended release capsule TAKE 1 CAPSULE BY MOUTH DAILY 90 capsule 3    traZODone (DESYREL) 150 MG tablet TAKE 1 TABLET BY MOUTH EVERY NIGHT 90 tablet 3    Handicap Placard MISC by Does not apply route Duration: three years 1 each 0    nystatin (MYCOSTATIN) 830983 UNIT/ML suspension Take 5 mLs by mouth 4 times daily 473 mL 1    blood glucose test strips (ACCU-CHEK DAVIDA PLUS) strip USE TO TEST BLOOD SUGAR ONCE DAILY 100 strip 3    fluticasone (FLONASE) 50 MCG/ACT nasal spray 1 spray by Each Nostril route daily 2 Bottle 1    diclofenac sodium (VOLTAREN) 1 % GEL diclofenac 1 % topical gel      Continuous Blood Gluc Sensor (FREESTYLE DYLAN 14 DAY SENSOR) [facility] FreeStyle Dylan 14 Day Sensor kit   APPLY 1 SENSOR EXTERNALLY EVERY 14 DAYS      Continuous Blood Gluc  (FREESTYLE DYLAN 14 DAY READER) PAOLA Check blood sugars daily 1 Device 0    Handicap Placard [facility] by Does not apply route Duration: three years    Type II idabetes  Diabetic neuropathy 1 each 0    HYDROcodone-acetaminophen (NORCO) 5-325 MG per tablet Take 1 tablet by mouth every 8 hours as needed for Pain. Elvie Goff traMADol (ULTRAM) 50 MG tablet Take 100 mg by mouth 3 times daily.  OXYGEN Inhale 2 L into the lungs       Elastic Bandages & Supports (WRIST BRACE/RIGHT MEDIUM) MISC Wear brace on right wrist nightly 1 each 0    Cholecalciferol (VITAMIN D3) 2000 units CAPS Take by mouth daily      rOPINIRole (REQUIP) 1 MG tablet Take 1 mg by mouth nightly       acetaminophen 650 MG TABS Take 650 mg by mouth every 4 hours as needed for Pain (For mild pain level 1-3 or for fever > 100.5).  120 tablet      No current facility-administered medications for this visit. ROS  Review of Systems   Constitutional: Negative for activity change, appetite change, chills and fever. HENT: Negative for congestion and postnasal drip. Eyes: Negative. Respiratory: Positive for shortness of breath. Negative for cough, chest tightness, wheezing and stridor. Cardiovascular: Negative for chest pain and leg swelling. Gastrointestinal: Negative for diarrhea and nausea. Endocrine: Negative. Genitourinary: Negative. Musculoskeletal: Negative. Negative for arthralgias and back pain. Skin: Negative. Allergic/Immunologic: Negative. Neurological: Negative. Negative for dizziness and light-headedness. Psychiatric/Behavioral: Negative. All other systems reviewed and are negative. Exam  Vitals -  /76   Pulse 68   Temp 97.5 °F (36.4 °C)   Ht 5' 7\" (1.702 m)   Wt 271 lb (122.9 kg)   SpO2 97% Comment: r/a  BMI 42.44 kg/m²    Body mass index is 42.44 kg/m². Oxygen level - Room Air  Physical Exam  Constitutional:       General: She is not in acute distress. Appearance: Normal appearance. She is normal weight. She is not ill-appearing. HENT:      Head: Normocephalic and atraumatic. Nose: Nose normal.   Eyes:      Extraocular Movements: Extraocular movements intact. Pupils: Pupils are equal, round, and reactive to light. Pulmonary:      Effort: Pulmonary effort is normal.   Neurological:      Mental Status: She is alert and oriented to person, place, and time. Psychiatric:         Attention and Perception: Attention and perception normal.         Mood and Affect: Mood and affect normal.         Speech: Speech normal.         Behavior: Behavior normal.         Thought Content: Thought content normal.         Cognition and Memory: Cognition and memory normal.         Judgment: Judgment normal.        Assessment   Diagnosis Orders   1.  Moderate COPD (chronic obstructive pulmonary disease) ([facility]     2.  Nocturnal hypoxemia due to emphysema ([facility]        Recommendations    PSG does not reveal CARLOTTA  She does hypoxemia  Will use O2 at 2 liters at night  Follow up PRN for sleep       Chente Turner PA-C, MPAS  4/28/2022

## 2022-05-05 DIAGNOSIS — E11.59 TYPE 2 DIABETES MELLITUS WITH OTHER CIRCULATORY COMPLICATION, WITH LONG-TERM CURRENT USE OF INSULIN (HCC): ICD-10-CM

## 2022-05-05 DIAGNOSIS — Z79.4 TYPE 2 DIABETES MELLITUS WITH OTHER CIRCULATORY COMPLICATION, WITH LONG-TERM CURRENT USE OF INSULIN (HCC): ICD-10-CM

## 2022-05-05 RX ORDER — LIRAGLUTIDE 6 MG/ML
INJECTION SUBCUTANEOUS
Qty: 9 ML | Refills: 3 | Status: SHIPPED | OUTPATIENT
Start: 2022-05-05 | End: 2022-08-29

## 2022-05-05 NOTE — TELEPHONE ENCOUNTER
Recent Visits  Date Type Provider Dept   03/14/22 Office Visit Bridger Mae APRN - CNP [facility]   12/13/21 Office Visit Bridger aMe APRN - CNP [facility]   09/21/21 Office Visit Bridger Mae APRN - CNP [facility]   09/13/21 Office Visit Bridger Mae APRULISSES Umm CNP [facility]   08/24/21 Office Visit Bridger Mae APRULISSES - CNP [facility]   07/12/21 Office Visit Bridger Mae APRULISSES - CNP [facility]   06/08/21 Office Visit Bridger Mae APRULISSES - CNP [facility]   02/23/21 Office Visit ALLEN Hoyt CNP [facility]   Showing recent visits within past 540 days with a meds authorizing provider and meeting all other requirements  Future Appointments  Date Type Provider Dept   06/14/22 Appointment ALLEN Hoyt CNP [facility]   Showing future appointments within next 150 days with a meds authorizing provider and meeting all other requirements      Future Appointments   Date Time Provider Scarlett Ibarra   5/24/2022  2:30 PM Myriam Coulter MD N ENT [facility]   6/14/2022  8:00 AM Bridger Mae APRN - [address] [address] [address] [facility]   9/30/2022  1:00 PM STR CT IMAGING RM1  [facility] OUT EXP STR Radiolog   9/30/2022  1:30 PM STR PULMONARY FUNCTION ROOM 1 STRZ PFT [address] [facility]   10/10/2022 11:30 AM ALLEN Byrd Rahu [address]

## 2022-06-09 ENCOUNTER — TELEPHONE (OUTPATIENT)
Dept: FAMILY MEDICINE CLINIC | Age: 62
End: 2022-06-09

## 2022-06-09 DIAGNOSIS — Z78.0 POSTMENOPAUSE: Primary | ICD-10-CM

## 2022-06-09 DIAGNOSIS — Z12.31 ENCOUNTER FOR SCREENING MAMMOGRAM FOR MALIGNANT NEOPLASM OF BREAST: ICD-10-CM

## 2022-06-13 ENCOUNTER — TELEPHONE (OUTPATIENT)
Dept: FAMILY MEDICINE CLINIC | Age: 62
End: 2022-06-13

## 2022-06-13 ENCOUNTER — HOSPITAL ENCOUNTER (OUTPATIENT)
Dept: WOMENS IMAGING | Age: 62
Discharge: HOME | End: 2022-06-13
Payer: MEDICARE

## 2022-06-13 DIAGNOSIS — Z78.0 POSTMENOPAUSE: ICD-10-CM

## 2022-06-13 DIAGNOSIS — Z12.31 ENCOUNTER FOR SCREENING MAMMOGRAM FOR MALIGNANT NEOPLASM OF BREAST: ICD-10-CM

## 2022-06-13 PROCEDURE — 77063 BREAST TOMOSYNTHESIS BI: CPT

## 2022-06-13 RX ORDER — INSULIN DETEMIR 100 [IU]/ML
73 INJECTION, SOLUTION SUBCUTANEOUS NIGHTLY
Qty: 3 EACH | Refills: 3 | Status: SHIPPED | OUTPATIENT
Start: 2022-06-13

## 2022-06-13 NOTE — TELEPHONE ENCOUNTER
Recent Visits  Date Type Provider Dept   03/14/22 Office Visit Aurelio Sat, APRN - CNP [facility]   12/13/21 Office Visit Aurelio Sat, APRN - CNP [facility]   09/21/21 Office Visit Aurelio Sat, APRN - CNP [facility]   09/13/21 Office Visit Aurelio Sat, APRN - CNP [facility]   08/24/21 Office Visit Aurelio Sat, APRN - CNP [facility]   07/12/21 Office Visit Aurelio Sat, APRN - CNP [facility]   06/08/21 Office Visit Aurelio Sat, APRN - CNP [facility]   02/23/21 Office Visit Aurelio Sat, APRN - CNP [facility]   Showing recent visits within past 540 days with a meds authorizing provider and meeting all other requirements  Future Appointments  Date Type Provider Dept   06/14/22 Appointment Aurelio Honeycutt, APRN - CNP [facility]   Showing future appointments within next 150 days with a meds authorizing provider and meeting all other requirements        Future Appointments   Date Time Provider Scarlett Ibarra   6/14/2022  8:00 AM Aurelio Honeycutt, APRN - [id number] [facility] [address] [facility]   9/30/2022  1:00 PM STR CT IMAGING RM1  OP [facility] STRZ OUT EXP STR Radiolog   9/30/2022  1:30 PM STR PULMONARY FUNCTION ROOM 1 STRZ PFT [facility] HOD   10/10/2022 11:30 AM ALLEN Adams - [address]

## 2022-06-13 NOTE — TELEPHONE ENCOUNTER
----- Message from ALLEN St CNP sent at 6/13/2022  2:05 PM EDT -----  Let pt know her mammogram is normal recommend 1 yr f/u

## 2022-06-14 ENCOUNTER — OFFICE VISIT (OUTPATIENT)
Dept: FAMILY MEDICINE CLINIC | Age: 62
End: 2022-06-14
Payer: MEDICARE

## 2022-06-14 VITALS
HEIGHT: 67 IN | TEMPERATURE: 97.7 F | RESPIRATION RATE: 14 BRPM | HEART RATE: 63 BPM | SYSTOLIC BLOOD PRESSURE: 120 MMHG | WEIGHT: 270.2 LBS | BODY MASS INDEX: 42.41 KG/M2 | DIASTOLIC BLOOD PRESSURE: 74 MMHG | OXYGEN SATURATION: 97 %

## 2022-06-14 DIAGNOSIS — L30.8 OTHER ECZEMA: ICD-10-CM

## 2022-06-14 DIAGNOSIS — F32.4 MAJOR DEPRESSIVE DISORDER IN PARTIAL REMISSION, UNSPECIFIED WHETHER RECURRENT (HCC): ICD-10-CM

## 2022-06-14 DIAGNOSIS — M79.7 FIBROMYALGIA: ICD-10-CM

## 2022-06-14 DIAGNOSIS — G25.81 RLS (RESTLESS LEGS SYNDROME): ICD-10-CM

## 2022-06-14 DIAGNOSIS — E11.42 DIABETIC POLYNEUROPATHY ASSOCIATED WITH TYPE 2 DIABETES MELLITUS (HCC): Primary | ICD-10-CM

## 2022-06-14 LAB — HBA1C MFR BLD: 7.7 % (ref 4.3–5.7)

## 2022-06-14 PROCEDURE — 99214 OFFICE O/P EST MOD 30 MIN: CPT | Performed by: NURSE PRACTITIONER

## 2022-06-14 PROCEDURE — 3051F HG A1C>EQUAL 7.0%<8.0%: CPT | Performed by: NURSE PRACTITIONER

## 2022-06-14 RX ORDER — FLASH GLUCOSE SENSOR
KIT MISCELLANEOUS
Qty: 14 EACH | Refills: 3 | Status: SHIPPED | OUTPATIENT
Start: 2022-06-14

## 2022-06-14 ASSESSMENT — PATIENT HEALTH QUESTIONNAIRE - PHQ9
7. TROUBLE CONCENTRATING ON THINGS, SUCH AS READING THE NEWSPAPER OR WATCHING TELEVISION: 0
SUM OF ALL RESPONSES TO PHQ QUESTIONS 1-9: 15
10. IF YOU CHECKED OFF ANY PROBLEMS, HOW DIFFICULT HAVE THESE PROBLEMS MADE IT FOR YOU TO DO YOUR WORK, TAKE CARE OF THINGS AT HOME, OR GET ALONG WITH OTHER PEOPLE: 2
SUM OF ALL RESPONSES TO PHQ QUESTIONS 1-9: 15
5. POOR APPETITE OR OVEREATING: 3
SUM OF ALL RESPONSES TO PHQ9 QUESTIONS 1 & 2: 6
4. FEELING TIRED OR HAVING LITTLE ENERGY: 3
SUM OF ALL RESPONSES TO PHQ QUESTIONS 1-9: 15
2. FEELING DOWN, DEPRESSED OR HOPELESS: 3
8. MOVING OR SPEAKING SO SLOWLY THAT OTHER PEOPLE COULD HAVE NOTICED. OR THE OPPOSITE, BEING SO FIGETY OR RESTLESS THAT YOU HAVE BEEN MOVING AROUND A LOT MORE THAN USUAL: 0
9. THOUGHTS THAT YOU WOULD BE BETTER OFF DEAD, OR OF HURTING YOURSELF: 0
6. FEELING BAD ABOUT YOURSELF - OR THAT YOU ARE A FAILURE OR HAVE LET YOURSELF OR YOUR FAMILY DOWN: 0
SUM OF ALL RESPONSES TO PHQ QUESTIONS 1-9: 15
3. TROUBLE FALLING OR STAYING ASLEEP: 3
1. LITTLE INTEREST OR PLEASURE IN DOING THINGS: 3

## 2022-06-14 NOTE — PATIENT INSTRUCTIONS
Patient Education        Atopic Dermatitis: Care Instructions  Overview  Atopic dermatitis (also called eczema) is a skin problem that causes intense itching and a red, raised rash. In severe cases, the rash develops clear fluid-filled blisters. The rash is not contagious. You can't catch it from others. People with this condition seem to have very sensitive immune systems that are likely to react to things that cause allergies. The immune system isthe body's way of fighting infection. There is no cure for atopic dermatitis. But you may be able to control it withcare at home. Follow-up care is a key part of your treatment and safety. Be sure to make and go to all appointments, and call your doctor if you are having problems. It's also a good idea to know your test results and keep alist of the medicines you take. How can you care for yourself at home?  Use moisturizer at least twice a day.  If your doctor prescribes a cream, use it as directed. If your doctor prescribes other medicine, take it exactly as directed.  Wash the affected area with warm (not hot) water only. Soap can make dryness and itching worse. Pat dry.  Apply a moisturizer after bathing. Use a cream such as Cetaphil, Lubriderm, or Moisturel that does not irritate the skin or cause a rash. Apply the cream while your skin is still damp after lightly drying with a towel.  Use cold, wet cloths to reduce itching.  Keep cool, and stay out of the sun.  If itching affects your sleep, ask your doctor if you can take an antihistamine that might reduce itching and make you sleepy, such as diphenhydramine (Benadryl). Be safe with medicines. Read and follow all instructions on the label.  Control scratching. Keep your fingernails trimmed and smooth to prevent damage to the skin when you scratch it. Wearing cotton mittens or gloves can help you stop scratching.  Try to avoid things that trigger your rash.  These may include things like allergens, such as pollen or animal dander. Harsh soaps, scratchy clothes, stress, and some foods are other examples. When should you call for help? Call your doctor now or seek immediate medical care if:     Your rash gets worse and you have a fever.      You have new blisters or bruises, or the rash spreads and looks like a sunburn.      You have signs of infection, such as:  ? Increased pain, swelling, warmth, or redness. ? Red streaks leading from the rash. ? Pus draining from the rash. ? A fever.      You have crusting or oozing sores.      You have joint aches or body aches along with your rash. Watch closely for changes in your health, and be sure to contact your doctor if:     Your rash does not clear up after 2 to 3 weeks of home treatment.      Itching interferes with your sleep or daily activities. Where can you learn more? Go to [email or web address] and sign in to your [facility] account. Enter F205 in the STARFACE box to learn more about \"Atopic Dermatitis: Care Instructions. \"     If you do not have an account, please click on the \"Sign Up Now\" link. Current as of: November 15, 2021               Content Version: 13.2  © 2006-2022 Healthwise, Incorporated. Care instructions adapted under license by [facility] ([facility]). If you have questions about a medical condition or this instruction, always ask your healthcare professional. [place] [address] any warranty or liability for your use of this information.

## 2022-06-14 NOTE — PROGRESS NOTES
Juanita Perkins is a 57 y.o. female for 3 Month Follow-Up  Pt here for a f/u visit     Depressed Mood    HPI:    Depressed Mood? yes - she is taking  effexor  And states her depression has worsened B/C she is new training for a job and does not want to start the new job but insurance is making her. She states the new  is stressing her out. Anhedonia? yes - most days   Appetite changes?  no  Sleep disturbances? no  Feelings of guilt? no  Decreased energy? yes - some days   Impaired concentration? no  Substance abuse? no    Suicidal/Homicidal Ideation? no      Compliant with meds: Yes declines any referral for counseling, will refer her to psychiatry   Med side effects: no   Sees therapist?:  no  Family History of Mental Illness? yes -     Review of Systems - Psychological ROS: positive for - anxiety and depression, negative for - sleep disturbances or suicidal ideation      Pt taking requip for RLS and states symptoms are well controlled. Diabetes Type 2    Glucose control:   Does patient check blood glucoses at home? Yes  Report of hypoglycemia: no  Lab Results   Component Value Date    LABA1C 7.7 (H) 06/14/2022     No results found for: EAG    Symptoms  Polyuria, Polydipsia or Polyphagia? No  Chest Pain, SOB, or Palpitations? -  No  New Vision complaints? No  Paresthesias of the extremities? Yes has neuropathy of BLE taking lyrica for this also sees PM and on norco and ultram for pain     Medications  Current medication were reviewed. Compliant with medications? yes  Medication side effects? No  On ACE-I or ARB? No  On antiplatelet therapy? Yes  On Statin? Yes    Last Diabetic Eye Exam: in the last year     Exercise  Exercise? Unable due to pain   Wt Readings from Last 3 Encounters:   06/14/22 270 lb 3.2 oz (122.6 kg)   04/28/22 271 lb (122.9 kg)   03/14/22 272 lb 9.6 oz (123.7 kg)       Diet discipline?:  Low salt, fat, sugar diet?   Yes    states the tips(finger pads) of her fingers get gabapentin  Sees PM and on norco and ultram  Did discuss CBD oil she does smoke medical marijuana. Major depressive disorder in partial remission, unspecified whether recurrent ([address]  -     Lisa Torrse, CNP, Psychiatry, [place] [facility]  worsening  likely situational  Continue current meds  RLS (restless legs syndrome)  controlled  Other eczema  -     triamcinolone (KENALOG) 0.1 % ointment; Apply topically 2 times daily for 7 days    Other orders  -     POCT glycosylated hemoglobin (Hb A1C)    Pt in agreement with plan   Controlled substances monitoring: possible medication side effects, risk of tolerance and/or dependence, and alternative treatments discussed, no signs of potential drug abuse or diversion identified and [place]AR[place] report reviewed today- activity consistent with treatment plan. Return in about 6 months (around 12/14/2022) for A1C and check up.

## 2022-06-15 RX ORDER — PREGABALIN 300 MG/1
CAPSULE ORAL
Qty: 180 CAPSULE | Refills: 1 | Status: SHIPPED | OUTPATIENT
Start: 2022-06-15 | End: 2022-12-15

## 2022-08-28 DIAGNOSIS — E11.59 TYPE 2 DIABETES MELLITUS WITH OTHER CIRCULATORY COMPLICATION, WITH LONG-TERM CURRENT USE OF INSULIN (HCC): ICD-10-CM

## 2022-08-28 DIAGNOSIS — Z79.4 TYPE 2 DIABETES MELLITUS WITH OTHER CIRCULATORY COMPLICATION, WITH LONG-TERM CURRENT USE OF INSULIN (HCC): ICD-10-CM

## 2022-08-29 RX ORDER — LIRAGLUTIDE 6 MG/ML
INJECTION SUBCUTANEOUS
Qty: 9 ML | Refills: 3 | Status: SHIPPED | OUTPATIENT
Start: 2022-08-29

## 2022-08-29 NOTE — TELEPHONE ENCOUNTER
Recent Visits  Date Type Provider Dept   06/14/22 Office Visit ALLEN Tompkins CNP [facility]   03/14/22 Office Visit ALLEN Tompkins CNP [facility]   12/13/21 Office Visit ALLEN Tompkins CNP [facility]   09/21/21 Office Visit ALLEN Tompkins CNP [facility]   09/13/21 Office Visit ALLEN Tompkins CNP [facility]   08/24/21 Office Visit ALLEN Tompkins CNP [facility]   07/12/21 Office Visit ALLEN Tompkins CNP [facility]   06/08/21 Office Visit ALLEN Tompkins CNP [facility]   Showing recent visits within past 540 days with a meds authorizing provider and meeting all other requirements  Future Appointments  Date Type Provider Dept   12/14/22 Appointment ALLEN Tompkins CNP [facility]   Showing future appointments within next 150 days with a meds authorizing provider and meeting all other requirements      Future Appointments   Date Time Provider Scarlett Ibarra   9/12/2022  2:00 PM [facility]   9/30/2022  1:00 PM STR CT IMAGING RM1  OP EXPRESS STRZ OUT EXP STR Radiolog   9/30/2022  1:30 PM STR PULMONARY FUNCTION ROOM 1 STRZ PFT [facility]   10/10/2022 11:30 AM ALLEN White CNP Pulm Med [address]   12/14/2022  8:00 AM ALLEN Tompkins - [id number] [facility] [address] [place]

## 2022-09-12 DIAGNOSIS — J44.9 MODERATE COPD (CHRONIC OBSTRUCTIVE PULMONARY DISEASE) (HCC): ICD-10-CM

## 2022-09-12 DIAGNOSIS — Z79.4 TYPE 2 DIABETES MELLITUS WITHOUT COMPLICATION, WITH LONG-TERM CURRENT USE OF INSULIN (HCC): ICD-10-CM

## 2022-09-12 DIAGNOSIS — E11.9 TYPE 2 DIABETES MELLITUS WITHOUT COMPLICATION, WITH LONG-TERM CURRENT USE OF INSULIN (HCC): ICD-10-CM

## 2022-09-12 DIAGNOSIS — F33.42 RECURRENT MAJOR DEPRESSIVE DISORDER, IN FULL REMISSION (HCC): ICD-10-CM

## 2022-09-12 DIAGNOSIS — J43.9 PULMONARY EMPHYSEMA, UNSPECIFIED EMPHYSEMA TYPE (HCC): ICD-10-CM

## 2022-09-12 RX ORDER — VENLAFAXINE HYDROCHLORIDE 150 MG/1
150 CAPSULE, EXTENDED RELEASE ORAL DAILY
Qty: 90 CAPSULE | Refills: 3 | Status: SHIPPED | OUTPATIENT
Start: 2022-09-12

## 2022-09-12 RX ORDER — GUAIFENESIN 600 MG/1
TABLET, EXTENDED RELEASE ORAL
Qty: 120 TABLET | Refills: 2 | Status: SHIPPED | OUTPATIENT
Start: 2022-09-12

## 2022-09-12 RX ORDER — BLOOD SUGAR DIAGNOSTIC
STRIP MISCELLANEOUS
Qty: 100 STRIP | Refills: 3 | Status: SHIPPED | OUTPATIENT
Start: 2022-09-12

## 2022-09-13 NOTE — TELEPHONE ENCOUNTER
Received refill request for Anoro. Medication was last ordered by Jing Murrieta. Medication was last ordered on 2/7/22 with 5 refills. Patient was last seen in the office 10/12/21. Patient has a scheduled follow up 10/10/22. Medication needs to be sent to [facility].

## 2022-09-30 ENCOUNTER — HOSPITAL ENCOUNTER (OUTPATIENT)
Dept: CT IMAGING | Age: 62
Discharge: HOME | End: 2022-09-30
Payer: MEDICARE

## 2022-09-30 ENCOUNTER — HOSPITAL ENCOUNTER (OUTPATIENT)
Dept: PULMONOLOGY | Age: 62
Discharge: HOME | End: 2022-09-30
Payer: MEDICARE

## 2022-09-30 DIAGNOSIS — F17.210 CIGARETTE NICOTINE DEPENDENCE, UNCOMPLICATED: ICD-10-CM

## 2022-09-30 DIAGNOSIS — J43.9 PULMONARY EMPHYSEMA, UNSPECIFIED EMPHYSEMA TYPE (HCC): ICD-10-CM

## 2022-09-30 PROCEDURE — 71271 CT THORAX LUNG CANCER SCR C-: CPT

## 2022-09-30 PROCEDURE — 94060 EVALUATION OF WHEEZING: CPT

## 2022-10-22 DIAGNOSIS — F33.42 RECURRENT MAJOR DEPRESSIVE DISORDER, IN FULL REMISSION (HCC): ICD-10-CM

## 2022-10-24 RX ORDER — TRAZODONE HYDROCHLORIDE 150 MG/1
TABLET ORAL
Qty: 90 TABLET | Refills: 3 | Status: SHIPPED | OUTPATIENT
Start: 2022-10-24

## 2022-11-10 ENCOUNTER — OFFICE VISIT (OUTPATIENT)
Dept: FAMILY MEDICINE CLINIC | Age: 62
End: 2022-11-10
Payer: MEDICARE

## 2022-11-10 VITALS
OXYGEN SATURATION: 97 % | BODY MASS INDEX: 42.38 KG/M2 | WEIGHT: 270 LBS | TEMPERATURE: 97.4 F | SYSTOLIC BLOOD PRESSURE: 118 MMHG | RESPIRATION RATE: 18 BRPM | DIASTOLIC BLOOD PRESSURE: 68 MMHG | HEIGHT: 67 IN | HEART RATE: 68 BPM

## 2022-11-10 DIAGNOSIS — J44.1 CHRONIC OBSTRUCTIVE PULMONARY DISEASE WITH ACUTE EXACERBATION (HCC): Primary | ICD-10-CM

## 2022-11-10 PROBLEM — I73.9 PAD (PERIPHERAL ARTERY DISEASE) (HCC): Status: ACTIVE | Noted: 2022-11-10

## 2022-11-10 LAB
INFLUENZA VIRUS A RNA: NEGATIVE
INFLUENZA VIRUS B RNA: NEGATIVE
Lab: 0
QC PASS/FAIL: 0
SARS-COV-2 RDRP RESP QL NAA+PROBE: NEGATIVE

## 2022-11-10 PROCEDURE — 87502 INFLUENZA DNA AMP PROBE: CPT | Performed by: FAMILY MEDICINE

## 2022-11-10 PROCEDURE — 99214 OFFICE O/P EST MOD 30 MIN: CPT | Performed by: FAMILY MEDICINE

## 2022-11-10 PROCEDURE — 87635 SARS-COV-2 COVID-19 AMP PRB: CPT | Performed by: FAMILY MEDICINE

## 2022-11-10 RX ORDER — DOXYCYCLINE HYCLATE 100 MG
100 TABLET ORAL 2 TIMES DAILY
Qty: 20 TABLET | Refills: 0 | Status: SHIPPED | OUTPATIENT
Start: 2022-11-10 | End: 2022-11-20

## 2022-11-10 RX ORDER — BENZONATATE 100 MG/1
CAPSULE ORAL
Qty: 60 CAPSULE | Refills: 0 | Status: SHIPPED | OUTPATIENT
Start: 2022-11-10 | End: 2022-11-20

## 2022-11-10 SDOH — ECONOMIC STABILITY: FOOD INSECURITY: WITHIN THE PAST 12 MONTHS, THE FOOD YOU BOUGHT JUST DIDN'T LAST AND YOU DIDN'T HAVE MONEY TO GET MORE.: NEVER TRUE

## 2022-11-10 SDOH — ECONOMIC STABILITY: FOOD INSECURITY: WITHIN THE PAST 12 MONTHS, YOU WORRIED THAT YOUR FOOD WOULD RUN OUT BEFORE YOU GOT MONEY TO BUY MORE.: NEVER TRUE

## 2022-11-10 ASSESSMENT — SOCIAL DETERMINANTS OF HEALTH (SDOH): HOW HARD IS IT FOR YOU TO PAY FOR THE VERY BASICS LIKE FOOD, HOUSING, MEDICAL CARE, AND HEATING?: NOT HARD AT ALL

## 2022-11-10 NOTE — PROGRESS NOTES
Chief Complaint   Patient presents with    Cough    Wheezing            History obtained from the patient. SUBJECTIVE:  Boykin Burkitt is a 60 y.o. female that presents today for    -URI type sxs:   Coughing a wk  Inc wheezing, nasal congestion the last few days  No fevers  Mild SOB  Using albuterol a bit more  On anoro  Covid/flu neg    Fever - No  Runny nose or congestion -  Yes   Cough -  Yes  Sore throat -  Yes  Headache, fatigue, joint pains, muscle aches -  No  Double Sickening - Yes  Shortness of breath/Wheezing? -  as above  Nausea/Vomiting/Diarrhea? No  Sick contacts - Yes  Maxillary Tooth Pain -  Yes  Treatment tried and response - OTC meds, no better      -Smokin PPD  Not ready to quit  UTD LDCT      Current Outpatient Medications   Medication Sig Dispense Refill    doxycycline hyclate (VIBRA-TABS) 100 MG tablet Take 1 tablet by mouth 2 times daily for 10 days 20 tablet 0    benzonatate (TESSALON PERLES) 100 MG capsule Take 1 to 2 tablets by mouth every 8 hours as needed for cough.  60 capsule 0    traZODone (DESYREL) 150 MG tablet TAKE 1 TABLET BY MOUTH EVERY NIGHT 90 tablet 3    ANORO ELLIPTA 62.5-25 MCG/INH AEPB inhaler INHALE 1 PUFF INTO THE LUNGS DAILY 60 each 5    MUCUS RELIEF 600 MG extended release tablet TAKE 1 TABLET BY MOUTH TWICE DAILY 120 tablet 2    ACCU-CHEK DAVIDA PLUS strip USE TO TEST BLOOD SUGAR ONCE DAILY 100 strip 3    venlafaxine (EFFEXOR XR) 150 MG extended release capsule TAKE 1 CAPSULE BY MOUTH DAILY 90 capsule 3    VICTOZA 18 MG/3ML SOPN SC injection ADMINISTER 1.8 MG UNDER THE SKIN DAILY 9 mL 3    pregabalin (LYRICA) 300 MG capsule TAKE 1 CAPSULE BY MOUTH TWICE DAILY 180 capsule 1    Continuous Blood Gluc Sensor (FREESTYLE MICKI 14 DAY SENSOR) [facility] FreeStyle [place] 14 Day Sensor kit  APPLY 1 SENSOR EXTERNALLY EVERY 14 DAYS 14 each 3    insulin detemir (LEVEMIR) 100 UNIT/ML injection vial Inject 73 Units into the skin nightly (Patient taking differently: Inject 70 Units into the skin nightly ) 3 each 3    insulin lispro, 1 Unit Dial, 100 UNIT/ML SOPN INJECT 12 UNITS UNDER THE SKIN THREE TIMES DAILY WITH MEALS PLUS SLIDING SCALE( MAX OF 60 UNITS DAILY) 15 mL 3    pantoprazole (PROTONIX) 40 MG tablet Take 1 tablet by mouth 2 times daily (before meals) 180 tablet 3    Continuous Blood Gluc Sensor (FREESTYLE MICKI 14 DAY SENSOR) MISC APPLY 1 SENSOR EXTERNALLY EVERY 14 DAYS 2 each 3    atorvastatin (LIPITOR) 20 MG tablet TAKE 1 TABLET BY MOUTH DAILY 90 tablet 3    B-D ULTRAFINE III SHORT PEN 31G X 8 MM MISC USE AS DIRECTED ONCE DAILY 100 each 2    albuterol sulfate  (90 Base) MCG/ACT inhaler Inhale 2 puffs into the lungs every 6 hours as needed for Wheezing or Shortness of Breath 8 g 11    venlafaxine (EFFEXOR XR) 37.5 MG extended release capsule TAKE 1 CAPSULE BY MOUTH ONCE DAILY ALONG WITH 150MG 90 capsule 3    Handicap Placard MISC by Does not apply route Duration: three years 1 each 0    nystatin (MYCOSTATIN) 468660 UNIT/ML suspension Take 5 mLs by mouth 4 times daily 473 mL 1    fluticasone (FLONASE) 50 MCG/ACT nasal spray 1 spray by Each Nostril route daily 2 Bottle 1    diclofenac sodium (VOLTAREN) 1 % GEL diclofenac 1 % topical gel      Continuous Blood Gluc  (FREESTYLE MICKI 14 DAY READER) PAOLA Check blood sugars daily 1 Device 0    Handicap Placard MISC by Does not apply route Duration: three years    Type II idabetes  Diabetic neuropathy 1 each 0    HYDROcodone-acetaminophen (NORCO) 5-325 MG per tablet Take 1 tablet by mouth every 8 hours as needed for Pain. .      traMADol (ULTRAM) 50 MG tablet Take 100 mg by mouth 3 times daily.        OXYGEN Inhale 2 L into the lungs       Elastic Bandages & Supports (WRIST BRACE/RIGHT MEDIUM) MISC Wear brace on right wrist nightly 1 each 0    Cholecalciferol (VITAMIN D3) 2000 units CAPS Take by mouth daily      rOPINIRole (REQUIP) 1 MG tablet Take 1 mg by mouth nightly       acetaminophen 650 MG TABS Take 650 mg by mouth every 4 hours as needed for Pain (For mild pain level 1-3 or for fever > 100.5). 120 tablet      No current facility-administered medications for this visit. Orders Placed This Encounter   Medications    doxycycline hyclate (VIBRA-TABS) 100 MG tablet     Sig: Take 1 tablet by mouth 2 times daily for 10 days     Dispense:  20 tablet     Refill:  0    benzonatate (TESSALON PERLES) 100 MG capsule     Sig: Take 1 to 2 tablets by mouth every 8 hours as needed for cough. Dispense:  60 capsule     Refill:  0         All medications reviewed and reconciled, including OTC and herbal medications. Updated list given to patient.        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    Hepatic steatosis 05/31/2018    BMI 40.0-44.9, adult ([address] 05/31/2018    Depression 05/31/2018    Herniated cervical disc 10/09/2017    Pulmonary emphysema ([address] 07/12/2017    Hyperlipidemia, mixed 07/12/2017    Diabetic polyneuropathy associated with type 2 diabetes mellitus ([address] 02/01/2016       Past Medical History:   Diagnosis Date    Anxiety     Arthritis     Carpal tunnel syndrome     Cellulitis     Chronic back pain     Depression     Diabetes mellitus ([address]     Emphysema of lung (HCC)     Fibromyalgia     GERD (gastroesophageal reflux disease)     Glaucoma     Hiatal hernia     esophagus closes    Hx of blood clots     foot post op    Hyperlipidemia, mixed 7/12/2017    IBS (irritable bowel syndrome)     Leg pain     nerve damage right leg    MDRO (multiple drug resistant organisms) resistance 2008    wound and nasal    Guzman's neuroma     left foot    Nausea & vomiting     Neuropathic pain     Osteoarthritis     PONV (postoperative nausea and vomiting) patient's past medical history, past surgical history, allergies, medications, social and family history and I have made updates where appropriate. Review of Systems  Positive responses are highlighted in bold    Constitutional:  Fever, Chills, Night Sweats, Fatigue, Unexpected changes in weight  HENT:  Ear pain, Tinnitus, Nosebleeds, Trouble swallowing, Hearing loss, Sore throat  Cardiovascular:  Chest Pain, Palpitations, Orthopnea, Paroxysmal Nocturnal Dyspnea  Respiratory:  Cough, Wheezing, Shortness of breath, Chest tightness, Apnea  Gastrointestinal:  Nausea, Vomiting, Diarrhea, Constipation, Heartburn, Blood in stool  Genitourinary:  Difficulty or painful urination, Flank pain, Change in frequency, Urgency  Skin:  Color change, Rash, Itching, Wound  Musculoskeletal:  Joint pain, Back pain, Gait problems, Joint swelling, Myalgias  Neurological:  Dizziness, Headaches, Presyncope, Numbness, Seizures, Tremors  Endocrine:  Heat Intolerance, Cold Intolerance, Polydipsia, Polyphagia, Polyuria      PHYSICAL EXAM:  Vitals:    11/10/22 1458   BP: 118/68   Site: Right Upper Arm   Position: Sitting   Cuff Size: Large Adult   Pulse: 68   Resp: 18   Temp: 97.4 °F (36.3 °C)   SpO2: 97%   Weight: 270 lb (122.5 kg)   Height: 5' 7\" (1.702 m)     Body mass index is 42.29 kg/m². VS Reviewed  General Appearance: A&O x 3, No acute distress,well developed and well- nourished  Eyes: pupils equal, round, and reactive to light, extraocular eye movements intact, conjunctivae and eye lids without erythema  ENT: external ear and ear canal clear bilaterally, TMs intact and regular, nose without deformity, nasal mucosa and turbinates normal without polyps, oropharynx normal, dentition is normal for age  Neck: supple and non-tender without mass, no thyromegaly or thyroid nodules, no cervical lymphadenopathy  Pulmonary/Chest: good air movement bilaterally. Scattered wheezing. No crackles or rhonchi. No accessory muscle use.  No distress. Cardiovascular: S1 and S2 auscultated w/ RRR. No murmurs, rubs, clicks, or gallops, distal pulses intact. Abdomen: soft, non-tender, non-distended, bowel sounds physiologic,  no rebound or guarding, no masses or hernias noted. Liver and spleen without enlargement. Extremities: no cyanosis, clubbing or edema of the lower extremities. Skin: warm and dry, no rash or erythema      Office Visit on 11/10/2022   Component Date Value Ref Range Status    SARS-COV-2, RdRp gene 11/10/2022 Negative  Negative Final    Lot Number 11/10/2022 0   Final    QC Pass/Fail 11/10/2022 0   Final    Influenza virus A RNA 11/10/2022 negative   Final    Influenza virus B RNA 11/10/2022 negative   Final       ASSESSMENT & PLAN  1. Chronic obstructive pulmonary disease with acute exacerbation (HCC)    Bronchitis creating AE COPD  Covid/flu neg  VSS  Exam reassuring  Con't anoro  Inc frequency of alb  Doxy, tessalon  F/u if no better  Reviewed ER precautions, pt understands. - POCT COVID-19 Rapid, NAAT  - POCT Influenza A/B DNA  - doxycycline hyclate (VIBRA-TABS) 100 MG tablet; Take 1 tablet by mouth 2 times daily for 10 days  Dispense: 20 tablet; Refill: 0  - benzonatate (TESSALON PERLES) 100 MG capsule; Take 1 to 2 tablets by mouth every 8 hours as needed for cough. Dispense: 60 capsule; Refill: 0      DISPOSITION    Return if symptoms worsen or fail to improve. Justo Ji released without restrictions. PATIENT COUNSELING    Counseling was provided today regarding the following topics: Healthy eating habits, Regular exercise, substance abuse and healthy sleep habits. Barriers to learning and self management: none    Discussed use, benefit, and side effects of prescribed medications. Barriers to medication compliance addressed. All patient questions answered. Pt voiced understanding.        Electronically signed by Tyrell Serrano DO on 11/10/2022 at 3:23 PM

## 2022-11-23 DIAGNOSIS — Z79.4 TYPE 2 DIABETES MELLITUS WITH OTHER CIRCULATORY COMPLICATION, WITH LONG-TERM CURRENT USE OF INSULIN (HCC): ICD-10-CM

## 2022-11-23 DIAGNOSIS — E11.59 TYPE 2 DIABETES MELLITUS WITH OTHER CIRCULATORY COMPLICATION, WITH LONG-TERM CURRENT USE OF INSULIN (HCC): ICD-10-CM

## 2022-11-23 DIAGNOSIS — E78.2 HYPERLIPIDEMIA, MIXED: ICD-10-CM

## 2022-11-23 RX ORDER — ATORVASTATIN CALCIUM 20 MG/1
TABLET, FILM COATED ORAL
Qty: 90 TABLET | Refills: 3 | Status: SHIPPED | OUTPATIENT
Start: 2022-11-23

## 2022-11-23 RX ORDER — PEN NEEDLE, DIABETIC 31 GX5/16"
NEEDLE, DISPOSABLE MISCELLANEOUS
Qty: 100 EACH | Refills: 2 | Status: SHIPPED | OUTPATIENT
Start: 2022-11-23

## 2022-11-23 NOTE — TELEPHONE ENCOUNTER
Recent Visits  Date Type Provider Dept   11/10/22 Office Visit Kennedy Bowers, DO [facility]   06/14/22 Office Visit Hay Erin, APRN - CNP [facility]   03/14/22 Office Visit Hayrichie Leonardos, APRN - CNP [facility]   12/13/21 Office Visit Hayrichie Khan, APRN - CNP [facility]   09/21/21 Office Visit Hay Erin, APRN - CNP [facility]   09/13/21 Office Visit Hay Erin, APRN - CNP [facility]   08/24/21 Office Visit Hay Erin, APRN - CNP [facility]   07/12/21 Office Visit Hay Erin, APRN - CNP [facility]   06/08/21 Office Visit Hay Leonardos, APRN - CNP [facility]   Showing recent visits within past 540 days with a meds authorizing provider and meeting all other requirements  Future Appointments  Date Type Provider Dept   12/14/22 Appointment Hay Khan, APRN - CNP [facility]   Showing future appointments within next 150 days with a meds authorizing provider and meeting all other requirements      Future Appointments   Date Time Provider Scarlett Ibarra   12/1/2022  9:00 AM ALLEN Matthews  [address] [place] [address]   12/14/2022  8:00 AM Hay Khan, ALLEN [address]

## 2022-11-28 NOTE — PROGRESS NOTES
[facility]    Patient: Julia Ferrari, 60 y.o.   : 1960  2022    Patient of Dr. Janny Baker   Patient presents with    Follow-up     Emphysema 1 yr f/u with ldct & johanna 22        HPI  Blanche Cohen is here for follow up for emphysema. She lives in senior citizen apartment complex. Patient is here with CT Lung Screen that revealed LUNG Rads Category 2. She is also here with spirometry with slight decline in FEV1 and FVC. Overall patient reports respiratory symptoms have been a little worse since last appointment. Patient reports good compliance with inhaled medications (Anoro). Patient using albuterol nebulizer 1 times per day on average. She has not required her albuterol inhaler. Patient reports physical limitation due to respiratory symptoms. Her past medical history is significant for emphysema, allergic rhinitis, arthritis, DM, OA, anxiety, carpal tunnel syndrome, cellulitis, chronic back pain, depression, fibromyalgia, GERD, glaucoma- patient reports that this was a misdiagnosis and that her current ophthalmologist reports that she does not have glaucoma, hiatal hernia, blood clots, hyperlipidemia, IBS, yoo's neuroma, and neuropathic pain. Patient was last seen in the sleep clinic on 22 - PSG did not reveal CARLOTTA did have hypoxemia - advised to use 2 lpm at nighttime. Complaints: shortness of breath   Onset Duration: over a year  Exacerbating factors: walking, exertional activities  Alleviating factors: rest/sitting  Timing-exertion, certain times of day: no certain time of day  Associated symptoms: cough with dark to light yellow sputum production, chest tightness (\"some\"), wheezing  Pertinent negatives: hemoptysis  Risk factors for lung disease: tobacco exposure      Progress History:   Since last visit any new medical issues?  Yes Saw Dr. Licha Uribe a few weeks ago - URI diagnosed and was prescribed doxycycline and left foot    Nausea & vomiting     Neuropathic pain     Osteoarthritis     PONV (postoperative nausea and vomiting)     Pulmonary emphysema ([address] 2017    Sleep apnea     DOES NOT USE CPAP     SURGICAL HISTORY:  Past Surgical History:   Procedure Laterality Date    ABDOMEN SURGERY      APPENDECTOMY      BACK SURGERY      lower x 3    CERVICAL FUSION       SECTION      x3    CHOLECYSTECTOMY      COLONOSCOPY      ECTOPIC PREGNANCY SURGERY      EYE SURGERY Bilateral     LASER FOR GLAUCOMA    HYSTERECTOMY, TOTAL ABDOMINAL (CERVIX REMOVED)      age 38    JOINT REPLACEMENT      OTHER SURGICAL HISTORY  Aug 5, 2013    Left knee total arthroplasty (Dr. Kodi Patino, [facility])    OVARY REMOVAL Bilateral     age 38     [address] 20 SQ CM< Right 2018    INCISIONAL DEBRIEDMENT INCISION AND DRAINAGE RIGHT GLUTEAL ABCESS performed by Ventura Schmitt MD at [address] OFFICE/OUTPT [facility] Right 2018    EXCISIONAL DEBRIDEMENT OF RIGHT BUTTOCKS WOUND performed by Horacio Mansfield DO at [facility] Left     little toe bone removed    TONSILLECTOMY      TOTAL KNEE ARTHROPLASTY Right 2016    UPPER GASTROINTESTINAL ENDOSCOPY      UPPER GASTROINTESTINAL ENDOSCOPY Left 2019    EGD DILATION SAVORY performed by Francesca Penaloza MD at [facility]     SOCIAL HISTORY:  Social History     Tobacco Use    Smoking status: Every Day     Packs/day: 2.00     Years: 50.00     Pack years: 100.00     Types: Cigarettes     Start date: 1976    Smokeless tobacco: Never   Vaping Use    Vaping Use: Never used   Substance Use Topics    Alcohol use: No     Comment: Sober for 26 years    Drug use: Yes     Types: Marijuana (Weed)     Comment: couple times a day      ALLERGIES:  Allergies   Allergen Reactions    Latex Itching and Rash     FAMILY HISTORY:  Family History   Problem Relation Age of Onset    Arthritis Mother     Heart Disease Mother         CHF    High Blood Pressure Mother     Kidney Disease Mother     Cancer Father     Heart Disease Sister         diastolic dysfunction    Other Sister         thyroid cysts    Cancer Brother     Kidney Disease Brother     Diabetes Brother     Kidney Disease Brother     Colon Cancer Neg Hx      CURRENT MEDICATIONS:  Current Outpatient Medications   Medication Sig Dispense Refill    ipratropium (ATROVENT) 0.06 % nasal spray 2 sprays by Each Nostril route 3 times daily as needed for Rhinitis 15 mL 3    fluticasone-umeclidin-vilant (TRELEGY ELLIPTA) 100-62.5-25 MCG/ACT AEPB inhaler Inhale 1 puff into the lungs daily 3 each 3    guaiFENesin (MUCINEX) 600 MG extended release tablet Take 1 tablet by mouth 2 times daily 60 tablet 6    atorvastatin (LIPITOR) 20 MG tablet TAKE 1 TABLET BY MOUTH DAILY 90 tablet 3    B-D ULTRAFINE III SHORT PEN 31G X 8 MM MISC USE AS DIRECTED ONCE DAILY 100 each 2    traZODone (DESYREL) 150 MG tablet TAKE 1 TABLET BY MOUTH EVERY NIGHT 90 tablet 3    ACCU-CHEK DAVIDA PLUS strip USE TO TEST BLOOD SUGAR ONCE DAILY 100 strip 3    venlafaxine (EFFEXOR XR) 150 MG extended release capsule TAKE 1 CAPSULE BY MOUTH DAILY 90 capsule 3    VICTOZA 18 MG/3ML SOPN SC injection ADMINISTER 1.8 MG UNDER THE SKIN DAILY 9 mL 3    pregabalin (LYRICA) 300 MG capsule TAKE 1 CAPSULE BY MOUTH TWICE DAILY 180 capsule 1    Continuous Blood Gluc Sensor (FREESTYLE MICKI 14 DAY SENSOR) [facility] FreeStyle Micki 14 Day Sensor kit  APPLY 1 SENSOR EXTERNALLY EVERY 14 DAYS 14 each 3    insulin detemir (LEVEMIR) 100 UNIT/ML injection vial Inject 73 Units into the skin nightly (Patient taking differently: Inject 70 Units into the skin nightly ) 3 each 3    insulin lispro, 1 Unit Dial, 100 UNIT/ML SOPN INJECT 12 UNITS UNDER THE SKIN THREE TIMES DAILY WITH MEALS PLUS SLIDING SCALE( MAX OF 60 UNITS DAILY) 15 mL 3    pantoprazole (PROTONIX) 40 MG tablet Take 1 tablet by mouth 2 times daily (before meals) 180 tablet 3    Continuous Blood Gluc Sensor (FREESTYLE MICKI 14 DAY SENSOR) MISC APPLY 1 SENSOR EXTERNALLY EVERY 14 DAYS 2 each 3    albuterol sulfate  (90 Base) MCG/ACT inhaler Inhale 2 puffs into the lungs every 6 hours as needed for Wheezing or Shortness of Breath 8 g 11    venlafaxine (EFFEXOR XR) 37.5 MG extended release capsule TAKE 1 CAPSULE BY MOUTH ONCE DAILY ALONG WITH 150MG 90 capsule 3    Handicap Placard MISC by Does not apply route Duration: three years 1 each 0    nystatin (MYCOSTATIN) 008261 UNIT/ML suspension Take 5 mLs by mouth 4 times daily 473 mL 1    fluticasone (FLONASE) 50 MCG/ACT nasal spray 1 spray by Each Nostril route daily 2 Bottle 1    diclofenac sodium (VOLTAREN) 1 % GEL diclofenac 1 % topical gel      Continuous Blood Gluc  (FREESTYLE MICKI 14 DAY READER) PAOLA Check blood sugars daily 1 Device 0    Handicap Placard MISC by Does not apply route Duration: three years    Type II idabetes  Diabetic neuropathy 1 each 0    HYDROcodone-acetaminophen (NORCO) 5-325 MG per tablet Take 1 tablet by mouth every 8 hours as needed for Pain. .      traMADol (ULTRAM) 50 MG tablet Take 100 mg by mouth 3 times daily. OXYGEN Inhale 2 L into the lungs       Elastic Bandages & Supports (WRIST BRACE/RIGHT MEDIUM) MISC Wear brace on right wrist nightly 1 each 0    Cholecalciferol (VITAMIN D3) 2000 units CAPS Take by mouth daily      rOPINIRole (REQUIP) 1 MG tablet Take 1 mg by mouth nightly       acetaminophen 650 MG TABS Take 650 mg by mouth every 4 hours as needed for Pain (For mild pain level 1-3 or for fever > 100.5). 120 tablet      No current facility-administered medications for this visit. Les Gilberto ASHBY   Review of Systems   Constitutional:  Negative for appetite change, fever and unexpected weight change. HENT:  Positive for rhinorrhea. Negative for congestion, postnasal drip, sinus pressure, sinus pain and sneezing. Respiratory:  Positive for cough, chest tightness, shortness of breath and wheezing.          Denies hemoptysis Cardiovascular:  Negative for chest pain, palpitations and leg swelling. Genitourinary:  Negative for difficulty urinating. Allergic/Immunologic: Negative for environmental allergies. Physical exam   /76 (Site: Left Upper Arm, Position: Sitting, Cuff Size: Medium Adult)   Pulse 74   Temp 97.7 °F (36.5 °C) (Infrared)   Ht 5' 7\" (1.702 m)   Wt 264 lb (119.7 kg)   SpO2 98%   BMI 41.35 kg/m²    Wt Readings from Last 3 Encounters:   12/01/22 264 lb (119.7 kg)   11/10/22 270 lb (122.5 kg)   06/14/22 270 lb 3.2 oz (122.6 kg)       Physical Exam  Constitutional:       General: She is not in acute distress. Comments: BMI 41   HENT:      Head: Normocephalic and atraumatic. Right Ear: External ear normal.      Left Ear: External ear normal.      Mouth/Throat:      Mouth: Mucous membranes are moist.      Pharynx: No oropharyngeal exudate or posterior oropharyngeal erythema. Eyes:      General:         Right eye: No discharge. Left eye: No discharge. Cardiovascular:      Rate and Rhythm: Normal rate and regular rhythm. Pulmonary:      Effort: Pulmonary effort is normal. No respiratory distress. Breath sounds: No wheezing, rhonchi or rales. Comments: Diminished breath sounds  Chest:      Chest wall: No tenderness. Musculoskeletal:      Cervical back: Neck supple. Right lower leg: No edema. Left lower leg: No edema. Skin:     General: Skin is warm and dry. Neurological:      General: No focal deficit present. Mental Status: She is alert. Psychiatric:         Mood and Affect: Mood normal.         Behavior: Behavior normal.         Thought Content:  Thought content normal.        Results   Lung Nodule Screening     [x] Qualifies    [] Does not qualify   [] Declined    [] Completed  35 PY history   The USPSTF recommends annual screening for lung cancer with low-dose computed tomography (LDCT) in adults aged 48 to 80 years who have a 20 pack-year smoking history and currently smoke or have quit within the past 15 years. Screening should be discontinued once a person has not smoked for 15 years or develops a health problem that substantially limits life expectancy or the ability or willingness to have curative lung surgery. CT Lung Screen 9/30/22  Narrative   NONCONTRAST SCREENING CT CHEST:       HISTORY: Personal history of tobacco use. TECHNIQUE:    1 mm axial imaging of the chest and upper abdomen without IV contrast.        All CT scans at this facility use dose modulation, iterative reconstruction, and/or weight-based dosing when appropriate to reduce radiation dose to as low as reasonably achievable. COMPARISON: CT chest 9/29/2021. FINDINGS:   Heart/mediastinum: The thyroid gland is unremarkable. The heart size is normal. No pericardial effusion is observed. The aorta is not dilated. Calcified mediastinal and hilar lymph nodes suggesting prior granulomatous disease are unchanged. No    lymphadenopathy is visualized accounting for lack of IV contrast.       Lungs: No focal consolidation, pleural effusion, or pneumothorax is identified. A 4 mm right upper lobe pulmonary nodule is stable (series 3, image 151). Nodular linear scarring in the right lower lobe is stable (series 3, image 295). No new or    suspicious pulmonary mass or nodule is visualized. No focal consolidation, pleural effusion, or pneumothorax is observed. Upper abdomen: No acute findings are noted in the limited images through the upper abdomen. Musculoskeletal: The visualized skeletal structures appear intact. Impression   1. No new or suspicious pulmonary mass or nodule. Stable 4 mm right upper lobe pulmonary nodule. 2. No acute intrathoracic process. Chronic findings are noted. 3. LUNGRADS ASSESSMENT VALUE: 2       LUNG RADS: Category 2       RECOMMENDATION: Annual low-dose noncontrast CT thorax for lung cancer screening.            The LUNG RADS RECOMMENDATIONS for monitoring lung nodules listed below (ACR- Lung-RADS Version 1.0 Assessment Categories)       LUNG RADS RECOMMENDATIONS;   1.  Normal, continue annual screening   2. Benign appearance or behavior, continue annual screening   3.  6 month CT recommended   4A.  3 month CT recommended; may consider PET/CT   4B. Additional diagnostics and/or tissue sampling recommended   4X. Additional diagnostics and/or tissue sampling recommended         **This report has been created using voice recognition software. It may contain minor errors which are inherent in voice recognition technology. **       Final report electronically signed by Dr Angelina Lam on 9/30/2022 12:52 PM         Assessment      Diagnosis Orders   1. Pulmonary emphysema, unspecified emphysema type (HCC)  fluticasone-umeclidin-vilant (TRELEGY ELLIPTA) 100-62.5-25 MCG/ACT AEPB inhaler    HI VISIT TO DISCUSS LUNG CA SCREEN W LDCT    CT Lung Screen (Annual)    guaiFENesin (MUCINEX) 600 MG extended release tablet      2. Morbid obesity with BMI of 40.0-44.9, adult ([facility]        3. Cigarette nicotine dependence, uncomplicated  HI VISIT TO DISCUSS LUNG CA SCREEN W LDCT    CT Lung Screen (Annual)      4. Nocturnal hypoxemia due to emphysema ([facility]        5. Rhinorrhea  ipratropium (ATROVENT) 0.06 % nasal spray            Plan   1. Pulmonary emphysema, unspecified emphysema type ([facility]  -Patient's not currently well controlled. Will escalate therapy to Trelegy. Instructed patient on use and to rinse and spit after use. 2 samples given to patient today  -Start guaiFENesin (MUCINEX) 600 MG extended release tablet; Take 1 tablet by mouth 2 times daily    -Patient declines preventative vaccinations    2. Morbid obesity with body mass index (BMI) of 40 to 44.9 in adult [facility])  -Patient needs to lose weight    3.  Cigarette nicotine dependence, uncomplicated  -Discussed with patient smoking cessation techniques including patches and gum patient reports has used in the past and did not help, reinforced to patient the importance of quitting smoking to prevent further damage to lung function, patient verbalized understanding. (Approximately 3 mins)   Advised patient to quit and offered support. -ID VISIT TO DISCUSS LUNG CA SCREEN W LDCT  -Repeat CT Lung Screen (Annual) in 1 year    4. Nocturnal hypoxemia due to emphysema [facility])  -Advised patient to continue 2 lpm at nighttime. She reports good compliance     5. Rhinorrhea  -Start ipratropium (ATROVENT) 0.06 % nasal spray; 2 sprays by Each Nostril route 3 times daily as needed for Rhinitis      Advised patient to call office with any changes, questions, or concerns regarding respiratory status or issues with prescribed medications    Return in about 3 months (around 3/1/2023) for COPD med check (2nd appt in 1 year with LDCT prior). Electronically signed by ALLEN Correa CNP on 12/1/2022 at 10:19 AM     (Please note that portions of this note may have been completed with a voice recognition program. Efforts were made to edit the dictation but occasionally words are mis-transcribed)   Discussed with the patient the current USPSTF guidelines released March 9, 2021 for screening for lung cancer. For adults aged 48 to [de-identified] years who have a 20 pack-year smoking history and currently smoke or have quit within the past 15 years the grade B recommendation is to:  Screen for lung cancer with low-dose computed tomography (LDCT) every year. Stop screening once a person has not smoked for 15 years or has a health problem that limits life expectancy or the ability to have lung surgery. The patient  reports that she has been smoking cigarettes. She started smoking about 46 years ago. She has a 100.00 pack-year smoking history. She has never used smokeless tobacco.. Discussed with patient the risks and benefits of screening, including over-diagnosis, false positive rate, and total radiation exposure. The patient currently exhibits no signs or symptoms suggestive of lung cancer. Discussed with patient the importance of compliance with yearly annual lung cancer screenings and willingness to undergo diagnosis and treatment if screening scan is positive. In addition, the patient was counseled regarding the importance of remaining smoke free and/or total smoking cessation.     Also reviewed the following if the patient has Medicare that as of February 10, 2022, Medicare only covers LDCT screening in patients aged 51-72 with at least a 20 pack-year smoking history who currently smoke or have quit in the last 15 years

## 2022-12-01 ENCOUNTER — OFFICE VISIT (OUTPATIENT)
Dept: PULMONOLOGY | Age: 62
End: 2022-12-01
Payer: MEDICARE

## 2022-12-01 VITALS
WEIGHT: 264 LBS | OXYGEN SATURATION: 98 % | HEART RATE: 74 BPM | BODY MASS INDEX: 41.44 KG/M2 | TEMPERATURE: 97.7 F | DIASTOLIC BLOOD PRESSURE: 76 MMHG | HEIGHT: 67 IN | SYSTOLIC BLOOD PRESSURE: 124 MMHG

## 2022-12-01 DIAGNOSIS — J43.9 PULMONARY EMPHYSEMA, UNSPECIFIED EMPHYSEMA TYPE (HCC): Primary | ICD-10-CM

## 2022-12-01 DIAGNOSIS — J34.89 RHINORRHEA: ICD-10-CM

## 2022-12-01 DIAGNOSIS — E66.01 MORBID OBESITY WITH BMI OF 40.0-44.9, ADULT (HCC): ICD-10-CM

## 2022-12-01 DIAGNOSIS — G47.36 NOCTURNAL HYPOXEMIA DUE TO EMPHYSEMA (HCC): ICD-10-CM

## 2022-12-01 DIAGNOSIS — J43.9 NOCTURNAL HYPOXEMIA DUE TO EMPHYSEMA (HCC): ICD-10-CM

## 2022-12-01 DIAGNOSIS — F17.210 CIGARETTE NICOTINE DEPENDENCE, UNCOMPLICATED: ICD-10-CM

## 2022-12-01 PROCEDURE — G0296 VISIT TO DETERM LDCT ELIG: HCPCS

## 2022-12-01 PROCEDURE — 99406 BEHAV CHNG SMOKING 3-10 MIN: CPT

## 2022-12-01 PROCEDURE — 99214 OFFICE O/P EST MOD 30 MIN: CPT

## 2022-12-01 RX ORDER — IPRATROPIUM BROMIDE 42 UG/1
2 SPRAY, METERED NASAL 3 TIMES DAILY PRN
Qty: 15 ML | Refills: 3 | Status: SHIPPED | OUTPATIENT
Start: 2022-12-01

## 2022-12-01 RX ORDER — FLUTICASONE FUROATE, UMECLIDINIUM BROMIDE AND VILANTEROL TRIFENATATE 100; 62.5; 25 UG/1; UG/1; UG/1
1 POWDER RESPIRATORY (INHALATION) DAILY
Qty: 3 EACH | Refills: 3 | Status: SHIPPED | OUTPATIENT
Start: 2022-12-01

## 2022-12-01 RX ORDER — GUAIFENESIN 600 MG/1
600 TABLET, EXTENDED RELEASE ORAL 2 TIMES DAILY
Qty: 60 TABLET | Refills: 6 | Status: SHIPPED | OUTPATIENT
Start: 2022-12-01 | End: 2022-12-31

## 2022-12-01 ASSESSMENT — ENCOUNTER SYMPTOMS
RHINORRHEA: 1
SINUS PRESSURE: 0
COUGH: 1
WHEEZING: 1
SHORTNESS OF BREATH: 1
CHEST TIGHTNESS: 1
SINUS PAIN: 0

## 2022-12-01 NOTE — PATIENT INSTRUCTIONS
Stop Anoro. Start Trelegy 1 puff daily. Rinse mouth with water and spit after use. Continue your albuterol inhaler and nebulizer. You may use one or the other every 6 hours as needed for shortness of breath or wheezing. Do NOT use both at the same time as they continue the same medication. We will start Mucinex 1 (600 mg tablet) twice daily. If you pick this up over the counter, make sure to get plain Mucinex, not Mucinex DM. Start Atrovent nasal spray. I will see you back in 3 months. Learning About Lung Cancer Screening  What is screening for lung cancer? Lung cancer screening is a way to find some lung cancers early, before a person has any symptoms of the cancer. Lung cancer screening may help those who have the highest risk for lung cancer--people age 48 and older who are or were heavy smokers. For most people, who aren't at increased risk, screening for lung cancer probably isn't helpful. Screening won't prevent cancer. And it may not find all lung cancers. Lung cancer screening may lower the risk of dying from lung cancer in a small number of people. How is it done? Lung cancer screening is done with a low-dose CT (computed tomography) scan. A CT scan uses X-rays, or radiation, to make detailed pictures of your body. Experts recommend that screening be done in medical centers that focus on finding and treating lung cancer. Who is screening recommended for? Lung cancer screening is recommended for people age 48 and older who are or were heavy smokers. That means people with a smoking history of at least 20 pack years. A pack year is a way to measure how heavy a smoker you are or were. To figure out your pack years, multiply how many packs a day on average (assuming 20 cigarettes per pack) you have smoked by how many years you have smoked. For example: If you smoked 1 pack a day for 20 years, that's 1 times 20. So you have a smoking history of 20 pack years.   If you smoked 2 packs a day for 10 years, that's 2 times 10. So you have a smoking history of 20 pack years. Experts agree that screening is for people who have a high risk of lung cancer. But experts don't agree on what high risk means. Some say people age 48 or older with at least a 20-pack-year smoking history are high risk. Others say it's people age 54 or older with a 30-pack-year history. To see if you could benefit from screening, first find out if you are at high risk for lung cancer. Your doctor can help you decide your lung cancer risk. What are the risks of screening? CT screening for lung cancer isn't perfect. It can show an abnormal result when it turns out there wasn't any cancer. This is called a false-positive result. This means you may need more tests to make sure you don't have cancer. These tests can be harmful and cause a lot of worry. These tests may include more CT scans and invasive testing like a lung biopsy. In a biopsy, the doctor takes a sample of tissue from inside your lung so it can be looked at under a microscope. A biopsy is the only way to tell if you have lung cancer. If the biopsy finds cancer, you and your doctor will have to decide how or whether to treat it. Some lung cancers found on CT scans are harmless and would not have caused a problem if they had not been found through screening. But because doctors can't tell which ones will turn out to be harmless, most will be treated. This means that you may get treatment--including surgery, radiation, or chemotherapy--that you don't need. There is a risk of damage to cells or tissue from being exposed to radiation, including the small amounts used in CTs, X-rays, and other medical tests. Over time, exposure to radiation may cause cancer and other health problems. But in most cases, the risk of getting cancer from being exposed to small amounts of radiation is low. It's not a reason to avoid these tests for most people.   What are the benefits of screening? Your scan may be normal (negative). For some people who are at higher risk, screening lowers the chance of dying of lung cancer. How much and how long you smoked helps to determine your risk level. Screening can find some cancers early, when treatment may be more likely to work. What happens after screening? The results of your CT scan will be sent to your doctor. Someone from your care team will explain the results of your scan and answer any questions you may have. If you need any follow-up, he or she will help you understand what to do next. After a lung cancer screening, you can go back to your usual activities right away. A lung cancer screening test can't tell if you have lung cancer. If your results are positive, your doctor can't tell whether an abnormal finding is a harmless nodule, cancer, or something else without doing more tests. What can you do to help prevent lung cancer? Some lung cancers can't be prevented. But if you smoke, quitting smoking is the best step you can take to prevent lung cancer. If you want to quit, your doctor can recommend medicines or other ways to help. Follow-up care is a key part of your treatment and safety. Be sure to make and go to all appointments, and call your doctor if you are having problems. It's also a good idea to know your test results and keep a list of the medicines you take. Where can you learn more? Go to https://[employer] org and sign in to your [employer] account. Enter F951 in the Liqu[employer] box to learn more about \"Learning About Lung Cancer Screening. \"     If you do not have an account, please click on the \"Sign Up Now\" link. Current as of: May 4, 2022               Content Version: 13.4  © 5109-3500 [employer], Incorporated. Care instructions adapted under license by [facility] ([facility]).  If you have questions about a medical condition or this instruction, always ask your healthcare professional. [employer] disclaims any warranty or liability for your use of this information.

## 2022-12-07 DIAGNOSIS — F33.42 RECURRENT MAJOR DEPRESSIVE DISORDER, IN FULL REMISSION (HCC): ICD-10-CM

## 2022-12-07 RX ORDER — VENLAFAXINE HYDROCHLORIDE 37.5 MG/1
CAPSULE, EXTENDED RELEASE ORAL
Qty: 90 CAPSULE | Refills: 3 | Status: SHIPPED | OUTPATIENT
Start: 2022-12-07

## 2022-12-07 NOTE — TELEPHONE ENCOUNTER
Recent Visits  Date Type Provider Dept   11/10/22 Office Visit Minus Gearing, DO [facility]   06/14/22 Office Visit Ludmila Eric, APRN - CNP [facility]   03/14/22 Office Visit Ludmila Eric, APRN - CNP [facility]   12/13/21 Office Visit Trzuleima Eric, APRN - CNP [facility]   09/21/21 Office Visit Ludmila Eric, APRN - CNP [facility]   09/13/21 Office Visit Trzuleima Eric, APRN - CNP [facility]   08/24/21 Office Visit Trzuleima Eric, APRN - CNP [facility]   07/12/21 Office Visit Trzuleima Eric, APRN - CNP [facility]   Showing recent visits within past 540 days with a meds authorizing provider and meeting all other requirements  Future Appointments  Date Type Provider Dept   12/14/22 Appointment Ludmila Eric, ALLEN - CNP [facility]   Showing future appointments within next 150 days with a meds authorizing provider and meeting all other requirements  Future Appointments   Date Time Provider Scarlett Ibarra   12/14/2022  8:00 AM Ludmila Jeaneth, APRN - [address] [address] [address] [facility]   3/21/2023 10:30 AM ALLEN Jimenez CNP [facility]   12/4/2023 10:00 PM STR CT IMAGING RM1 STRZ CT SCAN STR Radiolog   12/18/2023  8:30 AM ALLEN Jimenez [address]

## 2022-12-14 ENCOUNTER — OFFICE VISIT (OUTPATIENT)
Dept: FAMILY MEDICINE CLINIC | Age: 62
End: 2022-12-14
Payer: MEDICARE

## 2022-12-14 ENCOUNTER — TELEPHONE (OUTPATIENT)
Dept: FAMILY MEDICINE CLINIC | Age: 62
End: 2022-12-14

## 2022-12-14 VITALS
BODY MASS INDEX: 41.97 KG/M2 | RESPIRATION RATE: 16 BRPM | SYSTOLIC BLOOD PRESSURE: 124 MMHG | HEART RATE: 66 BPM | WEIGHT: 267.4 LBS | DIASTOLIC BLOOD PRESSURE: 78 MMHG | OXYGEN SATURATION: 95 % | TEMPERATURE: 97.8 F | HEIGHT: 67 IN

## 2022-12-14 DIAGNOSIS — M79.7 FIBROMYALGIA: ICD-10-CM

## 2022-12-14 DIAGNOSIS — Z51.81 MEDICATION MONITORING ENCOUNTER: ICD-10-CM

## 2022-12-14 DIAGNOSIS — Z79.4 TYPE 2 DIABETES MELLITUS WITH OTHER CIRCULATORY COMPLICATION, WITH LONG-TERM CURRENT USE OF INSULIN (HCC): Primary | ICD-10-CM

## 2022-12-14 DIAGNOSIS — I73.9 PAD (PERIPHERAL ARTERY DISEASE) (HCC): ICD-10-CM

## 2022-12-14 DIAGNOSIS — E11.42 DIABETIC POLYNEUROPATHY ASSOCIATED WITH TYPE 2 DIABETES MELLITUS (HCC): ICD-10-CM

## 2022-12-14 DIAGNOSIS — M79.605 PAIN IN BOTH LOWER EXTREMITIES: ICD-10-CM

## 2022-12-14 DIAGNOSIS — Z86.79 HISTORY OF INTERMITTENT CLAUDICATION: ICD-10-CM

## 2022-12-14 DIAGNOSIS — E11.59 TYPE 2 DIABETES MELLITUS WITH OTHER CIRCULATORY COMPLICATION, WITH LONG-TERM CURRENT USE OF INSULIN (HCC): Primary | ICD-10-CM

## 2022-12-14 DIAGNOSIS — M79.604 PAIN IN BOTH LOWER EXTREMITIES: ICD-10-CM

## 2022-12-14 LAB
AMPHETAMINE+METHAMPHETAMINE URINE SCREEN: NEGATIVE
BARBITURATE QUANTITATIVE URINE: NEGATIVE
BENZODIAZEPINE QUANTITATIVE URINE: NEGATIVE
CANNABINOID QUANTITATIVE URINE: POSITIVE
COCAINE METABOLITE QUANTITATIVE URINE: NEGATIVE
FENTANYL: NEGATIVE
HBA1C MFR BLD: 7.3 % (ref 4.3–5.7)
MICROALB/CREAT RATIO POC: < 30 MG/G
MICROALBUMIN/CREAT UR-RTO: 30 MG/L
OPIATES, URINE: NEGATIVE
OXYCODONE: NEGATIVE
PHENCYCLIDINE QUANTITATIVE URINE: NEGATIVE
POC CREATININE: 200 MG/DL

## 2022-12-14 PROCEDURE — 3051F HG A1C>EQUAL 7.0%<8.0%: CPT | Performed by: NURSE PRACTITIONER

## 2022-12-14 PROCEDURE — 2028F FOOT EXAM PERFORMED: CPT | Performed by: NURSE PRACTITIONER

## 2022-12-14 PROCEDURE — 99214 OFFICE O/P EST MOD 30 MIN: CPT | Performed by: NURSE PRACTITIONER

## 2022-12-14 RX ORDER — FLUTICASONE PROPIONATE 50 MCG
1 SPRAY, SUSPENSION (ML) NASAL DAILY
Qty: 2 EACH | Refills: 1 | Status: SHIPPED | OUTPATIENT
Start: 2022-12-14

## 2022-12-14 RX ORDER — INSULIN LISPRO 100 [IU]/ML
INJECTION, SOLUTION INTRAVENOUS; SUBCUTANEOUS
Qty: 15 ML | Refills: 3 | Status: SHIPPED | OUTPATIENT
Start: 2022-12-14

## 2022-12-14 RX ORDER — PREGABALIN 300 MG/1
CAPSULE ORAL
Qty: 180 CAPSULE | Refills: 1 | Status: SHIPPED | OUTPATIENT
Start: 2022-12-14 | End: 2023-06-15

## 2022-12-14 RX ORDER — INSULIN DETEMIR 100 [IU]/ML
73 INJECTION, SOLUTION SUBCUTANEOUS NIGHTLY
Qty: 5 EACH | Refills: 3 | Status: SHIPPED | OUTPATIENT
Start: 2022-12-14

## 2022-12-14 ASSESSMENT — PATIENT HEALTH QUESTIONNAIRE - PHQ9
SUM OF ALL RESPONSES TO PHQ QUESTIONS 1-9: 1
SUM OF ALL RESPONSES TO PHQ QUESTIONS 1-9: 1
SUM OF ALL RESPONSES TO PHQ9 QUESTIONS 1 & 2: 1
1. LITTLE INTEREST OR PLEASURE IN DOING THINGS: 0
DEPRESSION UNABLE TO ASSESS: FUNCTIONAL CAPACITY MOTIVATION LIMITS ACCURACY
SUM OF ALL RESPONSES TO PHQ QUESTIONS 1-9: 1
2. FEELING DOWN, DEPRESSED OR HOPELESS: 1
SUM OF ALL RESPONSES TO PHQ QUESTIONS 1-9: 1

## 2022-12-14 NOTE — TELEPHONE ENCOUNTER
VL SIL  12/16/2022   Arrive at 9:45 Am starts 10:00 Am  2k second floor   No prep       Left message on answering machine requesting pt to call back at earliest convenience.

## 2022-12-14 NOTE — PROGRESS NOTES
Sadiq Metcalf is a 60 y.o. female for Diabetes (6 month follow up. Wants to discuss weight loss) and Health Maintenance (No recent eye exam )      Diabetes Type 2    Glucose control:   Does patient check blood glucoses at home? Yes  Report of hypoglycemia: no  Lab Results   Component Value Date    LABA1C 7.3 (H) 12/14/2022     No results found for: EAG    Symptoms  Polyuria, Polydipsia or Polyphagia? No  Chest Pain, SOB, or Palpitations? -  No  New Vision complaints? No  Paresthesias of the extremities? Yes - intermittent N/T of LE    Medications  Current medication were reviewed. Compliant with medications? yes  Medication side effects? No  On ACE-I or ARB? No  On antiplatelet therapy? Yes  On Statin? Yes    Last Diabetic Eye Exam: due for eye exam     Exercise  Exercise? No  Wt Readings from Last 3 Encounters:   12/14/22 267 lb 6.4 oz (121.3 kg)   12/01/22 264 lb (119.7 kg)   11/10/22 270 lb (122.5 kg)       Diet discipline?:  Low salt, fat, sugar diet? Yes    A1C has improved from 7.7 to 7.3,     Depressed Mood    HPI:    Depressed Mood? yes - but better since her last visit feels happier, working likes her job better   Anhedonia?  no  Appetite changes?  no  Sleep disturbances? no  Feelings of guilt? no  Decreased energy? no  Impaired concentration? no  Substance abuse? no    Suicidal/Homicidal Ideation? no      Compliant with meds: Yes  Med side effects: no   Sees therapist?:  no  Family History of Mental Illness? yes -     Review of Systems - Psychological ROS: positive for - anxiety and depression, negative for - sleep disturbances or suicidal ideation     Pt states her feet and legs hurt and tingle and burn on lyrica for this and also sees PM on norco daily for pain. Will r/o IC due to her history of PAD,     Also taking lyrica for fibromyalgia and it is working well for this.    Pt would like to loose weight ,  TSH normal will trial contrave   Lab Results   Component Value Date    TSH 2.170 11/01/2021    T4FREE 1.07 11/01/2021        Lab Results   Component Value Date    WBC 7.4 08/30/2021    HGB 16.1 (H) 08/30/2021    HCT 45.7 (H) 08/30/2021    MCV 88.6 08/30/2021     (L) 08/30/2021        Lab Results   Component Value Date/Time     08/30/2021 06:25 PM    K 3.8 08/30/2021 06:25 PM    K 3.9 01/29/2020 04:25 AM     08/30/2021 06:25 PM    CO2 22 08/30/2021 06:25 PM    BUN 16 08/30/2021 06:25 PM    CREATININE 200 12/14/2022 09:15 AM    CREATININE 0.81 08/30/2021 06:25 PM    GLUCOSE 167 08/30/2021 06:25 PM    CALCIUM 8.90 08/30/2021 06:25 PM         Lab Results   Component Value Date    CHOL 159 07/13/2021    CHOL 155 01/29/2020    CHOL 170 11/09/2018     Lab Results   Component Value Date    TRIG 168 07/13/2021    TRIG 266 (H) 01/29/2020    TRIG 139 11/09/2018     Lab Results   Component Value Date    HDL 33 07/13/2021    HDL 28 01/29/2020    HDL 40 11/09/2018     Lab Results   Component Value Date    LDLCALC 92 07/13/2021    LDLCALC 74 01/29/2020    LDLCALC 102 11/09/2018     No results found for: LABVLDL, VLDL  No results found for: CHOLHDLRATIO   Blood pressure control:  BP Readings from Last 3 Encounters:   12/14/22 124/78   12/01/22 124/76   11/10/22 118/68       Lab Results   Component Value Date    LABMICR 2.14 02/23/2021       Lab Results   Component Value Date    LDLCALC 92 07/13/2021       Physical Exam    /78   Pulse 66   Temp 97.8 °F (36.6 °C)   Resp 16   Ht 5' 7\" (1.702 m)   Wt 267 lb 6.4 oz (121.3 kg)   SpO2 95%   BMI 41.88 kg/m²   Appearance: alert, well appearing, and in no distress, over  appearing weight and well hydrated. Neck exam - supple, no significant adenopathy. CVS exam: normal rate, regular rhythm, normal S1, S2, no murmurs, rubs, clicks or gallops. Lungs: clear to auscultation, no wheezes, rales or rhonchi, symmetric air entry.   Abdomen:  BS normal, soft, non tender, non distended, no rebound or guarding  Mental Status: normal mood, affect behavior, speech, dress,  and thought processes. Neuro:  Alert, muscle tone grossly normal  Skin exam: normal coloration and turgor, no rashes, no suspicious skin lesions noted. Foot exam:  No pedal edema, no erythematous lesions noted b/l, sensation decreased BLE, PT and TP pulses wnl b/l no open lesions     ASSESSMENT & PLAN  Gene Christopher was seen today for diabetes and health maintenance. Diagnoses and all orders for this visit:    Type 2 diabetes mellitus with other circulatory complication, with long-term current use of insulin ([facility])  Improved   -      DIABETES FOOT EXAM  -     insulin lispro, 1 Unit Dial, (HUMALOG/ADMELOG) 100 UNIT/ML SOPN; INJECT 12 UNITS UNDER THE SKIN THREE TIMES DAILY WITH MEALS PLUS SLIDING SCALE( MAX OF 60 UNITS DAILY)  -     insulin detemir (LEVEMIR) 100 UNIT/ML injection vial; Inject 73 Units into the skin nightly    Fibromyalgia  controlled  -     pregabalin (LYRICA) 300 MG capsule; 1 tab bid  -     Urine Drug Screen, Comprehensive; Future    PAD (peripheral artery disease) ([facility])  R/o IC   -     NON-INVASIVE PHYSIOLOGIC STUDY, BILAT  EXTREMITY ARTERIES, >= 3 LEVELS; Future    History of intermittent claudication  -     NON-INVASIVE PHYSIOLOGIC STUDY, BILAT  EXTREMITY ARTERIES, >= 3 LEVELS; Future    Pain in both lower extremities  R/o IC  -     NON-INVASIVE PHYSIOLOGIC STUDY, BILAT  EXTREMITY ARTERIES, >= 3 LEVELS; Future    BMI 40.0-44.9, adult ([facility])  1500 calorie a day diet   -     naltrexone-buPROPion (CONTRAVE) 8-90 MG per extended release tablet; Take 1 tablet bid    Medication monitoring encounter  -     Urine Drug Screen, Comprehensive;  Future    Other orders  -     POCT glycosylated hemoglobin (Hb A1C)  -     fluticasone (FLONASE) 50 MCG/ACT nasal spray; 1 spray by Each Nostril route daily  -     POCT microalbumin    Diabetic Polyneuropathy  Lyrica   Controlled substances monitoring: possible medication side effects, risk of tolerance and/or dependence, and alternative treatments discussed, no signs of potential drug abuse or diversion identified, and [place] report reviewed today- activity consistent with treatment plan. Return in about 6 months (around 6/14/2023) for A! C and check up.

## 2022-12-16 ENCOUNTER — HOSPITAL ENCOUNTER (OUTPATIENT)
Dept: INTERVENTIONAL RADIOLOGY/VASCULAR | Age: 62
Discharge: HOME | End: 2022-12-16
Payer: MEDICARE

## 2022-12-16 DIAGNOSIS — Z86.79 HISTORY OF INTERMITTENT CLAUDICATION: ICD-10-CM

## 2022-12-16 DIAGNOSIS — I73.9 PAD (PERIPHERAL ARTERY DISEASE) (HCC): ICD-10-CM

## 2022-12-16 PROCEDURE — 93922 UPR/L XTREMITY ART 2 LEVELS: CPT

## 2022-12-19 ENCOUNTER — TELEPHONE (OUTPATIENT)
Dept: FAMILY MEDICINE CLINIC | Age: 62
End: 2022-12-19

## 2022-12-19 DIAGNOSIS — I70.209: ICD-10-CM

## 2022-12-19 DIAGNOSIS — I73.89 OTHER SPECIFIED PERIPHERAL VASCULAR DISEASES (HCC): ICD-10-CM

## 2022-12-19 DIAGNOSIS — M79.604 RIGHT LEG PAIN: Primary | ICD-10-CM

## 2022-12-19 NOTE — TELEPHONE ENCOUNTER
Patient informed, verbally understood. Pt would like the test to be scheduled on a Tuesday or Friday.

## 2022-12-19 NOTE — TELEPHONE ENCOUNTER
----- Message from ALLEN Bustamante - CNP sent at 12/19/2022  9:23 AM EST -----  Let pt know one of the arteries on her right leg is totally occluded, meaning the blood flow is not getting through , this could cause some pain. Is her right leg pain worse than left? I do recommend following through with an additional test for this leg. To further visualize the flow and see what the occlusion is coming from. The left leg is normal, Let me know if she has any questions.

## 2022-12-23 DIAGNOSIS — E11.59 TYPE 2 DIABETES MELLITUS WITH OTHER CIRCULATORY COMPLICATION, WITH LONG-TERM CURRENT USE OF INSULIN (HCC): ICD-10-CM

## 2022-12-23 DIAGNOSIS — Z79.4 TYPE 2 DIABETES MELLITUS WITH OTHER CIRCULATORY COMPLICATION, WITH LONG-TERM CURRENT USE OF INSULIN (HCC): ICD-10-CM

## 2022-12-23 RX ORDER — LIRAGLUTIDE 6 MG/ML
INJECTION SUBCUTANEOUS
Qty: 18 ML | Refills: 3 | Status: SHIPPED | OUTPATIENT
Start: 2022-12-23

## 2022-12-23 NOTE — TELEPHONE ENCOUNTER
Recent Visits  Date Type Provider Dept   12/14/22 Office Visit Paige Cheema, APRN - CNP [facility]   11/10/22 Office Visit Rena Mayfield DO [facility]   06/14/22 Office Visit Paige Cheema, APRN - CNP [facility]   03/14/22 Office Visit Paige Cheema, APRN - CNP [facility]   12/13/21 Office Visit Paige Cheema, APRN - CNP [facility]   09/21/21 Office Visit Paige Cheema, APRN - CNP [facility]   09/13/21 Office Visit Paige Cheema, APRN - CNP [facility]   08/24/21 Office Visit Paige Cheema, APRN - CNP [facility]   07/12/21 Office Visit Paige Cheema, APRN - CNP [facility]   Showing recent visits within past 540 days with a meds authorizing provider and meeting all other requirements  Future Appointments  Date Type Provider Dept   01/17/23 Appointment Paige Cheema APRN - CNP [facility]   Showing future appointments within next 150 days with a meds authorizing provider and meeting all other requirements    Future Appointments   Date Time Provider Scarlett Ibarra   12/23/2022 10:30 AM STR VASCULAR LAB ROOM 1 [facility] VAS LAB STR Radiolog   1/17/2023  8:00 AM Paige Cheema APRN - CNP [facility] [place]   3/21/2023 10:30 AM ALLEN Puri - CNP [facility] [place]   6/13/2023  8:00 AM Paige Cheema APRN - CNP [facility] [place]   12/4/2023 10:00 PM STR CT IMAGING RM1 STRZ CT SCAN STR Radiolog   12/18/2023  8:30 AM Pamela Quick APRN -  [address] [place] [address]

## 2022-12-30 ENCOUNTER — HOSPITAL ENCOUNTER (OUTPATIENT)
Dept: INTERVENTIONAL RADIOLOGY/VASCULAR | Age: 62
Discharge: HOME | End: 2022-12-30
Payer: MEDICARE

## 2022-12-30 DIAGNOSIS — M79.604 RIGHT LEG PAIN: ICD-10-CM

## 2022-12-30 DIAGNOSIS — I70.209: ICD-10-CM

## 2022-12-30 DIAGNOSIS — I73.89 OTHER SPECIFIED PERIPHERAL VASCULAR DISEASES (HCC): ICD-10-CM

## 2022-12-30 PROCEDURE — 93925 LOWER EXTREMITY STUDY: CPT

## 2023-01-03 ENCOUNTER — TELEPHONE (OUTPATIENT)
Dept: FAMILY MEDICINE CLINIC | Age: 63
End: 2023-01-03

## 2023-01-03 DIAGNOSIS — M79.604 RIGHT LEG PAIN: Primary | ICD-10-CM

## 2023-01-03 NOTE — TELEPHONE ENCOUNTER
Please contact Interventional Radiology at [phone] to schedule an aortofemoral angiogram for intermittent claudication of right leg with abnormal SIL's, Please get an order number if an order needs placed for this thanks

## 2023-01-03 NOTE — TELEPHONE ENCOUNTER
Pt states that she can not walk 2 blocks, she is luck yif she can walk 1/2 a block. The pain goes away after she sits for a while with her legs up. Pt is willing to do angiogram if needed.

## 2023-01-03 NOTE — TELEPHONE ENCOUNTER
----- Message from ALLEN Johnston CNP sent at 1/2/2023 10:16 AM EST -----  Let pt know the results of the left leg study were normal, no evidence of occlusion causing leg pain. The right side identifies mild stenosis of the lower right saphenous popliteal area (the vessel that delivers blood to the lower leg), so the stenosis is mild , we could move forward with an additional test such as angiogram depending on how severe her leg symptoms are, but in light of the fact that she is also being treated for lumbar pain. Through PM, I suspect her RLE pain is not coming from claudication (or lack of blood flow)due to the fact that the test identifies the results as  mild . Please ask the pt how significant the pain in her RLE is, does it occur after walking 2 blocks and then go away immediately with rest? If so that would identify pain coming form the stenosis and we could consider the angiogram. If RLE pain with rest and it stays even after sitting for a few minutes likely not from a lack of blood flow.

## 2023-01-04 NOTE — TELEPHONE ENCOUNTER
Spoke with Kell Trevizo @ [facility]  She needs an order faxed to [phone]    The order will be for a IR angiogram lower extremity with right leg and dx on order

## 2023-01-06 ENCOUNTER — TELEPHONE (OUTPATIENT)
Dept: FAMILY MEDICINE CLINIC | Age: 63
End: 2023-01-06

## 2023-01-06 DIAGNOSIS — E11.59 TYPE 2 DIABETES MELLITUS WITH OTHER CIRCULATORY COMPLICATION, WITH LONG-TERM CURRENT USE OF INSULIN (HCC): ICD-10-CM

## 2023-01-06 DIAGNOSIS — Z79.4 TYPE 2 DIABETES MELLITUS WITH OTHER CIRCULATORY COMPLICATION, WITH LONG-TERM CURRENT USE OF INSULIN (HCC): ICD-10-CM

## 2023-01-06 RX ORDER — INSULIN DETEMIR 100 [IU]/ML
73 INJECTION, SOLUTION SUBCUTANEOUS NIGHTLY
Qty: 8 ADJUSTABLE DOSE PRE-FILLED PEN SYRINGE | Refills: 5 | Status: SHIPPED | OUTPATIENT
Start: 2023-01-06

## 2023-01-06 RX ORDER — INSULIN DETEMIR 100 [IU]/ML
INJECTION, SOLUTION SUBCUTANEOUS
Qty: 21 ML | OUTPATIENT
Start: 2023-01-06

## 2023-01-06 NOTE — TELEPHONE ENCOUNTER
Pt called and stated \" I am out of my levemir and I have been out for a week. I only get 4 vials in a box. They will not let me refill it until 2/3/23\"     Ina winston Romney stated \" I just got off the phone a little bit ago with the patient and she does not want the vials but is requesting the pens. \"    Would like a prescription for levemir pens sent to [facility]. I asked pharmacist if they will approve the patient for refills if the pens get sent in and she stated that she is unsure if insurance will approve the refill or not.  If it does not she will call the insurance company

## 2023-01-13 ENCOUNTER — TELEPHONE (OUTPATIENT)
Dept: FAMILY MEDICINE CLINIC | Age: 63
End: 2023-01-13

## 2023-01-13 DIAGNOSIS — I70.221 ATHEROSCLEROSIS OF NATIVE ARTERY OF RIGHT LOWER EXTREMITY WITH REST PAIN (HCC): Primary | ICD-10-CM

## 2023-01-13 NOTE — TELEPHONE ENCOUNTER
Order updated for angiogram please contact the department to let them now the order has been updated so they follow through with the test on Tuesday.  The number to contact is in this previous message thanks

## 2023-01-13 NOTE — TELEPHONE ENCOUNTER
Patrizia Garcia from [facility] scheduling called stating that patient is scheduled for an angiogram this coming Tuesday and insurance won't cover it with the currrent diagnosis  code. She states if the DX code is changed to  [employer] will cover it. If DX is changed Patrizia Garcia would like for office to call her back to let her know.

## 2023-01-17 ENCOUNTER — TELEPHONE (OUTPATIENT)
Dept: FAMILY MEDICINE CLINIC | Age: 63
End: 2023-01-17

## 2023-01-17 ENCOUNTER — PREP FOR PROCEDURE (OUTPATIENT)
Dept: CARDIOLOGY | Age: 63
End: 2023-01-17

## 2023-01-17 ENCOUNTER — OFFICE VISIT (OUTPATIENT)
Dept: FAMILY MEDICINE CLINIC | Age: 63
End: 2023-01-17
Payer: MEDICARE

## 2023-01-17 ENCOUNTER — HOSPITAL ENCOUNTER (OUTPATIENT)
Dept: INTERVENTIONAL RADIOLOGY/VASCULAR | Age: 63
Discharge: HOME | End: 2023-01-17
Attending: RADIOLOGY | Admitting: RADIOLOGY
Payer: MEDICARE

## 2023-01-17 VITALS
DIASTOLIC BLOOD PRESSURE: 85 MMHG | OXYGEN SATURATION: 95 % | WEIGHT: 263 LBS | SYSTOLIC BLOOD PRESSURE: 162 MMHG | RESPIRATION RATE: 10 BRPM | HEIGHT: 67 IN | HEART RATE: 68 BPM | BODY MASS INDEX: 41.28 KG/M2 | TEMPERATURE: 98 F

## 2023-01-17 VITALS
SYSTOLIC BLOOD PRESSURE: 128 MMHG | WEIGHT: 263.8 LBS | DIASTOLIC BLOOD PRESSURE: 76 MMHG | HEIGHT: 67 IN | RESPIRATION RATE: 16 BRPM | OXYGEN SATURATION: 92 % | BODY MASS INDEX: 41.4 KG/M2 | HEART RATE: 61 BPM | TEMPERATURE: 98.1 F

## 2023-01-17 DIAGNOSIS — E66.01 OBESITY, CLASS III, BMI 40-49.9 (MORBID OBESITY) (HCC): ICD-10-CM

## 2023-01-17 DIAGNOSIS — Z13.29 SCREENING FOR THYROID DISORDER: ICD-10-CM

## 2023-01-17 DIAGNOSIS — Z13.31 DEPRESSION SCREENING NEGATIVE: ICD-10-CM

## 2023-01-17 DIAGNOSIS — I70.221 ATHEROSCLEROSIS OF NATIVE ARTERY OF RIGHT LOWER EXTREMITY WITH REST PAIN (HCC): ICD-10-CM

## 2023-01-17 DIAGNOSIS — E11.42 DIABETIC POLYNEUROPATHY ASSOCIATED WITH TYPE 2 DIABETES MELLITUS (HCC): ICD-10-CM

## 2023-01-17 DIAGNOSIS — G47.36 NOCTURNAL HYPOXEMIA DUE TO EMPHYSEMA (HCC): ICD-10-CM

## 2023-01-17 DIAGNOSIS — Z71.89 ACP (ADVANCE CARE PLANNING): ICD-10-CM

## 2023-01-17 DIAGNOSIS — F32.4 MAJOR DEPRESSIVE DISORDER IN PARTIAL REMISSION, UNSPECIFIED WHETHER RECURRENT (HCC): ICD-10-CM

## 2023-01-17 DIAGNOSIS — J43.9 NOCTURNAL HYPOXEMIA DUE TO EMPHYSEMA (HCC): ICD-10-CM

## 2023-01-17 DIAGNOSIS — Z13.6 SCREENING FOR CARDIOVASCULAR CONDITION: ICD-10-CM

## 2023-01-17 DIAGNOSIS — Z00.00 MEDICARE ANNUAL WELLNESS VISIT, SUBSEQUENT: Primary | ICD-10-CM

## 2023-01-17 DIAGNOSIS — I73.9 PAD (PERIPHERAL ARTERY DISEASE) (HCC): ICD-10-CM

## 2023-01-17 LAB
APTT: 36.4 SECONDS (ref 22–38)
CREAT SERPL-MCNC: 0.7 MG/DL (ref 0.4–1.2)
ERYTHROCYTE [DISTWIDTH] IN BLOOD BY AUTOMATED COUNT: 14 % (ref 11.5–14.5)
ERYTHROCYTE [DISTWIDTH] IN BLOOD BY AUTOMATED COUNT: 46.7 FL (ref 35–45)
GFR SERPL CREATININE-BSD FRML MDRD: > 60 ML/MIN/1.73M2
HBA1C MFR BLD: 7.3 % (ref 4.3–5.7)
HCT VFR BLD CALC: 43.4 % (ref 37–47)
HEMOGLOBIN: 14.9 GM/DL (ref 12–16)
INR BLD: 0.99 (ref 0.85–1.13)
MCH RBC QN AUTO: 31.4 PG (ref 26–33)
MCHC RBC AUTO-ENTMCNC: 34.3 GM/DL (ref 32.2–35.5)
MCV RBC AUTO: 91.4 FL (ref 81–99)
MICROALB/CREAT RATIO POC: < 30 MG/G
MICROALBUMIN/CREAT UR-RTO: 30 MG/L
PLATELET # BLD: 224 THOU/MM3 (ref 130–400)
PMV BLD AUTO: 11.5 FL (ref 9.4–12.4)
POC CREATININE: 200 MG/DL
RBC # BLD: 4.75 MILL/MM3 (ref 4.2–5.4)
WBC # BLD: 10.5 THOU/MM3 (ref 4.8–10.8)

## 2023-01-17 PROCEDURE — 2580000003 HC RX 258: Performed by: RADIOLOGY

## 2023-01-17 PROCEDURE — 75710 ARTERY X-RAYS ARM/LEG: CPT

## 2023-01-17 PROCEDURE — 6360000002 HC RX W HCPCS: Performed by: RADIOLOGY

## 2023-01-17 PROCEDURE — 85610 PROTHROMBIN TIME: CPT

## 2023-01-17 PROCEDURE — 82565 ASSAY OF CREATININE: CPT

## 2023-01-17 PROCEDURE — 85027 COMPLETE CBC AUTOMATED: CPT

## 2023-01-17 PROCEDURE — 37224 HC PLASTY UNI FEMPOP: CPT

## 2023-01-17 PROCEDURE — 36415 COLL VENOUS BLD VENIPUNCTURE: CPT

## 2023-01-17 PROCEDURE — G0439 PPPS, SUBSEQ VISIT: HCPCS | Performed by: NURSE PRACTITIONER

## 2023-01-17 PROCEDURE — 3051F HG A1C>EQUAL 7.0%<8.0%: CPT | Performed by: NURSE PRACTITIONER

## 2023-01-17 PROCEDURE — 6360000004 HC RX CONTRAST MEDICATION: Performed by: RADIOLOGY

## 2023-01-17 PROCEDURE — 99214 OFFICE O/P EST MOD 30 MIN: CPT | Performed by: NURSE PRACTITIONER

## 2023-01-17 PROCEDURE — G0446 INTENS BEHAVE THER CARDIO DX: HCPCS | Performed by: NURSE PRACTITIONER

## 2023-01-17 PROCEDURE — C1889 IMPLANT/INSERT DEVICE, NOC: HCPCS

## 2023-01-17 PROCEDURE — 85730 THROMBOPLASTIN TIME PARTIAL: CPT

## 2023-01-17 RX ORDER — FENTANYL CITRATE 50 UG/ML
INJECTION, SOLUTION INTRAMUSCULAR; INTRAVENOUS
Status: COMPLETED | OUTPATIENT
Start: 2023-01-17 | End: 2023-01-17

## 2023-01-17 RX ORDER — SODIUM CHLORIDE 450 MG/100ML
INJECTION, SOLUTION INTRAVENOUS CONTINUOUS
Status: DISCONTINUED | OUTPATIENT
Start: 2023-01-17 | End: 2023-01-17 | Stop reason: HOSPADM

## 2023-01-17 RX ORDER — MIDAZOLAM HYDROCHLORIDE 1 MG/ML
INJECTION INTRAMUSCULAR; INTRAVENOUS
Status: COMPLETED | OUTPATIENT
Start: 2023-01-17 | End: 2023-01-17

## 2023-01-17 RX ORDER — HEPARIN SODIUM 1000 [USP'U]/ML
INJECTION, SOLUTION INTRAVENOUS; SUBCUTANEOUS
Status: COMPLETED | OUTPATIENT
Start: 2023-01-17 | End: 2023-01-17

## 2023-01-17 RX ORDER — FENTANYL CITRATE 50 UG/ML
50 INJECTION, SOLUTION INTRAMUSCULAR; INTRAVENOUS ONCE
Status: DISCONTINUED | OUTPATIENT
Start: 2023-01-17 | End: 2023-01-17 | Stop reason: HOSPADM

## 2023-01-17 RX ORDER — MIDAZOLAM HYDROCHLORIDE 1 MG/ML
1 INJECTION INTRAMUSCULAR; INTRAVENOUS ONCE
Status: DISCONTINUED | OUTPATIENT
Start: 2023-01-17 | End: 2023-01-17 | Stop reason: HOSPADM

## 2023-01-17 RX ADMIN — MIDAZOLAM 1 MG: 1 INJECTION INTRAMUSCULAR; INTRAVENOUS at 10:47

## 2023-01-17 RX ADMIN — HEPARIN SODIUM 4000 UNITS: 1000 INJECTION INTRAVENOUS; SUBCUTANEOUS at 11:12

## 2023-01-17 RX ADMIN — MIDAZOLAM 1 MG: 1 INJECTION INTRAMUSCULAR; INTRAVENOUS at 11:01

## 2023-01-17 RX ADMIN — IOPAMIDOL 100 ML: 612 INJECTION, SOLUTION INTRAVENOUS at 11:36

## 2023-01-17 RX ADMIN — MIDAZOLAM 1 MG: 1 INJECTION INTRAMUSCULAR; INTRAVENOUS at 11:23

## 2023-01-17 RX ADMIN — MIDAZOLAM 1 MG: 1 INJECTION INTRAMUSCULAR; INTRAVENOUS at 11:12

## 2023-01-17 RX ADMIN — FENTANYL CITRATE 50 MCG: 50 INJECTION, SOLUTION INTRAMUSCULAR; INTRAVENOUS at 10:47

## 2023-01-17 RX ADMIN — SODIUM CHLORIDE: 4.5 INJECTION, SOLUTION INTRAVENOUS at 09:46

## 2023-01-17 RX ADMIN — FENTANYL CITRATE 50 MCG: 50 INJECTION, SOLUTION INTRAMUSCULAR; INTRAVENOUS at 11:23

## 2023-01-17 RX ADMIN — FENTANYL CITRATE 50 MCG: 50 INJECTION, SOLUTION INTRAMUSCULAR; INTRAVENOUS at 11:01

## 2023-01-17 RX ADMIN — FENTANYL CITRATE 50 MCG: 50 INJECTION, SOLUTION INTRAMUSCULAR; INTRAVENOUS at 11:12

## 2023-01-17 ASSESSMENT — LIFESTYLE VARIABLES
HOW MANY STANDARD DRINKS CONTAINING ALCOHOL DO YOU HAVE ON A TYPICAL DAY: PATIENT DOES NOT DRINK
HOW OFTEN DO YOU HAVE A DRINK CONTAINING ALCOHOL: NEVER

## 2023-01-17 ASSESSMENT — PATIENT HEALTH QUESTIONNAIRE - PHQ9
SUM OF ALL RESPONSES TO PHQ9 QUESTIONS 1 & 2: 2
SUM OF ALL RESPONSES TO PHQ QUESTIONS 1-9: 2
SUM OF ALL RESPONSES TO PHQ QUESTIONS 1-9: 2
1. LITTLE INTEREST OR PLEASURE IN DOING THINGS: 1
2. FEELING DOWN, DEPRESSED OR HOPELESS: 1
SUM OF ALL RESPONSES TO PHQ QUESTIONS 1-9: 2
SUM OF ALL RESPONSES TO PHQ QUESTIONS 1-9: 2

## 2023-01-17 NOTE — DISCHARGE INSTRUCTIONS
Home with designated . Remove dressing in AM, apply antibiotic ointment and bandaid to site every day for 5 days, wash gently and dry every day for 5 days. Patient may shower but no tub bath for 5 days. Minimal activity for the rest of the day, moderate to normal activity the following day. No lifting over 10 pounds for 48 hours. If bleeding occurs apply firm pressure to site and call 911/doctor. Monitor for signs of infection (redness, drainage, swelling, elevated temp) and notify doctor. Home going sedation sheet given to patient. Angio-seal booklet given to patient. SEDATION/ANALGESIA INFORMATION/HOME GOING ADVICE    SEDATION / ANALGESIA INFORMATION / HOME GOING ADVICE  You have received the sedation/analgesia medication during your visit     Sedation/analgesia is used during short medical procedures under controlled supervision. The medication will produce a strong relaxation. You will be able to hear, speak and follow instructions, but your memory and alertness will be decreased. You will be able to swallow and breathe on your own. During sedation/analgesia your blood pressure, heart and breathing will be watched closely. After the procedure, you may not remember what was said or done. You may have the following effects from the medication. \"           Drowsiness, dizziness, sleepiness or confusion. \"           Difficulty remembering or delayed reaction times. \"           Loss of fine muscle control or difficulty with your balance especially while walking. \"           Difficulty focusing or blurred vision. You may not be aware of slight changes in your behavior and/or your reaction time because of the medication used during the procedure. Therefore you should follow these instructions. \"           Have someone responsible help you with your care. \"           Do not drive for 24 hours.   \"           Do not operate equipment for 24 hours (lawnmowers, power tools, kitchen accessories, stove). \"           Do not drink any alcoholic beverages for a minimum of 24 hours. \"           Do not make important personal, legal or business decisions for 24 hours. \"           You may experience dizziness or lightheadedness. Move slowly and carefully, do not make sudden position changes. \"           Drink extra amounts of fluids today. \"           Increase your diet as tolerated (unless you have received specific instructions from your doctor). \"           If you feel nauseated, continue with liquids until the nausea is gone. \"           Notify your physician if you have not urinated within 8 hours after the procedure. \"           Resume your medications unless otherwise instructed. Contact your physician if you have any questions or concerns.      IF YOU REPORT TO AN EMERGENCY ROOM, DOCTOR'S OFFICE OR HOSPITAL WITHIN 24 HOURS AFTER YOUR PROCEDURE, BRING THIS SHEET AND YOUR AFTER VISIT SUMMARY WITH YOU AND GIVE IT TO THE PHYSICIAN OR NURSE ATTENDING YOU.

## 2023-01-17 NOTE — ASSESSMENT & PLAN NOTE
Well-controlled, continue current medications   Hemoglobin A1C   Date Value Ref Range Status   01/17/2023 7.3 (H) 4.3 - 5.7 % Final     Comment:     Performed at [facility] under CLIA #  [id number]

## 2023-01-17 NOTE — PATIENT INSTRUCTIONS
Preventing Falls: Care Instructions  Overview     Getting around your home safely can be a challenge if you have injuries or health problems that make it easy for you to fall. Loose rugs and furniture in walkways are among the dangers for many older people who have problems walking or who have poor eyesight. People who have conditions such as arthritis, osteoporosis, or dementia also have to be careful not to fall. You can make your home safer with a few simple measures. Follow-up care is a key part of your treatment and safety. Be sure to make and go to all appointments, and call your doctor if you are having problems. It's also a good idea to know your test results and keep a list of the medicines you take. How can you care for yourself at home? Taking care of yourself  Exercise regularly to improve your strength, muscle tone, and balance. Walk if you can. Swimming may be a good choice if you cannot walk easily. Have your vision and hearing checked each year or any time you notice a change. If you have trouble seeing and hearing, you might not be able to avoid objects and could lose your balance. Know the side effects of the medicines you take. Ask your doctor or pharmacist whether the medicines you take can affect your balance. Sleeping pills or sedatives can affect your balance. Limit the amount of alcohol you drink. Alcohol can impair your balance and other senses. Ask your doctor whether calluses or corns on your feet need to be removed. If you wear loose-fitting shoes because of calluses or corns, you can lose your balance and fall. Talk to your doctor if you have numbness in your feet. You may get dizzy if you do not drink enough water. To prevent dehydration, drink plenty of fluids. Choose water and other clear liquids. If you have kidney, heart, or liver disease and have to limit fluids, talk with your doctor before you increase the amount of fluids you drink.   Preventing falls at home  Remove raised doorway thresholds, throw rugs, and clutter. Repair loose carpet or raised areas in the floor. Move furniture and electrical cords to keep them out of walking paths. Use nonskid floor wax, and wipe up spills right away, especially on ceramic tile floors. If you use a walker or cane, put rubber tips on it. If you use crutches, clean the bottoms of them regularly with an abrasive pad, such as steel wool. Keep your house well lit, especially stairways, porches, and outside walkways. Use night-lights in areas such as hallways and bathrooms. Add extra light switches or use remote switches (such as switches that go on or off when you clap your hands) to make it easier to turn lights on if you have to get up during the night. Install sturdy handrails on stairways. Move items in your cabinets so that the things you use a lot are on the lower shelves (about waist level). Keep a cordless phone and a flashlight with new batteries by your bed. If possible, put a phone in each of the main rooms of your house, or carry a cell phone in case you fall and cannot reach a phone. Or, you can wear a device around your neck or wrist. You push a button that sends a signal for help. Wear low-heeled shoes that fit well and give your feet good support. Use footwear with nonskid soles. Check the heels and soles of your shoes for wear. Repair or replace worn heels or soles. Do not wear socks without shoes on smooth floors, such as wood. Walk on the grass when the sidewalks are slippery. If you live in an area that gets snow and ice in the winter, sprinkle salt on slippery steps and sidewalks. Or ask a family member or friend to do this for you. Preventing falls in the bath  Install grab bars and nonskid mats inside and outside your shower or tub and near the toilet and sinks. Use shower chairs and bath benches. Use a hand-held shower head that will allow you to sit while showering.   Get into a tub or shower by putting the weaker leg in first. Get out of a tub or shower with your strong side first.  Repair loose toilet seats and consider installing a raised toilet seat to make getting on and off the toilet easier. Keep your bathroom door unlocked while you are in the shower. Where can you learn more? Go to http://www.martino.com/ and enter G117 to learn more about \"Preventing Falls: Care Instructions. \"  Current as of: May 4, 2022               Content Version: 13.5  © 3702-7869 Healthwise, Incorporated. Care instructions adapted under license by [facility] ([facility]). If you have questions about a medical condition or this instruction, always ask your healthcare professional. [address] any warranty or liability for your use of this information. Learning About Emotional Support  When do you need emotional support? You might find getting support from others helpful when you have a long-term health problem. Often people feel alone, confused, or scared when coping with an illness. But you aren't alone. Other people are going through the same thing you are and know how you feel. Talking with others about your feelings can help you feel better. Your family and friends can give you support. So can your doctor, a support group, or a Bahai. If you have a support network, you will not feel as alone. You will learn new ways to deal with your situation, and you may try harder to overcome it. Where you can get support  Family and friends: They can help you cope by giving you comfort and encouragement. Counseling: Professional counseling can help you cope with situations that interfere with your life and cause stress. Counseling can help you understand and deal with your illness. Your doctor: Find a doctor you trust and feel comfortable with. Be open and honest about your fears and concerns. Your doctor can help you get the right medical treatments, including counseling.   Spiritual or Zoroastrianism groups: They can provide comfort and may be able to help you find counseling or other social support services. Social groups: They can help you meet new people and get involved in activities you enjoy. Community support groups: In a support group, you can talk to others who have dealt with the same problems or illness as you. You can encourage one another and learn ways to cope with tough emotions. How to find a support group  Ask your doctor, counselor, or other health professional for suggestions. Contact your local Buddhist, Hindu, Cheondoism, or other Zoroastrianism group. Ask your family and friends. Ask people who have the same health concerns. Go online. Forums and blogs let you read messages from others and leave your own messages. You can exchange stories, vent your frustrations, and ask and answer questions. Contact a city, state, or national group that provides support for your health concerns. Your library or community center may have a list of these groups. Or you can look for information online. Look for a support group that works for you. Ask yourself if you prefer structure and would like a , or if you would like a less formal group. Do you prefer face-to-face meetings? Or do you feel more secure in online chat rooms or forums? Supportive relationships  A supportive relationship includes emotional support such as love, trust, and understanding, as well as advice and concrete help, such as help managing your time. Reach out to others  Family and friends can help you. Ask them to:  Listen to you and give you encouragement. This can keep you from feeling hopeless or alone. Help with small daily tasks or with bigger problems. A helping hand can keep you from feeling overwhelmed. Help you manage a health problem. For example, ask them to go to doctor visits with you. Your loved ones can offer support by being involved in your medical care.   Respect your relationships  A good relationship is also a two-way street. You count on help from others, but they also count on you.  Know your friends' limits. You don't have to see or call your friends every day. If you are going through a rough patch, ask friends if you can contact them outside of the usual boundaries.  Don't always complain or talk about yourself. Know when it's time to stop talking and listen or just enjoy your friend's company.  Know that good friends can be a bad influence. For example, if a friend encourages you to drink when you know it will harm you, you may want to end the friendship.  Where can you learn more?  Go to https://www.Leyou software.net/patientEd and enter G092 to learn more about \"Learning About Emotional Support.\"  Current as of: February 9, 2022               Content Version: 13.5  © 3077-4960 [employer].   Care instructions adapted under license by [employer]. If you have questions about a medical condition or this instruction, always ask your healthcare professional. [employer] disclaims any warranty or liability for your use of this information.      For more information on your local Area Agency on Aging or [place] on Aging please visit the appropriate web site below:    [place]: https://www.eaaa.org/services-programs/    [place]: https://aging.[place].gov/    [place]: https://aging.[place].gov/    [place]: https://www.[place]da.[place].gov/aaamap.htm           Hearing Loss: Care Instructions  Overview     Hearing loss is a sudden or slow decrease in how well you hear. It can range from mild to severe. Permanent hearing loss can occur with aging. It also can happen when you are exposed long-term to loud noise. Examples include listening to loud music, riding motorcycles, or being around other loud machines.  Hearing loss can affect your work and home life. It can make you feel lonely or depressed. You may feel that you have lost your independence. But hearing aids and other devices can  help you hear better and feel connected to others. Follow-up care is a key part of your treatment and safety. Be sure to make and go to all appointments, and call your doctor if you are having problems. It's also a good idea to know your test results and keep a list of the medicines you take. How can you care for yourself at home? Avoid loud noises whenever possible. This helps keep your hearing from getting worse. Always wear hearing protection around loud noises. Wear a hearing aid as directed. See a professional who can help you pick a hearing aid that fits you. Have hearing tests as your doctor suggests. They can show whether your hearing has changed. Your hearing aid may need to be adjusted. Use other devices as needed. These may include:  Telephone amplifiers and hearing aids that can connect to a television, stereo, radio, or microphone. Devices that use lights or vibrations. These alert you to the doorbell, a ringing telephone, or a baby monitor. Television closed-captioning. This shows the words at the bottom of the screen. Most new TVs can do this. TTY (text telephone). This lets you type messages back and forth on the telephone instead of talking or listening. These devices are also called TDD. When messages are typed on the keyboard, they are sent over the phone line to a receiving TTY. The message is shown on a monitor. Use text messaging, social media, and email if it is hard for you to communicate by telephone. Try to learn a listening technique called speechreading. It is not lipreading. You pay attention to people's gestures, expressions, posture, and tone of voice. These clues can help you understand what a person is saying. Face the person you are talking to, and have them face you. Make sure the lighting is good. You need to see the other person's face clearly. Think about counseling if you need help to adjust to your hearing loss. When should you call for help?   Watch closely for changes in your health, and be sure to contact your doctor if:    You think your hearing is getting worse.     You have new symptoms, such as dizziness or nausea. Where can you learn more? Go to http://www.martino.com/ and enter R798 to learn more about \"Hearing Loss: Care Instructions. \"  Current as of: May 4, 2022               Content Version: 13.5  © 2006-2022 [employer]. Care instructions adapted under license by [facility] ([facility]). If you have questions about a medical condition or this instruction, always ask your healthcare professional. [place] [address] any warranty or liability for your use of this information. Learning About Vision Tests  What are vision tests? The four most common vision tests are visual acuity tests, refraction, visual field tests, and color vision tests. Visual acuity (sharpness) tests  These tests are used: To see if you need glasses or contact lenses. To monitor an eye problem. To check an eye injury. Visual acuity tests are done as part of routine exams. You may also have this test when you get your 's license or apply for some types of jobs. Visual field tests  These tests are used: To check for vision loss in any area of your range of vision. To screen for certain eye diseases. To look for nerve damage after a stroke, head injury, or other problem that could reduce blood flow to the brain. Refraction and color tests  A refraction test is done to find the right prescription for glasses and contact lenses. A color vision test is done to check for color blindness. Color vision is often tested as part of a routine exam. You may also have this test when you apply for a job where recognizing different colors is important, such as , electronics, or the [employer]. How are vision tests done? Visual acuity test   You cover one eye at a time. You read aloud from a wall chart across the room.   You read aloud from a small card that you hold in your hand. Refraction   You look into a special device. The device puts lenses of different strengths in front of each eye to see how strong your glasses or contact lenses need to be. Visual field tests   Your doctor may have you look through special machines. Or your doctor may simply have you stare straight ahead while they move a finger into and out of your field of vision. Color vision test   You look at pieces of printed test patterns in various colors. You say what number or symbol you see. Your doctor may have you trace the number or symbol using a pointer. How do these tests feel? There is very little chance of having a problem from this test. If dilating drops are used for a vision test, they may make the eyes sting and cause a medicine taste in the mouth. Follow-up care is a key part of your treatment and safety. Be sure to make and go to all appointments, and call your doctor if you are having problems. It's also a good idea to know your test results and keep a list of the medicines you take. Where can you learn more? Go to http://www.martino.com/ and enter G551 to learn more about \"Learning About Vision Tests. \"  Current as of: October 12, 2022               Content Version: 13.5  © 2006-2022 Healthwise, Incorporated. Care instructions adapted under license by [facility] ([facility]). If you have questions about a medical condition or this instruction, always ask your healthcare professional. [place] [address] any warranty or liability for your use of this information. Advance Directives: Care Instructions  Overview  An advance directive is a legal way to state your wishes at the end of your life. It tells your family and your doctor what to do if you can't say what you want. There are two main types of advance directives. You can change them any time your wishes change. Living will.   This form tells your family and your doctor your wishes about life support and other treatment. The form is also called a declaration. Medical power of . This form lets you name a person to make treatment decisions for you when you can't speak for yourself. This person is called a health care agent (health care proxy, health care surrogate). The form is also called a durable power of  for health care. If you do not have an advance directive, decisions about your medical care may be made by a family member, or by a doctor or a  who doesn't know you. It may help to think of an advance directive as a gift to the people who care for you. If you have one, they won't have to make tough decisions by themselves. For more information, including forms for your state, see the [address] website (www.caringinfo.org/planning/advance-directives/). Follow-up care is a key part of your treatment and safety. Be sure to make and go to all appointments, and call your doctor if you are having problems. It's also a good idea to know your test results and keep a list of the medicines you take. What should you include in an advance directive? Many states have a unique advance directive form. (It may ask you to address specific issues.) Or you might use a universal form that's approved by many states. If your form doesn't tell you what to address, it may be hard to know what to include in your advance directive. Use the questions below to help you get started. Who do you want to make decisions about your medical care if you are not able to? What life-support measures do you want if you have a serious illness that gets worse over time or can't be cured? What are you most afraid of that might happen? (Maybe you're afraid of having pain, losing your independence, or being kept alive by machines.)  Where would you prefer to die? (Your home? A hospital? A nursing home?)  Do you want to donate your organs when you die?   Do you want certain Scientologist practices performed before you die? When should you call for help? Be sure to contact your doctor if you have any questions. Where can you learn more? Go to http://www.martino.com/ and enter R264 to learn more about \"Advance Directives: Care Instructions. \"  Current as of: June 16, 2022               Content Version: 13.5  © 5658-2847 [employer]. Care instructions adapted under license by [facility] ([facility]). If you have questions about a medical condition or this instruction, always ask your healthcare professional. [address] any warranty or liability for your use of this information. Learning About Living Mohit Lute  What is a living will? A living will, also called a declaration, is a legal form. It tells your family and your doctor your wishes when you can't speak for yourself. It's used by the health professionals who will treat you as you near the end of your life or if you get seriously hurt or ill. If you put your wishes in writing, your loved ones and others will know what kind of care you want. They won't need to guess. This can ease your mind and be helpful to others. And you can change or cancel your living will at any time. A living will is not the same as an estate or property will. An estate will explains what you want to happen with your money and property after you die. How do you use it? Keep these facts in mind about how a living will is used. Your living will is used only if you can't speak or make decisions for yourself. Most often, one or more doctors must certify that you can't speak or decide for yourself before your living will takes effect. If you get better and can speak for yourself again, you can accept or refuse any treatment. It doesn't matter what you said in your living will. Some states may limit your right to refuse treatment in certain cases.  For example, you may need to clearly state in your living will that you don't want artificial hydration and nutrition, such as being fed through a tube. Is a living will a legal document? A living will is a legal document. Each state has its own laws about living [place]. And a living will may be called something else in your state. Here are some things to know about living [place]. You don't need an  to complete a living will. But legal advice can be helpful if your state's laws are unclear. It can also help if your health history is complicated or your family can't agree on what should be in your living will. You can change your living will at any time. Some people find that their wishes about end-of-life care change as their health changes. If you make big changes to your living will, complete a new form. If you move to another state, make sure that your living will is legal in the state where you now live. In most cases, doctors will respect your wishes even if you have a form from a different state. You might use a universal form that has been approved by many states. This kind of form can sometimes be filled out and stored online. Your digital copy will then be available wherever you have a connection to the internet. The doctors and nurses who need to treat you can find it right away. Your state may offer an online registry. This is another place where you can store your living will online. It's a good idea to get your living will notarized. This means using a person called a  to watch two people sign, or witness, your living will. What should you know when you create a living will? Here are some questions to ask yourself as you make your living will. Do you know enough about life support methods that might be used? If not, talk to your doctor so you know what might be done if you can't breathe on your own, your heart stops, or you can't swallow. What things would you still want to be able to do after you receive life-support methods?  Would you want to be able to walk? To speak? To eat on your own? To live without the help of machines? Do you want certain Voodoo practices performed if you become very ill? If you have a choice, where do you want to be cared for? In your home? At a hospital or nursing home? If you have a choice at the end of your life, where would you prefer to die? At home? In a hospital or nursing home? Somewhere else? Would you prefer to be buried or cremated? Do you want your organs to be donated after you die? What should you do with your living will? Make sure that your family members and your health care agent have copies of your living will (also called a declaration). Give your doctor a copy of your living will. Ask to have it kept as part of your medical record. If you have more than one doctor, make sure that each one has a copy. Put a copy of your living will where it can be easily found. For example, some people may put a copy on their refrigerator door. If you are using a digital copy, be sure your doctor, family members, and health care agent know how to find and access it. Where can you learn more? Go to http://www.martino.com/ and enter K356 to learn more about \"Learning About Living [place]. \"  Current as of: June 16, 2022               Content Version: 13.5  © 1936-0061 Healthwise, Incorporated. Care instructions adapted under license by [facility] ([facility]). If you have questions about a medical condition or this instruction, always ask your healthcare professional. [place] [address] any warranty or liability for your use of this information. Personalized Preventive Plan for Daniel Bean - 1/17/2023  Medicare offers a range of preventive health benefits. Some of the tests and screenings are paid in full while other may be subject to a deductible, co-insurance, and/or copay.     Some of these benefits include a comprehensive review of your medical history including lifestyle, illnesses that may run in your family, and various assessments and screenings as appropriate. After reviewing your medical record and screening and assessments performed today your provider may have ordered immunizations, labs, imaging, and/or referrals for you. A list of these orders (if applicable) as well as your Preventive Care list are included within your After Visit Summary for your review. Other Preventive Recommendations:    A preventive eye exam performed by an eye specialist is recommended every 1-2 years to screen for glaucoma; cataracts, macular degeneration, and other eye disorders. A preventive dental visit is recommended every 6 months. Try to get at least 150 minutes of exercise per week or 10,000 steps per day on a pedometer . Order or download the FREE \"Exercise & Physical Activity: Your Everyday Guide\" from [employer]. Call 5-508.528.3342 or search [employer] online. You need 8941-7175 mg of calcium and 9495-2689 IU of vitamin D per day. It is possible to meet your calcium requirement with diet alone, but a vitamin D supplement is usually necessary to meet this goal.  When exposed to the sun, use a sunscreen that protects against both UVA and UVB radiation with an SPF of 30 or greater. Reapply every 2 to 3 hours or after sweating, drying off with a towel, or swimming. Always wear a seat belt when traveling in a car. Always wear a helmet when riding a bicycle or motorcycle.

## 2023-01-17 NOTE — PLAN OF CARE
Problem: Chronic Conditions and Co-morbidities  Goal: Patient's chronic conditions and co-morbidity symptoms are monitored and maintained or improved  Outcome: Progressing   Pt to have peripheral angiogram today  Problem: Discharge Planning  Goal: Discharge to home or other facility with appropriate resources  Outcome: Progressing

## 2023-01-17 NOTE — OP NOTE
Department of Radiology  Post Procedure Progress Note      Pre-Procedure Diagnosis:  RLE pain    Procedure Performed:  RLE arteriogram with angioplasty    Anesthesia: local / versed and fentanyl    Findings: successful    Immediate Complications:  None    Estimated Blood Loss: minimal    SEE DICTATED PROCEDURE NOTE FOR COMPLETE DETAILS.     Electronically signed by Kat Hummel MD on 1/17/2023 at 11:35 AM

## 2023-01-17 NOTE — H&P
[address] [address]  Sedation/Analgesia History & Physical    Pt Name: Josemanuel Gutierres  MRN: [id number]  YOB: 1960  Provider Performing Procedure: Cony Pro MD, MD  Primary Care Physician: ALLEN Ro CNP    Formulation and discussion of sedation / procedure plans, risks, benefits, side effects and alternatives with patient and/or responsible adult completed. PRE-PROCEDURE   DNR-CCA/DNR-CC []Yes [x]No  Brief History/Pre-Procedure Diagnosis: RLE pain          MEDICAL HISTORY  []CAD/Valve  []Liver Disease  []Lung Disease []Diabetes  []Hypertension []Renal Disease  [x]Additional information:       has a past medical history of Anxiety, Arthritis, Carpal tunnel syndrome, Cellulitis, Chronic back pain, Depression, Diabetes mellitus ([address] Emphysema of lung ([address] Fibromyalgia, GERD (gastroesophageal reflux disease), Glaucoma, Hiatal hernia, Hx of blood clots, Hyperlipidemia, mixed, IBS (irritable bowel syndrome), Leg pain, MDRO (multiple drug resistant organisms) resistance, Guzman's neuroma, Nausea & vomiting, Neuropathic pain, Osteoarthritis, PONV (postoperative nausea and vomiting), Pulmonary emphysema ([address] and Sleep apnea. SURGICAL HISTORY   has a past surgical history that includes Appendectomy;  section; Cholecystectomy; back surgery; cervical fusion; Tonsillectomy; Ectopic pregnancy surgery; Toe Surgery (Left); Abdomen surgery; Colonoscopy; other surgical history (Aug 5, 2013); Total knee arthroplasty (Right, 2016); joint replacement; Upper gastrointestinal endoscopy; pr office/outpt visit,procedure only (Right, 2018); pr debridement open wound 20 sq cm/< (Right, 2018); Upper gastrointestinal endoscopy (Left, 2019); eye surgery (Bilateral, ); Hysterectomy, total abdominal; and Ovary removal (Bilateral).   Additional information:       ALLERGIES   Allergies as of 2023 - Fully Reviewed 2023   Allergen Reaction Noted    Latex Itching and Rash 08/21/2012     Additional information:       MEDICATIONS   Coumadin Use Last 5 Days [x]No []Yes  Antiplatelet drug therapy use last 5 days  [x]No []Yes  Other anticoagulant use last 5 days  [x]No []Yes    Current Facility-Administered Medications:     midazolam (VERSED) injection 1 mg, 1 mg, IntraVENous, Once, Eddi Morrow MD    0.45 % sodium chloride infusion, , IntraVENous, Continuous, Eddi Morrow MD, Last Rate: 20 mL/hr at 01/17/23 0946, New Bag at 01/17/23 0946    fentaNYL (SUBLIMAZE) injection 50 mcg, 50 mcg, IntraVENous, Once, Eddi Morrow MD    iopamidol (ISOVUE-300) 61 % injection 100 mL, 100 mL, IntraVENous, Once, Eddi Morrow MD  Prior to Admission medications    Medication Sig Start Date End Date Taking? Authorizing Provider   insulin detemir (LEVEMIR FLEXTOUCH) 100 UNIT/ML injection pen Inject 73 Units into the skin nightly 1/6/23   ALLEN Wheatley CNP   Liraglutide (VICTOZA) 18 MG/3ML SOPN SC injection ADMINISTER 1.8 MG UNDER THE SKIN DAILY 12/23/22   Oumar Cramer,    pregabalin (LYRICA) 300 MG capsule 1 tab bid 12/14/22 6/15/23  ALLEN Wheatley CNP   insulin lispro, 1 Unit Dial, (HUMALOG/ADMELOG) 100 UNIT/ML SOPN INJECT 12 UNITS UNDER THE SKIN THREE TIMES DAILY WITH MEALS PLUS SLIDING SCALE( MAX OF 60 UNITS DAILY) 12/14/22   ALLEN Wheatley CNP   fluticasone (FLONASE) 50 MCG/ACT nasal spray 1 spray by Each Nostril route daily 12/14/22   ALLEN Wheatley CNP   naltrexone-buPROPion (CONTRAVE) 8-90 MG per extended release tablet Take 1 tablet bid  Patient not taking: No sig reported 12/14/22   ALLEN Wheatley CNP   venlafaxine (EFFEXOR XR) 37.5 MG extended release capsule TAKE ONE CAPSULE BY MOUTH DAILY ALONG WITH 150MG 12/7/22   ALLEN Wheatley CNP   ipratropium (ATROVENT) 0.06 % nasal spray 2 sprays by Each Nostril route 3 times daily as needed for Rhinitis 12/1/22   Sandra Hu, APRN - CNP  fluticasone-umeclidin-vilant (TRELEGY ELLIPTA) 100-62.5-25 MCG/ACT AEPB inhaler Inhale 1 puff into the lungs daily 12/1/22   ALLEN Delgado CNP   atorvastatin (LIPITOR) 20 MG tablet TAKE 1 TABLET BY MOUTH DAILY 11/23/22   ALLEN Anaya CNP   B-D ULTRAFINE III SHORT PEN 31G X 8 MM MISC USE AS DIRECTED ONCE DAILY 11/23/22   ALLEN Anaya CNP   traZODone (DESYREL) 150 MG tablet TAKE 1 TABLET BY MOUTH EVERY NIGHT 10/24/22   ALLEN Anaya CNP   ACCU-CHEK DAVIDA PLUS strip USE TO TEST BLOOD SUGAR ONCE DAILY 9/12/22   ALLEN Anaya CNP   venlafaxine (EFFEXOR XR) 150 MG extended release capsule TAKE 1 CAPSULE BY MOUTH DAILY 9/12/22   ALLEN Anaya CNP   Continuous Blood Gluc Sensor (FREESTYLE DYLAN 14 DAY SENSOR) [facility] [place] 14 Day Sensor kit  APPLY 1 SENSOR EXTERNALLY EVERY 14 DAYS 6/14/22   ALLEN Anaya CNP   pantoprazole (PROTONIX) 40 MG tablet Take 1 tablet by mouth 2 times daily (before meals) 3/14/22   ALLEN Anaya CNP   Continuous Blood Gluc Sensor (FREESTYLE DYLAN 14 DAY SENSOR) [facility] [place] 1 SENSOR EXTERNALLY EVERY 14 DAYS 3/4/22   ALLEN Anaya CNP   albuterol sulfate  (90 Base) MCG/ACT inhaler Inhale 2 puffs into the lungs every 6 hours as needed for Wheezing or Shortness of Breath 12/21/21 12/21/22  ALLEN Delgado CNP   Handicap Placard MISC by Does not apply route Duration: three years 9/13/21   ALLEN Anaya CNP   nystatin (MYCOSTATIN) 829202 UNIT/ML suspension Take 5 mLs by mouth 4 times daily  Patient not taking: No sig reported 8/24/21   ALLEN Anaya CNP   diclofenac sodium (VOLTAREN) 1 % GEL diclofenac 1 % topical gel    Historical Provider, MD   Continuous Blood Gluc  (FREESTYLE DYLAN 14 DAY READER) PAOLA Check blood sugars daily 12/21/20   ALLEN Anaya - CNP   Handicap Placard MISC by Does not apply route Duration: three years    Type II idabetes  Diabetic neuropathy 10/29/18   Susanna Ibarra, APRN - CNP   HYDROcodone-acetaminophen (NORCO) 5-325 MG per tablet Take 1 tablet by mouth every 8 hours as needed for Pain. Daphnie De Jesus Historical Provider, MD   traMADol (ULTRAM) 50 MG tablet Take 100 mg by mouth 3 times daily. Historical Provider, MD   OXYGEN Inhale 2 L into the lungs     Historical Provider, MD   Elastic Bandages & Supports (WRIST BRACE/RIGHT MEDIUM) MISC Wear brace on right wrist nightly 12/20/17   Debra Campos APRN - CNP   Cholecalciferol (VITAMIN D3) 2000 units CAPS Take by mouth daily    Historical Provider, MD   rOPINIRole (REQUIP) 1 MG tablet Take 1 mg by mouth nightly     Historical Provider, MD   acetaminophen 650 MG TABS Take 650 mg by mouth every 4 hours as needed for Pain (For mild pain level 1-3 or for fever > 100.5). 8/16/13   Palma Monreal MD     Additional information:       VITAL SIGNS   Vitals:    01/17/23 0915   BP: (!) 149/71   Pulse: 58   Resp: 12   Temp: 98 °F (36.7 °C)   SpO2: 97%       PHYSICAL:   Heart:  [x]Regular rate and rhythm  []Other:    Lungs:  [x]Clear    []Other:    Abdomen: [x]Soft    []Other:    Mental Status: [x]Alert & Oriented  []Other:      PLANNED PROCEDURE   []Biospy [x]Arteriogram              []Drainage   []Mediport Insertion  []Fistulogram []IV access       []Vertebroplasty / Augmentation  []IVC filter []Dialysis catheter []Biliary drainage  []Other: []CAPD Catheter []Nephrostomy Tube / Stent  SEDATION  Planned agent:[x]Midazolam []Meperidine [x]Sublimaze []Dilaudid []Morphine     []Diazepam  []Other:     ASA Classification:  []1 [x]2 []3 []4 []5  Class 1: A normal healthy patient  Class 2: Pt with mild to moderate systemic disease  Class 3: Severe systemic disease or disturbance  Class 4: Severe systemic disorders that are already life threatening. Class 5: Moribund pt with little chances of survival, for more than 24 hours.   Mallampati I Airway Classification:   []1 [x]2 []3 []4    [x]Pre-procedure diagnostic studies complete and results available. Comment:    [x]Previous sedation/anesthesia experiences assessed. Comment:    [x]The patient is an appropriate candidate to undergo the planned procedure sedation and anesthesia. (Refer to nursing sedation/analgesia documentation record)  [x]Formulation and discussion of sedation/procedure plan, risks, and expectations with patient and/or responsible adult completed. [x]Patient examined immediately prior to the procedure.  (Refer to nursing sedation/analgesia documentation record)    Mukesh Russo MD, MD  Electronically signed 1/17/2023 at 10:30 AM

## 2023-01-17 NOTE — PROGRESS NOTES
1/17/23 at [phone] This RN spoke with patient regarding Plavix script that was called into [facility] and needs picked up. Pt states she understands and will  the script later today.

## 2023-01-17 NOTE — PLAN OF CARE
Problem: Chronic Conditions and Co-morbidities  Goal: Patient's chronic conditions and co-morbidity symptoms are monitored and maintained or improved  1/17/2023 1203 by Jt Trevizo RN  Outcome: Adequate for Discharge  1/17/2023 0901 by Jt Trevizo RN  Outcome: Progressing     Problem: Discharge Planning  Goal: Discharge to home or other facility with appropriate resources  1/17/2023 1203 by Jt Trevizo RN  Outcome: Adequate for Discharge  Flowsheets (Taken 1/17/2023 0935)  Discharge to home or other facility with appropriate resources: Identify barriers to discharge with patient and caregiver  1/17/2023 0901 by Jt Trevizo RN  Outcome: Progressing     Problem: Safety - Adult  Goal: Free from fall injury  Outcome: Adequate for Discharge     Problem: Pain  Goal: Verbalizes/displays adequate comfort level or baseline comfort level  Outcome: Adequate for Discharge

## 2023-01-17 NOTE — PROGRESS NOTES
1007 Patient received in IR for procedure. 1014 This procedure has been fully reviewed with the patient and written informed consent has been obtained. 56 Dr. Marla Alvarenga in; spoke to patient and assessment obtained. 1026 Patient prepped for procedure. 1048 Procedure started with Dr. Marla Alvarenga. 1050 Access obtained in left femoral with use of sonosite. 1122 Angioplasty of SFA using IN. PACT 5 x 120 balloon. 1132 Procedure completed; patient tolerated well. Angio seal device deployed to left femoral artery. 1135 Gauze and op site to left femoral site; area soft to touch with no bleeding noted. 1141 Patient on bed; comfort ensured. Left femoral dressing remains dry and intact with area soft. [phone] Patient taken to 2E via bed. Left femoral dressing remains dry and intact with area soft. 1144 Report called to Tacos Mendes RN on 2E.

## 2023-01-17 NOTE — FLOWSHEET NOTE
Received pt from specials post peripheral angioplasty right leg. Left femoral arterial access site stable. Pt aware to keep her head down and not to move left leg. Taking sips of water. 0.45 normal saline cont. Resting with easy resp. Denies pain or needs.

## 2023-01-17 NOTE — PROGRESS NOTES
Medicare Annual Wellness Visit    [address] [facility] is here for Medicare AWV (No concerns ) and Health Maintenance (No recent eye exam, scheduled for February )    Assessment & Plan     Encounter Diagnoses   Name Primary? Medicare annual wellness visit, subsequent Yes    ACP (advance care planning)     Screening for cardiovascular condition     Depression screening negative     Major depressive disorder in partial remission, unspecified whether recurrent ([facility]     Obesity, Class III, BMI 40-49.9 (morbid obesity) ([facility]     Nocturnal hypoxemia due to emphysema [facility])     Diabetic polyneuropathy associated with type 2 diabetes mellitus ([facility]     PAD (peripheral artery disease) ([facility]     Screening for thyroid disorder         Depression at her baseline, continue current meds  Chronic conditions table  A1c remains the same, T2DM controlled urine micro normal.     Increase physical activity to promote weight loss, decrease carb intake   BMI improved from previous lost 4 pounds. Await testing for PAD pt having done today, continue with PM                    Orders Placed This Encounter   Procedures    TSH With Reflex Ft4    Basic Metabolic Panel    Hepatic Function Panel    Lipid Panel    CBC with Auto Differential    POCT glycosylated hemoglobin (Hb A1C)    [place] Intensive Behavior Counseling for Cardiovascular Diseases, 8-15 minutes []       Recommendations for Preventive Services Due: see orders and patient instructions/AVS.  Recommended screening schedule for the next 5-10 years is provided to the patient in written form: see Patient Instructions/AVS.     No follow-ups on file. Subjective   The following acute and/or chronic problems were also addressed today:  Uses oxygen at night for hypoxia, neuropathy controlled with lyrica. PAD has screening test today to r/o occlusion in RLE.  Results of previous VL doppler RLE The right side identifies mild stenosis of the lower right saphenous popliteal area (the vessel that delivers blood to the lower leg), so the stenosis is mild , we are  moving  forward with an additional test such as angiogram Continue with statin and good BP control     Depression stable and improved. NO signs of hypoxia today, pt denies SOB on exertion. Diabetes Type 2    Glucose control:   Does patient check blood glucoses at home? Yes  Report of hypoglycemia: no  Lab Results   Component Value Date    LABA1C 7.3 (H) 12/14/2022     No results found for: EAG    Symptoms  Polyuria, Polydipsia or Polyphagia? No  Chest Pain, SOB, or Palpitations? -  No  New Vision complaints? No  Paresthesias of the extremities? Yes - intermittent N/T of LE    Medications  Current medication were reviewed. Compliant with medications? yes  Medication side effects? No  On ACE-I or ARB? No  On antiplatelet therapy? Yes  On Statin? Yes    Last Diabetic Eye Exam: due for eye exam     Exercise  Exercise? No  Wt Readings from Last 3 Encounters:   12/14/22 267 lb 6.4 oz (121.3 kg)   12/01/22 264 lb (119.7 kg)   11/10/22 270 lb (122.5 kg)       Diet discipline?:  Low salt, fat, sugar diet? Yes    A1C has improved from 7.7 to 7.3,     Depressed Mood    HPI:    Depressed Mood? yes - but better since her last visit feels happier, working likes her job better   Anhedonia?  no  Appetite changes?  no  Sleep disturbances? no  Feelings of guilt? no  Decreased energy? no  Impaired concentration? no  Substance abuse? no    Suicidal/Homicidal Ideation? no      Compliant with meds: Yes  Med side effects: no   Sees therapist?:  no  Family History of Mental Illness? yes -     Review of Systems - Psychological ROS: positive for - anxiety and depression, negative for - sleep disturbances or suicidal ideation     Pt states her feet and legs hurt and tingle and burn on lyrica for this and also sees PM on norco daily for pain.  Will r/o IC due to her history of PAD,     Also taking lyrica for fibromyalgia and it is working well for this. Pt would like to loose weight ,  TSH normal will trial contrave   Patient's complete Health Risk Assessment and screening values have been reviewed and are found in Flowsheets. The following problems were reviewed today and where indicated follow up appointments were made and/or referrals ordered. Positive Risk Factor Screenings with Interventions:    Fall Risk:  Do you feel unsteady or are you worried about falling? : (!) yes  2 or more falls in past year?: no  Fall with injury in past year?: no     Interventions:    Patient declines any further evaluation or treatment       Drug Use:          Interpretation:  1-2: Low level - Monitor, re-assess at a later date  3-5: Moderate level - Further Investigation  6-8: Substantial level - Intensive Assessment  9-10: Severe level - Intensive Assessment    Interventions:  Patient declined any further intervention or treatment         Opioid Risk: (Low risk score <55) Opioid risk score: 44    Patient is low risk for opioid use disorder or overdose. Last PDMP Gayle Alas as Reviewed:  Review User Review Instant Review Result   Loree Rocha 12/14/2022  8:27 AM     Reviewed PDMP [1]     Last Controlled Substance Monitoring Documentation      Flowsheet Row Refill from 8/13/2020 in [address] [place]  [facility]   Periodic Controlled Substance Monitoring No signs of potential drug abuse or diversion identified.  filed at 08/13/2020 1240               Social and Emotional Support:  Do you get the social and emotional support that you need?: (!) No  Interventions:  Patient comments: pt doing well depression controlled with effexor    Weight and Activity:  Physical Activity: Unknown    Days of Exercise per Week: 7 days    Minutes of Exercise per Session: Not on file     On average, how many days per week do you engage in moderate to strenuous exercise (like a brisk walk)?: 7 days  Have you lost any weight without trying in the past 3 months?: No  Body mass index: (!) 41.31    Obesity Interventions:  Pt did not get the contrave states pharmacy did not have it, did loose 4 pounds since last visit     Obesity Counseling: Patient was asked about her current diet and exercise habits, and personalized advice was provided regarding recommended lifestyle changes. Patient's comorbid health conditions associated with elevated BMI were discussed, as well as the likely benefits of weight loss. Based upon patient's motivation to change her behavior, the following plan was agreed upon to work toward a weight loss goal of 20 pounds: lower carbohydrate diet, 1800 calorie/day diet, at least 150 minutes of exercise/week, and medication prescribed- contrave . Educational materials for weight loss were provided. Patient will follow-up in 3 month(s) with PCP. Provider spent 6 minutes counseling patient. Dentist Screen:  Have you seen the dentist within the past year?: (!) No    Intervention:  Not applicable - dentures    Hearing Screen:  Do you or your family notice any trouble with your hearing that hasn't been managed with hearing aids?: (!) Yes    Interventions:  Patient declines any further evaluation or treatment    Vision Screen:  Do you have difficulty driving, watching TV, or doing any of your daily activities because of your eyesight?: No  Have you had an eye exam within the past year?: (!) No  No results found. Interventions:   Patient declines any further evaluation or treatment      Advanced Directives:  Do you have a Living Will?: (!) No    Intervention:  has NO advanced directive - not interested in additional information    Advance Care Planning   Advanced Care Planning: Discussed the patients choices for care and treatment in case of a health event that adversely affects decision-making abilities. Also discussed the patients long-term treatment options. Reviewed with the patient the appropriate state-specific advance directive documents.  Reviewed the process of designating a competent adult as an Agent (or -in-fact) that could take make health care decisions for the patient if incompetent. Patient was asked to complete the declaration forms, if they have not already, either acknowledge the forms by a public notary or an eligible witness and provide a signed copy to the practice office. Time spent (minutes): 5       Tobacco Use:  Tobacco Use: High Risk    Smoking Tobacco Use: Every Day    Smokeless Tobacco Use: Never    Passive Exposure: Not on file     E-cigarette/Vaping       Questions Responses    E-cigarette/Vaping Use Never User    Start Date     Passive Exposure     Quit Date     Counseling Given     Comments           Interventions:  Patient declined any further intervention or treatment    Tobacco Use Counseling: Patient was counseled on tobacco cessation. Based upon patient's motivation to change her behavior, the following plan was agreed upon: Patient is not ready to work toward tobacco cessation at this time. Educational materials regarding tobacco cessation were provided. Provider spent 6 minutes counseling patient. CV Risk Counseling:  Patient was asked about her current diet and exercise habits, and personalized advice was provided regarding recommended lifestyle changes. Patient's individual cardiovascular disease risk factors, including diabetes mellitus, dyslipidemia, obesity (BMI >= 30), and sedentary lifestyle, were discussed, as well as the likely benefits of lifestyle changes. Based upon patient's motivation to change her behavior, the following plan was agreed upon to work toward lowering cardiovascular disease risk: low saturated fat, low cholesterol diet and increase physical activity, as tolerated. Aspirin use for primary prevention of cardiovascular disease for men 45-79 and women 55-79: Indicated- continue daily aspirin. Educational materials for lifestyle changes were provided. Patient will follow-up in 3 month(s) with PCP.  Provider spent 5 minutes counseling patient. Objective   Vitals:    01/17/23 0811   BP: 128/76   Pulse: 61   Resp: 16   Temp: 98.1 °F (36.7 °C)   SpO2: 92%   Weight: 263 lb 12.8 oz (119.7 kg)   Height: 5' 7\" (1.702 m)      Body mass index is 41.32 kg/m². General Appearance: alert and oriented to person, place and time, well-developed and well-nourished, in no acute distress  Skin: warm and dry, no rash or erythema obese  Head: normocephalic and atraumatic  Pulmonary/Chest: clear to auscultation bilaterally- no wheezes, rales or rhonchi, normal air movement, no respiratory distress  Cardiovascular: normal rate, normal S1 and S2, no gallops, intact distal pulses, and no carotid bruits  Abdomen: soft, non-tender, non-distended, normal bowel sounds, no masses or organomegaly  Musculoskeletal: normal range of motion, no joint swelling, deformity or tenderness and c/o RLe weakness, havin gtest today to r/o PAD occlusion        Allergies   Allergen Reactions    Latex Itching and Rash     Prior to Visit Medications    Medication Sig Taking?  Authorizing Provider   insulin detemir (LEVEMIR FLEXTOUCH) 100 UNIT/ML injection pen Inject 73 Units into the skin nightly Yes ALLEN Rodarte CNP   Liraglutide (VICTOZA) 18 MG/3ML SOPN SC injection ADMINISTER 1.8 MG UNDER THE SKIN DAILY Yes Stew Mancilla DO   pregabalin (LYRICA) 300 MG capsule 1 tab bid Yes ALLEN Rodarte CNP   insulin lispro, 1 Unit Dial, (HUMALOG/ADMELOG) 100 UNIT/ML SOPN INJECT 12 UNITS UNDER THE SKIN THREE TIMES DAILY WITH MEALS PLUS SLIDING SCALE( MAX OF 60 UNITS DAILY) Yes ALLEN Rodarte CNP   fluticasone (FLONASE) 50 MCG/ACT nasal spray 1 spray by Each Nostril route daily Yes ALLEN Rodarte CNP   venlafaxine (EFFEXOR XR) 37.5 MG extended release capsule TAKE ONE CAPSULE BY MOUTH DAILY ALONG WITH 150MG Yes ALLEN Rodarte CNP   ipratropium (ATROVENT) 0.06 % nasal spray 2 sprays by Each Nostril route 3 times daily as needed for Rhinitis Yes ALLEN Barnhart CNP   fluticasone-umeclidin-vilant (TRELEGY ELLIPTA) 100-62.5-25 MCG/ACT AEPB inhaler Inhale 1 puff into the lungs daily Yes ALLEN Barnhart CNP   atorvastatin (LIPITOR) 20 MG tablet TAKE 1 TABLET BY MOUTH DAILY Yes ALLEN Heredia CNP   B-D ULTRAFINE III SHORT PEN 31G X 8 MM MISC USE AS DIRECTED ONCE DAILY Yes ALLEN Heredia CNP   traZODone (DESYREL) 150 MG tablet TAKE 1 TABLET BY MOUTH EVERY NIGHT Yes ALLEN Heredia CNP   ACCU-CHEK DAVIDA PLUS strip USE TO TEST BLOOD SUGAR ONCE DAILY Yes ALLEN Heredia CNP   venlafaxine (EFFEXOR XR) 150 MG extended release capsule TAKE 1 CAPSULE BY MOUTH DAILY Yes ALLEN Heredia CNP   Continuous Blood Gluc Sensor (FREESTYLE DYLAN 14 DAY SENSOR) [facility] FreeStyle Dylan 14 Day Sensor kit  APPLY 1 SENSOR EXTERNALLY EVERY 14 DAYS Yes ALLEN Heredia CNP   pantoprazole (PROTONIX) 40 MG tablet Take 1 tablet by mouth 2 times daily (before meals) Yes ALLEN Heredia CNP   Continuous Blood Gluc Sensor (FREESTYLE DYLAN 14 DAY SENSOR) [facility] APPLY 1 SENSOR EXTERNALLY EVERY 14 DAYS Yes ALLEN Heredia CNP   Handicap Placard MISC by Does not apply route Duration: three years Yes ALLEN Heredia CNP   diclofenac sodium (VOLTAREN) 1 % GEL diclofenac 1 % topical gel Yes Historical Provider, MD   Continuous Blood Gluc  (FREESTYLE DYLAN 14 DAY READER) PAOLA Check blood sugars daily Yes ALLEN Heredia CNP   Handicap Placard MISC by Does not apply route Duration: three years    Type II idabetes  Diabetic neuropathy Yes ALLEN Heredia CNP   HYDROcodone-acetaminophen (NORCO) 5-325 MG per tablet Take 1 tablet by mouth every 8 hours as needed for Pain. . Yes Historical Provider, MD   traMADol (ULTRAM) 50 MG tablet Take 100 mg by mouth 3 times daily.  Yes Historical Provider, MD   OXYGEN Inhale 2 L into the lungs  Yes Historical Provider, MD   Elastic Bandages & Supports (WRIST BRACE/RIGHT MEDIUM) MISC Wear brace on right wrist nightly Yes ALLEN Syed CNP   Cholecalciferol (VITAMIN D3) 2000 units CAPS Take by mouth daily Yes Historical Provider, MD   rOPINIRole (REQUIP) 1 MG tablet Take 1 mg by mouth nightly  Yes Historical Provider, MD   acetaminophen 650 MG TABS Take 650 mg by mouth every 4 hours as needed for Pain (For mild pain level 1-3 or for fever > 100.5). Yes Venkat Murillo MD   naltrexone-buPROPion (CONTRAVE) 8-90 MG per extended release tablet Take 1 tablet bid  Patient not taking: Reported on 1/17/2023  ALLEN Newberry CNP   albuterol sulfate  (90 Base) MCG/ACT inhaler Inhale 2 puffs into the lungs every 6 hours as needed for Wheezing or Shortness of Breath  ALLEN Wayne CNP   nystatin (MYCOSTATIN) 660594 UNIT/ML suspension Take 5 mLs by mouth 4 times daily  Patient not taking: Reported on 1/17/2023  ALLEN Newberry CNP       CareTeam (Including outside providers/suppliers regularly involved in providing care):   Patient Care Team:  ALLEN Newberry CNP as PCP - General (Family Nurse Practitioner)  ALLEN Newberry CNP as PCP - [facility] Provider     Reviewed and updated this visit:  Tobacco  Allergies  Meds  Problems  Med Hx  Surg Hx  Soc Hx  Fam Hx      Controlled substances monitoring: possible medication side effects, risk of tolerance and/or dependence, and alternative treatments discussed, no signs of potential drug abuse or diversion identified, and [place]ARRS report reviewed today- activity consistent with treatment plan.

## 2023-01-17 NOTE — FLOWSHEET NOTE
Patient admitted to 2E03  via wheelchair. for peripheral angiogram. Patient NPO  Vital signs obtained. Assessment and data collection intiated. Oriented to room. Policies and procedures for 2E explained. All questions answered with no further questions at this time. Fall prevention and safety precautions discussed with patient. Explained patients right to have family, representative or physician notified of their admission. Patient has Declined for physician to be notified. Patient has Declined for family/representative to be notified.

## 2023-01-17 NOTE — FLOWSHEET NOTE
Discharge instructions with AVS review completed. Questions and concerns addressed. Left groin procedure site stable. Iv catheter removed. Pt preparing for discharge to home post peripheral angioplasty. Awaits ride home.

## 2023-01-18 NOTE — PROGRESS NOTES
1/17/23 Pt phoned for follow up post arteriogram. Spoke with pt and she states she is \"a little sore\" but denies any concerns at this time. She denies any bruising, bleeding or hematoma at puncture site.

## 2023-02-27 RX ORDER — CLOPIDOGREL BISULFATE 75 MG/1
TABLET ORAL
Qty: 45 TABLET | Refills: 3 | Status: SHIPPED | OUTPATIENT
Start: 2023-02-27

## 2023-03-17 ENCOUNTER — TELEPHONE (OUTPATIENT)
Dept: FAMILY MEDICINE CLINIC | Age: 63
End: 2023-03-17

## 2023-03-17 NOTE — TELEPHONE ENCOUNTER
Patient called in stating she needs refills on her . I told patient it was filled on 1/6/2023 with 5 refills.      I spoke to Cole Robison at  [facility]  and he states  that  there are refills left but Levimir flextouch no longer exists  it is now called Levemir flexpen so they need a new  script  to state Levemir flexpen      Spoke to patient to make her aware of this

## 2023-03-19 RX ORDER — INSULIN DETEMIR 100 [IU]/ML
73 INJECTION, SOLUTION SUBCUTANEOUS NIGHTLY
Qty: 5 ADJUSTABLE DOSE PRE-FILLED PEN SYRINGE | Refills: 3 | Status: SHIPPED | OUTPATIENT
Start: 2023-03-19

## 2023-03-21 ENCOUNTER — OFFICE VISIT (OUTPATIENT)
Dept: PULMONOLOGY | Age: 63
End: 2023-03-21
Payer: MEDICARE

## 2023-03-21 VITALS
BODY MASS INDEX: 41.18 KG/M2 | DIASTOLIC BLOOD PRESSURE: 64 MMHG | OXYGEN SATURATION: 97 % | TEMPERATURE: 98 F | SYSTOLIC BLOOD PRESSURE: 114 MMHG | HEART RATE: 67 BPM | HEIGHT: 67 IN | WEIGHT: 262.4 LBS

## 2023-03-21 DIAGNOSIS — J44.9 MODERATE COPD (CHRONIC OBSTRUCTIVE PULMONARY DISEASE) (HCC): Primary | ICD-10-CM

## 2023-03-21 DIAGNOSIS — F17.200 SMOKER UNMOTIVATED TO QUIT: ICD-10-CM

## 2023-03-21 DIAGNOSIS — F12.90 MARIJUANA SMOKER: ICD-10-CM

## 2023-03-21 DIAGNOSIS — J43.9 PULMONARY EMPHYSEMA, UNSPECIFIED EMPHYSEMA TYPE (HCC): ICD-10-CM

## 2023-03-21 DIAGNOSIS — E66.01 MORBID OBESITY WITH BMI OF 40.0-44.9, ADULT (HCC): ICD-10-CM

## 2023-03-21 PROCEDURE — 99214 OFFICE O/P EST MOD 30 MIN: CPT

## 2023-03-21 PROCEDURE — 99406 BEHAV CHNG SMOKING 3-10 MIN: CPT

## 2023-03-21 RX ORDER — NEBULIZER ACCESSORIES
1 KIT MISCELLANEOUS EVERY 6 HOURS PRN
Qty: 1 KIT | Refills: 1 | Status: SHIPPED | OUTPATIENT
Start: 2023-03-21 | End: 2024-03-20

## 2023-03-21 RX ORDER — GUAIFENESIN 600 MG/1
TABLET, EXTENDED RELEASE ORAL
COMMUNITY

## 2023-03-21 RX ORDER — ALBUTEROL SULFATE 2.5 MG/3ML
2.5 SOLUTION RESPIRATORY (INHALATION) EVERY 6 HOURS PRN
Qty: 120 EACH | Refills: 11 | Status: SHIPPED | OUTPATIENT
Start: 2023-03-21 | End: 2024-03-20

## 2023-03-21 ASSESSMENT — ENCOUNTER SYMPTOMS
SINUS PRESSURE: 0
COUGH: 1
RHINORRHEA: 0
SINUS PAIN: 0
WHEEZING: 1
SHORTNESS OF BREATH: 1
CHEST TIGHTNESS: 0

## 2023-03-21 NOTE — PATIENT INSTRUCTIONS
Continue Trelegy 1 puff daily. Rinse mouth with water, gargle, and spit after use. I am starting you on a saline nebulizer to use up to 3 times daily for chest congestion and thick sputum. This is NOT to be used as a rescue medication. Continue your albuterol inhaler and start albuterol nebulizer. You may use one or the other every 6 hours as needed for shortness of breath or wheezing. Do NOT use both at the same time as they continue the same medication. We will start Mucinex 1 (600 mg tablet) twice daily. If you pick this up over the counter, make sure to get plain Mucinex, not Mucinex DM. Make sure to quit smoking. I will see you back in Sept 2023 with your lung screening prior. Learning About Benefits From Quitting Smoking  How does quitting smoking make you healthier? If you're thinking about quitting smoking, you may have a few reasons to be smoke-free. Your health may be one of them. When you quit smoking, you lower your risks for cancer, lung disease, heart attack, stroke, blood vessel disease, and blindness from macular degeneration. When you're smoke-free, you get sick less often, and you heal faster. You are less likely to get colds, flu, bronchitis, and pneumonia. As a nonsmoker, you may find that your mood is better and you are less stressed. When and how will you feel healthier? Quitting has real health benefits that start from day 1 of being smoke-free. And the longer you stay smoke-free, the healthier you get and the better you feel. The first hours  After just 20 minutes, your blood pressure and heart rate go down. That means there's less stress on your heart and blood vessels. Within 12 hours, the level of carbon monoxide in your blood drops back to normal. That makes room for more oxygen. With more oxygen in your body, you may notice that you have more energy than when you smoked. After 2 weeks  Your lungs start to work better.   Your risk of heart attack starts

## 2023-03-24 DIAGNOSIS — R13.14 PHARYNGOESOPHAGEAL DYSPHAGIA: ICD-10-CM

## 2023-03-24 RX ORDER — PANTOPRAZOLE SODIUM 40 MG/1
TABLET, DELAYED RELEASE ORAL
Qty: 180 TABLET | Refills: 3 | Status: SHIPPED | OUTPATIENT
Start: 2023-03-24

## 2023-03-28 ENCOUNTER — TELEPHONE (OUTPATIENT)
Dept: PULMONOLOGY | Age: 63
End: 2023-03-28

## 2023-04-17 ENCOUNTER — HOSPITAL ENCOUNTER (OUTPATIENT)
Age: 63
Discharge: HOME | End: 2023-04-17
Payer: MEDICARE

## 2023-04-17 ENCOUNTER — HOSPITAL ENCOUNTER (OUTPATIENT)
Dept: INTERVENTIONAL RADIOLOGY/VASCULAR | Age: 63
Discharge: HOME | End: 2023-04-17
Payer: MEDICARE

## 2023-04-17 DIAGNOSIS — I70.221 ATHEROSCLEROSIS OF NATIVE ARTERY OF RIGHT LOWER EXTREMITY WITH REST PAIN (HCC): ICD-10-CM

## 2023-04-17 DIAGNOSIS — I73.9 PAD (PERIPHERAL ARTERY DISEASE) (HCC): Primary | ICD-10-CM

## 2023-04-17 LAB
25(OH)D3 SERPL-MCNC: 12 NG/ML (ref 30–100)
ALBUMIN SERPL BCG-MCNC: 4.6 G/DL (ref 3.5–5.1)
ALP SERPL-CCNC: 166 U/L (ref 38–126)
ALT SERPL W/O P-5'-P-CCNC: 19 U/L (ref 11–66)
ANION GAP SERPL CALC-SCNC: 11 MEQ/L (ref 8–16)
AST SERPL-CCNC: 16 U/L (ref 5–40)
BASOPHILS ABSOLUTE: 0.1 THOU/MM3 (ref 0–0.1)
BASOPHILS NFR BLD AUTO: 0.9 %
BILIRUB SERPL-MCNC: 0.4 MG/DL (ref 0.3–1.2)
BUN SERPL-MCNC: 11 MG/DL (ref 7–22)
CALCIUM SERPL-MCNC: 9.6 MG/DL (ref 8.5–10.5)
CHLORIDE SERPL-SCNC: 105 MEQ/L (ref 98–111)
CHOLEST SERPL-MCNC: 213 MG/DL (ref 100–199)
CO2 SERPL-SCNC: 25 MEQ/L (ref 23–33)
CREAT SERPL-MCNC: 0.7 MG/DL (ref 0.4–1.2)
DEPRECATED RDW RBC AUTO: 48.6 FL (ref 35–45)
EOSINOPHIL NFR BLD AUTO: 2.4 %
EOSINOPHILS ABSOLUTE: 0.2 THOU/MM3 (ref 0–0.4)
ERYTHROCYTE [DISTWIDTH] IN BLOOD BY AUTOMATED COUNT: 13.9 % (ref 11.5–14.5)
GFR SERPL CREATININE-BSD FRML MDRD: > 60 ML/MIN/1.73M2
GLUCOSE SERPL-MCNC: 167 MG/DL (ref 70–108)
HCT VFR BLD AUTO: 46.9 % (ref 37–47)
HDLC SERPL-MCNC: 38 MG/DL
HGB BLD-MCNC: 15.3 GM/DL (ref 12–16)
IMM GRANULOCYTES # BLD AUTO: 0.02 THOU/MM3 (ref 0–0.07)
IMM GRANULOCYTES NFR BLD AUTO: 0.2 %
LDLC SERPL CALC-MCNC: 126 MG/DL
LYMPHOCYTES ABSOLUTE: 2.7 THOU/MM3 (ref 1–4.8)
LYMPHOCYTES NFR BLD AUTO: 30.8 %
MCH RBC QN AUTO: 31.2 PG (ref 26–33)
MCHC RBC AUTO-ENTMCNC: 32.6 GM/DL (ref 32.2–35.5)
MCV RBC AUTO: 95.7 FL (ref 81–99)
MONOCYTES ABSOLUTE: 0.7 THOU/MM3 (ref 0.4–1.3)
MONOCYTES NFR BLD AUTO: 7.6 %
NEUTROPHILS NFR BLD AUTO: 58.1 %
NRBC BLD AUTO-RTO: 0 /100 WBC
PLATELET # BLD AUTO: 223 THOU/MM3 (ref 130–400)
PMV BLD AUTO: 11.5 FL (ref 9.4–12.4)
POTASSIUM SERPL-SCNC: 4.5 MEQ/L (ref 3.5–5.2)
PROT SERPL-MCNC: 7 G/DL (ref 6.1–8)
RBC # BLD AUTO: 4.9 MILL/MM3 (ref 4.2–5.4)
SEGMENTED NEUTROPHILS ABSOLUTE COUNT: 5.2 THOU/MM3 (ref 1.8–7.7)
SODIUM SERPL-SCNC: 141 MEQ/L (ref 135–145)
TRIGL SERPL-MCNC: 243 MG/DL (ref 0–199)
TSH SERPL DL<=0.005 MIU/L-ACNC: 2.06 UIU/ML (ref 0.4–4.2)
WBC # BLD AUTO: 8.9 THOU/MM3 (ref 4.8–10.8)

## 2023-04-17 PROCEDURE — 84443 ASSAY THYROID STIM HORMONE: CPT

## 2023-04-17 PROCEDURE — 36415 COLL VENOUS BLD VENIPUNCTURE: CPT

## 2023-04-17 PROCEDURE — 80053 COMPREHEN METABOLIC PANEL: CPT

## 2023-04-17 PROCEDURE — 82306 VITAMIN D 25 HYDROXY: CPT

## 2023-04-17 PROCEDURE — 85025 COMPLETE CBC W/AUTO DIFF WBC: CPT

## 2023-04-17 PROCEDURE — 99211 OFF/OP EST MAY X REQ PHY/QHP: CPT

## 2023-04-17 PROCEDURE — 93925 LOWER EXTREMITY STUDY: CPT

## 2023-04-17 PROCEDURE — 80061 LIPID PANEL: CPT

## 2023-04-19 ENCOUNTER — TELEPHONE (OUTPATIENT)
Dept: FAMILY MEDICINE CLINIC | Age: 63
End: 2023-04-19

## 2023-05-20 ENCOUNTER — HOSPITAL ENCOUNTER (OUTPATIENT)
Age: 63
Discharge: HOME | End: 2023-05-20
Payer: MEDICARE

## 2023-05-20 LAB
CORTIS SERPL-MCNC: 1.4 UG/DL
CORTISOL COLLECTION INFO: NORMAL

## 2023-05-20 PROCEDURE — 80299 QUANTITATIVE ASSAY DRUG: CPT

## 2023-05-20 PROCEDURE — 82533 TOTAL CORTISOL: CPT

## 2023-05-20 PROCEDURE — 36415 COLL VENOUS BLD VENIPUNCTURE: CPT

## 2023-05-23 NOTE — TELEPHONE ENCOUNTER
----- Message from ALLEN Sanz CNP sent at 8/3/2020  9:58 AM EDT -----  Let pt know her skin biopsy was negative for any cancer. The path report identified it as a wart.  Ask how her surgical site is doing
Pt informed and verbalized understanding.   Pt states the surgical site looks good, she has been using the neosporin to the area
Yes

## 2023-05-27 LAB — DEXAMETHASONE SERPL-MCNC: 427.8 NG/DL

## 2023-06-01 ENCOUNTER — HOSPITAL ENCOUNTER (OUTPATIENT)
Dept: MRI IMAGING | Age: 63
Discharge: HOME | End: 2023-06-01
Payer: MEDICARE

## 2023-06-01 ENCOUNTER — HOSPITAL ENCOUNTER (OUTPATIENT)
Dept: GENERAL RADIOLOGY | Age: 63
Discharge: HOME | End: 2023-06-01
Payer: MEDICARE

## 2023-06-01 ENCOUNTER — HOSPITAL ENCOUNTER (OUTPATIENT)
Age: 63
Discharge: HOME | End: 2023-06-01
Payer: MEDICARE

## 2023-06-01 DIAGNOSIS — M48.02 SPINAL STENOSIS IN CERVICAL REGION: ICD-10-CM

## 2023-06-01 PROCEDURE — 72082 X-RAY EXAM ENTIRE SPI 2/3 VW: CPT

## 2023-06-01 PROCEDURE — 72141 MRI NECK SPINE W/O DYE: CPT

## 2023-06-11 DIAGNOSIS — F33.42 RECURRENT MAJOR DEPRESSIVE DISORDER, IN FULL REMISSION (HCC): ICD-10-CM

## 2023-06-12 RX ORDER — VENLAFAXINE HYDROCHLORIDE 150 MG/1
150 CAPSULE, EXTENDED RELEASE ORAL DAILY
Qty: 90 CAPSULE | Refills: 3 | OUTPATIENT
Start: 2023-06-12

## 2023-07-17 ENCOUNTER — HOSPITAL ENCOUNTER (OUTPATIENT)
Dept: INTERVENTIONAL RADIOLOGY/VASCULAR | Age: 63
Discharge: HOME | End: 2023-07-17
Payer: MEDICARE

## 2023-07-17 DIAGNOSIS — I73.9 PAD (PERIPHERAL ARTERY DISEASE) (HCC): ICD-10-CM

## 2023-07-17 DIAGNOSIS — I70.221 ATHEROSCLEROSIS OF NATIVE ARTERY OF RIGHT LOWER EXTREMITY WITH REST PAIN (HCC): ICD-10-CM

## 2023-07-17 PROCEDURE — 93925 LOWER EXTREMITY STUDY: CPT

## 2023-08-01 ENCOUNTER — OFFICE VISIT (OUTPATIENT)
Dept: FAMILY MEDICINE CLINIC | Age: 63
End: 2023-08-01
Payer: MEDICARE

## 2023-08-01 VITALS
OXYGEN SATURATION: 94 % | SYSTOLIC BLOOD PRESSURE: 126 MMHG | DIASTOLIC BLOOD PRESSURE: 78 MMHG | WEIGHT: 261.8 LBS | BODY MASS INDEX: 41.09 KG/M2 | HEART RATE: 56 BPM | RESPIRATION RATE: 18 BRPM | HEIGHT: 67 IN | TEMPERATURE: 98 F

## 2023-08-01 DIAGNOSIS — E11.42 DIABETIC POLYNEUROPATHY ASSOCIATED WITH TYPE 2 DIABETES MELLITUS (HCC): Primary | ICD-10-CM

## 2023-08-01 DIAGNOSIS — R73.9 HYPERGLYCEMIA: ICD-10-CM

## 2023-08-01 DIAGNOSIS — Z79.4 TYPE 2 DIABETES MELLITUS WITH OTHER CIRCULATORY COMPLICATION, WITH LONG-TERM CURRENT USE OF INSULIN (HCC): ICD-10-CM

## 2023-08-01 DIAGNOSIS — F33.42 RECURRENT MAJOR DEPRESSIVE DISORDER, IN FULL REMISSION (HCC): ICD-10-CM

## 2023-08-01 DIAGNOSIS — H61.21 IMPACTED CERUMEN, RIGHT EAR: ICD-10-CM

## 2023-08-01 DIAGNOSIS — M79.7 FIBROMYALGIA: ICD-10-CM

## 2023-08-01 DIAGNOSIS — E55.9 VITAMIN D DEFICIENCY: ICD-10-CM

## 2023-08-01 DIAGNOSIS — G25.81 RLS (RESTLESS LEGS SYNDROME): ICD-10-CM

## 2023-08-01 DIAGNOSIS — E11.59 TYPE 2 DIABETES MELLITUS WITH OTHER CIRCULATORY COMPLICATION, WITH LONG-TERM CURRENT USE OF INSULIN (HCC): ICD-10-CM

## 2023-08-01 PROBLEM — K76.0 HEPATIC STEATOSIS: Status: RESOLVED | Noted: 2018-05-31 | Resolved: 2023-08-01

## 2023-08-01 LAB — HBA1C MFR BLD: 7.5 % (ref 4.3–5.7)

## 2023-08-01 PROCEDURE — 3051F HG A1C>EQUAL 7.0%<8.0%: CPT | Performed by: NURSE PRACTITIONER

## 2023-08-01 PROCEDURE — 99215 OFFICE O/P EST HI 40 MIN: CPT | Performed by: NURSE PRACTITIONER

## 2023-08-01 PROCEDURE — 69209 REMOVE IMPACTED EAR WAX UNI: CPT | Performed by: NURSE PRACTITIONER

## 2023-08-01 RX ORDER — VENLAFAXINE HYDROCHLORIDE 37.5 MG/1
CAPSULE, EXTENDED RELEASE ORAL
Qty: 90 CAPSULE | Refills: 3 | Status: CANCELLED | OUTPATIENT
Start: 2023-08-01

## 2023-08-01 RX ORDER — FLASH GLUCOSE SENSOR
KIT MISCELLANEOUS
Qty: 14 EACH | Refills: 3 | Status: SHIPPED | OUTPATIENT
Start: 2023-08-01

## 2023-08-01 RX ORDER — FLASH GLUCOSE SCANNING READER
EACH MISCELLANEOUS
Qty: 3 EACH | Refills: 3 | Status: SHIPPED | OUTPATIENT
Start: 2023-08-01

## 2023-08-01 RX ORDER — TIRZEPATIDE 5 MG/.5ML
5 INJECTION, SOLUTION SUBCUTANEOUS WEEKLY
Qty: 4 ADJUSTABLE DOSE PRE-FILLED PEN SYRINGE | Refills: 0 | Status: SHIPPED | OUTPATIENT
Start: 2023-08-01

## 2023-08-01 RX ORDER — VENLAFAXINE HYDROCHLORIDE 37.5 MG/1
CAPSULE, EXTENDED RELEASE ORAL
Qty: 90 CAPSULE | Refills: 3 | Status: SHIPPED | OUTPATIENT
Start: 2023-08-01

## 2023-08-01 RX ORDER — ROPINIROLE 1 MG/1
1 TABLET, FILM COATED ORAL NIGHTLY
Qty: 90 TABLET | Refills: 4 | Status: SHIPPED | OUTPATIENT
Start: 2023-08-01

## 2023-08-01 RX ORDER — PREGABALIN 300 MG/1
CAPSULE ORAL
Qty: 180 CAPSULE | Refills: 1 | Status: SHIPPED | OUTPATIENT
Start: 2023-08-01 | End: 2024-01-31

## 2023-08-01 SDOH — ECONOMIC STABILITY: FOOD INSECURITY: WITHIN THE PAST 12 MONTHS, YOU WORRIED THAT YOUR FOOD WOULD RUN OUT BEFORE YOU GOT MONEY TO BUY MORE.: NEVER TRUE

## 2023-08-01 SDOH — ECONOMIC STABILITY: FOOD INSECURITY: WITHIN THE PAST 12 MONTHS, THE FOOD YOU BOUGHT JUST DIDN'T LAST AND YOU DIDN'T HAVE MONEY TO GET MORE.: NEVER TRUE

## 2023-08-01 SDOH — ECONOMIC STABILITY: HOUSING INSECURITY
IN THE LAST 12 MONTHS, WAS THERE A TIME WHEN YOU DID NOT HAVE A STEADY PLACE TO SLEEP OR SLEPT IN A SHELTER (INCLUDING NOW)?: NO

## 2023-08-01 SDOH — ECONOMIC STABILITY: INCOME INSECURITY: HOW HARD IS IT FOR YOU TO PAY FOR THE VERY BASICS LIKE FOOD, HOUSING, MEDICAL CARE, AND HEATING?: NOT HARD AT ALL

## 2023-08-01 ASSESSMENT — PATIENT HEALTH QUESTIONNAIRE - PHQ9
1. LITTLE INTEREST OR PLEASURE IN DOING THINGS: 0
SUM OF ALL RESPONSES TO PHQ9 QUESTIONS 1 & 2: 2
SUM OF ALL RESPONSES TO PHQ QUESTIONS 1-9: 2
2. FEELING DOWN, DEPRESSED OR HOPELESS: 2
SUM OF ALL RESPONSES TO PHQ QUESTIONS 1-9: 2

## 2023-08-01 NOTE — PATIENT INSTRUCTIONS
Stop victoza when you start mounjaro  Take 5 mg mounjaro once a week for weeks 1-4  Decrease short acting insulin to 8 units TID and then further as BS decrease

## 2023-08-02 ENCOUNTER — HOSPITAL ENCOUNTER (OUTPATIENT)
Age: 63
Discharge: HOME | End: 2023-08-02
Payer: MEDICARE

## 2023-08-02 DIAGNOSIS — E55.9 VITAMIN D DEFICIENCY: ICD-10-CM

## 2023-08-02 DIAGNOSIS — R73.9 HYPERGLYCEMIA: ICD-10-CM

## 2023-08-02 DIAGNOSIS — E11.59 TYPE 2 DIABETES MELLITUS WITH OTHER CIRCULATORY COMPLICATION, WITH LONG-TERM CURRENT USE OF INSULIN (HCC): ICD-10-CM

## 2023-08-02 DIAGNOSIS — Z79.4 TYPE 2 DIABETES MELLITUS WITH OTHER CIRCULATORY COMPLICATION, WITH LONG-TERM CURRENT USE OF INSULIN (HCC): ICD-10-CM

## 2023-08-02 DIAGNOSIS — E11.42 DIABETIC POLYNEUROPATHY ASSOCIATED WITH TYPE 2 DIABETES MELLITUS (HCC): ICD-10-CM

## 2023-08-02 LAB
25(OH)D3 SERPL-MCNC: 22 NG/ML (ref 30–100)
C PEPTIDE SERPL-MCNC: 5 NG/ML (ref 1.1–4.4)

## 2023-08-02 PROCEDURE — 82306 VITAMIN D 25 HYDROXY: CPT

## 2023-08-02 PROCEDURE — 36415 COLL VENOUS BLD VENIPUNCTURE: CPT

## 2023-08-02 PROCEDURE — 86337 INSULIN ANTIBODIES: CPT

## 2023-08-02 PROCEDURE — 84681 ASSAY OF C-PEPTIDE: CPT

## 2023-08-03 ENCOUNTER — TELEPHONE (OUTPATIENT)
Dept: FAMILY MEDICINE CLINIC | Age: 63
End: 2023-08-03

## 2023-08-03 DIAGNOSIS — E55.9 VITAMIN D DEFICIENCY: Primary | ICD-10-CM

## 2023-08-03 RX ORDER — ERGOCALCIFEROL 1.25 MG/1
50000 CAPSULE ORAL WEEKLY
Qty: 12 CAPSULE | Refills: 1 | Status: SHIPPED | OUTPATIENT
Start: 2023-08-03 | End: 2023-10-02 | Stop reason: ALTCHOICE

## 2023-08-03 NOTE — TELEPHONE ENCOUNTER
I called and spoke to Lorenzolindsay Hernandez and she verbalized understanding and states that she was unable to afford to get the moujarao because it was too expensive. I sent lab orders to pt on 8/3/23.

## 2023-08-03 NOTE — TELEPHONE ENCOUNTER
I called and spoke to [address] [place] [address] and she states that it was not denied but rather she had to do step therapy meaning she has to try Ozempic, Victoza or Trulicity first.

## 2023-08-03 NOTE — TELEPHONE ENCOUNTER
----- Message from ALLEN Salvador - CNP sent at 8/3/2023  8:31 AM EDT -----  Let pt know her vitamin d level is still below range but better than previous, she had been at 12 and now up to 22, normal range is , I would like her to take high dose vitamin d for 12 weeks and then repeat labs, , please mail to her. Also ask if she was able to get the mounjaro medication.  Thanks

## 2023-08-03 NOTE — TELEPHONE ENCOUNTER
Pt has failed to reach glycemia control with victoza at maximum doses that is why the mounjaro was ordered, please PA the mounjaro with his information.   Thanks

## 2023-08-07 LAB — INSULIN HUMAN AB SER-ACNC: NORMAL

## 2023-08-20 DIAGNOSIS — E11.59 TYPE 2 DIABETES MELLITUS WITH OTHER CIRCULATORY COMPLICATION, WITH LONG-TERM CURRENT USE OF INSULIN (HCC): ICD-10-CM

## 2023-08-20 DIAGNOSIS — Z79.4 TYPE 2 DIABETES MELLITUS WITH OTHER CIRCULATORY COMPLICATION, WITH LONG-TERM CURRENT USE OF INSULIN (HCC): ICD-10-CM

## 2023-08-21 RX ORDER — PEN NEEDLE, DIABETIC 31 GX5/16"
NEEDLE, DISPOSABLE MISCELLANEOUS
Qty: 100 EACH | Refills: 2 | Status: SHIPPED | OUTPATIENT
Start: 2023-08-21

## 2023-08-21 NOTE — TELEPHONE ENCOUNTER
Recent Visits  Date Type Provider Dept   08/01/23 Office Visit Cathy Perry, APRULISSES - CNP [facility]   01/17/23 Office Visit Cathy Perry, APRN - CNP [facility]   12/14/22 Office Visit Cathy Perry, APRN - CNP [facility]   11/10/22 Office Visit Viry Keweenaw, DO [facility]   06/14/22 Office Visit Cathy Perry, APRN - CNP [facility]   03/14/22 Office Visit Cathy Perry, APRN - CNP [facility]   Showing recent visits within past 540 days with a meds authorizing provider and meeting all other requirements  Future Appointments  Date Type Provider Dept   09/05/23 Appointment ALLEN Daigle - CNP [facility]   Showing future appointments within next 150 days with a meds authorizing provider and meeting all other requirements

## 2023-08-31 DIAGNOSIS — E11.42 DIABETIC POLYNEUROPATHY ASSOCIATED WITH TYPE 2 DIABETES MELLITUS (HCC): ICD-10-CM

## 2023-08-31 DIAGNOSIS — Z79.4 TYPE 2 DIABETES MELLITUS WITH OTHER CIRCULATORY COMPLICATION, WITH LONG-TERM CURRENT USE OF INSULIN (HCC): ICD-10-CM

## 2023-08-31 DIAGNOSIS — E11.59 TYPE 2 DIABETES MELLITUS WITH OTHER CIRCULATORY COMPLICATION, WITH LONG-TERM CURRENT USE OF INSULIN (HCC): ICD-10-CM

## 2023-08-31 RX ORDER — TIRZEPATIDE 5 MG/.5ML
INJECTION, SOLUTION SUBCUTANEOUS
Qty: 2 ML | OUTPATIENT
Start: 2023-08-31

## 2023-08-31 NOTE — TELEPHONE ENCOUNTER
Recent Visits  Date Type Provider Dept   08/01/23 Office Visit ALLEN Tang CNP [facility]   01/17/23 Office Visit ALLEN Tang CNP [facility]   12/14/22 Office Visit ALLEN Tang CNP [facility]   11/10/22 Office Visit Donal Poole DO [facility]   06/14/22 Office Visit ALLEN Tang CNP [facility]   03/14/22 Office Visit ALLEN Tang CNP [facility]   Showing recent visits within past 540 days with a meds authorizing provider and meeting all other requirements  Future Appointments  Date Type Provider Dept   09/05/23 Appointment ALLEN Tang CNP [facility]   Showing future appointments within next 150 days with a meds authorizing provider and meeting all other requirements

## 2023-09-01 DIAGNOSIS — Z79.4 TYPE 2 DIABETES MELLITUS WITH OTHER CIRCULATORY COMPLICATION, WITH LONG-TERM CURRENT USE OF INSULIN (HCC): ICD-10-CM

## 2023-09-01 DIAGNOSIS — E11.59 TYPE 2 DIABETES MELLITUS WITH OTHER CIRCULATORY COMPLICATION, WITH LONG-TERM CURRENT USE OF INSULIN (HCC): ICD-10-CM

## 2023-09-01 NOTE — TELEPHONE ENCOUNTER
This medication refill is regarding a telephone request. Refill requested by patient. Requested Prescriptions     Pending Prescriptions Disp Refills    Tirzepatide (MOUNJARO) 7.5 MG/0.5ML SOPN SC injection 4 Adjustable Dose Pre-filled Pen Syringe 1     Sig: Inject 0.5 mLs into the skin once a week       Date of last visit: 8/1/2023   Date of next visit: 9/5/2023  Date of last refill:   4/1 8/31/2023      Last Lipid Panel:    Lab Results   Component Value Date/Time    CHOL 213 04/17/2023 12:03 PM    TRIG 243 04/17/2023 12:03 PM    HDL 38 04/17/2023 12:03 PM    LDLCALC 126 04/17/2023 12:03 PM     Last CMP:   Lab Results   Component Value Date     04/17/2023    K 4.5 04/17/2023     04/17/2023    CO2 25 04/17/2023    BUN 11 04/17/2023    CREATININE 0.7 04/17/2023    GLUCOSE 167 (H) 04/17/2023    CALCIUM 9.6 04/17/2023    PROT 7.0 04/17/2023    LABALBU 4.6 04/17/2023    BILITOT 0.4 04/17/2023    ALKPHOS 166 (H) 04/17/2023    AST 16 04/17/2023    ALT 19 04/17/2023    LABGLOM >60 04/17/2023       Last Thyroid:    Lab Results   Component Value Date    TSH 2.060 04/17/2023    T4FREE 1.07 11/01/2021     Last Hemoglobin A1C:    Lab Results   Component Value Date/Time    LABA1C 7.5 08/01/2023 01:18 PM    LABA1C 7.8 08/07/2020 09:42 AM    AVGG 174 08/07/2020 09:42 AM       Rx verified, ordered and set to EP.

## 2023-09-26 ENCOUNTER — HOSPITAL ENCOUNTER (OUTPATIENT)
Age: 63
Discharge: HOME | End: 2023-09-26
Payer: MEDICARE

## 2023-09-26 DIAGNOSIS — E55.9 VITAMIN D DEFICIENCY: ICD-10-CM

## 2023-09-26 LAB
25(OH)D3 SERPL-MCNC: 33 NG/ML (ref 30–100)
ANION GAP SERPL CALC-SCNC: 13 MEQ/L (ref 8–16)
BUN SERPL-MCNC: 12 MG/DL (ref 7–22)
CALCIUM SERPL-MCNC: 9.4 MG/DL (ref 8.5–10.5)
CHLORIDE SERPL-SCNC: 107 MEQ/L (ref 98–111)
CO2 SERPL-SCNC: 24 MEQ/L (ref 23–33)
CREAT SERPL-MCNC: 0.7 MG/DL (ref 0.4–1.2)
GFR SERPL CREATININE-BSD FRML MDRD: > 60 ML/MIN/1.73M2
GLUCOSE SERPL-MCNC: 87 MG/DL (ref 70–108)
POTASSIUM SERPL-SCNC: 3.9 MEQ/L (ref 3.5–5.2)
PTH-INTACT SERPL-MCNC: 60.5 PG/ML (ref 15–65)
SODIUM SERPL-SCNC: 144 MEQ/L (ref 135–145)

## 2023-09-26 PROCEDURE — 80048 BASIC METABOLIC PNL TOTAL CA: CPT

## 2023-09-26 PROCEDURE — 36415 COLL VENOUS BLD VENIPUNCTURE: CPT

## 2023-09-26 PROCEDURE — 82306 VITAMIN D 25 HYDROXY: CPT

## 2023-09-26 PROCEDURE — 83970 ASSAY OF PARATHORMONE: CPT

## 2023-09-27 ENCOUNTER — TELEPHONE (OUTPATIENT)
Dept: FAMILY MEDICINE CLINIC | Age: 63
End: 2023-09-27

## 2023-09-27 DIAGNOSIS — F33.42 RECURRENT MAJOR DEPRESSIVE DISORDER, IN FULL REMISSION (HCC): ICD-10-CM

## 2023-09-27 RX ORDER — TRAZODONE HYDROCHLORIDE 150 MG/1
TABLET ORAL
Qty: 90 TABLET | Refills: 3 | Status: SHIPPED | OUTPATIENT
Start: 2023-09-27

## 2023-09-27 NOTE — TELEPHONE ENCOUNTER
----- Message from Willam Lesches sent at 9/27/2023  2:21 PM EDT -----  Subject: Refill Request    QUESTIONS  Name of Medication? traZODone (DESYREL) 150 MG tablet  Patient-reported dosage and instructions? 150 mg once a day  How many days do you have left? 0  [id number]  Pharmacy phone number (if available)? [phone]  ---------------------------------------------------------------------------  --------------  CALL BACK INFO  What is the best way for the office to contact you? OK to leave message on   voicemail  Preferred Call Back Phone Number? [phone]  ---------------------------------------------------------------------------  --------------  SCRIPT ANSWERS  Relationship to Patient?  Self

## 2023-09-27 NOTE — TELEPHONE ENCOUNTER
Recent Visits  Date Type Provider Dept   08/01/23 Office Visit ALLEN Woodson CNP [facility]   01/17/23 Office Visit ALLEN Woodson CNP [facility]   12/14/22 Office Visit ALLEN Woodson CNP [facility]   11/10/22 Office Visit Latricia Ramsey,  [facility]   06/14/22 Office Visit ALLEN Woodson CNP [facility]   Showing recent visits within past 540 days with a meds authorizing provider and meeting all other requirements  Future Appointments  No visits were found meeting these conditions.   Showing future appointments within next 150 days with a meds authorizing provider and meeting all other requirements

## 2023-09-27 NOTE — TELEPHONE ENCOUNTER
----- Message from ALLEN Pedro CNP sent at 9/26/2023  5:22 PM EDT -----  Let pt know labs are stable and in appropriate range, vitamin d has increased to 33 at low end of normal continue with daily MVI and Vitamin d3 1000 units daily

## 2023-10-02 ENCOUNTER — HOSPITAL ENCOUNTER (OUTPATIENT)
Dept: CT IMAGING | Age: 63
Discharge: HOME | End: 2023-10-02
Payer: MEDICARE

## 2023-10-02 ENCOUNTER — OFFICE VISIT (OUTPATIENT)
Dept: FAMILY MEDICINE CLINIC | Age: 63
End: 2023-10-02
Payer: MEDICARE

## 2023-10-02 VITALS
TEMPERATURE: 97.5 F | HEART RATE: 58 BPM | WEIGHT: 267.2 LBS | SYSTOLIC BLOOD PRESSURE: 128 MMHG | HEIGHT: 67 IN | BODY MASS INDEX: 41.94 KG/M2 | RESPIRATION RATE: 18 BRPM | OXYGEN SATURATION: 95 % | DIASTOLIC BLOOD PRESSURE: 78 MMHG

## 2023-10-02 DIAGNOSIS — F17.210 CIGARETTE NICOTINE DEPENDENCE, UNCOMPLICATED: ICD-10-CM

## 2023-10-02 DIAGNOSIS — E78.2 HYPERLIPIDEMIA, MIXED: ICD-10-CM

## 2023-10-02 DIAGNOSIS — E11.42 DIABETIC POLYNEUROPATHY ASSOCIATED WITH TYPE 2 DIABETES MELLITUS (HCC): Primary | ICD-10-CM

## 2023-10-02 DIAGNOSIS — R04.0 EPISTAXIS: ICD-10-CM

## 2023-10-02 DIAGNOSIS — E66.01 CLASS 3 SEVERE OBESITY DUE TO EXCESS CALORIES WITH SERIOUS COMORBIDITY AND BODY MASS INDEX (BMI) OF 40.0 TO 44.9 IN ADULT (HCC): ICD-10-CM

## 2023-10-02 DIAGNOSIS — F32.4 MAJOR DEPRESSIVE DISORDER IN PARTIAL REMISSION, UNSPECIFIED WHETHER RECURRENT (HCC): ICD-10-CM

## 2023-10-02 DIAGNOSIS — Z99.81 SUPPLEMENTAL OXYGEN DEPENDENT: ICD-10-CM

## 2023-10-02 DIAGNOSIS — F33.42 RECURRENT MAJOR DEPRESSIVE DISORDER, IN FULL REMISSION (HCC): ICD-10-CM

## 2023-10-02 DIAGNOSIS — J43.9 PULMONARY EMPHYSEMA, UNSPECIFIED EMPHYSEMA TYPE (HCC): ICD-10-CM

## 2023-10-02 PROCEDURE — 99214 OFFICE O/P EST MOD 30 MIN: CPT | Performed by: NURSE PRACTITIONER

## 2023-10-02 PROCEDURE — 3051F HG A1C>EQUAL 7.0%<8.0%: CPT | Performed by: NURSE PRACTITIONER

## 2023-10-02 PROCEDURE — 71271 CT THORAX LUNG CANCER SCR C-: CPT

## 2023-10-02 RX ORDER — ECHINACEA PURPUREA EXTRACT 125 MG
1 TABLET ORAL PRN
Qty: 1 EACH | Refills: 3 | Status: SHIPPED | OUTPATIENT
Start: 2023-10-02

## 2023-10-02 RX ORDER — CHOLECALCIFEROL (VITAMIN D3) 50 MCG
2000 TABLET ORAL DAILY
Qty: 90 TABLET | Refills: 4 | Status: SHIPPED | OUTPATIENT
Start: 2023-10-02

## 2023-10-02 RX ORDER — VENLAFAXINE HYDROCHLORIDE 150 MG/1
150 CAPSULE, EXTENDED RELEASE ORAL DAILY
Qty: 90 CAPSULE | Refills: 4 | Status: SHIPPED | OUTPATIENT
Start: 2023-10-02

## 2023-10-02 RX ORDER — TIRZEPATIDE 10 MG/.5ML
10 INJECTION, SOLUTION SUBCUTANEOUS WEEKLY
Qty: 4 ADJUSTABLE DOSE PRE-FILLED PEN SYRINGE | Refills: 0 | Status: SHIPPED | OUTPATIENT
Start: 2023-10-02

## 2023-10-02 RX ORDER — ATORVASTATIN CALCIUM 20 MG/1
20 TABLET, FILM COATED ORAL DAILY
Qty: 90 TABLET | Refills: 4 | Status: SHIPPED | OUTPATIENT
Start: 2023-10-02

## 2023-10-02 NOTE — PROGRESS NOTES
([address] )     Class 3 severe obesity due to excess calories with serious comorbidity and body mass index (BMI) of 40.0 to 44.9 in adult [facility])     Supplemental oxygen dependent     Epistaxis     Recurrent major depressive disorder, in full remission ([address])     -T2Dm better controlled  -stop short acting insulin, decrease levemir to 65 units increase mounjaro to 10 mg weekly tolerating it well no SE  -decrease carbs and sugars and increase physical activity   -epistaxis likely johnson to air being dry start [facility]   -MDD controlled with effexor 225 mg daily  Continue with current plan   Controlled substances monitoring: possible medication side effects, risk of tolerance and/or dependence, and alternative treatments discussed, no signs of potential drug abuse or diversion identified, and OARRS report reviewed today- activity consistent with treatment plan. No orders of the defined types were placed in this encounter. Declines flu shot   Pt in agreement with plan    Over 41 minutes spent on visit time discussing SE of medications , carb counting and calorie restriction, and medication adjustments of insulin. reviewing past records and examining pt and documenting visit over 50% of time spent face to face. Return in about 2 months (around 12/2/2023) for A1C and f/u weight .

## 2023-10-05 NOTE — PROGRESS NOTES
humidification to O2  -Advised patient to  nasal saline spray  -She is to call with further epistaxis or hemoptysis     3. Cigarette nicotine dependence, uncomplicated  -Discussed with patient smoking cessation techniques, reinforced to patient the importance of quitting smoking to prevent further damage to lung function, patient verbalized understanding. (Approximately 3 mins)   Advised patient to quit and offered support. Educational material provided to patient. Advised patient to call office with any changes, questions, or concerns regarding respiratory status or issues with prescribed medications    Return in about 6 months (around 4/9/2024) for COPD check up.        Electronically signed by ALLEN Matson CNP on 10/9/2023 at 2:02 PM     (Please note that portions of this note may have been completed with a voice recognition program. Efforts were made to edit the dictation but occasionally words are mis-transcribed)

## 2023-10-09 ENCOUNTER — OFFICE VISIT (OUTPATIENT)
Dept: PULMONOLOGY | Age: 63
End: 2023-10-09
Payer: MEDICARE

## 2023-10-09 VITALS
HEIGHT: 67 IN | DIASTOLIC BLOOD PRESSURE: 80 MMHG | BODY MASS INDEX: 41.75 KG/M2 | HEART RATE: 60 BPM | SYSTOLIC BLOOD PRESSURE: 132 MMHG | OXYGEN SATURATION: 96 % | TEMPERATURE: 97.8 F | WEIGHT: 266 LBS

## 2023-10-09 DIAGNOSIS — G47.36 NOCTURNAL HYPOXEMIA DUE TO EMPHYSEMA (HCC): ICD-10-CM

## 2023-10-09 DIAGNOSIS — Z91.199 NON COMPLIANCE WITH MEDICAL TREATMENT: ICD-10-CM

## 2023-10-09 DIAGNOSIS — F17.210 CIGARETTE NICOTINE DEPENDENCE, UNCOMPLICATED: ICD-10-CM

## 2023-10-09 DIAGNOSIS — J43.9 PULMONARY EMPHYSEMA, UNSPECIFIED EMPHYSEMA TYPE (HCC): ICD-10-CM

## 2023-10-09 DIAGNOSIS — J44.9 MODERATE COPD (CHRONIC OBSTRUCTIVE PULMONARY DISEASE) (HCC): Primary | ICD-10-CM

## 2023-10-09 DIAGNOSIS — J43.9 NOCTURNAL HYPOXEMIA DUE TO EMPHYSEMA (HCC): ICD-10-CM

## 2023-10-09 PROCEDURE — 99406 BEHAV CHNG SMOKING 3-10 MIN: CPT

## 2023-10-09 PROCEDURE — 99214 OFFICE O/P EST MOD 30 MIN: CPT

## 2023-10-09 RX ORDER — FLUTICASONE FUROATE, UMECLIDINIUM BROMIDE AND VILANTEROL TRIFENATATE 100; 62.5; 25 UG/1; UG/1; UG/1
1 POWDER RESPIRATORY (INHALATION) DAILY
Qty: 3 EACH | Refills: 3 | Status: SHIPPED | OUTPATIENT
Start: 2023-10-09

## 2023-10-09 RX ORDER — ALBUTEROL SULFATE 90 UG/1
2 AEROSOL, METERED RESPIRATORY (INHALATION) EVERY 6 HOURS PRN
Qty: 8 G | Refills: 11 | Status: SHIPPED | OUTPATIENT
Start: 2023-10-09 | End: 2024-10-08

## 2023-10-09 ASSESSMENT — ENCOUNTER SYMPTOMS
COUGH: 1
WHEEZING: 1
RHINORRHEA: 1
SHORTNESS OF BREATH: 1
SINUS PAIN: 0
SINUS PRESSURE: 0
CHEST TIGHTNESS: 1

## 2023-11-07 DIAGNOSIS — E66.01 CLASS 3 SEVERE OBESITY DUE TO EXCESS CALORIES WITH SERIOUS COMORBIDITY AND BODY MASS INDEX (BMI) OF 40.0 TO 44.9 IN ADULT (HCC): ICD-10-CM

## 2023-11-07 DIAGNOSIS — E11.42 DIABETIC POLYNEUROPATHY ASSOCIATED WITH TYPE 2 DIABETES MELLITUS (HCC): ICD-10-CM

## 2023-11-07 RX ORDER — TIRZEPATIDE 10 MG/.5ML
INJECTION, SOLUTION SUBCUTANEOUS
Qty: 2 ML | Refills: 3 | Status: SHIPPED | OUTPATIENT
Start: 2023-11-07

## 2023-11-07 NOTE — TELEPHONE ENCOUNTER
Recent Visits  Date Type Provider Dept   10/02/23 Office Visit ALLEN Galvin CNP [facility]   08/01/23 Office Visit ALLEN Galvin CNP [facility]   01/17/23 Office Visit ALLEN Galvin CNP [facility]   12/14/22 Office Visit ALLEN Galvin CNP [facility]   11/10/22 Office Visit Sanford Garza DO [facility]   06/14/22 Office Visit Hilton Calderón, APRN - CNP [facility]   Showing recent visits within past 540 days with a meds authorizing provider and meeting all other requirements  Future Appointments  Date Type Provider Dept   12/05/23 Appointment Hilton Calderón APRN - CNP [facility]   Showing future appointments within next 150 days with a meds authorizing provider and meeting all other requirements

## 2023-12-07 ENCOUNTER — OFFICE VISIT (OUTPATIENT)
Dept: FAMILY MEDICINE CLINIC | Age: 63
End: 2023-12-07
Payer: MEDICARE

## 2023-12-07 VITALS
RESPIRATION RATE: 14 BRPM | HEIGHT: 67 IN | WEIGHT: 258.6 LBS | BODY MASS INDEX: 40.59 KG/M2 | TEMPERATURE: 97.8 F | SYSTOLIC BLOOD PRESSURE: 124 MMHG | OXYGEN SATURATION: 96 % | DIASTOLIC BLOOD PRESSURE: 78 MMHG | HEART RATE: 66 BPM

## 2023-12-07 DIAGNOSIS — R73.9 HYPERGLYCEMIA: ICD-10-CM

## 2023-12-07 DIAGNOSIS — E78.2 HYPERLIPIDEMIA, MIXED: ICD-10-CM

## 2023-12-07 DIAGNOSIS — J44.9 MODERATE COPD (CHRONIC OBSTRUCTIVE PULMONARY DISEASE) (HCC): ICD-10-CM

## 2023-12-07 DIAGNOSIS — Z12.31 ENCOUNTER FOR SCREENING MAMMOGRAM FOR BREAST CANCER: ICD-10-CM

## 2023-12-07 DIAGNOSIS — Z79.4 TYPE 2 DIABETES MELLITUS WITH OTHER CIRCULATORY COMPLICATION, WITH LONG-TERM CURRENT USE OF INSULIN (HCC): ICD-10-CM

## 2023-12-07 DIAGNOSIS — M79.7 FIBROMYALGIA: ICD-10-CM

## 2023-12-07 DIAGNOSIS — E11.42 DIABETIC POLYNEUROPATHY ASSOCIATED WITH TYPE 2 DIABETES MELLITUS (HCC): Primary | ICD-10-CM

## 2023-12-07 DIAGNOSIS — M54.50 LUMBAR PAIN: ICD-10-CM

## 2023-12-07 DIAGNOSIS — E11.59 TYPE 2 DIABETES MELLITUS WITH OTHER CIRCULATORY COMPLICATION, WITH LONG-TERM CURRENT USE OF INSULIN (HCC): ICD-10-CM

## 2023-12-07 DIAGNOSIS — F32.4 MAJOR DEPRESSIVE DISORDER IN PARTIAL REMISSION, UNSPECIFIED WHETHER RECURRENT (HCC): ICD-10-CM

## 2023-12-07 DIAGNOSIS — E66.01 MORBID OBESITY DUE TO EXCESS CALORIES (HCC): ICD-10-CM

## 2023-12-07 LAB — HBA1C MFR BLD: 8.8 % (ref 4.3–5.7)

## 2023-12-07 PROCEDURE — 99214 OFFICE O/P EST MOD 30 MIN: CPT | Performed by: NURSE PRACTITIONER

## 2023-12-07 PROCEDURE — 3052F HG A1C>EQUAL 8.0%<EQUAL 9.0%: CPT | Performed by: NURSE PRACTITIONER

## 2023-12-07 RX ORDER — BUPROPION HYDROCHLORIDE 150 MG/1
150 TABLET ORAL EVERY MORNING
Qty: 90 TABLET | Refills: 1 | Status: SHIPPED | OUTPATIENT
Start: 2023-12-07

## 2023-12-07 RX ORDER — TIRZEPATIDE 12.5 MG/.5ML
12.5 INJECTION, SOLUTION SUBCUTANEOUS WEEKLY
Qty: 2 ML | Refills: 3 | Status: SHIPPED | OUTPATIENT
Start: 2023-12-07

## 2023-12-07 NOTE — PROGRESS NOTES
noted.  Foot exam:  No pedal edema, no erythematous lesions noted b/l, sensation wnl b/l, PT and TP pulses wnl b/l    ASSESSMENT & PLAN  Maryann Richardson was seen today for follow-up. Diagnoses and all orders for this visit:  Encounter Diagnoses   Name Primary? Diabetic polyneuropathy associated with type 2 diabetes mellitus (HCC) Yes    Hyperlipidemia, mixed     Major depressive disorder in partial remission, unspecified whether recurrent (HCC)     Moderate COPD (chronic obstructive pulmonary disease) (HCC)     Hyperglycemia     Morbid obesity due to excess calories ([address])     Fibromyalgia     Encounter for screening mammogram for breast cancer     Type 2 diabetes mellitus with other circulatory complication, with long-term current use of insulin ([facility])     Lumbar pain       -T2Dm uncontrolled, increase mounjaro to 12.5 mg , decrease crackers to 2 a day and other carbs   --decrease carbs and sugars and increase physical activity   -epistaxis likely johnson to air being dry start [facility]   -MDD better with  with effexor 225 mg daily, but not at goal add wellbutrin 150 mg may also augment weight loss  -COPD stable  -nueuropathy controlled with lyrica  also sees PM on norco daily   Controlled substances monitoring: possible medication side effects, risk of tolerance and/or dependence, and alternative treatments discussed, no signs of potential drug abuse or diversion identified, and OARRS report reviewed today- activity consistent with treatment plan. Orders Placed This Encounter   Procedures    HERRERA DIGITAL SCREEN W OR WO CAD BILATERAL    POCT glycosylated hemoglobin (Hb A1C)      Declines flu shot   Pt in agreement with plan    Over 41 minutes spent on visit time discussing SE of medications , carb counting and calorie restriction, and medication adjustments of insulin. reviewing past records and examining pt and documenting visit over 50% of time spent face to face.        Return in about 3 months (around 3/7/2024) for A1C

## 2023-12-15 DIAGNOSIS — Z79.4 TYPE 2 DIABETES MELLITUS WITH OTHER CIRCULATORY COMPLICATION, WITH LONG-TERM CURRENT USE OF INSULIN (HCC): ICD-10-CM

## 2023-12-15 DIAGNOSIS — E11.42 DIABETIC POLYNEUROPATHY ASSOCIATED WITH TYPE 2 DIABETES MELLITUS (HCC): ICD-10-CM

## 2023-12-15 DIAGNOSIS — E11.59 TYPE 2 DIABETES MELLITUS WITH OTHER CIRCULATORY COMPLICATION, WITH LONG-TERM CURRENT USE OF INSULIN (HCC): ICD-10-CM

## 2023-12-15 RX ORDER — FLASH GLUCOSE SENSOR
KIT MISCELLANEOUS
Qty: 2 EACH | Refills: 3 | Status: SHIPPED | OUTPATIENT
Start: 2023-12-15

## 2024-01-17 ENCOUNTER — HOSPITAL ENCOUNTER (OUTPATIENT)
Dept: INTERVENTIONAL RADIOLOGY/VASCULAR | Age: 64
Discharge: HOME | End: 2024-01-17
Payer: MEDICARE

## 2024-01-17 ENCOUNTER — HOSPITAL ENCOUNTER (OUTPATIENT)
Dept: INTERVENTIONAL RADIOLOGY/VASCULAR | Age: 64
Discharge: HOME | End: 2024-01-19
Payer: MEDICARE

## 2024-01-17 DIAGNOSIS — I73.9 PAD (PERIPHERAL ARTERY DISEASE) (HCC): ICD-10-CM

## 2024-01-17 PROCEDURE — 93925 LOWER EXTREMITY STUDY: CPT

## 2024-01-28 DIAGNOSIS — E11.42 DIABETIC POLYNEUROPATHY ASSOCIATED WITH TYPE 2 DIABETES MELLITUS (HCC): ICD-10-CM

## 2024-01-28 DIAGNOSIS — M79.7 FIBROMYALGIA: ICD-10-CM

## 2024-01-29 RX ORDER — PREGABALIN 300 MG/1
CAPSULE ORAL
Qty: 180 CAPSULE | Refills: 1 | Status: SHIPPED | OUTPATIENT
Start: 2024-01-29 | End: 2024-07-29

## 2024-01-29 NOTE — TELEPHONE ENCOUNTER
Recent Visits  Date Type Provider Dept   12/07/23 Office Visit Hilton Alcala APRN - CNP [facility]   10/02/23 Office Visit Hilton Alcala APRN - CNP [facility]   08/01/23 Office Visit Hilton Alcala APRN - CNP [facility]   01/17/23 Office Visit Hilton Alcala APRN - CNP [facility]   12/14/22 Office Visit Hilton Alcala APRN - CNP [facility]   11/10/22 Office Visit Oumar Cramer DO [facility]   Showing recent visits within past 540 days with a meds authorizing provider and meeting all other requirements  Future Appointments  Date Type Provider Dept   03/07/24 Appointment Hilton Alcala APRN - CNP [facility]   Showing future appointments within next 150 days with a meds authorizing provider and meeting all other requirements

## 2024-02-05 RX ORDER — ERGOCALCIFEROL 1.25 MG/1
50000 CAPSULE ORAL WEEKLY
Qty: 12 CAPSULE | Refills: 1 | Status: SHIPPED | OUTPATIENT
Start: 2024-02-05

## 2024-03-07 ENCOUNTER — OFFICE VISIT (OUTPATIENT)
Dept: FAMILY MEDICINE CLINIC | Age: 64
End: 2024-03-07
Payer: MEDICARE

## 2024-03-07 VITALS
TEMPERATURE: 97.9 F | RESPIRATION RATE: 18 BRPM | BODY MASS INDEX: 38.14 KG/M2 | SYSTOLIC BLOOD PRESSURE: 126 MMHG | DIASTOLIC BLOOD PRESSURE: 82 MMHG | HEART RATE: 79 BPM | OXYGEN SATURATION: 97 % | HEIGHT: 67 IN | WEIGHT: 243 LBS

## 2024-03-07 DIAGNOSIS — E66.01 SEVERE OBESITY (BMI 35.0-39.9) WITH COMORBIDITY (HCC): ICD-10-CM

## 2024-03-07 DIAGNOSIS — R80.9 MICROALBUMINURIA: ICD-10-CM

## 2024-03-07 DIAGNOSIS — R13.14 PHARYNGOESOPHAGEAL DYSPHAGIA: ICD-10-CM

## 2024-03-07 DIAGNOSIS — Z00.01 ENCOUNTER FOR GENERAL ADULT MEDICAL EXAMINATION WITH ABNORMAL FINDINGS: ICD-10-CM

## 2024-03-07 DIAGNOSIS — F32.4 MAJOR DEPRESSIVE DISORDER IN PARTIAL REMISSION, UNSPECIFIED WHETHER RECURRENT (HCC): ICD-10-CM

## 2024-03-07 DIAGNOSIS — I70.221 ATHEROSCLEROSIS OF NATIVE ARTERY OF RIGHT LOWER EXTREMITY WITH REST PAIN (HCC): ICD-10-CM

## 2024-03-07 DIAGNOSIS — E55.9 VITAMIN D DEFICIENCY: ICD-10-CM

## 2024-03-07 DIAGNOSIS — E11.42 DIABETIC POLYNEUROPATHY ASSOCIATED WITH TYPE 2 DIABETES MELLITUS (HCC): ICD-10-CM

## 2024-03-07 DIAGNOSIS — J44.9 MODERATE COPD (CHRONIC OBSTRUCTIVE PULMONARY DISEASE) (HCC): ICD-10-CM

## 2024-03-07 DIAGNOSIS — Z00.00 MEDICARE ANNUAL WELLNESS VISIT, SUBSEQUENT: Primary | ICD-10-CM

## 2024-03-07 DIAGNOSIS — I73.9 PAD (PERIPHERAL ARTERY DISEASE) (HCC): ICD-10-CM

## 2024-03-07 DIAGNOSIS — Z79.899 CONTROLLED SUBSTANCE AGREEMENT SIGNED: ICD-10-CM

## 2024-03-07 DIAGNOSIS — M79.7 FIBROMYALGIA: ICD-10-CM

## 2024-03-07 LAB
AMPHETAMINES UR QL SCN: NEGATIVE
BARBITURATES UR QL SCN: NEGATIVE
BENZODIAZ UR QL SCN: NEGATIVE
BZE UR QL SCN: NEGATIVE
CANNABINOIDS UR QL SCN: POSITIVE
CREAT UR-MCNC: 289.2 MG/DL
HBA1C MFR BLD: 6.4 % (ref 4.3–5.7)
MICROALB/CREAT RATIO POC: ABNORMAL MG/G
MICROALBUMIN UR-MCNC: 2.73 MG/DL
MICROALBUMIN/CREAT RATIO PNL UR: 9 MG/G (ref 0–30)
MICROALBUMIN/CREAT UR-RTO: 150 MG/L
OPIATES UR QL SCN: POSITIVE
OXYCODONE: NEGATIVE
PCP UR QL SCN: NEGATIVE
POC CREATININE, URINE: 300 MG/DL

## 2024-03-07 PROCEDURE — G0439 PPPS, SUBSEQ VISIT: HCPCS | Performed by: NURSE PRACTITIONER

## 2024-03-07 PROCEDURE — 99214 OFFICE O/P EST MOD 30 MIN: CPT | Performed by: NURSE PRACTITIONER

## 2024-03-07 PROCEDURE — 3044F HG A1C LEVEL LT 7.0%: CPT | Performed by: NURSE PRACTITIONER

## 2024-03-07 RX ORDER — PREGABALIN 300 MG/1
CAPSULE ORAL
Qty: 180 CAPSULE | Refills: 1 | Status: SHIPPED | OUTPATIENT
Start: 2024-03-07 | End: 2024-09-05

## 2024-03-07 RX ORDER — BUPROPION HYDROCHLORIDE 300 MG/1
300 TABLET ORAL EVERY MORNING
Qty: 90 TABLET | Refills: 4 | Status: SHIPPED | OUTPATIENT
Start: 2024-03-07

## 2024-03-07 RX ORDER — DESVENLAFAXINE SUCCINATE 50 MG/1
50 TABLET, EXTENDED RELEASE ORAL DAILY
Qty: 30 TABLET | Refills: 3 | Status: SHIPPED | OUTPATIENT
Start: 2024-03-07

## 2024-03-07 RX ORDER — PANTOPRAZOLE SODIUM 40 MG/1
40 TABLET, DELAYED RELEASE ORAL
Qty: 180 TABLET | Refills: 3 | Status: SHIPPED | OUTPATIENT
Start: 2024-03-07

## 2024-03-07 ASSESSMENT — PATIENT HEALTH QUESTIONNAIRE - PHQ9
SUM OF ALL RESPONSES TO PHQ QUESTIONS 1-9: 16
1. LITTLE INTEREST OR PLEASURE IN DOING THINGS: 1
10. IF YOU CHECKED OFF ANY PROBLEMS, HOW DIFFICULT HAVE THESE PROBLEMS MADE IT FOR YOU TO DO YOUR WORK, TAKE CARE OF THINGS AT HOME, OR GET ALONG WITH OTHER PEOPLE: 2
3. TROUBLE FALLING OR STAYING ASLEEP: 3
5. POOR APPETITE OR OVEREATING: 2
SUM OF ALL RESPONSES TO PHQ QUESTIONS 1-9: 16
SUM OF ALL RESPONSES TO PHQ9 QUESTIONS 1 & 2: 4
7. TROUBLE CONCENTRATING ON THINGS, SUCH AS READING THE NEWSPAPER OR WATCHING TELEVISION: 0
2. FEELING DOWN, DEPRESSED OR HOPELESS: 3
9. THOUGHTS THAT YOU WOULD BE BETTER OFF DEAD, OR OF HURTING YOURSELF: 1
SUM OF ALL RESPONSES TO PHQ QUESTIONS 1-9: 16
SUM OF ALL RESPONSES TO PHQ QUESTIONS 1-9: 15
4. FEELING TIRED OR HAVING LITTLE ENERGY: 3
8. MOVING OR SPEAKING SO SLOWLY THAT OTHER PEOPLE COULD HAVE NOTICED. OR THE OPPOSITE, BEING SO FIGETY OR RESTLESS THAT YOU HAVE BEEN MOVING AROUND A LOT MORE THAN USUAL: 0
6. FEELING BAD ABOUT YOURSELF - OR THAT YOU ARE A FAILURE OR HAVE LET YOURSELF OR YOUR FAMILY DOWN: 3

## 2024-03-07 ASSESSMENT — COLUMBIA-SUICIDE SEVERITY RATING SCALE - C-SSRS
5. HAVE YOU STARTED TO WORK OUT OR WORKED OUT THE DETAILS OF HOW TO KILL YOURSELF? DO YOU INTEND TO CARRY OUT THIS PLAN?: NO
3. HAVE YOU BEEN THINKING ABOUT HOW YOU MIGHT KILL YOURSELF?: NO
2. HAVE YOU ACTUALLY HAD ANY THOUGHTS OF KILLING YOURSELF?: YES
1. WITHIN THE PAST MONTH, HAVE YOU WISHED YOU WERE DEAD OR WISHED YOU COULD GO TO SLEEP AND NOT WAKE UP?: YES
6. HAVE YOU EVER DONE ANYTHING, STARTED TO DO ANYTHING, OR PREPARED TO DO ANYTHING TO END YOUR LIFE?: NO
4. HAVE YOU HAD THESE THOUGHTS AND HAD SOME INTENTION OF ACTING ON THEM?: NO

## 2024-03-07 NOTE — PROGRESS NOTES
Medicare Annual Wellness Visit    Laurie Munroe is here for Health Maintenance (No recent eye exam) and Medicare AWV (No concerns)    Assessment & Plan   Medicare annual wellness visit, subsequent  Encounter for general adult medical examination with abnormal findings  Atherosclerosis of native artery of right lower extremity with rest pain ([facility])  -     Lipid Panel; Future  Stable   No symptoms   Severe obesity (BMI 35.0-39.9) with comorbidity ([facility])  Low fat low cholesterol diet  Daily aerobic activity   -     Lipid Panel; Future  Moderate COPD (chronic obstructive pulmonary disease) ([facility])  Pulmonary f/u   Assessment & Plan:   Well-controlled, continue current medications  Major depressive disorder in partial remission, unspecified whether recurrent ([facility])  -not controlled stop effexor and change over to pristiq increase wellbutrin to 300 mg daily   Assessment & Plan:   Uncontrolled, changes made today:   and lifestyle modifications recommended  Orders:  -     buPROPion (WELLBUTRIN XL) 300 MG extended release tablet; Take 1 tablet by mouth every morning, Disp-90 tablet, R-4Normal  PAD (peripheral artery disease) ([facility])  Diabetic polyneuropathy associated with type 2 diabetes mellitus ([facility])  At goal  Continue lyrica   Assessment & Plan:   Well-controlled, continue current medications and wiil decrease levemir due to hypoglycemia  Orders:  -     pregabalin (LYRICA) 300 MG capsule; TAKE 1 CAPSULE BY MOUTH TWICE DAILY, Disp-180 capsule, R-1Normal  -     insulin detemir (LEVEMIR FLEXPEN) 100 UNIT/ML injection pen; Inject 55 Units into the skin nightly, Disp-5 Adjustable Dose Pre-filled Pen Syringe, R-3Normal  -     Lipid Panel; Future  Pharyngoesophageal dysphagia  -     pantoprazole (PROTONIX) 40 MG tablet; Take 1 tablet by mouth 2 times daily (before meals), Disp-180 tablet, R-3Normal  Fibromyalgia  Controlled   -     pregabalin (LYRICA) 300 MG capsule; TAKE 1 CAPSULE BY MOUTH TWICE DAILY, Disp-180 capsule,

## 2024-03-22 DIAGNOSIS — E11.59 TYPE 2 DIABETES MELLITUS WITH OTHER CIRCULATORY COMPLICATION, WITH LONG-TERM CURRENT USE OF INSULIN (HCC): ICD-10-CM

## 2024-03-22 DIAGNOSIS — Z79.4 TYPE 2 DIABETES MELLITUS WITH OTHER CIRCULATORY COMPLICATION, WITH LONG-TERM CURRENT USE OF INSULIN (HCC): ICD-10-CM

## 2024-03-22 RX ORDER — FLASH GLUCOSE SENSOR
KIT MISCELLANEOUS
Qty: 2 EACH | Refills: 3 | Status: SHIPPED | OUTPATIENT
Start: 2024-03-22

## 2024-03-22 NOTE — TELEPHONE ENCOUNTER
Recent Visits  Date Type Provider Dept   03/07/24 Office Visit FredrickHilton, ALLEN - CNP [facility]   12/07/23 Office Visit Fredrick Hilton, APRN - CNP [facility]   10/02/23 Office Visit Fredrick Hilton, ALLEN - CNP [facility]   08/01/23 Office Visit FredrickHilton, APRN - CNP [facility]   01/17/23 Office Visit Fredrick Hilton, APRN - CNP [facility]   12/14/22 Office Visit Fredrick Hilton, ALLEN - CNP [facility]   11/10/22 Office Visit Oumar Cramer DO [facility]   Showing recent visits within past 540 days with a meds authorizing provider and meeting all other requirements  Future Appointments  Date Type Provider Dept   03/28/24 Appointment Hilton Alcala APRN - CNP [facility]   Showing future appointments within next 150 days with a meds authorizing provider and meeting all other requirements

## 2024-03-27 DIAGNOSIS — E66.01 MORBID OBESITY DUE TO EXCESS CALORIES (HCC): ICD-10-CM

## 2024-03-27 DIAGNOSIS — E11.42 DIABETIC POLYNEUROPATHY ASSOCIATED WITH TYPE 2 DIABETES MELLITUS (HCC): ICD-10-CM

## 2024-03-27 RX ORDER — TIRZEPATIDE 12.5 MG/.5ML
INJECTION, SOLUTION SUBCUTANEOUS
Qty: 2 ML | Refills: 3 | Status: SHIPPED | OUTPATIENT
Start: 2024-03-27

## 2024-03-27 NOTE — TELEPHONE ENCOUNTER
Recent Visits  Date Type Provider Dept   03/07/24 Office Visit FredrickHilton, ALLEN - CNP [facility]   12/07/23 Office Visit Fredrick Hilton, APRN - CNP [facility]   10/02/23 Office Visit Fredrick Hilton, ALLEN - CNP [facility]   08/01/23 Office Visit FredrickHilton, APRN - CNP [facility]   01/17/23 Office Visit Fredrick Hilton, APRN - CNP [facility]   12/14/22 Office Visit Fredrick Hilton, ALLEN - CNP [facility]   11/10/22 Office Visit Oumar Crmaer DO [facility]   Showing recent visits within past 540 days with a meds authorizing provider and meeting all other requirements  Future Appointments  Date Type Provider Dept   04/10/24 Appointment Hilton Alcala APRN - CNP [facility]   Showing future appointments within next 150 days with a meds authorizing provider and meeting all other requirements

## 2024-04-01 ENCOUNTER — HOSPITAL ENCOUNTER (OUTPATIENT)
Age: 64
Discharge: HOME | End: 2024-04-01
Payer: MEDICARE

## 2024-04-01 ENCOUNTER — TELEPHONE (OUTPATIENT)
Dept: FAMILY MEDICINE CLINIC | Age: 64
End: 2024-04-01

## 2024-04-01 DIAGNOSIS — E11.42 DIABETIC POLYNEUROPATHY ASSOCIATED WITH TYPE 2 DIABETES MELLITUS (HCC): ICD-10-CM

## 2024-04-01 DIAGNOSIS — E55.9 VITAMIN D DEFICIENCY: ICD-10-CM

## 2024-04-01 DIAGNOSIS — E66.01 SEVERE OBESITY (BMI 35.0-39.9) WITH COMORBIDITY (HCC): ICD-10-CM

## 2024-04-01 DIAGNOSIS — I70.221 ATHEROSCLEROSIS OF NATIVE ARTERY OF RIGHT LOWER EXTREMITY WITH REST PAIN (HCC): ICD-10-CM

## 2024-04-01 LAB
25(OH)D3 SERPL-MCNC: 66 NG/ML (ref 30–100)
CHOLEST SERPL-MCNC: 175 MG/DL (ref 100–199)
HDLC SERPL-MCNC: 34 MG/DL
LDLC SERPL CALC-MCNC: 103 MG/DL
TRIGL SERPL-MCNC: 189 MG/DL (ref 0–199)

## 2024-04-01 PROCEDURE — 36415 COLL VENOUS BLD VENIPUNCTURE: CPT

## 2024-04-01 PROCEDURE — 82306 VITAMIN D 25 HYDROXY: CPT

## 2024-04-01 PROCEDURE — 80061 LIPID PANEL: CPT

## 2024-04-01 NOTE — TELEPHONE ENCOUNTER
----- Message from ALLEN Wheatley CNP sent at 4/1/2024  5:11 PM EDT -----  Let pt know her vitamin D level is very improved! I recommend she continue 2000 units a day vitamin D3 , cholesterol has decreased from 213 to 175, continue current meds

## 2024-04-10 ENCOUNTER — OFFICE VISIT (OUTPATIENT)
Dept: FAMILY MEDICINE CLINIC | Age: 64
End: 2024-04-10
Payer: MEDICARE

## 2024-04-10 VITALS
SYSTOLIC BLOOD PRESSURE: 128 MMHG | RESPIRATION RATE: 18 BRPM | DIASTOLIC BLOOD PRESSURE: 78 MMHG | HEIGHT: 67 IN | BODY MASS INDEX: 37.67 KG/M2 | WEIGHT: 240 LBS | HEART RATE: 78 BPM | OXYGEN SATURATION: 96 % | TEMPERATURE: 97.9 F

## 2024-04-10 DIAGNOSIS — E11.42 DIABETIC POLYNEUROPATHY ASSOCIATED WITH TYPE 2 DIABETES MELLITUS (HCC): ICD-10-CM

## 2024-04-10 DIAGNOSIS — Z12.31 ENCOUNTER FOR SCREENING MAMMOGRAM FOR MALIGNANT NEOPLASM OF BREAST: ICD-10-CM

## 2024-04-10 DIAGNOSIS — M54.50 LUMBAR PAIN: ICD-10-CM

## 2024-04-10 DIAGNOSIS — E11.59 TYPE 2 DIABETES MELLITUS WITH OTHER CIRCULATORY COMPLICATION, WITH LONG-TERM CURRENT USE OF INSULIN (HCC): Primary | ICD-10-CM

## 2024-04-10 DIAGNOSIS — E66.01 MORBID OBESITY (HCC): ICD-10-CM

## 2024-04-10 DIAGNOSIS — Z79.4 TYPE 2 DIABETES MELLITUS WITH OTHER CIRCULATORY COMPLICATION, WITH LONG-TERM CURRENT USE OF INSULIN (HCC): Primary | ICD-10-CM

## 2024-04-10 DIAGNOSIS — F32.4 MAJOR DEPRESSIVE DISORDER IN PARTIAL REMISSION, UNSPECIFIED WHETHER RECURRENT (HCC): ICD-10-CM

## 2024-04-10 PROCEDURE — 99214 OFFICE O/P EST MOD 30 MIN: CPT | Performed by: NURSE PRACTITIONER

## 2024-04-10 PROCEDURE — 3044F HG A1C LEVEL LT 7.0%: CPT | Performed by: NURSE PRACTITIONER

## 2024-04-10 RX ORDER — DESVENLAFAXINE 25 MG/1
25 TABLET, EXTENDED RELEASE ORAL DAILY
Qty: 30 TABLET | Refills: 3 | Status: SHIPPED | OUTPATIENT
Start: 2024-04-10 | End: 2024-04-10 | Stop reason: SINTOL

## 2024-04-10 RX ORDER — DESVENLAFAXINE SUCCINATE 50 MG/1
50 TABLET, EXTENDED RELEASE ORAL DAILY
Qty: 30 TABLET | Refills: 3 | Status: SHIPPED | OUTPATIENT
Start: 2024-04-10

## 2024-04-10 RX ORDER — BUPROPION HYDROCHLORIDE 450 MG/1
450 TABLET, FILM COATED, EXTENDED RELEASE ORAL EVERY MORNING
Qty: 90 TABLET | Refills: 1 | Status: SHIPPED | OUTPATIENT
Start: 2024-04-10

## 2024-04-10 NOTE — PROGRESS NOTES
Standing Expiration Date:   6/10/2025    Lipid Panel     Standing Status:   Future     Standing Expiration Date:   4/10/2025     Order Specific Question:   Is Patient Fasting?/# of Hours     Answer:   10-12 Hrs    Gabi Braxton CNP, Psychiatry, [place]     Referral Priority:   Routine     Referral Type:   Eval and Treat     Referral Reason:   Specialty Services Required     Referred to Provider:   Gabi Haywood APRN - CNP     Requested Specialty:   Psychiatry     Number of Visits Requested:   1    Handicap Placard MISC     Sig: by Does not apply route Duration: three years     Dispense:  1 each     Refill:  0    buPROPion 450 MG TB24     Sig: Take 450 mg by mouth every morning     Dispense:  90 tablet     Refill:  1    DISCONTD: desvenlafaxine succinate (PRISTIQ) 25 MG TB24 extended release tablet     Sig: Take 1 tablet by mouth daily     Dispense:  30 tablet     Refill:  3    desvenlafaxine succinate (PRISTIQ) 50 MG TB24 extended release tablet     Sig: Take 1 tablet by mouth daily     Dispense:  30 tablet     Refill:  3      Return in about 3 months (around 7/10/2024) for A1C and med refill .

## 2024-04-11 ENCOUNTER — TELEPHONE (OUTPATIENT)
Dept: FAMILY MEDICINE CLINIC | Age: 64
End: 2024-04-11

## 2024-04-11 DIAGNOSIS — E11.42 DIABETIC POLYNEUROPATHY ASSOCIATED WITH TYPE 2 DIABETES MELLITUS (HCC): ICD-10-CM

## 2024-04-11 DIAGNOSIS — E66.01 MORBID OBESITY DUE TO EXCESS CALORIES (HCC): ICD-10-CM

## 2024-04-11 RX ORDER — TIRZEPATIDE 12.5 MG/.5ML
INJECTION, SOLUTION SUBCUTANEOUS
Qty: 2 ML | Refills: 3 | Status: SHIPPED | OUTPATIENT
Start: 2024-04-11

## 2024-04-11 NOTE — TELEPHONE ENCOUNTER
Tried to reach patient to see if she ever started the Pristiq?    Left message on answering machine requesting pt to call back at earliest convenience.

## 2024-04-11 NOTE — TELEPHONE ENCOUNTER
----- Message from Mariaelena Santo sent at 4/11/2024  9:56 AM EDT -----  Regarding: ECC Message to Provider  ECC Message to Provider    Relationship to Patient: Self     Additional Information: the patient is calling for the practice for refill of her medication ( maunjaro 2.5mg) but no one is answering.   --------------------------------------------------------------------------------------------------------------------------    Call Back Information: OK to leave message on voicemail  Preferred Call Back Number: Phone [phone]

## 2024-04-11 NOTE — TELEPHONE ENCOUNTER
Pt is picking up the script for the Pristiq today from "[facility]    Pt also called about the Mounjaro script. There are no local pharmacies that have the Mounjaro. Pt is asking Hilton to send a script to her mail pharmacy, which is [facility].     The script has been pended with the correct pharmacy.

## 2024-04-29 DIAGNOSIS — E11.59 TYPE 2 DIABETES MELLITUS WITH OTHER CIRCULATORY COMPLICATION, WITH LONG-TERM CURRENT USE OF INSULIN (HCC): ICD-10-CM

## 2024-04-29 DIAGNOSIS — Z79.4 TYPE 2 DIABETES MELLITUS WITH OTHER CIRCULATORY COMPLICATION, WITH LONG-TERM CURRENT USE OF INSULIN (HCC): ICD-10-CM

## 2024-04-29 DIAGNOSIS — E11.42 DIABETIC POLYNEUROPATHY ASSOCIATED WITH TYPE 2 DIABETES MELLITUS (HCC): ICD-10-CM

## 2024-04-29 RX ORDER — FLASH GLUCOSE SCANNING READER
EACH MISCELLANEOUS
Qty: 3 EACH | Refills: 3 | Status: SHIPPED | OUTPATIENT
Start: 2024-04-29

## 2024-04-29 RX ORDER — FLASH GLUCOSE SENSOR
KIT MISCELLANEOUS
Qty: 14 EACH | Refills: 3 | Status: SHIPPED | OUTPATIENT
Start: 2024-04-29

## 2024-04-29 NOTE — TELEPHONE ENCOUNTER
Pharmacy called stating the Dylan Freestyle 14 has been discontinued.     A new script for Dylan Freestyle 2 reader and sensor are needing to be sent to the pharmacy.    Orders pending, please review

## 2024-06-13 RX ORDER — DAPAGLIFLOZIN 10 MG/1
10 TABLET, FILM COATED ORAL EVERY MORNING
Qty: 90 TABLET | Refills: 1 | Status: SHIPPED | OUTPATIENT
Start: 2024-06-13

## 2024-06-13 NOTE — TELEPHONE ENCOUNTER
Recent Visits  Date Type Provider Dept   04/10/24 Office Visit Hilton Alcala APRN - CNP [facility]   03/07/24 Office Visit Hilton Alcala APRN - CNP [facility]   12/07/23 Office Visit Hilton Alcala APRN - CNP [facility]   10/02/23 Office Visit Hilton Alcala APRN - CNP [facility]   08/01/23 Office Visit Hilton Alcala APRN - CNP [facility]   01/17/23 Office Visit Hilton Alcala APRN - CNP [facility]   Showing recent visits within past 540 days with a meds authorizing provider and meeting all other requirements  Future Appointments  Date Type Provider Dept   07/10/24 Appointment Hilton Alcala APRN - CNP [facility]   Showing future appointments within next 150 days with a meds authorizing provider and meeting all other requirements

## 2024-07-17 DIAGNOSIS — Z79.4 TYPE 2 DIABETES MELLITUS WITH OTHER CIRCULATORY COMPLICATION, WITH LONG-TERM CURRENT USE OF INSULIN (HCC): ICD-10-CM

## 2024-07-17 DIAGNOSIS — E11.59 TYPE 2 DIABETES MELLITUS WITH OTHER CIRCULATORY COMPLICATION, WITH LONG-TERM CURRENT USE OF INSULIN (HCC): ICD-10-CM

## 2024-07-17 RX ORDER — TIRZEPATIDE 7.5 MG/.5ML
INJECTION, SOLUTION SUBCUTANEOUS
Qty: 2 ML | OUTPATIENT
Start: 2024-07-17

## 2024-10-01 NOTE — TELEPHONE ENCOUNTER
Please contact pt and remind her of her upcoming madi ton 6/14/2022 and she also is overdue for her mammogram please see if she can get it scheduled on th 14 since she will be going out that day and get both appt done on the same day for convenience Thanks!  Orders paced Progressing  8/27: waking once every 6 hours, less pain when waking up in the mornings Progressing  PN 9/3: waking once every 6 hours, less pain when waking up in the mornings Progressing  PN 10/1: patient awakening 1x per night (around 4 am) with minimal pain Progressing     Patient will report delaying urinary urge 10 to 15 minutes to improve QOL and productivity.       Eval: Patient reports immediate urge, unable to suppress  PN 8/1/24: completes urge suppression techniques, but then immediately urinates, progressing  8/6: patient reports less intensity with urge suppression, 3 minutes Progressing  PN 9/3: 3-5 minutes Progressing   PN 10/1: 3-5 minutes (RLQ pain onset if holding for longer)     Patient will be able to elicit PFMC without verbal or tactile cueing and no accessory substitution or breath holding to improve strength and decrease reported symptoms.  Eval: patient educated on pelvic floor anatomy, function, and performance of PFC  7/16: PERF 2/6/4/6  PN 8/1/24: patient report improving performance and ease of contraction, progressing  PN 9/3: deferred  PN 10/1: deferred     MET Patient will demonstrate/verbalize appropriate toileting postures, modifications, and breathing techniques to reduce strain on pelvic floor and organs contributing to symptoms.   Eval: patient unaware of toileting modifications to reduce strain; patient reports straining frequency of 3 times per week  PN 8/1/24: utilizing stool to raise knees on toilet, progressing  PN 9/3: patient reports no longer feeling constipation, compliant with toilet positioning  modification MET     Long term goals: To be achieved in 12 treatments:  MET Patient will report bladder continence 90% of the time with cough/sneeze/laugh, walking to the toilet, and ADLs requiring abdominal pressure change, such as transfers and lifting.   Eval: pt stress & urgency incontinence 3-4 times per week  PN 8/1/24: decreased frequency, progressing  PN 9/3: patient  Therapy at [facility]  [address] [place] [address]  Ph [phone]  Fx [phone]

## 2024-10-06 DIAGNOSIS — J43.9 PULMONARY EMPHYSEMA, UNSPECIFIED EMPHYSEMA TYPE (HCC): ICD-10-CM

## 2024-10-06 DIAGNOSIS — J44.9 MODERATE COPD (CHRONIC OBSTRUCTIVE PULMONARY DISEASE) (HCC): ICD-10-CM

## 2024-10-07 RX ORDER — CHOLECALCIFEROL (VITAMIN D3) 50 MCG
1 TABLET ORAL DAILY
Qty: 90 TABLET | Refills: 4 | Status: SHIPPED | OUTPATIENT
Start: 2024-10-07

## 2024-10-07 RX ORDER — FLUTICASONE FUROATE, UMECLIDINIUM BROMIDE AND VILANTEROL TRIFENATATE 100; 62.5; 25 UG/1; UG/1; UG/1
1 POWDER RESPIRATORY (INHALATION) DAILY
Qty: 1 EACH | Refills: 0 | Status: SHIPPED | OUTPATIENT
Start: 2024-10-07

## 2024-10-07 NOTE — TELEPHONE ENCOUNTER
Called pt to inform of refill.  Pt states she no longer takes this inhaler.  Pt asked that I call the pharmacy and disregard this refill.    Called [facility] and spoke w/pharmacy tech and informed to disregard the script for Trelegy sent today as pt states she no longer takes this med.  Pharmacy tech verbalized understanding.

## 2024-10-07 NOTE — TELEPHONE ENCOUNTER
I will send one however patient has not been seen in a year and needs a follow up for further refills. Thanks!

## 2024-10-07 NOTE — TELEPHONE ENCOUNTER
Patient calling in regards to medication Trelegy.    Patient reports that they need a refill for Trelegy    Last office appointment 10/9/23    Patient is not scheduled for follow-up in office.    Last seen by Sandra Hu CNP    Medication refill routed to established provider Sandra Hu CNP     Patient requests 30 day supply.     [facility]

## 2024-10-07 NOTE — TELEPHONE ENCOUNTER
Recent Visits  Date Type Provider Dept   04/10/24 Office Visit FredrickHilton, APRN - CNP [facility]   03/07/24 Office Visit Fredrick Hilton, APRN - CNP [facility]   12/07/23 Office Visit Fredrick Hilton, APRN - CNP [facility]   10/02/23 Office Visit Fredrick Hilton, APRN - CNP [facility]   08/01/23 Office Visit Fredrick Hilton, APRN - CNP [facility]   Showing recent visits within past 540 days with a meds authorizing provider and meeting all other requirements  Future Appointments  No visits were found meeting these conditions.  Showing future appointments within next 150 days with a meds authorizing provider and meeting all other requirements

## 2024-11-28 ENCOUNTER — HOSPITAL ENCOUNTER (EMERGENCY)
Age: 64
Discharge: HOME | End: 2024-11-28
Attending: EMERGENCY MEDICINE
Payer: MEDICARE

## 2024-11-28 VITALS
OXYGEN SATURATION: 97 % | SYSTOLIC BLOOD PRESSURE: 127 MMHG | HEIGHT: 67 IN | HEART RATE: 62 BPM | TEMPERATURE: 97.7 F | BODY MASS INDEX: 33.18 KG/M2 | WEIGHT: 211.4 LBS | RESPIRATION RATE: 18 BRPM | DIASTOLIC BLOOD PRESSURE: 82 MMHG
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
## 2024-11-28 PROCEDURE — 6370000000 HC RX 637 (ALT 250 FOR IP): Performed by: EMERGENCY MEDICINE

## 2024-11-28 PROCEDURE — 90715 TDAP VACCINE 7 YRS/> IM: CPT | Performed by: EMERGENCY MEDICINE

## 2024-11-28 PROCEDURE — 6360000002 HC RX W HCPCS: Performed by: EMERGENCY MEDICINE

## 2024-11-28 PROCEDURE — 99284 EMERGENCY DEPT VISIT MOD MDM: CPT

## 2024-11-28 PROCEDURE — 90471 IMMUNIZATION ADMIN: CPT | Performed by: EMERGENCY MEDICINE

## 2024-11-28 RX ADMIN — AMOXICILLIN AND CLAVULANATE POTASSIUM 1 TABLET: 875; 125 TABLET, FILM COATED ORAL at 07:00

## 2024-11-28 RX ADMIN — TETANUS TOXOID, REDUCED DIPHTHERIA TOXOID AND ACELLULAR PERTUSSIS VACCINE, ADSORBED 0.5 ML: 5; 2.5; 8; 8; 2.5 SUSPENSION INTRAMUSCULAR at 07:35

## 2024-11-28 ASSESSMENT — PAIN DESCRIPTION - LOCATION: LOCATION: HAND

## 2024-11-28 ASSESSMENT — PAIN DESCRIPTION - ORIENTATION: ORIENTATION: LEFT

## 2024-11-28 ASSESSMENT — PAIN DESCRIPTION - DESCRIPTORS: DESCRIPTORS: THROBBING

## 2024-11-28 ASSESSMENT — PAIN - FUNCTIONAL ASSESSMENT
PAIN_FUNCTIONAL_ASSESSMENT: ACTIVITIES ARE NOT PREVENTED
PAIN_FUNCTIONAL_ASSESSMENT: 0-10

## 2024-11-28 ASSESSMENT — PAIN SCALES - GENERAL: PAINLEVEL_OUTOF10: 6

## 2024-11-28 NOTE — ED NOTES
Pt presents to ED c/o cat bite to left hand. Pt states this occurred last night around 2100, no bleeding noted at this time. Pt states the cat is a stray. Pt states cleaning wound out with water and peroxide and applying neosporin.

## 2024-11-28 NOTE — ED PROVIDER NOTES
[facility] EMERGENCY DEPT      EMERGENCY MEDICINE     Pt Name: Laurie Munroe  MRN: [id number]  Birthdate 1960  Date of evaluation: 11/28/2024  Provider: Sanford Ayers DO  Supervising Physician: Chon Mcdowell MD    CHIEF COMPLAINT       Chief Complaint   Patient presents with    Animal Bite     Left hand     HISTORY OF PRESENT ILLNESS   Laurie Munroe is a 64 y.o. female with a history of DM who presents to the emergency department from home for evaluation of a cat bite that occurred to the left hand around 9 PM last night.  Patient states this was a stray cat who was in her house all day long, she 5 located cat chased around went to grab it when it bit her on the dorsum of the hand.  Patient states she merely washed out with peroxide, water and then put Neosporin on it.  Patient complaining of persistent pain, she does not think there is any teeth left in it.  Tetanus is not up-to-date.  She does not think the cat is vaccinated.    PASTMEDICAL HISTORY     Past Medical History:   Diagnosis Date    Anxiety     Arthritis     Carpal tunnel syndrome     Cellulitis     Chronic back pain     Depression     Diabetes mellitus (HCC)     Emphysema of lung (HCC)     Fibromyalgia     GERD (gastroesophageal reflux disease)     Glaucoma     Hiatal hernia     esophagus closes    Hx of blood clots     foot post op    Hyperlipidemia, mixed 7/12/2017    IBS (irritable bowel syndrome)     Leg pain     nerve damage right leg    MDRO (multiple drug resistant organisms) resistance 2008    wound and nasal    Guzman's neuroma     left foot    Nausea & vomiting     Neuropathic pain     Osteoarthritis     PONV (postoperative nausea and vomiting)     Pulmonary emphysema ([facility]) 7/12/2017    Sleep apnea     DOES NOT USE CPAP       Patient Active Problem List   Diagnosis Code    Diabetic polyneuropathy associated with type 2 diabetes mellitus ([facility]) E11.42    Pulmonary emphysema ([facility]) J43.9    Hyperlipidemia, mixed E78.2    Herniated

## 2024-11-28 NOTE — DISCHARGE INSTRUCTIONS
You are seen here today for a cat bite.  Make sure to wash it with soap and water daily.  Take Augmentin as prescribed.  Take Tylenol as needed for pain.  Return here if you develop significant worsening pain, swelling, or difficulty moving your fingers.

## 2024-11-28 NOTE — ED PROVIDER NOTES
[facility]  EMERGENCY MEDICINE ATTENDING ATTESTATION      Evaluation of Laurie VILLARREAL Shaneka.   Case discussed and care plan developed with resident physician.   I agree with the resident physician documentation and plan as documented by him, except if my documentation differs.   Patient seen, interviewed and examined by me.  I reviewed the medical, surgical, family and social history, medications and allergies.   I have reviewed and interpreted all available lab, radiology and ekg results available at the moment.  I have reviewed the nursing documentation.       Please see the resident physician completed note for final disposition except as documented on this attestation.   I have reviewed and interpreted all available lab, radiology and ekg results available at the moment.  Diagnosis, treatment and disposition plans were discussed and agreed upon by patient.   This transcription was electronically signed. It was dictated by use of voice recognition software and electronically transcribed. The transcription may contain errors not detected in proofreading.     I performed direct supervision and was present for the critical portion following procedures: None  Critical care time on this case: None    Electronically signed by Chon Mcdowell MD on 11/28/24 at 7:16 AM Chon Umaña MD  11/28/24 0716

## 2024-12-10 DIAGNOSIS — Z79.4 TYPE 2 DIABETES MELLITUS WITH OTHER CIRCULATORY COMPLICATION, WITH LONG-TERM CURRENT USE OF INSULIN (HCC): ICD-10-CM

## 2024-12-10 DIAGNOSIS — E11.59 TYPE 2 DIABETES MELLITUS WITH OTHER CIRCULATORY COMPLICATION, WITH LONG-TERM CURRENT USE OF INSULIN (HCC): ICD-10-CM

## 2024-12-10 RX ORDER — PEN NEEDLE, DIABETIC 31 GX5/16"
NEEDLE, DISPOSABLE MISCELLANEOUS
Qty: 100 EACH | Refills: 2 | OUTPATIENT
Start: 2024-12-10

## 2025-03-18 ENCOUNTER — HOSPITAL ENCOUNTER (OUTPATIENT)
Age: 65
Discharge: HOME | End: 2025-03-18
Payer: MEDICARE

## 2025-03-18 ENCOUNTER — HOSPITAL ENCOUNTER (OUTPATIENT)
Dept: GENERAL RADIOLOGY | Age: 65
Discharge: HOME | End: 2025-03-18
Payer: MEDICARE

## 2025-03-18 DIAGNOSIS — M25.561 PAIN IN JOINT OF RIGHT KNEE: ICD-10-CM

## 2025-03-18 PROCEDURE — 73562 X-RAY EXAM OF KNEE 3: CPT

## 2025-04-15 ENCOUNTER — HOSPITAL ENCOUNTER (OUTPATIENT)
Dept: GENERAL RADIOLOGY | Age: 65
Discharge: HOME | End: 2025-04-15
Payer: MEDICARE

## 2025-04-15 ENCOUNTER — HOSPITAL ENCOUNTER (OUTPATIENT)
Age: 65
Discharge: HOME | End: 2025-04-15
Payer: MEDICARE

## 2025-04-15 DIAGNOSIS — R52 PAIN: ICD-10-CM

## 2025-04-15 PROCEDURE — 73030 X-RAY EXAM OF SHOULDER: CPT

## 2025-04-15 PROCEDURE — 72040 X-RAY EXAM NECK SPINE 2-3 VW: CPT

## 2025-05-15 ENCOUNTER — OFFICE VISIT (OUTPATIENT)
Dept: FAMILY MEDICINE CLINIC | Age: 65
End: 2025-05-15
Payer: MEDICARE

## 2025-05-15 VITALS
TEMPERATURE: 97.8 F | SYSTOLIC BLOOD PRESSURE: 118 MMHG | RESPIRATION RATE: 18 BRPM | BODY MASS INDEX: 30.64 KG/M2 | DIASTOLIC BLOOD PRESSURE: 72 MMHG | OXYGEN SATURATION: 96 % | WEIGHT: 195.2 LBS | HEIGHT: 67 IN | HEART RATE: 68 BPM

## 2025-05-15 DIAGNOSIS — F32.4 MAJOR DEPRESSIVE DISORDER IN PARTIAL REMISSION, UNSPECIFIED WHETHER RECURRENT: ICD-10-CM

## 2025-05-15 DIAGNOSIS — Z87.891 PERSONAL HISTORY OF TOBACCO USE: ICD-10-CM

## 2025-05-15 DIAGNOSIS — G47.33 OSA (OBSTRUCTIVE SLEEP APNEA): ICD-10-CM

## 2025-05-15 DIAGNOSIS — F17.210 CIGARETTE NICOTINE DEPENDENCE, UNCOMPLICATED: ICD-10-CM

## 2025-05-15 DIAGNOSIS — E78.2 HYPERLIPIDEMIA, MIXED: ICD-10-CM

## 2025-05-15 DIAGNOSIS — I70.221 ATHEROSCLEROSIS OF NATIVE ARTERY OF RIGHT LOWER EXTREMITY WITH REST PAIN (HCC): ICD-10-CM

## 2025-05-15 DIAGNOSIS — I73.9 PAD (PERIPHERAL ARTERY DISEASE): ICD-10-CM

## 2025-05-15 DIAGNOSIS — J44.9 MODERATE COPD (CHRONIC OBSTRUCTIVE PULMONARY DISEASE) (HCC): ICD-10-CM

## 2025-05-15 DIAGNOSIS — J43.9 PULMONARY EMPHYSEMA, UNSPECIFIED EMPHYSEMA TYPE (HCC): ICD-10-CM

## 2025-05-15 DIAGNOSIS — E11.42 DIABETIC POLYNEUROPATHY ASSOCIATED WITH TYPE 2 DIABETES MELLITUS (HCC): Primary | ICD-10-CM

## 2025-05-15 DIAGNOSIS — M79.7 FIBROMYALGIA: ICD-10-CM

## 2025-05-15 DIAGNOSIS — Z78.0 POSTMENOPAUSE: ICD-10-CM

## 2025-05-15 DIAGNOSIS — R13.14 PHARYNGOESOPHAGEAL DYSPHAGIA: ICD-10-CM

## 2025-05-15 DIAGNOSIS — G25.81 RESTLESS LEG SYNDROME: ICD-10-CM

## 2025-05-15 LAB
CREAT UR-MCNC: 284 MG/DL
MICROALBUMIN UR-MCNC: < 2 MG/DL
MICROALBUMIN/CREAT RATIO PNL UR: 7 MG/G (ref 0–30)

## 2025-05-15 PROCEDURE — 1123F ACP DISCUSS/DSCN MKR DOCD: CPT | Performed by: NURSE PRACTITIONER

## 2025-05-15 PROCEDURE — 3044F HG A1C LEVEL LT 7.0%: CPT | Performed by: NURSE PRACTITIONER

## 2025-05-15 PROCEDURE — G0296 VISIT TO DETERM LDCT ELIG: HCPCS | Performed by: NURSE PRACTITIONER

## 2025-05-15 PROCEDURE — 99215 OFFICE O/P EST HI 40 MIN: CPT | Performed by: NURSE PRACTITIONER

## 2025-05-15 PROCEDURE — G0506 COMP ASSES CARE PLAN CCM SVC: HCPCS | Performed by: NURSE PRACTITIONER

## 2025-05-15 RX ORDER — PANTOPRAZOLE SODIUM 40 MG/1
40 TABLET, DELAYED RELEASE ORAL
Qty: 180 TABLET | Refills: 4 | Status: SHIPPED | OUTPATIENT
Start: 2025-05-15

## 2025-05-15 RX ORDER — INSULIN GLARGINE 100 [IU]/ML
INJECTION, SOLUTION SUBCUTANEOUS NIGHTLY
COMMUNITY
End: 2025-05-15 | Stop reason: ALTCHOICE

## 2025-05-15 RX ORDER — ROSUVASTATIN CALCIUM 20 MG/1
20 TABLET, COATED ORAL DAILY
COMMUNITY

## 2025-05-15 RX ORDER — CARBIDOPA AND LEVODOPA 25; 100 MG/1; MG/1
1 TABLET ORAL NIGHTLY PRN
COMMUNITY

## 2025-05-15 RX ORDER — DAPAGLIFLOZIN 10 MG/1
10 TABLET, FILM COATED ORAL EVERY MORNING
Qty: 90 TABLET | Refills: 4 | Status: SHIPPED | OUTPATIENT
Start: 2025-05-15

## 2025-05-15 RX ORDER — ACETAZOLAMIDE 250 MG/1
250 TABLET ORAL DAILY PRN
COMMUNITY

## 2025-05-15 RX ORDER — TRAZODONE HYDROCHLORIDE 150 MG/1
150 TABLET ORAL EVERY EVENING
Qty: 90 TABLET | Refills: 1 | Status: SHIPPED | OUTPATIENT
Start: 2025-05-15

## 2025-05-15 RX ORDER — TRAZODONE HYDROCHLORIDE 150 MG/1
150 TABLET ORAL EVERY EVENING
COMMUNITY
Start: 2025-05-05 | End: 2025-05-15 | Stop reason: SDUPTHER

## 2025-05-15 RX ORDER — DESVENLAFAXINE 25 MG/1
25 TABLET, EXTENDED RELEASE ORAL DAILY
Qty: 90 TABLET | Refills: 1 | Status: SHIPPED | OUTPATIENT
Start: 2025-05-15

## 2025-05-15 SDOH — ECONOMIC STABILITY: FOOD INSECURITY: WITHIN THE PAST 12 MONTHS, YOU WORRIED THAT YOUR FOOD WOULD RUN OUT BEFORE YOU GOT MONEY TO BUY MORE.: NEVER TRUE

## 2025-05-15 SDOH — ECONOMIC STABILITY: FOOD INSECURITY: WITHIN THE PAST 12 MONTHS, THE FOOD YOU BOUGHT JUST DIDN'T LAST AND YOU DIDN'T HAVE MONEY TO GET MORE.: NEVER TRUE

## 2025-05-15 ASSESSMENT — PATIENT HEALTH QUESTIONNAIRE - PHQ9
5. POOR APPETITE OR OVEREATING: NEARLY EVERY DAY
7. TROUBLE CONCENTRATING ON THINGS, SUCH AS READING THE NEWSPAPER OR WATCHING TELEVISION: MORE THAN HALF THE DAYS
8. MOVING OR SPEAKING SO SLOWLY THAT OTHER PEOPLE COULD HAVE NOTICED. OR THE OPPOSITE, BEING SO FIGETY OR RESTLESS THAT YOU HAVE BEEN MOVING AROUND A LOT MORE THAN USUAL: NOT AT ALL
SUM OF ALL RESPONSES TO PHQ QUESTIONS 1-9: 14
10. IF YOU CHECKED OFF ANY PROBLEMS, HOW DIFFICULT HAVE THESE PROBLEMS MADE IT FOR YOU TO DO YOUR WORK, TAKE CARE OF THINGS AT HOME, OR GET ALONG WITH OTHER PEOPLE: SOMEWHAT DIFFICULT
SUM OF ALL RESPONSES TO PHQ QUESTIONS 1-9: 14
3. TROUBLE FALLING OR STAYING ASLEEP: NOT AT ALL
9. THOUGHTS THAT YOU WOULD BE BETTER OFF DEAD, OR OF HURTING YOURSELF: NOT AT ALL
SUM OF ALL RESPONSES TO PHQ QUESTIONS 1-9: 14
SUM OF ALL RESPONSES TO PHQ QUESTIONS 1-9: 14
4. FEELING TIRED OR HAVING LITTLE ENERGY: MORE THAN HALF THE DAYS
6. FEELING BAD ABOUT YOURSELF - OR THAT YOU ARE A FAILURE OR HAVE LET YOURSELF OR YOUR FAMILY DOWN: NEARLY EVERY DAY
2. FEELING DOWN, DEPRESSED OR HOPELESS: NEARLY EVERY DAY
1. LITTLE INTEREST OR PLEASURE IN DOING THINGS: SEVERAL DAYS

## 2025-05-15 ASSESSMENT — ENCOUNTER SYMPTOMS
RESPIRATORY NEGATIVE: 1
BACK PAIN: 1
GASTROINTESTINAL NEGATIVE: 1

## 2025-05-21 ENCOUNTER — HOSPITAL ENCOUNTER (OUTPATIENT)
Dept: WOMENS IMAGING | Age: 65
Discharge: HOME | End: 2025-05-21
Payer: MEDICARE

## 2025-05-21 ENCOUNTER — HOSPITAL ENCOUNTER (OUTPATIENT)
Dept: CT IMAGING | Age: 65
Discharge: HOME | End: 2025-05-21
Payer: MEDICARE

## 2025-05-21 VITALS — BODY MASS INDEX: 30.97 KG/M2 | WEIGHT: 197.3 LBS | HEIGHT: 67 IN

## 2025-05-21 DIAGNOSIS — F17.210 CIGARETTE NICOTINE DEPENDENCE, UNCOMPLICATED: ICD-10-CM

## 2025-05-21 DIAGNOSIS — Z78.0 POSTMENOPAUSE: ICD-10-CM

## 2025-05-21 PROCEDURE — 77080 DXA BONE DENSITY AXIAL: CPT

## 2025-05-21 PROCEDURE — 71271 CT THORAX LUNG CANCER SCR C-: CPT

## 2025-05-22 ENCOUNTER — TELEPHONE (OUTPATIENT)
Dept: FAMILY MEDICINE CLINIC | Age: 65
End: 2025-05-22

## 2025-05-22 ENCOUNTER — RESULTS FOLLOW-UP (OUTPATIENT)
Dept: FAMILY MEDICINE CLINIC | Age: 65
End: 2025-05-22

## 2025-05-22 DIAGNOSIS — M85.80 OSTEOPENIA, UNSPECIFIED LOCATION: ICD-10-CM

## 2025-05-22 DIAGNOSIS — R91.1 PULMONARY NODULE: Primary | ICD-10-CM

## 2025-05-22 RX ORDER — CALCIUM CARBONATE/VITAMIN D3 500 MG-10
1 TABLET ORAL 2 TIMES DAILY
COMMUNITY
Start: 2025-05-22

## 2025-05-22 NOTE — TELEPHONE ENCOUNTER
Oumar Cramer, DO  P [facility] Clinical Staff  Please let pt know that LDCT shows a new nodule, that was not there on a prior scan.  It's not clear on this CT if this is just from inflammation or something more than that.  The radiologist recommends a f/u CT scan called a PET/CT to determine what this nodule may be. I've ordered this test. Let me know if questions, thanks!    Also, DEXA scan showed mild thinning of the bones called osteopenia. Recommend weight bearing execise and taking a twice daily calcium wit vit D supplement, usual dose is 500mg Ca and 400IU vit D together.    Let me know if questions, thanks!

## 2025-06-16 ENCOUNTER — OFFICE VISIT (OUTPATIENT)
Dept: FAMILY MEDICINE CLINIC | Age: 65
End: 2025-06-16

## 2025-06-16 VITALS
HEIGHT: 67 IN | HEART RATE: 64 BPM | SYSTOLIC BLOOD PRESSURE: 122 MMHG | BODY MASS INDEX: 30.83 KG/M2 | RESPIRATION RATE: 18 BRPM | OXYGEN SATURATION: 97 % | TEMPERATURE: 97.7 F | DIASTOLIC BLOOD PRESSURE: 72 MMHG | WEIGHT: 196.4 LBS

## 2025-06-16 DIAGNOSIS — M85.80 OSTEOPENIA, UNSPECIFIED LOCATION: ICD-10-CM

## 2025-06-16 DIAGNOSIS — E78.2 HYPERLIPIDEMIA, MIXED: ICD-10-CM

## 2025-06-16 DIAGNOSIS — I70.221 ATHEROSCLEROSIS OF NATIVE ARTERY OF RIGHT LOWER EXTREMITY WITH REST PAIN (HCC): ICD-10-CM

## 2025-06-16 DIAGNOSIS — R91.1 PULMONARY NODULE: ICD-10-CM

## 2025-06-16 DIAGNOSIS — M54.12 CERVICAL RADICULOPATHY: ICD-10-CM

## 2025-06-16 DIAGNOSIS — I73.9 PAD (PERIPHERAL ARTERY DISEASE): ICD-10-CM

## 2025-06-16 DIAGNOSIS — J44.9 MODERATE COPD (CHRONIC OBSTRUCTIVE PULMONARY DISEASE) (HCC): ICD-10-CM

## 2025-06-16 DIAGNOSIS — M25.562 ACUTE PAIN OF LEFT KNEE: ICD-10-CM

## 2025-06-16 DIAGNOSIS — J43.9 PULMONARY EMPHYSEMA, UNSPECIFIED EMPHYSEMA TYPE (HCC): ICD-10-CM

## 2025-06-16 DIAGNOSIS — G47.33 OSA (OBSTRUCTIVE SLEEP APNEA): ICD-10-CM

## 2025-06-16 DIAGNOSIS — E11.42 DIABETIC POLYNEUROPATHY ASSOCIATED WITH TYPE 2 DIABETES MELLITUS (HCC): ICD-10-CM

## 2025-06-16 DIAGNOSIS — F32.4 MAJOR DEPRESSIVE DISORDER IN PARTIAL REMISSION, UNSPECIFIED WHETHER RECURRENT: ICD-10-CM

## 2025-06-16 DIAGNOSIS — S83.207A POSITIVE MCMURRAY TEST OF LEFT KNEE, INITIAL ENCOUNTER: ICD-10-CM

## 2025-06-16 DIAGNOSIS — Z00.01 ENCOUNTER FOR MEDICARE ANNUAL EXAMINATION WITH ABNORMAL FINDINGS: Primary | ICD-10-CM

## 2025-06-16 DIAGNOSIS — R13.14 PHARYNGOESOPHAGEAL DYSPHAGIA: ICD-10-CM

## 2025-06-16 DIAGNOSIS — G25.81 RESTLESS LEG SYNDROME: ICD-10-CM

## 2025-06-16 PROBLEM — M50.20 HERNIATED CERVICAL DISC: Status: RESOLVED | Noted: 2017-10-09 | Resolved: 2025-06-16

## 2025-06-16 ASSESSMENT — PATIENT HEALTH QUESTIONNAIRE - PHQ9
1. LITTLE INTEREST OR PLEASURE IN DOING THINGS: SEVERAL DAYS
9. THOUGHTS THAT YOU WOULD BE BETTER OFF DEAD, OR OF HURTING YOURSELF: SEVERAL DAYS
8. MOVING OR SPEAKING SO SLOWLY THAT OTHER PEOPLE COULD HAVE NOTICED. OR THE OPPOSITE, BEING SO FIGETY OR RESTLESS THAT YOU HAVE BEEN MOVING AROUND A LOT MORE THAN USUAL: MORE THAN HALF THE DAYS
3. TROUBLE FALLING OR STAYING ASLEEP: MORE THAN HALF THE DAYS
10. IF YOU CHECKED OFF ANY PROBLEMS, HOW DIFFICULT HAVE THESE PROBLEMS MADE IT FOR YOU TO DO YOUR WORK, TAKE CARE OF THINGS AT HOME, OR GET ALONG WITH OTHER PEOPLE: VERY DIFFICULT
SUM OF ALL RESPONSES TO PHQ QUESTIONS 1-9: 19
5. POOR APPETITE OR OVEREATING: NEARLY EVERY DAY
4. FEELING TIRED OR HAVING LITTLE ENERGY: MORE THAN HALF THE DAYS
SUM OF ALL RESPONSES TO PHQ QUESTIONS 1-9: 19
2. FEELING DOWN, DEPRESSED OR HOPELESS: NEARLY EVERY DAY
SUM OF ALL RESPONSES TO PHQ QUESTIONS 1-9: 18
6. FEELING BAD ABOUT YOURSELF - OR THAT YOU ARE A FAILURE OR HAVE LET YOURSELF OR YOUR FAMILY DOWN: NEARLY EVERY DAY
7. TROUBLE CONCENTRATING ON THINGS, SUCH AS READING THE NEWSPAPER OR WATCHING TELEVISION: MORE THAN HALF THE DAYS
SUM OF ALL RESPONSES TO PHQ QUESTIONS 1-9: 19

## 2025-06-16 ASSESSMENT — COLUMBIA-SUICIDE SEVERITY RATING SCALE - C-SSRS
6. HAVE YOU EVER DONE ANYTHING, STARTED TO DO ANYTHING, OR PREPARED TO DO ANYTHING TO END YOUR LIFE?: NO
5. HAVE YOU STARTED TO WORK OUT OR WORKED OUT THE DETAILS OF HOW TO KILL YOURSELF? DO YOU INTEND TO CARRY OUT THIS PLAN?: NO
2. HAVE YOU ACTUALLY HAD ANY THOUGHTS OF KILLING YOURSELF?: YES
4. HAVE YOU HAD THESE THOUGHTS AND HAD SOME INTENTION OF ACTING ON THEM?: NO
1. WITHIN THE PAST MONTH, HAVE YOU WISHED YOU WERE DEAD OR WISHED YOU COULD GO TO SLEEP AND NOT WAKE UP?: YES
3. HAVE YOU BEEN THINKING ABOUT HOW YOU MIGHT KILL YOURSELF?: NO

## 2025-06-16 ASSESSMENT — LIFESTYLE VARIABLES
HOW OFTEN DO YOU HAVE A DRINK CONTAINING ALCOHOL: NEVER
HOW MANY STANDARD DRINKS CONTAINING ALCOHOL DO YOU HAVE ON A TYPICAL DAY: PATIENT DOES NOT DRINK

## 2025-06-16 NOTE — PROGRESS NOTES
Medicare Annual Wellness Visit    Laurie Munroe is here for Medicare AWV (Left knee pain, left arm, numbness and tingling would like to go to PT)    Assessment & Plan   Encounter for Medicare annual examination with abnormal findings  Cervical radiculopathy  -     [facility]  -     Nerve conduction test; Future  Positive Cash test of left knee, initial encounter  -     MRI KNEE LEFT WO CONTRAST; Future  Major depressive disorder in partial remission, unspecified whether recurrent  -     [facility] - Gabi Haywood, CNP, Psychiatry, [place]  Diabetic polyneuropathy associated with type 2 diabetes mellitus (HCC)  Pulmonary emphysema, unspecified emphysema type (HCC)  Hyperlipidemia, mixed  PAD (peripheral artery disease)  Moderate COPD (chronic obstructive pulmonary disease) ([facility])  Atherosclerosis of native artery of right lower extremity with rest pain ([facility])  Restless leg syndrome  Osteopenia, unspecified location  Pulmonary nodule  CARLOTTA (obstructive sleep apnea)  Pharyngoesophageal dysphagia  Acute pain of left knee  -     [facility]  -     MRI KNEE LEFT WO CONTRAST; Future  Abnormal wellness exam       Return if symptoms worsen or fail to improve.     Subjective       PMH: hyperlipidemia, COPD, T2DM, polyneuropathy, , PAD, OA  Diabetes Type 2, fibromyalgia, RLS      Here to discuss left arm N/T  Due to accident in February 2025   Following with alternative provider for cervical spinal stenosis  Had neck x-ray 4/2025 identified disc space narrowing of C5-6 but no fractures   -pain stars at posterior neck that radiates down left arm comes and goes  -neck pain is an ache 7/10  -did have neck x-ray but no EMG or MRI   UBJECTIVE:  Laurie Munroe is a 64 y.o. y/o female that presents with Medicare AWV (Left knee pain, left arm, numbness and tingling would like to go to PT)  .    HPI:  Pain is present in the cervical region.  Symptoms have been present for 4 month(s).  The

## 2025-06-16 NOTE — PATIENT INSTRUCTIONS
These include candy, desserts, and soda pop.   Heart-healthy lifestyle    If your doctor recommends it, get more exercise. For many people, walking is a good choice. Or you may want to swim, bike, or do other activities. Bit by bit, increase the time you're active every day. Try for at least 30 minutes on most days of the week.     Try to quit or cut back on using tobacco and other nicotine products. This includes smoking and vaping. If you need help quitting, talk to your doctor about stop-smoking programs and medicines. These can increase your chances of quitting for good. Quitting is one of the most important things you can do to protect your heart. It is never too late to quit. Try to avoid secondhand smoke too.     Stay at a weight that's healthy for you. Talk to your doctor if you need help losing weight.     Try to get 7 to 9 hours of sleep each night.     Limit alcohol to 2 drinks a day for men and 1 drink a day for women. Too much alcohol can cause health problems.     Manage other health problems such as diabetes, high blood pressure, and high cholesterol. If you think you may have a problem with alcohol or drug use, talk to your doctor.   Medicines    Take your medicines exactly as prescribed. Call your doctor if you think you are having a problem with your medicine.     If your doctor recommends aspirin, take the amount directed each day. Make sure you take aspirin and not another kind of pain reliever, such as acetaminophen (Tylenol).   When should you call for help?   Call 911 if you have symptoms of a heart attack. These may include:    Chest pain or pressure, or a strange feeling in the chest.     Sweating.     Shortness of breath.     Pain, pressure, or a strange feeling in the back, neck, jaw, or upper belly or in one or both shoulders or arms.     Lightheadedness or sudden weakness.     A fast or irregular heartbeat.   After you call 911, the  may tell you to chew 1 adult-strength or 2 to

## 2025-06-18 ENCOUNTER — RESULTS FOLLOW-UP (OUTPATIENT)
Dept: FAMILY MEDICINE CLINIC | Age: 65
End: 2025-06-18

## 2025-06-18 ENCOUNTER — HOSPITAL ENCOUNTER (OUTPATIENT)
Dept: PET IMAGING | Age: 65
Discharge: HOME | End: 2025-06-18
Attending: FAMILY MEDICINE
Payer: MEDICARE

## 2025-06-18 DIAGNOSIS — R91.1 PULMONARY NODULE: Primary | ICD-10-CM

## 2025-06-18 DIAGNOSIS — R91.1 PULMONARY NODULE: ICD-10-CM

## 2025-06-18 PROCEDURE — 78815 PET IMAGE W/CT SKULL-THIGH: CPT

## 2025-06-18 PROCEDURE — 3430000000 HC RX DIAGNOSTIC RADIOPHARMACEUTICAL: Performed by: FAMILY MEDICINE

## 2025-06-18 PROCEDURE — A9609 HC RX DIAGNOSTIC RADIOPHARMACEUTICAL: HCPCS | Performed by: FAMILY MEDICINE

## 2025-06-18 RX ORDER — FLUDEOXYGLUCOSE F 18 200 MCI/ML
9.8 INJECTION, SOLUTION INTRAVENOUS
Status: COMPLETED | OUTPATIENT
Start: 2025-06-18 | End: 2025-06-18

## 2025-06-18 RX ADMIN — FLUDEOXYGLUCOSE F 18 9.8 MILLICURIE: 200 INJECTION, SOLUTION INTRAVENOUS at 09:45

## 2025-06-18 NOTE — TELEPHONE ENCOUNTER
Pt informed of PET/CT scan . Pt voiced understanding with no further questions at this time.     Transferred patient to central scheduling to schedule for 3 months out

## 2025-07-01 ENCOUNTER — TELEPHONE (OUTPATIENT)
Dept: FAMILY MEDICINE CLINIC | Age: 65
End: 2025-07-01

## 2025-07-01 NOTE — TELEPHONE ENCOUNTER
Please give pt this info.,     Gabi Haywood, CNP has received the referral and has declined to schedule at this time. Per Gabi Haywood after review of referral; Due to noncompliance issues, she may be a better fit with [facility] for walk in services. *referral denied. Patient may contact [facility] ([place], ph. [phone], [facility] ph, [phone], [place], ph. [phone] and [place], ph. [phone]), [employer] ([place], ph. [phone]) or [place] ([place], ph. [phone], [place], ph. [phone]).

## 2025-07-02 NOTE — TELEPHONE ENCOUNTER
Recent Visits  Date Type Provider Dept   06/16/25 Office Visit Hilton Alcala APRN - CNP [facility]   05/15/25 Office Visit Hilton Alcala APRN - CNP [facility]   04/10/24 Office Visit Hilton Alcala APRN - CNP [facility]   03/07/24 Office Visit Hilton Alcala APRN - CNP [facility]   Showing recent visits within past 540 days with a meds authorizing provider and meeting all other requirements  Future Appointments  Date Type Provider Dept   11/17/25 Appointment Hilton Alcala APRN - CNP [facility]   Showing future appointments within next 150 days with a meds authorizing provider and meeting all other requirements

## 2025-07-07 DIAGNOSIS — M79.7 FIBROMYALGIA: ICD-10-CM

## 2025-07-07 DIAGNOSIS — E11.42 DIABETIC POLYNEUROPATHY ASSOCIATED WITH TYPE 2 DIABETES MELLITUS (HCC): ICD-10-CM

## 2025-07-07 RX ORDER — PREGABALIN 300 MG/1
CAPSULE ORAL
Qty: 180 CAPSULE | Refills: 1 | Status: SHIPPED | OUTPATIENT
Start: 2025-07-07 | End: 2026-01-05

## 2025-07-10 ENCOUNTER — TELEPHONE (OUTPATIENT)
Dept: FAMILY MEDICINE CLINIC | Age: 65
End: 2025-07-10

## 2025-07-10 ENCOUNTER — HOSPITAL ENCOUNTER (OUTPATIENT)
Dept: PHYSICAL THERAPY | Age: 65
Setting detail: THERAPIES SERIES
Discharge: HOME | End: 2025-07-10

## 2025-07-10 ENCOUNTER — PROCEDURE VISIT (OUTPATIENT)
Dept: NEUROLOGY | Age: 65
End: 2025-07-10
Payer: MEDICARE

## 2025-07-10 DIAGNOSIS — R29.898 BILATERAL ARM WEAKNESS: Primary | ICD-10-CM

## 2025-07-10 DIAGNOSIS — M54.12 CERVICAL RADICULOPATHY: Primary | ICD-10-CM

## 2025-07-10 DIAGNOSIS — M54.12 CERVICAL RADICULOPATHY: ICD-10-CM

## 2025-07-10 DIAGNOSIS — G56.03 BILATERAL CARPAL TUNNEL SYNDROME: ICD-10-CM

## 2025-07-10 PROCEDURE — 95911 NRV CNDJ TEST 9-10 STUDIES: CPT | Performed by: PSYCHIATRY & NEUROLOGY

## 2025-07-10 PROCEDURE — 95886 MUSC TEST DONE W/N TEST COMP: CPT | Performed by: PSYCHIATRY & NEUROLOGY

## 2025-07-10 NOTE — TELEPHONE ENCOUNTER
Pt states she doesn't want to go to  physical therapy she doesn't like the staff there  Pt would like to go to [facility]

## 2025-07-10 NOTE — CARE COORDINATION
Patient was scheduled for a 900AM PT evaluation.  At 904AM, I greeted patient in the waiting area and overheard she was on speaker phone listening to automated prompts.  Patient gave her name and  when I asked.  States, \"[facility] is not going to work out for me.  I am calling my doctor now to schedule somewhere else.\"  I asked if something happened and if she would like to speak to someone at this clinic and patient said, no.  I asked if she would like any coffee or water and she declined.  I asked if there was anything I could do to help her and she said, no.  I then checked with clerical staff who informed me that patient had arrived early for her 900AM PT evaluation, and had been informed she was early.  Clerical staff shared with me that patient had at one point come up to the desk, shouting, and asking why she had not been called back yet and was reminded again that she had arrived early.  A few minutes later, patient departed the clinic.

## 2025-07-12 ENCOUNTER — HOSPITAL ENCOUNTER (OUTPATIENT)
Dept: MRI IMAGING | Age: 65
Discharge: HOME | End: 2025-07-12
Payer: MEDICARE

## 2025-07-12 DIAGNOSIS — M25.562 ACUTE PAIN OF LEFT KNEE: ICD-10-CM

## 2025-07-12 DIAGNOSIS — S83.207A POSITIVE MCMURRAY TEST OF LEFT KNEE, INITIAL ENCOUNTER: ICD-10-CM

## 2025-07-12 PROCEDURE — 73721 MRI JNT OF LWR EXTRE W/O DYE: CPT

## 2025-07-14 ENCOUNTER — RESULTS FOLLOW-UP (OUTPATIENT)
Dept: FAMILY MEDICINE CLINIC | Age: 65
End: 2025-07-14

## 2025-07-14 ENCOUNTER — TELEPHONE (OUTPATIENT)
Dept: FAMILY MEDICINE CLINIC | Age: 65
End: 2025-07-14

## 2025-07-14 NOTE — TELEPHONE ENCOUNTER
Pt informed of [facility] pt referral . Pt voiced understanding with no further questions at this time.

## 2025-07-14 NOTE — TELEPHONE ENCOUNTER
----- Message from Juan Jgato GUAJARDO sent at 7/14/2025  2:38 PM EDT -----  Regarding: [facility] Referral Request  [facility] Referral Request    Reason for referral request: Specialty Provider : Physical Therapy    Specialist/Lab/Test patient is requesting (if known): n/a    Specialist Phone Number (if applicable): n/a    Additional Information : Patient is requesting another referral for Physical Therapy specialist because the one that she has at the [facility] were rude and she wants to have a PT specialist at the [facility].  --------------------------------------------------------------------------------------------------------------------------    Relationship to Patient: Self     Call Back Information: OK to leave message on voicemail  Preferred Call Back Number: Phone number : [phone] (home)

## 2025-07-14 NOTE — TELEPHONE ENCOUNTER
She has already been referred to [facility] PT, she can call them to schedule.

## 2025-07-15 ENCOUNTER — RESULTS FOLLOW-UP (OUTPATIENT)
Dept: FAMILY MEDICINE CLINIC | Age: 65
End: 2025-07-15

## 2025-07-15 ENCOUNTER — TELEPHONE (OUTPATIENT)
Dept: FAMILY MEDICINE CLINIC | Age: 65
End: 2025-07-15

## 2025-07-15 DIAGNOSIS — M54.12 CERVICAL RADICULOPATHY AT C5: Primary | ICD-10-CM

## 2025-07-15 RX ORDER — PREDNISONE 20 MG/1
TABLET ORAL
Qty: 18 TABLET | Refills: 0 | Status: SHIPPED | OUTPATIENT
Start: 2025-07-15

## 2025-07-15 NOTE — TELEPHONE ENCOUNTER
----- Message from ALLEN Wheatley CNP sent at 7/14/2025  4:46 PM EDT -----  Let pt know her MRI of knee does not identify any tendon or ligament tears , recommend following up with PT for strengthening and stretching of the knee, is pain any better? Continue to ice and use  a knee sleeve if needed for comfort.

## 2025-07-15 NOTE — TELEPHONE ENCOUNTER
Has [facility] PT reached out to her yet to schedule? If not have her contact them to start PT, and if no better after 4 weeks of PT we can order an MRI if needed to look at the meniscus and ligaments.

## 2025-07-15 NOTE — TELEPHONE ENCOUNTER
Patient will contact [facility] PT , and ok to do prednisone    [facility] on [address]    Hilton Alcala, APRN - CNP  P [facility] Clinical Staff  Let pt know her EMG study identifies chronic cervical impingement at the C5-6 level worse on left indicating a mild pinched nerve, she also has mild bilateral carpal tunnel.  We can try a course of prednisone to see if that helps alleviate the N/T of neck radiating to arm and if no better can consider an MRI of cervical neck, tx for carpal tunnel is NSAID and carpal tunnel braces to wear at night to prevent symptoms. There is also OT for the carpal tunnel but if mild would try NSAIDS and braces fist. Let me know her thoughts. There is also another encounter for her regarding her PT for her knee Thanks

## 2025-08-22 DIAGNOSIS — E11.42 DIABETIC POLYNEUROPATHY ASSOCIATED WITH TYPE 2 DIABETES MELLITUS (HCC): ICD-10-CM

## 2025-08-23 RX ORDER — TIRZEPATIDE 10 MG/.5ML
INJECTION, SOLUTION SUBCUTANEOUS
Qty: 2 ML | Refills: 2 | Status: SHIPPED | OUTPATIENT
Start: 2025-08-23

## 2025-08-26 ENCOUNTER — TELEPHONE (OUTPATIENT)
Dept: FAMILY MEDICINE CLINIC | Age: 65
End: 2025-08-26

## (undated) DEVICE — ENDO KIT: Brand: MEDLINE INDUSTRIES, INC.

## (undated) DEVICE — BREAST HERNIA PACK: Brand: MEDLINE INDUSTRIES, INC.

## (undated) DEVICE — CAP LUER FEM M CONN X- L

## (undated) DEVICE — SPONGE GZ W4XL4IN COT 12 PLY TYP VII WVN C FLD DSGN

## (undated) DEVICE — 4-PORT MANIFOLD: Brand: NEPTUNE 2

## (undated) DEVICE — TUBING, SUCTION, 1/4" X 20', STRAIGHT: Brand: MEDLINE INDUSTRIES, INC.

## (undated) DEVICE — SOLUTION IV IRRIG POUR BRL 0.9% SODIUM CHL 2F7124

## (undated) DEVICE — SYRINGE IRRIG 60ML SFT PLIABLE BLB EZ TO GRP 1 HND USE W/

## (undated) DEVICE — YANKAUER,BULB TIP,W/O VENT,RIGID,STERILE: Brand: MEDLINE

## (undated) DEVICE — FORCEP RAD JAW W/NEEDLE 160CM

## (undated) DEVICE — SPONGE LAP W18XL18IN WHT COT 4 PLY FLD STRUNG RADPQ DISP ST

## (undated) DEVICE — JELLY,LUBE,STERILE,FLIP TOP,TUBE,2-OZ: Brand: MEDLINE

## (undated) DEVICE — TAPE,CLOTH/SILK,CURAD,3"X10YD,LF,40/CS: Brand: CURAD

## (undated) DEVICE — CONMED SCOPE SAVER BITE BLOCK, 20X27 MM: Brand: SCOPE SAVER

## (undated) DEVICE — PREP SOL PVP IODINE 4%  4 OZ/BTL

## (undated) DEVICE — GAUZE,PACKING STRIP,IODOFORM,1/2"X5YD,ST: Brand: CURAD

## (undated) DEVICE — BANDAGE,GAUZE,4.5"X4.1YD,STERILE,LF: Brand: MEDLINE

## (undated) DEVICE — AMERICAN ESOPHAGEAL DILATOR CLEANING BRUSH

## (undated) DEVICE — CONNECTOR TBNG AUX H2O JET DISP FOR OLY 160/180 SER

## (undated) DEVICE — SHEET, T, LAPAROTOMY, STERILE: Brand: MEDLINE

## (undated) DEVICE — GLOVE SURG SZ 65 THK91MIL LTX FREE SYN POLYISOPRENE

## (undated) DEVICE — Z DISCONTINUED BY MEDLINE USE 2711682 TRAY SKIN PREP DRY W/ PREM GLV

## (undated) DEVICE — SOLUTION SURG PREP POV IOD 7.5% 4 OZ

## (undated) DEVICE — GAUZE,PACKING STRIP,IODOFORM,1"X5YD,STRL: Brand: CURAD

## (undated) DEVICE — SET LNR RED GRN W/ BASE CLEANASCOPE

## (undated) DEVICE — SUREFIT, DUAL DISPERSIVE ELECTRODE, CONTACT QUALITY MONITOR: Brand: SUREFIT

## (undated) DEVICE — PAD,ABDOMINAL,5"X9",ST,LF,25/BX: Brand: MEDLINE INDUSTRIES, INC.

## (undated) DEVICE — SYRINGE MED 10ML LUERLOCK TIP W/O SFTY DISP

## (undated) DEVICE — POSITIONER HD W8XH4XL8.5IN RASPBERRY FOAM SLT

## (undated) DEVICE — BASIC SINGLE BASIN BTC-LF: Brand: MEDLINE INDUSTRIES, INC.

## (undated) DEVICE — GLOVE ORANGE PI 7   MSG9070

## (undated) DEVICE — GARMENT,MEDLINE,DVT,INT,CALF,MED, GEN2: Brand: MEDLINE

## (undated) DEVICE — CANISTER, RIGID, 2000CC: Brand: MEDLINE INDUSTRIES, INC.